# Patient Record
Sex: FEMALE | Race: WHITE | Employment: OTHER | URBAN - NONMETROPOLITAN AREA
[De-identification: names, ages, dates, MRNs, and addresses within clinical notes are randomized per-mention and may not be internally consistent; named-entity substitution may affect disease eponyms.]

---

## 2017-01-12 ENCOUNTER — TELEPHONE (OUTPATIENT)
Dept: FAMILY MEDICINE | Facility: CLINIC | Age: 72
End: 2017-01-12

## 2017-01-18 ENCOUNTER — OFFICE VISIT (OUTPATIENT)
Dept: FAMILY MEDICINE | Facility: CLINIC | Age: 72
End: 2017-01-18
Payer: COMMERCIAL

## 2017-01-18 VITALS
SYSTOLIC BLOOD PRESSURE: 134 MMHG | WEIGHT: 280 LBS | HEIGHT: 65 IN | DIASTOLIC BLOOD PRESSURE: 68 MMHG | BODY MASS INDEX: 46.65 KG/M2 | HEART RATE: 84 BPM | TEMPERATURE: 98.7 F | RESPIRATION RATE: 20 BRPM

## 2017-01-18 DIAGNOSIS — Z23 NEED FOR PROPHYLACTIC VACCINATION AND INOCULATION AGAINST INFLUENZA: ICD-10-CM

## 2017-01-18 DIAGNOSIS — J45.30 MILD PERSISTENT ASTHMA, UNCOMPLICATED: Chronic | ICD-10-CM

## 2017-01-18 DIAGNOSIS — I10 BENIGN ESSENTIAL HYPERTENSION: ICD-10-CM

## 2017-01-18 DIAGNOSIS — Z13.6 CARDIOVASCULAR SCREENING; LDL GOAL LESS THAN 100: ICD-10-CM

## 2017-01-18 DIAGNOSIS — E66.01 MORBID OBESITY WITH BMI OF 45.0-49.9, ADULT (H): Chronic | ICD-10-CM

## 2017-01-18 DIAGNOSIS — E11.9 TYPE 2 DIABETES MELLITUS WITHOUT COMPLICATION, WITHOUT LONG-TERM CURRENT USE OF INSULIN (H): Primary | ICD-10-CM

## 2017-01-18 DIAGNOSIS — E78.1 HYPERTRIGLYCERIDEMIA: ICD-10-CM

## 2017-01-18 LAB
ALBUMIN SERPL-MCNC: 3.6 G/DL (ref 3.4–5)
ALP SERPL-CCNC: 120 U/L (ref 40–150)
ALT SERPL W P-5'-P-CCNC: 72 U/L (ref 0–50)
ANION GAP SERPL CALCULATED.3IONS-SCNC: 13 MMOL/L (ref 3–14)
AST SERPL W P-5'-P-CCNC: 52 U/L (ref 0–45)
BILIRUB SERPL-MCNC: 0.5 MG/DL (ref 0.2–1.3)
BUN SERPL-MCNC: 16 MG/DL (ref 7–30)
CALCIUM SERPL-MCNC: 9 MG/DL (ref 8.5–10.1)
CHLORIDE SERPL-SCNC: 107 MMOL/L (ref 94–109)
CHOLEST SERPL-MCNC: 166 MG/DL
CO2 SERPL-SCNC: 18 MMOL/L (ref 20–32)
CREAT SERPL-MCNC: 0.81 MG/DL (ref 0.52–1.04)
GFR SERPL CREATININE-BSD FRML MDRD: 70 ML/MIN/1.7M2
GLUCOSE SERPL-MCNC: 176 MG/DL (ref 70–99)
HBA1C MFR BLD: 7.7 % (ref 4.3–6)
HDLC SERPL-MCNC: 55 MG/DL
LDLC SERPL CALC-MCNC: 55 MG/DL
NONHDLC SERPL-MCNC: 111 MG/DL
POTASSIUM SERPL-SCNC: 4 MMOL/L (ref 3.4–5.3)
PROT SERPL-MCNC: 7.2 G/DL (ref 6.8–8.8)
SODIUM SERPL-SCNC: 138 MMOL/L (ref 133–144)
T4 FREE SERPL-MCNC: 1.12 NG/DL (ref 0.76–1.46)
TRIGL SERPL-MCNC: 281 MG/DL
TSH SERPL DL<=0.005 MIU/L-ACNC: 5 MU/L (ref 0.4–4)

## 2017-01-18 PROCEDURE — 83036 HEMOGLOBIN GLYCOSYLATED A1C: CPT | Performed by: NURSE PRACTITIONER

## 2017-01-18 PROCEDURE — 36415 COLL VENOUS BLD VENIPUNCTURE: CPT | Performed by: NURSE PRACTITIONER

## 2017-01-18 PROCEDURE — 80061 LIPID PANEL: CPT | Performed by: NURSE PRACTITIONER

## 2017-01-18 PROCEDURE — G0008 ADMIN INFLUENZA VIRUS VAC: HCPCS | Performed by: NURSE PRACTITIONER

## 2017-01-18 PROCEDURE — 84439 ASSAY OF FREE THYROXINE: CPT | Performed by: NURSE PRACTITIONER

## 2017-01-18 PROCEDURE — 99207 C FOOT EXAM  NO CHARGE: CPT | Performed by: NURSE PRACTITIONER

## 2017-01-18 PROCEDURE — 99214 OFFICE O/P EST MOD 30 MIN: CPT | Mod: 25 | Performed by: NURSE PRACTITIONER

## 2017-01-18 PROCEDURE — 80053 COMPREHEN METABOLIC PANEL: CPT | Performed by: NURSE PRACTITIONER

## 2017-01-18 PROCEDURE — 90662 IIV NO PRSV INCREASED AG IM: CPT | Performed by: NURSE PRACTITIONER

## 2017-01-18 PROCEDURE — 84443 ASSAY THYROID STIM HORMONE: CPT | Performed by: NURSE PRACTITIONER

## 2017-01-18 RX ORDER — VALSARTAN 320 MG/1
320 TABLET ORAL DAILY
Qty: 30 TABLET | Refills: 11 | Status: SHIPPED | OUTPATIENT
Start: 2017-01-18 | End: 2018-02-05

## 2017-01-18 RX ORDER — HYDRALAZINE HYDROCHLORIDE 50 MG/1
100 TABLET, FILM COATED ORAL 2 TIMES DAILY
Qty: 30 TABLET | Refills: 11 | Status: SHIPPED | OUTPATIENT
Start: 2017-01-18 | End: 2017-01-18

## 2017-01-18 RX ORDER — VERAPAMIL HYDROCHLORIDE 240 MG/1
240 TABLET, FILM COATED, EXTENDED RELEASE ORAL DAILY
Qty: 30 TABLET | Refills: 11 | Status: SHIPPED | OUTPATIENT
Start: 2017-01-18 | End: 2017-11-09 | Stop reason: ALTCHOICE

## 2017-01-18 RX ORDER — FUROSEMIDE 20 MG
20 TABLET ORAL DAILY
Qty: 30 TABLET | Refills: 11 | Status: SHIPPED | OUTPATIENT
Start: 2017-01-18 | End: 2017-12-04

## 2017-01-18 RX ORDER — HYDRALAZINE HYDROCHLORIDE 100 MG/1
100 TABLET, FILM COATED ORAL 2 TIMES DAILY
Qty: 60 TABLET | Refills: 11 | Status: SHIPPED | OUTPATIENT
Start: 2017-01-18 | End: 2017-08-29 | Stop reason: DRUGHIGH

## 2017-01-18 RX ORDER — LIRAGLUTIDE 6 MG/ML
1.2 INJECTION SUBCUTANEOUS DAILY
Qty: 3 ML | Refills: 3 | Status: SHIPPED | OUTPATIENT
Start: 2017-01-18 | End: 2017-05-31

## 2017-01-18 NOTE — PATIENT INSTRUCTIONS
Labs are pending    Diet- continue eating fruits/veggies    Exercise- up/down stairs, ensure 10 minute walk daily. GREAT JOB!!    When you can, get your eye exam

## 2017-01-18 NOTE — PROGRESS NOTES
SUBJECTIVE:                                                    Lucia Bobo is a 71 year old female who presents to clinic today for the following health issues:        Diabetes Follow-up    Patient is checking blood sugars: twice daily.  Results are as follows:         Average 130-160    Diabetic concerns: None     Symptoms of hypoglycemia (low blood sugar): none     Paresthesias (numbness or burning in feet) or sores: No     Date of last diabetic eye exam: Due   A1C      6.7   9/10/2015  A1C      6.9   3/23/2015  A1C      7.8   8/5/2014  A1C      6.8   5/7/2014  A1C      8.2   1/24/2014    Hyperlipidemia Follow-Up      Rate your low fat/cholesterol diet?: fair    Taking statin?  No    Other lipid medications/supplements?:  none     LDL CHOLESTEROL CALCULATED   Date Value Ref Range Status   09/30/2015 66 0 - 129 mg/dL Final     Comment:     LDL Cholesterol is the primary guide to therapy: LDL-cholesterol goal in high   risk patients is <100 mg/dL and in very high risk patients is <70 mg/dL.     08/05/2014 83 0 - 129 mg/dL Final     Comment:     LDL Cholesterol is the primary guide to therapy: LDL-cholesterol goal in high   risk patients is <100 mg/dL and in very high risk patients is <70 mg/dL.         Hypertension Follow-up      Outpatient blood pressures are not being checked.    Low Salt Diet: minimal added salt     BP Readings from Last 6 Encounters:   01/18/17 134/68   07/20/16 152/72   11/20/15 128/65   11/18/15 138/72   09/30/15 118/68   09/14/15 132/68     Due for eye exam, unable d/t financial situation currently    Asthma  ACT Total Scores 1/18/2017   ACT TOTAL SCORE -   ASTHMA ER VISITS -   ASTHMA HOSPITALIZATIONS -   ACT TOTAL SCORE (Goal Greater than or Equal to 20) 25   In the past 12 months, how many times did you visit the emergency room for your asthma without being admitted to the hospital? 0   In the past 12 months, how many times were you hospitalized overnight because of your asthma? 0          Amount of exercise or physical activity: Walking     Problems taking medications regularly: No    Medication side effects: Occasional diarrhea from Victoza     Diet: low salt, low fat/cholesterol and diabetic  HPI:     Patient Active Problem List   Diagnosis     Type 2 diabetes mellitus without complications (H)     CARDIOVASCULAR SCREENING; LDL GOAL LESS THAN 100     Advanced directives, counseling/discussion     Osteoarthritis of both knees     Morbid obesity with BMI of 45.0-49.9, adult (H)     Hypertriglyceridemia     Benign essential hypertension, BP goal <140/90     Mild persistent asthma, uncomplicated       Current outpatient prescriptions:      albuterol (2.5 MG/3ML) 0.083% nebulizer solution, Take 3 mLs by nebulization every 4 hours as needed for shortness of breath / dyspnea., Disp: 1 Box, Rfl: 1     albuterol (VENTOLIN HFA) 108 (90 BASE) MCG/ACT inhaler, Inhale 1-2 puffs into the lungs every 4 hours as needed, Disp: 1 Inhaler, Rfl: 3     aspirin 81 MG tablet, Take 1 tablet (81 mg) by mouth daily, Disp: 90 tablet, Rfl: 3     fluticasone-salmeterol (ADVAIR DISKUS) 250-50 MCG/DOSE diskus inhaler, Inhale 1 puff into the lungs 2 times daily, Disp: 3 Inhaler, Rfl: 3     furosemide (LASIX) 20 MG tablet, Take 1 tablet (20 mg) by mouth daily, Disp: 90 tablet, Rfl: 1     blood glucose monitoring (NO BRAND SPECIFIED) test strip, 1 strip by In Vitro route 2 times daily, Disp: 3 Box, Rfl: 3     hydrALAZINE (APRESOLINE) 50 MG tablet, Take 2 tablets (100 mg) by mouth 2 times daily, Disp: 360 tablet, Rfl: 3     insulin pen needle (BD SMILEY U/F) 32G X 4 MM, Use 1 daily or as directed., Disp: 100 each, Rfl: prn     ipratropium - albuterol 0.5 mg/2.5 mg/3 mL (DUONEB) 0.5-2.5 (3) MG/3ML nebulization, Inhale 1 vial (3 mLs) into the lungs every 6 hours as needed, Disp: 1 Box, Rfl: 3     liraglutide (VICTOZA) 18 MG/3ML soln, Inject 1.2 mg Subcutaneous daily, Disp: 1 Month, Rfl: 5     metFORMIN (GLUCOPHAGE) 1000 MG  "tablet, Take 1 tablet with breakfast and 1.5 tablets at dinner., Disp: 90 tablet, Rfl: 5     PHARMACIST CHOICE LANCETS MISC, Check blood sugars twice daily, Disp: 100 each, Rfl: 3     valsartan (DIOVAN) 320 MG tablet, Take 1 tablet (320 mg) by mouth daily, Disp: 90 tablet, Rfl: 1     verapamil (CALAN-SR) 240 MG tablet, Take 1 tablet (240 mg) by mouth daily, Disp: 90 tablet, Rfl: 1    Labs reviewed in EPIC  Problem list, Medication list, Allergies, and Medical/Social/Surgical histories reviewed in [facility] and updated as appropriate.      ROS:  Constitutional, HEENT, cardiovascular, pulmonary, gi and gu systems are negative, except as otherwise noted.  CONSTITUTIONAL:NEGATIVE for fever, chills, change in weight and obesity  RESP:NEGATIVE for significant cough or SOB and Hx asthma  CV: NEGATIVE for chest pain, palpitations or peripheral edema and Hx HTN  ENDOCRINE: NEGATIVE for temperature intolerance, skin/hair changes and Hx diabetes    PHYSICAL EXAM:   /68 mmHg  Pulse 84  Temp(Src) 98.7  F (37.1  C) (Tympanic)  Resp 20  Ht 5' 5\" (1.651 m)  Wt 280 lb (127.007 kg)  BMI 46.59 kg/m2  Body mass index is 46.59 kg/(m^2).  GENERAL APPEARANCE: healthy, alert, no distress and over weight  NECK: no adenopathy, no asymmetry, masses, or scars and thyroid normal to palpation  RESP: lungs clear to auscultation - no rales, rhonchi or wheezes  CV: regular rates and rhythm, normal S1 S2, no S3 or S4 and no murmur, click or rub  MS: extremities normal- no gross deformities noted and genu valgum  SKIN: no suspicious lesions or rashes  DIABETIC FOOT EXAM: normal DP and PT pulses, no trophic changes or ulcerative lesions, normal sensory exam, normal monofilament exam and dry skin  PSYCH: mentation appears normal and affect normal/bright    Flu shot given today  ASSESSMENT & PLAN:   Lucia was seen today for hypertension, diabetes and lipids.    Diagnoses and all orders for this visit:    Type 2 diabetes mellitus without " complication, without long-term current use of insulin (H), HgbA1c goal <7.0  Stable, continue medications  -     TSH with free T4 reflex  -     Hemoglobin A1c  -     Albumin Random Urine Quantitative  -     FOOT EXAM  -     Comprehensive metabolic panel (BMP + Alb, Alk Phos, ALT, AST, Total. Bili, TP)  -     liraglutide (VICTOZA) 18 MG/3ML soln; Inject 1.2 mg Subcutaneous daily    Benign essential hypertension, BP goal <140/90  Stable, controlled, continue medications  -     furosemide (LASIX) 20 MG tablet; Take 1 tablet (20 mg) by mouth daily  -     hydrALAZINE (APRESOLINE) 50 MG tablet; Take 2 tablets (100 mg) by mouth 2 times daily  -     valsartan (DIOVAN) 320 MG tablet; Take 1 tablet (320 mg) by mouth daily  -     verapamil (CALAN-SR) 240 MG CR tablet; Take 1 tablet (240 mg) by mouth daily    Morbid obesity with BMI of 45.0-49.9, adult (H)  Stable, working on walking and watching food    Mild persistent asthma, uncomplicated  Controlled, continue medications    CARDIOVASCULAR SCREENING; LDL GOAL LESS THAN 100  Controlled  -     Lipid Profile with reflex to direct LDL    Hypertriglyceridemia  Controlled  -     Lipid Profile with reflex to direct LDL          Patient Instructions     Labs are pending    Diet- continue eating fruits/veggies    Exercise- up/down stairs, ensure 10 minute walk daily. GREAT JOB!!    When you can, get your eye exam          Risks, benefits, side effects and rationale for treatment plan fully discussed with the patient and understanding expressed.    Keturah Chaney, [facility]

## 2017-01-18 NOTE — PROGRESS NOTES
Injectable Influenza Immunization Documentation    1.  Is the person to be vaccinated sick today?  No    2. Does the person to be vaccinated have an allergy to eggs or to a component of the vaccine?  No    3. Has the person to be vaccinated today ever had a serious reaction to influenza vaccine in the past?  No    4. Has the person to be vaccinated ever had Guillain-Lolo syndrome?  No     Form completed by Pt verbally

## 2017-01-18 NOTE — Clinical Note
[facility]  [address] [facility] [place] [address]  Phone: [phone]  Fax: [phone]    January 19, 2017    Lucia Bobo  [id number] [place] [id number]          Dear Ms. Bobo,    The results of your recent lab tests were: TSH is elevated. I'd like to repeat this lab in 3 weeks with LAB ONLY appointment to ensure this level is elevated before starting treatment for Hypothyroidism.  Cholesterol-this looks fine. CMP- Glucose is elevated, and ALT /AST have come down from a year ago.  HgbA1c has gone up again. I'd like you to focus on diet and exercise for the next 3 months. I want a repeat HgBA1c in 3 months LAB ONLY appointment. If still at this number or higher, I will have you sit with Michell Angulo to review your medications.    Enclosed is a copy of these results.  If you have any further questions or problems, please contact our office.    Sincerely,      Keturah Chaney, OPAL/ ana

## 2017-01-18 NOTE — Clinical Note
My Asthma Action Plan  Name: Lucia Bobo   YOB: 1945  Date: 1/18/2017   My doctor: Keturah Chaney   My clinic: [facility]      My Control Medicine: Fluticasone+salmeterol (Advair) -  Diskus 250/50 mcg          My Rescue Medicine: Albuterol (Proair/Ventolin/Proventil) HFA           My Asthma Severity: mild persistent  Avoid your asthma triggers!        GREEN ZONE   Good Control    I feel good    No cough or wheeze    Can work, sleep and play without asthma symptoms       Take your asthma control medicine every day.     1. If exercise triggers your asthma, take your rescue medication    15 minutes before exercise or sports, and    During exercise if you have asthma symptoms  2. Spacer to use with inhaler: If you have a spacer, make sure to use it with your inhaler             YELLOW ZONE Getting Worse  I have ANY of these:    I do not feel good    Cough or wheeze    Chest feels tight    Wake up at night   1. Keep taking your Green Zone medications  2. Start taking your rescue medicine:    every 20 minutes for up to 1 hour. Then every 4 hours for 24-48 hours.  3. If you stay in the Yellow Zone for more than 12-24 hours, contact your doctor.  4. If you do not return to the Green Zone in 12-24 hours or you get worse, start taking your oral steroid medicine if prescribed by your provider.           RED ZONE Medical Alert - Get Help  I have ANY of these:    I feel awful    Medicine is not helping    Breathing getting harder    Trouble walking or talking    Nose opens wide to breathe       1. Take your rescue medicine NOW  2. If your provider has prescribed an oral steroid medicine, start taking it NOW  3. Call your doctor NOW  4. If you are still in the Red Zone after 20 minutes and you have not reached your doctor:    Take your rescue medicine again and    Call 911 or go to the emergency room right away    See your regular doctor within 2 weeks of an Emergency Room or Urgent  Care visit for follow-up treatment.            Electronically signed by: Mi Sanz, January 18, 2017    Annual Reminders:  Meet with Asthma Educator,  Flu Shot in the Fall, consider Pneumonia Vaccination for patients with asthma (aged 19 and older).    Pharmacy:    [id number] - [place], [place] - [address] [facility] [place] [address]                    Asthma Triggers  How To Control Things That Make Your Asthma Worse    Triggers are things that make your asthma worse.  Look at the list below to help you find your triggers and what you can do about them.  You can help prevent asthma flare-ups by staying away from your triggers.      Trigger                                                          What you can do   Cigarette Smoke  Tobacco smoke can make asthma worse. Do not allow smoking in your home, car or around you.  Be sure no one smokes at a child s day care or school.  If you smoke, ask your health care provider for ways to help you quit.  Ask family members to quit too.  Ask your health care provider for a referral to Quit Plan to help you quit smoking, or call 1-815-619PLAN.     Colds, Flu, Bronchitis  These are common triggers of asthma. Wash your hands often.  Don t touch your eyes, nose or mouth.  Get a flu shot every year.     Dust Mites  These are tiny bugs that live in cloth or carpet. They are too small to see. Wash sheets and blankets in hot water every week.   Encase pillows and mattress in dust mite proof covers.  Avoid having carpet if you can. If you have carpet, vacuum weekly.   Use a dust mask and HEPA vacuum.   Pollen and Outdoor Mold  Some people are allergic to trees, grass, or weed pollen, or molds. Try to keep your windows closed.  Limit time out doors when pollen count is high.   Ask you health care provider about taking medicine during allergy season.     Animal Dander  Some people are allergic to skin flakes, urine or saliva  from pets with fur or feathers. Keep pets with fur or feathers out of your home.    If you can t keep the pet outdoors, then keep the pet out of your bedroom.  Keep the bedroom door closed.  Keep pets off cloth furniture and away from stuffed toys.     Mice, Rats, and Cockroaches  Some people are allergic to the waste from these pests.   Cover food and garbage.  Clean up spills and food crumbs.  Store grease in the refrigerator.   Keep food out of the bedroom.   Indoor Mold  This can be a trigger if your home has high moisture. Fix leaking faucets, pipes, or other sources of water.   Clean moldy surfaces.  Dehumidify basement if it is damp and smelly.   Smoke, Strong Odors, and Sprays  These can reduce air quality. Stay away from strong odors and sprays, such as perfume, powder, hair spray, paints, smoke incense, paint, cleaning products, candles and new carpet.   Exercise or Sports  Some people with asthma have this trigger. Be active!  Ask your doctor about taking medicine before sports or exercise to prevent symptoms.    Warm up for 5-10 minutes before and after sports or exercise.     Other Triggers of Asthma  Cold air:  Cover your nose and mouth with a scarf.  Sometimes laughing or crying can be a trigger.  Some medicines and food can trigger asthma.

## 2017-01-18 NOTE — MR AVS SNAPSHOT
"              After Visit Summary   1/18/2017    Lucia Bobo    MRN: [id number]           Patient Information     Date Of Birth          1945        Visit Information        Provider Department      1/18/2017 8:20 AM Keturah Chaney CNP [facility]        Today's Diagnoses     Type 2 diabetes mellitus without complication, without long-term current use of insulin (H), HgbA1c goal <7.0    -  1     Benign essential hypertension, BP goal <140/90         Morbid obesity with BMI of 45.0-49.9, adult (H)         Mild persistent asthma, uncomplicated         CARDIOVASCULAR SCREENING; LDL GOAL LESS THAN 100         Hypertriglyceridemia           Care Instructions      Labs are pending    Diet- continue eating fruits/veggies    Exercise- up/down stairs, ensure 10 minute walk daily. GREAT JOB!!    When you can, get your eye exam            Follow-ups after your visit        Who to contact     If you have questions or need follow up information about today's clinic visit or your schedule please contact [facility] directly at [phone].  Normal or non-critical lab and imaging results will be communicated to you by [employer], letter or phone within 4 business days after the clinic has received the results. If you do not hear from us within 7 days, please contact the clinic through [employer] or phone. If you have a critical or abnormal lab result, we will notify you by phone as soon as possible.  Submit refill requests through [employer] or call your pharmacy and they will forward the refill request to us. Please allow 3 business days for your refill to be completed.          Additional Information About Your Visit        [employer] Information     [employer] lets you send messages to your doctor, view your test results, renew your prescriptions, schedule appointments and more. To sign up, go to [email or web address] . Click on \"Log in\" on the left side of the screen, which will take you to " "the Welcome page. Then click on \"Sign up Now\" on the right side of the page.     You will be asked to enter the access code listed below, as well as some personal information. Please follow the directions to create your username and password.     Your access code is: [email or web address]  Expires: 2017  8:55 AM     Your access code will  in 90 days. If you need help or a new code, please call your [place] clinic or [phone].        Care EveryWhere ID     This is your Care EveryWhere ID. This could be used by other organizations to access your [place] medical records  [id number]        Your Vitals Were     Pulse Temperature Respirations Height BMI (Body Mass Index)       84 98.7  F (37.1  C) (Tympanic) 20 5' 5\" (1.651 m) 46.59 kg/m2        Blood Pressure from Last 3 Encounters:   17 134/68   16 152/72   11/20/15 128/65    Weight from Last 3 Encounters:   17 280 lb (127.007 kg)   16 284 lb 12.8 oz (129.184 kg)   11/20/15 294 lb (133.358 kg)              We Performed the Following     Albumin Random Urine Quantitative     Asthma Action Plan (AAP)     Comprehensive metabolic panel (BMP + Alb, Alk Phos, ALT, AST, Total. Bili, TP)     FOOT EXAM     Hemoglobin A1c     Lipid Profile with reflex to direct LDL     TSH with free T4 reflex          Where to get your medicines      These medications were sent to [facility] - [address] [address]     Phone:  [phone]    - furosemide 20 MG tablet  - hydrALAZINE 50 MG tablet  - liraglutide 18 MG/3ML soln  - valsartan 320 MG tablet  - verapamil 240 MG CR tablet       Primary Care Provider Office Phone # Fax #    Keturah Chaney [phone]       [facility] [address] [address]        Thank you!     Thank you for choosing [facility]  for your care. Our goal is always to provide you with excellent care. " Hearing back from our patients is one way we can continue to improve our services. Please take a few minutes to complete the written survey that you may receive in the mail after your visit with us. Thank you!             Your Updated Medication List - Protect others around you: Learn how to safely use, store and throw away your medicines at www.disposemymeds.org.          This list is accurate as of: 1/18/17  8:55 AM.  Always use your most recent med list.                   Brand Name Dispense Instructions for use    * albuterol (2.5 MG/3ML) 0.083% neb solution     1 Box    Take 3 mLs by nebulization every 4 hours as needed for shortness of breath / dyspnea.       * albuterol 108 (90 BASE) MCG/ACT Inhaler    VENTOLIN HFA    1 Inhaler    Inhale 1-2 puffs into the lungs every 4 hours as needed       aspirin 81 MG tablet     90 tablet    Take 1 tablet (81 mg) by mouth daily       blood glucose monitoring test strip    no brand specified    3 Box    1 strip by In Vitro route 2 times daily       fluticasone-salmeterol 250-50 MCG/DOSE diskus inhaler    ADVAIR DISKUS    3 Inhaler    Inhale 1 puff into the lungs 2 times daily       furosemide 20 MG tablet    LASIX    30 tablet    Take 1 tablet (20 mg) by mouth daily       hydrALAZINE 50 MG tablet    APRESOLINE    30 tablet    Take 2 tablets (100 mg) by mouth 2 times daily       insulin pen needle 32G X 4 MM    BD SMILEY U/F    100 each    Use 1 daily or as directed.       ipratropium - albuterol 0.5 mg/2.5 mg/3 mL 0.5-2.5 (3) MG/3ML neb solution    DUONEB    1 Box    Inhale 1 vial (3 mLs) into the lungs every 6 hours as needed       liraglutide 18 MG/3ML soln    VICTOZA    3 mL    Inject 1.2 mg Subcutaneous daily       metFORMIN 1000 MG tablet    GLUCOPHAGE    90 tablet    Take 1 tablet with breakfast and 1.5 tablets at dinner.       PHARMACIST CHOICE LANCETS Misc     100 each    Check blood sugars twice daily       valsartan 320 MG tablet    DIOVAN    30 tablet    Take 1  tablet (320 mg) by mouth daily       verapamil 240 MG CR tablet    CALAN-SR    30 tablet    Take 1 tablet (240 mg) by mouth daily       * Notice:  This list has 2 medication(s) that are the same as other medications prescribed for you. Read the directions carefully, and ask your doctor or other care provider to review them with you.

## 2017-01-18 NOTE — NURSING NOTE
"Chief Complaint   Patient presents with     Hypertension     Diabetes     Lipids       Initial /68 mmHg  Pulse 84  Temp(Src) 98.7  F (37.1  C) (Tympanic)  Resp 20  Wt 280 lb (127.007 kg) Estimated body mass index is 46.59 kg/(m^2) as calculated from the following:    Height as of 11/20/15: 5' 5\" (1.651 m).    Weight as of this encounter: 280 lb (127.007 kg).  BP completed using cuff size: large  "

## 2017-01-19 DIAGNOSIS — E11.9 TYPE 2 DIABETES MELLITUS WITHOUT COMPLICATION, WITHOUT LONG-TERM CURRENT USE OF INSULIN (H): ICD-10-CM

## 2017-01-19 DIAGNOSIS — R79.89 ELEVATED TSH: Primary | ICD-10-CM

## 2017-01-19 LAB
CREAT UR-MCNC: 58 MG/DL
MICROALBUMIN UR-MCNC: 10 MG/L
MICROALBUMIN/CREAT UR: 16.93 MG/G CR (ref 0–25)

## 2017-01-19 PROCEDURE — 82043 UR ALBUMIN QUANTITATIVE: CPT | Performed by: NURSE PRACTITIONER

## 2017-01-19 ASSESSMENT — ASTHMA QUESTIONNAIRES: ACT_TOTALSCORE: 25

## 2017-01-19 NOTE — PROGRESS NOTES
Quick Note:    Normal albumin  Please send her a copy of lab/test results.   Thanks. Keturah Chaney, LUIS M      ______

## 2017-01-19 NOTE — PROGRESS NOTES
Quick Note:    TSH is elevated. I'd like to repeat this lab in 3 weeks with LAB ONLY appointment to ensure this level is elevated before starting treatment for Hypothyroidism.  Cholesterol-this looks fine  CMP- Glucose is elevated, and ALT /AST have come down from a year ago  HgbA1c has gone up again. I'd like you to focus on diet and exercise for the next 3 months. I want a repeat HgBA1c in 3 months LAB ONLY appointment. If still at this number or higher, I will have you sit with Michell Angulo to review your medications.  Please send her a copy of lab/test results.   Thanks. Keturah Chaney, LUIS M      ______

## 2017-03-01 DIAGNOSIS — E11.9 TYPE 2 DIABETES MELLITUS WITHOUT COMPLICATIONS (H): ICD-10-CM

## 2017-03-01 NOTE — TELEPHONE ENCOUNTER
Metformin         Last Written Prescription Date: 07/20/2016  Last Fill Quantity: 90, # refills: 5  Last Office Visit with [facility], [facility] or [facility] prescribing provider:  01/18/2017        BP Readings from Last 3 Encounters:   01/18/17 134/68   07/20/16 152/72   11/20/15 128/65     Lab Results   Component Value Date    MICROL 10 01/19/2017     No results found for: MICROALBUMIN  Creatinine   Date Value Ref Range Status   01/18/2017 0.81 0.52 - 1.04 mg/dL Final   ]  GFR Estimate   Date Value Ref Range Status   01/18/2017 70 >60 mL/min/1.7m2 Final     Comment:     Non  GFR Calc   11/18/2015 62 >60 mL/min/1.7m2 Final     Comment:     Non  GFR Calc   09/10/2015 69 >60 mL/min/1.7m2 Final     Comment:     Non  GFR Calc     GFR Estimate If Black   Date Value Ref Range Status   01/18/2017 84 >60 mL/min/1.7m2 Final     Comment:      GFR Calc   11/18/2015 75 >60 mL/min/1.7m2 Final     Comment:      GFR Calc   09/10/2015 83 >60 mL/min/1.7m2 Final     Comment:      GFR Calc     Lab Results   Component Value Date    CHOL 166 01/18/2017     Lab Results   Component Value Date    HDL 55 01/18/2017     Lab Results   Component Value Date    LDL 55 01/18/2017     Lab Results   Component Value Date    TRIG 281 01/18/2017     Lab Results   Component Value Date    CHOLHDLRATIO 3.3 09/30/2015     Lab Results   Component Value Date    AST 52 01/18/2017     Lab Results   Component Value Date    ALT 72 01/18/2017     Lab Results   Component Value Date    A1C 7.7 01/18/2017    A1C 6.7 09/10/2015    A1C 6.9 03/23/2015    A1C 7.8 08/05/2014    A1C 6.8 05/07/2014     Potassium   Date Value Ref Range Status   01/18/2017 4.0 3.4 - 5.3 mmol/L Final       Korin Gardner [facility]  Float Technician  [place]

## 2017-03-02 NOTE — TELEPHONE ENCOUNTER
Prescription approved per [facility] Refill Protocol.  Chayito Sanz PharmD   [facility]  [email or web address]   Float pharmacist on behalf of [facility].

## 2017-05-04 ENCOUNTER — TRANSFERRED RECORDS (OUTPATIENT)
Dept: HEALTH INFORMATION MANAGEMENT | Facility: CLINIC | Age: 72
End: 2017-05-04

## 2017-05-31 DIAGNOSIS — E11.9 TYPE 2 DIABETES MELLITUS WITHOUT COMPLICATION, WITHOUT LONG-TERM CURRENT USE OF INSULIN (H): ICD-10-CM

## 2017-05-31 RX ORDER — LIRAGLUTIDE 6 MG/ML
INJECTION SUBCUTANEOUS
Qty: 6 ML | Refills: 2 | Status: SHIPPED | OUTPATIENT
Start: 2017-05-31 | End: 2017-08-29

## 2017-05-31 NOTE — TELEPHONE ENCOUNTER
liraglutide (VICTOZA) 18 MG/3ML soln         Last Written Prescription Date: 1/18/17  Last Fill Quantity: 3 ml, # refills: 3  Last Office Visit with G, [facility] or [facility] prescribing provider:  2/15/17        BP Readings from Last 3 Encounters:   01/18/17 134/68   07/20/16 152/72   11/20/15 128/65     Lab Results   Component Value Date    MICROL 10 01/19/2017     Lab Results   Component Value Date    UMALCR 16.93 01/19/2017     Creatinine   Date Value Ref Range Status   01/18/2017 0.81 0.52 - 1.04 mg/dL Final   ]  GFR Estimate   Date Value Ref Range Status   01/18/2017 70 >60 mL/min/1.7m2 Final     Comment:     Non  GFR Calc   11/18/2015 62 >60 mL/min/1.7m2 Final     Comment:     Non  GFR Calc   09/10/2015 69 >60 mL/min/1.7m2 Final     Comment:     Non  GFR Calc     GFR Estimate If Black   Date Value Ref Range Status   01/18/2017 84 >60 mL/min/1.7m2 Final     Comment:      GFR Calc   11/18/2015 75 >60 mL/min/1.7m2 Final     Comment:      GFR Calc   09/10/2015 83 >60 mL/min/1.7m2 Final     Comment:      GFR Calc     Lab Results   Component Value Date    CHOL 166 01/18/2017     Lab Results   Component Value Date    HDL 55 01/18/2017     Lab Results   Component Value Date    LDL 55 01/18/2017     Lab Results   Component Value Date    TRIG 281 01/18/2017     Lab Results   Component Value Date    CHOLHDLRATIO 3.3 09/30/2015     Lab Results   Component Value Date    AST 52 01/18/2017     Lab Results   Component Value Date    ALT 72 01/18/2017     Lab Results   Component Value Date    A1C 7.7 01/18/2017    A1C 6.7 09/10/2015    A1C 6.9 03/23/2015    A1C 7.8 08/05/2014    A1C 6.8 05/07/2014     Potassium   Date Value Ref Range Status   01/18/2017 4.0 3.4 - 5.3 mmol/L Final

## 2017-07-31 DIAGNOSIS — J45.30 MILD PERSISTENT ASTHMA, UNCOMPLICATED: Chronic | ICD-10-CM

## 2017-07-31 DIAGNOSIS — E11.9 TYPE 2 DIABETES MELLITUS WITHOUT COMPLICATIONS (H): ICD-10-CM

## 2017-07-31 DIAGNOSIS — I10 BENIGN ESSENTIAL HYPERTENSION: ICD-10-CM

## 2017-07-31 RX ORDER — HYDRALAZINE HYDROCHLORIDE 50 MG/1
TABLET, FILM COATED ORAL
Qty: 120 TABLET | Refills: 0 | Status: SHIPPED | OUTPATIENT
Start: 2017-07-31 | End: 2017-08-29

## 2017-07-31 NOTE — TELEPHONE ENCOUNTER
fluticasone-salmeterol (ADVAIR DISKUS) 250-50 MCG/DOSE diskus inhaler       Last Written Prescription Date: 7/20/16  Last Fill Quantity: 3, # refills: 3    Last Office Visit with [facility] or  [facility] prescribing provider:  1/18/17   Future Office Visit:       Date of Last Asthma Action Plan Letter:   Asthma Action Plan Q1 Year    Topic Date Due     Asthma Action Plan - yearly  01/18/2018      Asthma Control Test:   ACT Total Scores 1/18/2017   ACT TOTAL SCORE -   ASTHMA ER VISITS -   ASTHMA HOSPITALIZATIONS -   ACT TOTAL SCORE (Goal Greater than or Equal to 20) 25   In the past 12 months, how many times did you visit the emergency room for your asthma without being admitted to the hospital? 0   In the past 12 months, how many times were you hospitalized overnight because of your asthma? 0       Date of Last Spirometry Test:   No results found for this or any previous visit.          hydrALAZINE (APRESOLINE) 100 MG TABS tablet      Last Written Prescription Date: 1/18/17  Last Fill Quantity: 60, # refills: 11  Last Office Visit with [facility] or [facility] prescribing provider: 1/18/17       Potassium   Date Value Ref Range Status   01/18/2017 4.0 3.4 - 5.3 mmol/L Final     Creatinine   Date Value Ref Range Status   01/18/2017 0.81 0.52 - 1.04 mg/dL Final     BP Readings from Last 3 Encounters:   01/18/17 134/68   07/20/16 152/72   11/20/15 128/65       metFORMIN (GLUCOPHAGE) 1000 MG tablet         Last Written Prescription Date: 3/2/17  Last Fill Quantity: 75, # refills: 32  Last Office Visit with [facility] or [facility] prescribing provider:  1/18/17        BP Readings from Last 3 Encounters:   01/18/17 134/68   07/20/16 152/72   11/20/15 128/65     Lab Results   Component Value Date    MICROL 10 01/19/2017     Lab Results   Component Value Date    UMALCR 16.93 01/19/2017     Creatinine   Date Value Ref Range Status   01/18/2017 0.81 0.52 - 1.04 mg/dL Final   ]  GFR Estimate   Date Value Ref Range Status   01/18/2017 70  >60 mL/min/1.7m2 Final     Comment:     Non  GFR Calc   11/18/2015 62 >60 mL/min/1.7m2 Final     Comment:     Non  GFR Calc   09/10/2015 69 >60 mL/min/1.7m2 Final     Comment:     Non  GFR Calc     GFR Estimate If Black   Date Value Ref Range Status   01/18/2017 84 >60 mL/min/1.7m2 Final     Comment:      GFR Calc   11/18/2015 75 >60 mL/min/1.7m2 Final     Comment:      GFR Calc   09/10/2015 83 >60 mL/min/1.7m2 Final     Comment:      GFR Calc     Lab Results   Component Value Date    CHOL 166 01/18/2017     Lab Results   Component Value Date    HDL 55 01/18/2017     Lab Results   Component Value Date    LDL 55 01/18/2017     Lab Results   Component Value Date    TRIG 281 01/18/2017     Lab Results   Component Value Date    CHOLHDLRATIO 3.3 09/30/2015     Lab Results   Component Value Date    AST 52 01/18/2017     Lab Results   Component Value Date    ALT 72 01/18/2017     Lab Results   Component Value Date    A1C 7.7 01/18/2017    A1C 6.7 09/10/2015    A1C 6.9 03/23/2015    A1C 7.8 08/05/2014    A1C 6.8 05/07/2014     Potassium   Date Value Ref Range Status   01/18/2017 4.0 3.4 - 5.3 mmol/L Final       insulin pen needle (BD SMILEY U/F) 32G X 4 MM         Last Written Prescription Date: 7/20/16  Last Fill Quantity: 100, # refills: prn  Last Office Visit with G, [facility] or [facility] prescribing provider:  1/18/17        BP Readings from Last 3 Encounters:   01/18/17 134/68   07/20/16 152/72   11/20/15 128/65     Lab Results   Component Value Date    MICROL 10 01/19/2017     Lab Results   Component Value Date    UMALCR 16.93 01/19/2017     Creatinine   Date Value Ref Range Status   01/18/2017 0.81 0.52 - 1.04 mg/dL Final   ]  GFR Estimate   Date Value Ref Range Status   01/18/2017 70 >60 mL/min/1.7m2 Final     Comment:     Non  GFR Calc   11/18/2015 62 >60 mL/min/1.7m2 Final     Comment:     Non   GFR Calc   09/10/2015 69 >60 mL/min/1.7m2 Final     Comment:     Non  GFR Calc     GFR Estimate If Black   Date Value Ref Range Status   01/18/2017 84 >60 mL/min/1.7m2 Final     Comment:      GFR Calc   11/18/2015 75 >60 mL/min/1.7m2 Final     Comment:      GFR Calc   09/10/2015 83 >60 mL/min/1.7m2 Final     Comment:      GFR Calc     Lab Results   Component Value Date    CHOL 166 01/18/2017     Lab Results   Component Value Date    HDL 55 01/18/2017     Lab Results   Component Value Date    LDL 55 01/18/2017     Lab Results   Component Value Date    TRIG 281 01/18/2017     Lab Results   Component Value Date    CHOLHDLRATIO 3.3 09/30/2015     Lab Results   Component Value Date    AST 52 01/18/2017     Lab Results   Component Value Date    ALT 72 01/18/2017     Lab Results   Component Value Date    A1C 7.7 01/18/2017    A1C 6.7 09/10/2015    A1C 6.9 03/23/2015    A1C 7.8 08/05/2014    A1C 6.8 05/07/2014     Potassium   Date Value Ref Range Status   01/18/2017 4.0 3.4 - 5.3 mmol/L Final

## 2017-07-31 NOTE — TELEPHONE ENCOUNTER
Refills given with attached appt reminder message.  Spoke with [facility] who will talk to pt re: hydralazine dose as has 100mg tabs take 1 BID on profile.  Pharm will dispense dose per pharm recommendation/ pt preference.  JEFFREY Arias RN

## 2017-08-02 ENCOUNTER — TELEPHONE (OUTPATIENT)
Dept: FAMILY MEDICINE | Facility: CLINIC | Age: 72
End: 2017-08-02

## 2017-08-02 DIAGNOSIS — J45.30 MILD PERSISTENT ASTHMA, UNCOMPLICATED: Chronic | ICD-10-CM

## 2017-08-02 RX ORDER — ALBUTEROL SULFATE 90 UG/1
1-2 AEROSOL, METERED RESPIRATORY (INHALATION) EVERY 4 HOURS PRN
Qty: 1 INHALER | Refills: 1 | Status: SHIPPED | OUTPATIENT
Start: 2017-08-02 | End: 2019-03-27

## 2017-08-02 NOTE — TELEPHONE ENCOUNTER
albuterol (VENTOLIN HFA) 108 (90 BASE) MCG/ACT inhaler       Last Written Prescription Date: 7/20/16  Last Fill Quantity: 1, # refills: 3    Last Office Visit with [facility], [facility] or [facility] prescribing provider:  1/18/17   Future Office Visit:       Date of Last Asthma Action Plan Letter:   Asthma Action Plan Q1 Year    Topic Date Due     Asthma Action Plan - yearly  01/18/2018      Asthma Control Test:   ACT Total Scores 1/18/2017   ACT TOTAL SCORE -   ASTHMA ER VISITS -   ASTHMA HOSPITALIZATIONS -   ACT TOTAL SCORE (Goal Greater than or Equal to 20) 25   In the past 12 months, how many times did you visit the emergency room for your asthma without being admitted to the hospital? 0   In the past 12 months, how many times were you hospitalized overnight because of your asthma? 0       Date of Last Spirometry Test:   No results found for this or any previous visit.

## 2017-08-03 ASSESSMENT — ASTHMA QUESTIONNAIRES: ACT_TOTALSCORE: 24

## 2017-08-29 ENCOUNTER — OFFICE VISIT (OUTPATIENT)
Dept: FAMILY MEDICINE | Facility: CLINIC | Age: 72
End: 2017-08-29
Payer: COMMERCIAL

## 2017-08-29 VITALS
TEMPERATURE: 98 F | DIASTOLIC BLOOD PRESSURE: 76 MMHG | SYSTOLIC BLOOD PRESSURE: 148 MMHG | WEIGHT: 278 LBS | BODY MASS INDEX: 46.26 KG/M2 | RESPIRATION RATE: 20 BRPM | HEART RATE: 84 BPM

## 2017-08-29 DIAGNOSIS — E11.65 TYPE 2 DIABETES MELLITUS WITH HYPERGLYCEMIA, WITH LONG-TERM CURRENT USE OF INSULIN (H): Primary | ICD-10-CM

## 2017-08-29 DIAGNOSIS — E66.01 MORBID OBESITY WITH BMI OF 45.0-49.9, ADULT (H): Chronic | ICD-10-CM

## 2017-08-29 DIAGNOSIS — Z79.4 TYPE 2 DIABETES MELLITUS WITH HYPERGLYCEMIA, WITH LONG-TERM CURRENT USE OF INSULIN (H): Primary | ICD-10-CM

## 2017-08-29 DIAGNOSIS — I10 BENIGN ESSENTIAL HYPERTENSION: ICD-10-CM

## 2017-08-29 DIAGNOSIS — J45.30 MILD PERSISTENT ASTHMA, UNCOMPLICATED: Chronic | ICD-10-CM

## 2017-08-29 DIAGNOSIS — Z11.59 NEED FOR HEPATITIS C SCREENING TEST: ICD-10-CM

## 2017-08-29 DIAGNOSIS — E78.1 HYPERTRIGLYCERIDEMIA: ICD-10-CM

## 2017-08-29 LAB
HBA1C MFR BLD: 7.8 % (ref 4.3–6)
HCV AB SERPL QL IA: NONREACTIVE

## 2017-08-29 PROCEDURE — 99214 OFFICE O/P EST MOD 30 MIN: CPT | Performed by: NURSE PRACTITIONER

## 2017-08-29 PROCEDURE — 86803 HEPATITIS C AB TEST: CPT | Performed by: NURSE PRACTITIONER

## 2017-08-29 PROCEDURE — 36415 COLL VENOUS BLD VENIPUNCTURE: CPT | Performed by: NURSE PRACTITIONER

## 2017-08-29 PROCEDURE — 83036 HEMOGLOBIN GLYCOSYLATED A1C: CPT | Performed by: NURSE PRACTITIONER

## 2017-08-29 RX ORDER — LIRAGLUTIDE 6 MG/ML
INJECTION SUBCUTANEOUS
Qty: 6 ML | Refills: 2 | Status: SHIPPED | OUTPATIENT
Start: 2017-08-29 | End: 2017-08-30

## 2017-08-29 RX ORDER — HYDRALAZINE HYDROCHLORIDE 100 MG/1
TABLET, FILM COATED ORAL
Qty: 60 TABLET | Refills: 5 | Status: SHIPPED | OUTPATIENT
Start: 2017-08-29 | End: 2017-11-09

## 2017-08-29 RX ORDER — HYDRALAZINE HYDROCHLORIDE 25 MG/1
25 TABLET, FILM COATED ORAL DAILY
Qty: 30 TABLET | Refills: 5 | Status: SHIPPED | OUTPATIENT
Start: 2017-08-29 | End: 2017-10-24

## 2017-08-29 NOTE — PATIENT INSTRUCTIONS
Diabetes  If HgbA1c is not <7, repeat HgbA1c lab only every 3 months.   If HgbA1c is under 7, then repeat HgbA1c lab only every 6 months  Please have [facility] fax us your last eye exam results  Medications refilled    Hypertension  Add 25 mg Hadralazine to your morning dose of 100 mg   Recheck BP in 1 week with nurse only    Continue to work on exercise and weight loss. Fantastic!!!  Wt Readings from Last 10 Encounters:   08/29/17 278 lb (126.1 kg)   01/18/17 280 lb (127 kg)   07/20/16 284 lb 12.8 oz (129.2 kg)   11/20/15 294 lb (133.4 kg)   11/18/15 294 lb (133.4 kg)   09/30/15 297 lb (134.7 kg)   09/14/15 295 lb (133.8 kg)   09/03/15 296 lb (134.3 kg)   03/23/15 (!) 303 lb (137.4 kg)   08/05/14 (!) 310 lb (140.6 kg)

## 2017-08-29 NOTE — NURSING NOTE
"Chief Complaint   Patient presents with     Diabetes       Initial /76 (BP Location: Right arm, Patient Position: Sitting, Cuff Size: Adult Large)  Pulse 84  Temp 98  F (36.7  C) (Tympanic)  Resp 20  Wt 278 lb (126.1 kg)  BMI 46.26 kg/m2 Estimated body mass index is 46.26 kg/(m^2) as calculated from the following:    Height as of 1/18/17: 5' 5\" (1.651 m).    Weight as of this encounter: 278 lb (126.1 kg).  Medication Reconciliation: complete    Health Maintenance that is potentially due pending provider review:  Mammogram    Pt declines.    Is there anyone who you would like to be able to receive your results? No  If yes have patient fill out JAZMYN    "

## 2017-08-29 NOTE — MR AVS SNAPSHOT
After Visit Summary   8/29/2017    Lucia Bobo    MRN: [id number]           Patient Information     Date Of Birth          1945        Visit Information        Provider Department      8/29/2017 8:40 AM Keturah Chaney CNP [facility]        Today's Diagnoses     Type 2 diabetes mellitus with hyperglycemia, with long-term current use of insulin (H) HgbA1c goal <7    -  1    Benign essential hypertension, BP goal <140/90        Morbid obesity with BMI of 45.0-49.9, adult (H)        Mild persistent asthma, uncomplicated        Hypertriglyceridemia, LDL goal <100        Need for hepatitis C screening test          Care Instructions    Diabetes  If HgbA1c is not <7, repeat HgbA1c lab only every 3 months.   If HgbA1c is under 7, then repeat HgbA1c lab only every 6 months  Please have [facility] fax us your last eye exam results  Medications refilled    Hypertension  Add 25 mg Hadralazine to your morning dose of 100 mg   Recheck BP in 1 week with nurse only    Continue to work on exercise and weight loss. Fantastic!!!  Wt Readings from Last 10 Encounters:   08/29/17 278 lb (126.1 kg)   01/18/17 280 lb (127 kg)   07/20/16 284 lb 12.8 oz (129.2 kg)   11/20/15 294 lb (133.4 kg)   11/18/15 294 lb (133.4 kg)   09/30/15 297 lb (134.7 kg)   09/14/15 295 lb (133.8 kg)   09/03/15 296 lb (134.3 kg)   03/23/15 (!) 303 lb (137.4 kg)   08/05/14 (!) 310 lb (140.6 kg)               Follow-ups after your visit        Who to contact     If you have questions or need follow up information about today's clinic visit or your schedule please contact [facility] directly at [phone].  Normal or non-critical lab and imaging results will be communicated to you by MyChart, letter or phone within 4 business days after the clinic has received the results. If you do not hear from us within 7 days, please contact the clinic through MyChart or phone. If you have a critical or abnormal lab  "result, we will notify you by phone as soon as possible.  Submit refill requests through Archive or call your pharmacy and they will forward the refill request to us. Please allow 3 business days for your refill to be completed.          Additional Information About Your Visit        [facility] Information     Archive lets you send messages to your doctor, view your test results, renew your prescriptions, schedule appointments and more. To sign up, go to [email or web address] . Click on \"Log in\" on the left side of the screen, which will take you to the Welcome page. Then click on \"Sign up Now\" on the right side of the page.     You will be asked to enter the access code listed below, as well as some personal information. Please follow the directions to create your username and password.     Your access code is: [email or web address]  Expires: 2017  9:12 AM     Your access code will  in 90 days. If you need help or a new code, please call your [place] clinic or [phone].        Care EveryWhere ID     This is your Care EveryWhere ID. This could be used by other organizations to access your [place] medical records  [id number]        Your Vitals Were     Pulse Temperature Respirations BMI (Body Mass Index)          84 98  F (36.7  C) (Tympanic) 20 46.26 kg/m2         Blood Pressure from Last 3 Encounters:   17 158/76   17 134/68   16 152/72    Weight from Last 3 Encounters:   17 278 lb (126.1 kg)   17 280 lb (127 kg)   16 284 lb 12.8 oz (129.2 kg)              We Performed the Following     Hemoglobin A1c     Hepatitis C antibody          Today's Medication Changes          These changes are accurate as of: 17  9:12 AM.  If you have any questions, ask your nurse or doctor.               Start taking these medicines.        Dose/Directions    blood glucose monitoring meter device kit   Commonly known as:  no brand specified   Used for:  Type 2 diabetes mellitus with " hyperglycemia, with long-term current use of insulin (H)   Started by:  Keturah Chaney CNP        Use to test blood sugar 2 times daily or as directed. Needs lancets and strips per insurance coverage.   Quantity:  1 kit   Refills:  0         These medicines have changed or have updated prescriptions.        Dose/Directions    fluticasone-salmeterol 250-50 MCG/DOSE diskus inhaler   Commonly known as:  ADVAIR DISKUS   This may have changed:  additional instructions   Used for:  Mild persistent asthma, uncomplicated   Changed by:  Keturah Chaney CNP        Dose:  1 puff   Inhale 1 puff into the lungs 2 times daily   Quantity:  1 Inhaler   Refills:  6       * hydrALAZINE 100 MG Tabs tablet   Commonly known as:  APRESOLINE   This may have changed:    - medication strength  - additional instructions   Used for:  Benign essential hypertension   Changed by:  Keturah Chaney CNP        Take 1 tablet  by mouth 2 times daily   Quantity:  60 tablet   Refills:  5       * hydrALAZINE 25 MG tablet   Commonly known as:  APRESOLINE   This may have changed:    - medication strength  - how much to take  - when to take this   Used for:  Benign essential hypertension   Changed by:  Keturah Chaney CNP        Dose:  25 mg   Take 1 tablet (25 mg) by mouth daily   Quantity:  30 tablet   Refills:  5       liraglutide 18 MG/3ML soln   Commonly known as:  VICTOZA PEN   This may have changed:  See the new instructions.   Used for:  Type 2 diabetes mellitus with hyperglycemia, with long-term current use of insulin (H)   Changed by:  Keturah Chaney CNP        INJECT 1.2MG UNDER THE SKIN DAILY   Quantity:  6 mL   Refills:  2       metFORMIN 1000 MG tablet   Commonly known as:  GLUCOPHAGE   This may have changed:  additional instructions   Used for:  Type 2 diabetes mellitus with hyperglycemia, with long-term current use of insulin (H)   Changed by:  Keturah Chaney CNP        Take 1 tablet with  breakfast and 1.5 tablets at dinner   Quantity:  75 tablet   Refills:  2       * Notice:  This list has 2 medication(s) that are the same as other medications prescribed for you. Read the directions carefully, and ask your doctor or other care provider to review them with you.         Where to get your medicines      These medications were sent to [facility] - [address], [facility] [address]     Phone:  [phone]     blood glucose monitoring meter device kit    fluticasone-salmeterol 250-50 MCG/DOSE diskus inhaler    hydrALAZINE 100 MG Tabs tablet    hydrALAZINE 25 MG tablet    liraglutide 18 MG/3ML soln    metFORMIN 1000 MG tablet                Primary Care Provider Office Phone # Fax #    Keturah Singh Shiv, [facility] [phone]       [address] [facility] [address]        Equal Access to Services     SANJUANA [facility] System[facility]IGNA : Hadii bianca garcia hadasho Soomaali, waaxda luqadaha, qaybta kaalmada adeegyada, celeste lockwoodin hayaagarrett chance . So [facility] [phone].    ATENCIÓN: Si habla español, tiene a mcarthur disposición servicios gratuitos de asistencia lingüística. German al [phone].    We comply with applicable federal civil rights laws and [place] laws. We do not discriminate on the basis of race, color, national origin, age, disability sex, sexual orientation or gender identity.            Thank you!     Thank you for choosing [facility]  for your care. Our goal is always to provide you with excellent care. Hearing back from our patients is one way we can continue to improve our services. Please take a few minutes to complete the written survey that you may receive in the mail after your visit with us. Thank you!             Your Updated Medication List - Protect others around you: Learn how to safely use, store and throw away your medicines at www.disposemymeds.org.          This list is accurate as of: 8/29/17  9:12 AM.   Always use your most recent med list.                   Brand Name Dispense Instructions for use Diagnosis    * albuterol (2.5 MG/3ML) 0.083% neb solution     1 Box    Take 3 mLs by nebulization every 4 hours as needed for shortness of breath / dyspnea.    Mild persistent asthma, uncomplicated       * albuterol 108 (90 BASE) MCG/ACT Inhaler    VENTOLIN HFA    1 Inhaler    Inhale 1-2 puffs into the lungs every 4 hours as needed    Mild persistent asthma, uncomplicated       aspirin 81 MG tablet     90 tablet    Take 1 tablet (81 mg) by mouth daily    Type 2 diabetes mellitus without complications (H)       blood glucose monitoring meter device kit    no brand specified    1 kit    Use to test blood sugar 2 times daily or as directed. Needs lancets and strips per insurance coverage.    Type 2 diabetes mellitus with hyperglycemia, with long-term current use of insulin (H)       blood glucose monitoring test strip    no brand specified    3 Box    1 strip by In Vitro route 2 times daily    Type 2 diabetes mellitus without complications (H)       fluticasone-salmeterol 250-50 MCG/DOSE diskus inhaler    ADVAIR DISKUS    1 Inhaler    Inhale 1 puff into the lungs 2 times daily    Mild persistent asthma, uncomplicated       furosemide 20 MG tablet    LASIX    30 tablet    Take 1 tablet (20 mg) by mouth daily    Benign essential hypertension       * hydrALAZINE 100 MG Tabs tablet    APRESOLINE    60 tablet    Take 1 tablet  by mouth 2 times daily    Benign essential hypertension       * hydrALAZINE 25 MG tablet    APRESOLINE    30 tablet    Take 1 tablet (25 mg) by mouth daily    Benign essential hypertension       insulin pen needle 32G X 4 MM    BD SMILEY U/F    100 each    Inject Subcutaneous daily DUE FOR APPT    Type 2 diabetes mellitus without complications (H)       ipratropium - albuterol 0.5 mg/2.5 mg/3 mL 0.5-2.5 (3) MG/3ML neb solution    DUONEB    1 Box    Inhale 1 vial (3 mLs) into the lungs every 6 hours as needed     Mild persistent asthma, uncomplicated       liraglutide 18 MG/3ML soln    VICTOZA PEN    6 mL    INJECT 1.2MG UNDER THE SKIN DAILY    Type 2 diabetes mellitus with hyperglycemia, with long-term current use of insulin (H)       metFORMIN 1000 MG tablet    GLUCOPHAGE    75 tablet    Take 1 tablet with breakfast and 1.5 tablets at dinner    Type 2 diabetes mellitus with hyperglycemia, with long-term current use of insulin (H)       PHARMACIST CHOICE LANCETS Misc     100 each    Check blood sugars twice daily    Type 2 diabetes mellitus without complications (H)       valsartan 320 MG tablet    DIOVAN    30 tablet    Take 1 tablet (320 mg) by mouth daily    Benign essential hypertension       verapamil 240 MG CR tablet    CALAN-SR    30 tablet    Take 1 tablet (240 mg) by mouth daily    Benign essential hypertension       * Notice:  This list has 4 medication(s) that are the same as other medications prescribed for you. Read the directions carefully, and ask your doctor or other care provider to review them with you.

## 2017-08-29 NOTE — PROGRESS NOTES
SUBJECTIVE:   Lucia Bobo is a 72 year old female who presents to clinic today for the following health issues:      Diabetes Follow-up    Patient is checking blood sugars: twice daily.    Blood sugar testing frequency justification: Uncontrolled diabetes  Results are as follows: 110-150  Her meter broke 1 month ago   Diabetic concerns: None     Symptoms of hypoglycemia (low blood sugar): none     Paresthesias (numbness or burning in feet) or sores: No    Date of last diabetic eye exam: 2 months ago at [facility]       Amount of exercise or physical activity: Walking    Problems taking medications regularly: No    Medication side effects: none    Diet: diabetic- quit eating chips/sugar    Lab Results   Component Value Date    A1C 7.7 01/18/2017    A1C 6.7 09/10/2015    A1C 6.9 03/23/2015    A1C 7.8 08/05/2014    A1C 6.8 05/07/2014     BP Readings from Last 6 Encounters:   08/29/17 158/76   01/18/17 134/68   07/20/16 152/72   11/20/15 128/65   11/18/15 138/72   09/30/15 118/68       Declines: Mammo, DEXA, pneumonia IZ here d/t cost,     HPI:   PCP:  Keturah Chaney, [phone]    Patient Active Problem List   Diagnosis     Type 2 diabetes mellitus with hyperglycemia, with long-term current use of insulin (H) HgbA1c goal <7     Advanced directives, counseling/discussion     Osteoarthritis of both knees     Morbid obesity with BMI of 45.0-49.9, adult (H)     Hypertriglyceridemia, LDL goal <100     Benign essential hypertension, BP goal <140/90     Mild persistent asthma, uncomplicated     Current Outpatient Prescriptions   Medication     fluticasone-salmeterol (ADVAIR DISKUS) 250-50 MCG/DOSE diskus inhaler     hydrALAZINE (APRESOLINE) 100 MG TABS tablet     metFORMIN (GLUCOPHAGE) 1000 MG tablet     liraglutide (VICTOZA PEN) 18 MG/3ML soln     blood glucose monitoring (NO BRAND SPECIFIED) meter device kit     hydrALAZINE (APRESOLINE) 25 MG tablet     albuterol (VENTOLIN HFA) 108 (90 BASE) MCG/ACT Inhaler      furosemide (LASIX) 20 MG tablet     valsartan (DIOVAN) 320 MG tablet     verapamil (CALAN-SR) 240 MG CR tablet     aspirin 81 MG tablet     blood glucose monitoring (NO BRAND SPECIFIED) test strip     insulin pen needle (BD SMILEY U/F) 32G X 4 MM     [DISCONTINUED] hydrALAZINE (APRESOLINE) 50 MG tablet     [DISCONTINUED] metFORMIN (GLUCOPHAGE) 1000 MG tablet     [DISCONTINUED] fluticasone-salmeterol (ADVAIR DISKUS) 250-50 MCG/DOSE diskus inhaler     [DISCONTINUED] VICTOZA PEN 18 MG/3ML soln     [DISCONTINUED] hydrALAZINE (APRESOLINE) 100 MG TABS tablet     albuterol (2.5 MG/3ML) 0.083% nebulizer solution     ipratropium - albuterol 0.5 mg/2.5 mg/3 mL (DUONEB) 0.5-2.5 (3) MG/3ML nebulization     PHARMACIST CHOICE LANCETS MISC     No current facility-administered medications for this visit.        Reviewed and updated:  Tobacco  Allergies  Meds  Med Hx  Surg Hx  Fam Hx  Soc Hx     ROS:  Constitutional, HEENT, cardiovascular, pulmonary, gi and gu systems are negative, except as otherwise noted.  CV: NEGATIVE for chest pain, palpitations or peripheral edema and Hx HTN  ENDOCRINE: NEGATIVE for temperature intolerance, skin/hair changes and Hx diabetes      PHYSICAL EXAM:   /76 (BP Location: Right arm, Patient Position: Sitting, Cuff Size: Adult Large)  Pulse 84  Temp 98  F (36.7  C) (Tympanic)  Resp 20  Wt 278 lb (126.1 kg)  BMI 46.26 kg/m2  Body mass index is 46.26 kg/(m^2).  GENERAL APPEARANCE: healthy, alert, no distress and over weight  NECK: no adenopathy, no asymmetry, masses, or scars and thyroid normal to palpation  RESP: lungs clear to auscultation - no rales, rhonchi or wheezes  CV: regular rates and rhythm, normal S1 S2, no S3 or S4 and no murmur, click or rub  MS: extremities normal- no gross deformities noted  PSYCH: mentation appears normal and affect normal/bright    ASSESSMENT & PLAN:     (E11.65,  Z79.4) Type 2 diabetes mellitus with hyperglycemia, with long-term current use of insulin  (H) HgbA1c goal <7  (primary encounter diagnosis)  Comment: chronic, uncontrolled  Plan: metFORMIN (GLUCOPHAGE) 1000 MG tablet,         liraglutide (VICTOZA PEN) 18 MG/3ML soln,         Hemoglobin A1c, blood glucose monitoring (NO         BRAND SPECIFIED) meter device kit        She has changed diet significantly- we'll see how her A1c looks today    (I10) Benign essential hypertension, BP goal <140/90  Comment: chronic, elevated  Plan: hydrALAZINE (APRESOLINE) 100 MG TABS tablet,         Add hydrALAZINE (APRESOLINE) 25 MG tablet in the morning   Recheck BP in 1 week            (E66.01,  Z68.42) Morbid obesity with BMI of 45.0-49.9, adult (H)  Comment: chronic, stable  Plan: improving- continue staying away from sweets, and eating healthy. She is going to start Weight Watchers    (J45.30) Mild persistent asthma, uncomplicated  Comment: chronic, stable  Plan: fluticasone-salmeterol (ADVAIR DISKUS) 250-50         MCG/DOSE diskus inhaler        refill    (E78.1) Hypertriglyceridemia, LDL goal <100  Comment: chronic, stable  Plan: test    (Z11.59) Need for hepatitis C screening test  Comment: screen  Plan: Hepatitis C antibody              Patient Instructions     Diabetes  If HgbA1c is not <7, repeat HgbA1c lab only every 3 months.   If HgbA1c is under 7, then repeat HgbA1c lab only every 6 months  Please have [facility] fax us your last eye exam results  Medications refilled    Hypertension  Add 25 mg Hadralazine to your morning dose of 100 mg   Recheck BP in 1 week with nurse only    Continue to work on exercise and weight loss. Fantastic!!!  Wt Readings from Last 10 Encounters:   08/29/17 278 lb (126.1 kg)   01/18/17 280 lb (127 kg)   07/20/16 284 lb 12.8 oz (129.2 kg)   11/20/15 294 lb (133.4 kg)   11/18/15 294 lb (133.4 kg)   09/30/15 297 lb (134.7 kg)   09/14/15 295 lb (133.8 kg)   09/03/15 296 lb (134.3 kg)   03/23/15 (!) 303 lb (137.4 kg)   08/05/14 (!) 310 lb (140.6 kg)         Risks, benefits, side effects and  rationale for treatment plan fully discussed with the patient and understanding expressed.    Keturah Chaney, FNP-BC  [facility]

## 2017-08-30 DIAGNOSIS — E11.65 TYPE 2 DIABETES MELLITUS WITH HYPERGLYCEMIA, WITH LONG-TERM CURRENT USE OF INSULIN (H): ICD-10-CM

## 2017-08-30 DIAGNOSIS — Z79.4 TYPE 2 DIABETES MELLITUS WITH HYPERGLYCEMIA, WITH LONG-TERM CURRENT USE OF INSULIN (H): ICD-10-CM

## 2017-08-30 RX ORDER — LIRAGLUTIDE 6 MG/ML
INJECTION SUBCUTANEOUS
Qty: 6 ML | Refills: 2 | Status: SHIPPED | OUTPATIENT
Start: 2017-08-30 | End: 2017-11-13

## 2017-08-30 RX ORDER — LIRAGLUTIDE 6 MG/ML
INJECTION SUBCUTANEOUS
Qty: 6 ML | Refills: 2 | Status: SHIPPED | OUTPATIENT
Start: 2017-08-30 | End: 2017-08-30

## 2017-08-30 NOTE — PROGRESS NOTES
Pharmacist from [place] notified me, the Victoza pen does not have 1.4 mg markings. So I changed her dose from 1.2 mg to 1.8 mg. Please notify her of change and that the new Rx is at the pharmacy.   LUIS M Bean

## 2017-08-30 NOTE — PROGRESS NOTES
Hep C negative  Hemoglboin A1c has gone up. Increase Victoza from 1.2 mg to 1.4 mg daily. Call [facility] for new Rx when you run out early. Repeat HgbA1c every 3 months until it is under 7.   Please notify her of these results and send a hard copy if not on myChart.   Thanks. LUIS M Bean

## 2017-09-13 ENCOUNTER — ALLIED HEALTH/NURSE VISIT (OUTPATIENT)
Dept: FAMILY MEDICINE | Facility: CLINIC | Age: 72
End: 2017-09-13
Payer: COMMERCIAL

## 2017-09-13 VITALS — HEART RATE: 74 BPM | SYSTOLIC BLOOD PRESSURE: 132 MMHG | DIASTOLIC BLOOD PRESSURE: 70 MMHG

## 2017-09-13 DIAGNOSIS — I10 BENIGN ESSENTIAL HYPERTENSION: Primary | Chronic | ICD-10-CM

## 2017-09-13 PROCEDURE — 99207 ZZC NO CHARGE NURSE ONLY: CPT

## 2017-09-13 NOTE — MR AVS SNAPSHOT
"              After Visit Summary   2017    Lucia Bobo    MRN: [id number]           Patient Information     Date Of Birth          1945        Visit Information        Provider Department      2017 10:00 AM NATHAN MAR RN [facility]        Today's Diagnoses     Benign essential hypertension, BP goal <140/90    -  1       Follow-ups after your visit        Who to contact     If you have questions or need follow up information about today's clinic visit or your schedule please contact [facility] directly at [phone].  Normal or non-critical lab and imaging results will be communicated to you by [employer], letter or phone within 4 business days after the clinic has received the results. If you do not hear from us within 7 days, please contact the clinic through [email or web address] or phone. If you have a critical or abnormal lab result, we will notify you by phone as soon as possible.  Submit refill requests through [employer] or call your pharmacy and they will forward the refill request to us. Please allow 3 business days for your refill to be completed.          Additional Information About Your Visit        MyChart Information     [employer] lets you send messages to your doctor, view your test results, renew your prescriptions, schedule appointments and more. To sign up, go to [email or web address] . Click on \"Log in\" on the left side of the screen, which will take you to the Welcome page. Then click on \"Sign up Now\" on the right side of the page.     You will be asked to enter the access code listed below, as well as some personal information. Please follow the directions to create your username and password.     Your access code is: [email or web address]  Expires: 2017  9:12 AM     Your access code will  in 90 days. If you need help or a new code, please call your [facility] or [phone].        Care EveryWhere ID     This is your Care EveryWhere ID. This could be used " by other organizations to access your [place] medical records  [id number]        Your Vitals Were     Pulse                   74            Blood Pressure from Last 3 Encounters:   09/13/17 132/70   08/29/17 148/76   01/18/17 134/68    Weight from Last 3 Encounters:   08/29/17 278 lb (126.1 kg)   01/18/17 280 lb (127 kg)   07/20/16 284 lb 12.8 oz (129.2 kg)              Today, you had the following     No orders found for display       Primary Care Provider Office Phone # Fax #    Keturah Chaney, [facility] [phone]       [address] [facility] [address]        Equal Access to Services     SANJUANA MORGAN : Danielle Herrera, wajerome elkins, hildata kaalmada airam, celeste goodman. So [facility] [phone].    ATENCIÓN: Si habla español, tiene a mcarthur disposición servicios gratuitos de asistencia lingüística. Llame al [phone].    We comply with applicable federal civil rights laws and [place] laws. We do not discriminate on the basis of race, color, national origin, age, disability sex, sexual orientation or gender identity.            Thank you!     Thank you for choosing [facility]  for your care. Our goal is always to provide you with excellent care. Hearing back from our patients is one way we can continue to improve our services. Please take a few minutes to complete the written survey that you may receive in the mail after your visit with us. Thank you!             Your Updated Medication List - Protect others around you: Learn how to safely use, store and throw away your medicines at www.disposemymeds.org.          This list is accurate as of: 9/13/17  4:00 PM.  Always use your most recent med list.                   Brand Name Dispense Instructions for use Diagnosis    * albuterol (2.5 MG/3ML) 0.083% neb solution     1 Box    Take 3 mLs by nebulization every 4 hours as needed for shortness of breath / dyspnea.    Mild  persistent asthma, uncomplicated       * albuterol 108 (90 BASE) MCG/ACT Inhaler    VENTOLIN HFA    1 Inhaler    Inhale 1-2 puffs into the lungs every 4 hours as needed    Mild persistent asthma, uncomplicated       aspirin 81 MG tablet     90 tablet    Take 1 tablet (81 mg) by mouth daily    Type 2 diabetes mellitus without complications (H)       blood glucose monitoring meter device kit    no brand specified    1 kit    Use to test blood sugar 2 times daily or as directed. Needs lancets and strips per insurance coverage.    Type 2 diabetes mellitus with hyperglycemia, with long-term current use of insulin (H)       blood glucose monitoring test strip    no brand specified    3 Box    1 strip by In Vitro route 2 times daily    Type 2 diabetes mellitus without complications (H)       fluticasone-salmeterol 250-50 MCG/DOSE diskus inhaler    ADVAIR DISKUS    1 Inhaler    Inhale 1 puff into the lungs 2 times daily    Mild persistent asthma, uncomplicated       furosemide 20 MG tablet    LASIX    30 tablet    Take 1 tablet (20 mg) by mouth daily    Benign essential hypertension       * hydrALAZINE 100 MG Tabs tablet    APRESOLINE    60 tablet    Take 1 tablet  by mouth 2 times daily    Benign essential hypertension       * hydrALAZINE 25 MG tablet    APRESOLINE    30 tablet    Take 1 tablet (25 mg) by mouth daily    Benign essential hypertension       insulin pen needle 32G X 4 MM    BD SMILEY U/F    100 each    Inject Subcutaneous daily DUE FOR APPT    Type 2 diabetes mellitus without complications (H)       ipratropium - albuterol 0.5 mg/2.5 mg/3 mL 0.5-2.5 (3) MG/3ML neb solution    DUONEB    1 Box    Inhale 1 vial (3 mLs) into the lungs every 6 hours as needed    Mild persistent asthma, uncomplicated       liraglutide 18 MG/3ML soln    VICTOZA PEN    6 mL    INJECT 1.8 MG UNDER THE SKIN DAILY    Type 2 diabetes mellitus with hyperglycemia, with long-term current use of insulin (H)       metFORMIN 1000 MG tablet     GLUCOPHAGE    75 tablet    Take 1 tablet with breakfast and 1.5 tablets at dinner    Type 2 diabetes mellitus with hyperglycemia, with long-term current use of insulin (H)       PHARMACIST CHOICE LANCETS Misc     100 each    Check blood sugars twice daily    Type 2 diabetes mellitus without complications (H)       valsartan 320 MG tablet    DIOVAN    30 tablet    Take 1 tablet (320 mg) by mouth daily    Benign essential hypertension       verapamil 240 MG CR tablet    CALAN-SR    30 tablet    Take 1 tablet (240 mg) by mouth daily    Benign essential hypertension       * Notice:  This list has 4 medication(s) that are the same as other medications prescribed for you. Read the directions carefully, and ask your doctor or other care provider to review them with you.

## 2017-09-13 NOTE — PROGRESS NOTES
Pt here for F/U BP check.  Albertina Castellanos RN    BP Readings from Last 5 Encounters:   09/13/17 132/70   08/29/17 148/76   01/18/17 134/68   07/20/16 152/72   11/20/15 128/65     I10) Benign essential hypertension, BP goal <140/90  Comment: chronic, elevated  Plan: hydrALAZINE (APRESOLINE) 100 MG TABS tablet,         Add hydrALAZINE (APRESOLINE) 25 MG tablet in the morning                        Recheck BP in 1 week

## 2017-09-14 NOTE — PROGRESS NOTES
Hypertension controlled on Lasix, Hydralazine, Diovan, Verapamil. Keep an eye on BP at home, goal <140/90.   LUIS M Bean

## 2017-10-16 ENCOUNTER — OFFICE VISIT (OUTPATIENT)
Dept: FAMILY MEDICINE | Facility: CLINIC | Age: 72
End: 2017-10-16
Payer: COMMERCIAL

## 2017-10-16 ENCOUNTER — APPOINTMENT (OUTPATIENT)
Dept: CT IMAGING | Facility: CLINIC | Age: 72
End: 2017-10-16
Attending: EMERGENCY MEDICINE
Payer: MEDICARE

## 2017-10-16 ENCOUNTER — HOSPITAL ENCOUNTER (EMERGENCY)
Facility: CLINIC | Age: 72
Discharge: HOME | End: 2017-10-16
Attending: EMERGENCY MEDICINE | Admitting: EMERGENCY MEDICINE
Payer: MEDICARE

## 2017-10-16 VITALS
TEMPERATURE: 98.6 F | DIASTOLIC BLOOD PRESSURE: 76 MMHG | BODY MASS INDEX: 45.93 KG/M2 | OXYGEN SATURATION: 97 % | HEART RATE: 95 BPM | RESPIRATION RATE: 18 BRPM | WEIGHT: 276 LBS | SYSTOLIC BLOOD PRESSURE: 176 MMHG

## 2017-10-16 VITALS
TEMPERATURE: 97.9 F | DIASTOLIC BLOOD PRESSURE: 83 MMHG | SYSTOLIC BLOOD PRESSURE: 164 MMHG | HEART RATE: 98 BPM | OXYGEN SATURATION: 95 %

## 2017-10-16 DIAGNOSIS — R10.11 RUQ ABDOMINAL PAIN: ICD-10-CM

## 2017-10-16 DIAGNOSIS — R10.11 ABDOMINAL PAIN, RIGHT UPPER QUADRANT: ICD-10-CM

## 2017-10-16 DIAGNOSIS — R10.84 ABDOMINAL PAIN, GENERALIZED: Primary | ICD-10-CM

## 2017-10-16 LAB
ALBUMIN SERPL-MCNC: 3.6 G/DL (ref 3.4–5)
ALBUMIN UR-MCNC: NEGATIVE MG/DL
ALP SERPL-CCNC: 98 U/L (ref 40–150)
ALT SERPL W P-5'-P-CCNC: 82 U/L (ref 0–50)
ANION GAP SERPL CALCULATED.3IONS-SCNC: 11 MMOL/L (ref 3–14)
APPEARANCE UR: CLEAR
AST SERPL W P-5'-P-CCNC: 72 U/L (ref 0–45)
BASOPHILS # BLD AUTO: 0.1 10E9/L (ref 0–0.2)
BASOPHILS NFR BLD AUTO: 0.5 %
BILIRUB SERPL-MCNC: 0.4 MG/DL (ref 0.2–1.3)
BILIRUB UR QL STRIP: NEGATIVE
BUN SERPL-MCNC: 21 MG/DL (ref 7–30)
CALCIUM SERPL-MCNC: 9.6 MG/DL (ref 8.5–10.1)
CHLORIDE SERPL-SCNC: 108 MMOL/L (ref 94–109)
CO2 SERPL-SCNC: 20 MMOL/L (ref 20–32)
COLOR UR AUTO: YELLOW
CREAT SERPL-MCNC: 0.89 MG/DL (ref 0.52–1.04)
D DIMER PPP FEU-MCNC: 0.4 UG/ML FEU (ref 0–0.5)
DIFFERENTIAL METHOD BLD: ABNORMAL
EOSINOPHIL # BLD AUTO: 0.3 10E9/L (ref 0–0.7)
EOSINOPHIL NFR BLD AUTO: 2.6 %
ERYTHROCYTE [DISTWIDTH] IN BLOOD BY AUTOMATED COUNT: 13.4 % (ref 10–15)
GFR SERPL CREATININE-BSD FRML MDRD: 62 ML/MIN/1.7M2
GLUCOSE SERPL-MCNC: 155 MG/DL (ref 70–99)
GLUCOSE UR STRIP-MCNC: 100 MG/DL
HCT VFR BLD AUTO: 39.5 % (ref 35–47)
HGB BLD-MCNC: 13.3 G/DL (ref 11.7–15.7)
HGB UR QL STRIP: NEGATIVE
KETONES UR STRIP-MCNC: NEGATIVE MG/DL
LACTATE BLD-SCNC: 2.9 MMOL/L (ref 0.7–2)
LEUKOCYTE ESTERASE UR QL STRIP: NEGATIVE
LIPASE SERPL-CCNC: 308 U/L (ref 73–393)
LYMPHOCYTES # BLD AUTO: 2.8 10E9/L (ref 0.8–5.3)
LYMPHOCYTES NFR BLD AUTO: 23.2 %
MCH RBC QN AUTO: 32.8 PG (ref 26.5–33)
MCHC RBC AUTO-ENTMCNC: 33.7 G/DL (ref 31.5–36.5)
MCV RBC AUTO: 98 FL (ref 78–100)
MONOCYTES # BLD AUTO: 0.6 10E9/L (ref 0–1.3)
MONOCYTES NFR BLD AUTO: 5.2 %
NEUTROPHILS # BLD AUTO: 8.2 10E9/L (ref 1.6–8.3)
NEUTROPHILS NFR BLD AUTO: 68.5 %
NITRATE UR QL: NEGATIVE
PH UR STRIP: 5 PH (ref 5–7)
PLATELET # BLD AUTO: 439 10E9/L (ref 150–450)
POTASSIUM SERPL-SCNC: 4 MMOL/L (ref 3.4–5.3)
PROT SERPL-MCNC: 7.7 G/DL (ref 6.8–8.8)
RBC # BLD AUTO: 4.05 10E12/L (ref 3.8–5.2)
SODIUM SERPL-SCNC: 139 MMOL/L (ref 133–144)
SOURCE: ABNORMAL
SP GR UR STRIP: 1.01 (ref 1–1.03)
UROBILINOGEN UR STRIP-ACNC: 0.2 EU/DL (ref 0.2–1)
WBC # BLD AUTO: 12 10E9/L (ref 4–11)

## 2017-10-16 PROCEDURE — 81003 URINALYSIS AUTO W/O SCOPE: CPT | Performed by: NURSE PRACTITIONER

## 2017-10-16 PROCEDURE — 85379 FIBRIN DEGRADATION QUANT: CPT | Performed by: EMERGENCY MEDICINE

## 2017-10-16 PROCEDURE — 83690 ASSAY OF LIPASE: CPT | Performed by: NURSE PRACTITIONER

## 2017-10-16 PROCEDURE — 85025 COMPLETE CBC W/AUTO DIFF WBC: CPT | Performed by: NURSE PRACTITIONER

## 2017-10-16 PROCEDURE — 83690 ASSAY OF LIPASE: CPT | Performed by: EMERGENCY MEDICINE

## 2017-10-16 PROCEDURE — 99213 OFFICE O/P EST LOW 20 MIN: CPT | Performed by: NURSE PRACTITIONER

## 2017-10-16 PROCEDURE — 36415 COLL VENOUS BLD VENIPUNCTURE: CPT | Performed by: NURSE PRACTITIONER

## 2017-10-16 PROCEDURE — 80053 COMPREHEN METABOLIC PANEL: CPT | Performed by: EMERGENCY MEDICINE

## 2017-10-16 PROCEDURE — 82150 ASSAY OF AMYLASE: CPT | Performed by: NURSE PRACTITIONER

## 2017-10-16 PROCEDURE — 25000128 H RX IP 250 OP 636: Performed by: EMERGENCY MEDICINE

## 2017-10-16 PROCEDURE — 99284 EMERGENCY DEPT VISIT MOD MDM: CPT | Mod: 25 | Performed by: EMERGENCY MEDICINE

## 2017-10-16 PROCEDURE — 80053 COMPREHEN METABOLIC PANEL: CPT | Performed by: NURSE PRACTITIONER

## 2017-10-16 PROCEDURE — 99284 EMERGENCY DEPT VISIT MOD MDM: CPT | Mod: Z6 | Performed by: EMERGENCY MEDICINE

## 2017-10-16 PROCEDURE — 83605 ASSAY OF LACTIC ACID: CPT | Performed by: EMERGENCY MEDICINE

## 2017-10-16 PROCEDURE — 74176 CT ABD & PELVIS W/O CONTRAST: CPT

## 2017-10-16 PROCEDURE — 96374 THER/PROPH/DIAG INJ IV PUSH: CPT | Performed by: EMERGENCY MEDICINE

## 2017-10-16 RX ORDER — KETOROLAC TROMETHAMINE 15 MG/ML
15 INJECTION, SOLUTION INTRAMUSCULAR; INTRAVENOUS ONCE
Status: COMPLETED | OUTPATIENT
Start: 2017-10-16 | End: 2017-10-16

## 2017-10-16 RX ORDER — HYDROCODONE BITARTRATE AND ACETAMINOPHEN 5; 325 MG/1; MG/1
1-2 TABLET ORAL EVERY 4 HOURS PRN
Qty: 15 TABLET | Refills: 0 | Status: SHIPPED | OUTPATIENT
Start: 2017-10-16 | End: 2017-10-24

## 2017-10-16 RX ADMIN — KETOROLAC TROMETHAMINE 15 MG: 15 INJECTION, SOLUTION INTRAMUSCULAR; INTRAVENOUS at 14:41

## 2017-10-16 NOTE — ED AVS SNAPSHOT
[facility]    [address] [address]    Phone:  [phone]    Fax:  [phone]                                       Lucai Bobo   MRN: [id number]    Department:  [facility]   Date of Visit:  10/16/2017           Patient Information     Date Of Birth          1945        Your diagnoses for this visit were:     Abdominal pain, right upper quadrant        You were seen by Lacho Pedro MD.      Follow-up Information     Follow up with Keturah Chaney CNP.    Specialty:  Nurse Practitioner - Family    Why:  On Thursday or Friday to recheck your pain to see if you have improvement.    Contact information:    [address] [id number] [phone]        24 Hour Appointment Hotline       To make an appointment at any [facility], call [phone] ([phone]). If you don't have a family doctor or clinic, we will help you find one. [facility] are conveniently located to serve the needs of you and your family.             Review of your medicines      START taking        Dose / Directions Last dose taken    HYDROcodone-acetaminophen 5-325 MG per tablet   Commonly known as:  NORCO   Dose:  1-2 tablet   Quantity:  15 tablet        Take 1-2 tablets by mouth every 4 hours as needed for moderate to severe pain   Refills:  0        omeprazole 20 MG CR capsule   Commonly known as:  priLOSEC   Dose:  20 mg   Quantity:  30 capsule        Take 1 capsule (20 mg) by mouth daily   Refills:  0          Our records show that you are taking the medicines listed below. If these are incorrect, please call your family doctor or clinic.        Dose / Directions Last dose taken    * albuterol (2.5 MG/3ML) 0.083% neb solution   Quantity:  1 Box        Take 3 mLs by nebulization every 4 hours as needed for shortness of breath / dyspnea.   Refills:  1        * albuterol 108 (90 BASE) MCG/ACT Inhaler   Commonly known as:   VENTOLIN HFA   Dose:  1-2 puff   Quantity:  1 Inhaler        Inhale 1-2 puffs into the lungs every 4 hours as needed   Refills:  1        aspirin 81 MG tablet   Dose:  81 mg   Quantity:  90 tablet        Take 1 tablet (81 mg) by mouth daily   Refills:  3        blood glucose monitoring meter device kit   Commonly known as:  no brand specified   Quantity:  1 kit        Use to test blood sugar 2 times daily or as directed. Needs lancets and strips per insurance coverage.   Refills:  0        blood glucose monitoring test strip   Commonly known as:  no brand specified   Dose:  1 strip   Quantity:  3 Box        1 strip by In Vitro route 2 times daily   Refills:  3        fluticasone-salmeterol 250-50 MCG/DOSE diskus inhaler   Commonly known as:  ADVAIR DISKUS   Dose:  1 puff   Quantity:  1 Inhaler        Inhale 1 puff into the lungs 2 times daily   Refills:  6        furosemide 20 MG tablet   Commonly known as:  LASIX   Dose:  20 mg   Quantity:  30 tablet        Take 1 tablet (20 mg) by mouth daily   Refills:  11        * hydrALAZINE 100 MG Tabs tablet   Commonly known as:  APRESOLINE   Quantity:  60 tablet        Take 1 tablet  by mouth 2 times daily   Refills:  5        * hydrALAZINE 25 MG tablet   Commonly known as:  APRESOLINE   Dose:  25 mg   Quantity:  30 tablet        Take 1 tablet (25 mg) by mouth daily   Refills:  5        insulin pen needle 32G X 4 MM   Commonly known as:  BD SMILEY U/F   Quantity:  100 each        Inject Subcutaneous daily DUE FOR APPT   Refills:  0        ipratropium - albuterol 0.5 mg/2.5 mg/3 mL 0.5-2.5 (3) MG/3ML neb solution   Commonly known as:  DUONEB   Dose:  1 vial   Quantity:  1 Box        Inhale 1 vial (3 mLs) into the lungs every 6 hours as needed   Refills:  3        liraglutide 18 MG/3ML soln   Commonly known as:  VICTOZA PEN   Quantity:  6 mL        INJECT 1.8 MG UNDER THE SKIN DAILY   Refills:  2        metFORMIN 1000 MG tablet   Commonly known as:  GLUCOPHAGE   Quantity:  75  tablet        Take 1 tablet with breakfast and 1.5 tablets at dinner   Refills:  2        PHARMACIST CHOICE LANCETS Misc   Quantity:  100 each        Check blood sugars twice daily   Refills:  3        valsartan 320 MG tablet   Commonly known as:  DIOVAN   Dose:  320 mg   Quantity:  30 tablet        Take 1 tablet (320 mg) by mouth daily   Refills:  11        verapamil 240 MG CR tablet   Commonly known as:  CALAN-SR   Dose:  240 mg   Quantity:  30 tablet        Take 1 tablet (240 mg) by mouth daily   Refills:  11        * Notice:  This list has 4 medication(s) that are the same as other medications prescribed for you. Read the directions carefully, and ask your doctor or other care provider to review them with you.            Prescriptions were sent or printed at these locations (2 Prescriptions)                   [facility] [place], [place] - [address]   [address] [address]    Telephone:  [phone]   Fax:  [phone]   Hours:                  E-Prescribed (1 of 2)         omeprazole (PRILOSEC) 20 MG CR capsule                 Printed at Department/Unit printer (1 of 2)         HYDROcodone-acetaminophen (NORCO) 5-325 MG per tablet                Procedures and tests performed during your visit     CT Abdomen Pelvis WITHOUT Contrast (stone protocol)    Comprehensive metabolic panel    D dimer quantitative    Lactic acid whole blood    Lipase    Peripheral IV: Standard      Orders Needing Specimen Collection     None      Pending Results     Date and Time Order Name Status Description    10/16/2017 1152 AMYLASE In process     10/16/2017 1152 LIPASE In process     10/16/2017 1152 COMPREHENSIVE METABOLIC PANEL In process             Pending Culture Results     No orders found from 10/14/2017 to 10/17/2017.            Pending Results Instructions     If you had any lab results that were not finalized at the time of your Discharge, you can call the ED Lab Result RN at  [phone]. You will be contacted by this team for any positive Lab results or changes in treatment. The nurses are available 7 days a week from 10A to 6:30P.  You can leave a message 24 hours per day and they will return your call.        Test Results From Your Hospital Stay        10/16/2017  3:15 PM      Component Results     Component Value Ref Range & Units Status    D Dimer 0.4 0.0 - 0.50 ug/ml FEU Final    This D-dimer assay is intended for use in conjunction with a clinical pretest   probability assessment model to exclude pulmonary embolism (PE) and deep   venous thrombosis (DVT) in outpatients suspected of PE or DVT. The cut-off   value is 0.5 ug/mL FEU.           10/16/2017  3:25 PM      Component Results     Component Value Ref Range & Units Status    Sodium 139 133 - 144 mmol/L Final    Potassium 4.0 3.4 - 5.3 mmol/L Final    Chloride 108 94 - 109 mmol/L Final    Carbon Dioxide 20 20 - 32 mmol/L Final    Anion Gap 11 3 - 14 mmol/L Final    Glucose 155 (H) 70 - 99 mg/dL Final    Urea Nitrogen 21 7 - 30 mg/dL Final    Creatinine 0.89 0.52 - 1.04 mg/dL Final    GFR Estimate 62 >60 mL/min/1.7m2 Final    Non  GFR Calc    GFR Estimate If Black 75 >60 mL/min/1.7m2 Final    African American GFR Calc    Calcium 9.6 8.5 - 10.1 mg/dL Final    Bilirubin Total 0.4 0.2 - 1.3 mg/dL Final    Albumin 3.6 3.4 - 5.0 g/dL Final    Protein Total 7.7 6.8 - 8.8 g/dL Final    Alkaline Phosphatase 98 40 - 150 U/L Final    ALT 82 (H) 0 - 50 U/L Final    AST 72 (H) 0 - 45 U/L Final         10/16/2017  3:22 PM      Component Results     Component Value Ref Range & Units Status    Lipase 308 73 - 393 U/L Final         10/16/2017  3:12 PM      Component Results     Component Value Ref Range & Units Status    Lactic Acid 2.9 (H) 0.7 - 2.0 mmol/L Final    Significant value called to and read back by  CARMELITA MAURICIO RN [id number]  [id number]           10/16/2017  4:48 PM      Narrative     CT ABDOMEN AND PELVIS WITHOUT CONTRAST    10/16/2017 4:23 PM     HISTORY: Right flank and right upper quadrant pain with dysuria.    COMPARISON: 9/8/2015.    TECHNIQUE: Without intravenous or oral contrast, helical sections were  acquired from the top of the diaphragm through the pubic symphysis.  Coronal reconstructions were generated. Radiation dose for this scan  was reduced using automated exposure control, adjustment of the mA  and/or kV according to the patient's size, or iterative reconstruction  technique. (Renal stone protocol)    FINDINGS:     Right urinary tract: Four tiny nonobstructing renal calculi, all  measuring less than 0.3 cm in diameter. No ureteral calculi. No  dilatation of the intrarenal collecting system or ureter.    Left urinary tract: Two nonobstructing calculi in the inferior pole of  kidney, both measuring less than 0.3 cm in diameter. No ureteral  calculi. No dilatation of the intrarenal collecting system or ureter.    Urinary bladder: No visualized calculi.    Remainder of the abdomen and pelvis: Several calculi within the spleen  and liver likely relate to prior granulomatous infection. The liver,  spleen, and pancreas are otherwise unremarkable to the limits of a  noncontrast CT scan. Small low-attenuation nodules within both adrenal  glands, likely representing adenomas. The gallbladder is not  visualized. The small and large bowel are normal in caliber. The  appendix is not visualized. No bowel wall thickening, pneumatosis or  free intraperitoneal gas. The uterus is present. No enlarged lymph  nodes or free fluid in the abdomen or pelvis. 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  Atherosclerotic calcification in the abdominal aorta.    Scan through the lower chest is significant for a small calcified  granuloma in the left lower lobe.        Impression     IMPRESSION:   1. No cause of acute pain identified in  "the abdomen or pelvis.  2. No ureteral calculi or evidence of urinary obstruction.  3. A few tiny bilateral nonobstructing renal calculi.    DEBORA PERALTA MD                Thank you for choosing [facility]       Thank you for choosing [facility] for your care. Our goal is always to provide you with excellent care. Hearing back from our patients is one way we can continue to improve our services. Please take a few minutes to complete the written survey that you may receive in the mail after you visit with us. Thank you!        [employer] Information     Easy[employer] lets you send messages to your doctor, view your test results, renew your prescriptions, schedule appointments and more. To sign up, go to [email or web address] . Click on \"Log in\" on the left side of the screen, which will take you to the Welcome page. Then click on \"Sign up Now\" on the right side of the page.     You will be asked to enter the access code listed below, as well as some personal information. Please follow the directions to create your username and password.     Your access code is: [email or web address]  Expires: 2017  9:12 AM     Your access code will  in 90 days. If you need help or a new code, please call your [facility] clinic or [phone].        Care EveryWhere ID     This is your Care EveryWhere ID. This could be used by other organizations to access your [facility] medical records  [id number]        Equal Access to Services     SANJUANA MORGAN : Hadii bianca trianao [place], waaxda luqadaha, qaybta kaalmada airam, celeste chance . So [facility] [phone].    ATENCIÓN: Si habla español, tiene a mcarthur disposición servicios gratuitos de asistencia lingüística. Llame al [phone].    We comply with applicable federal civil rights laws and [place] laws. We do not discriminate on the basis of race, color, national origin, age, disability, sex, sexual orientation, or gender identity.            After Visit Summary "       This is your record. Keep this with you and show to your community pharmacist(s) and doctor(s) at your next visit.

## 2017-10-16 NOTE — PROGRESS NOTES
SUBJECTIVE:   Lucia Bobo is a 72 year old female who presents to clinic today for the following health issues:      Abdominal Pain      Duration: few weeks    Description (location/character/radiation): Right side of back, upper abdomen       Associated flank pain: yes, right side    Intensity:  moderate    Accompanying signs and symptoms:        Fever/Chills: YES (hot/cold feeling)       Gas/Bloating: YES       Nausea/vomitting: YES       Diarrhea: no        Dysuria or Hematuria: no     History (previous similar pain/trauma/previous testing): has had a kidney stone before, passed on its own, had gallbladder removed    Precipitating or alleviating factors:       Pain worse with eating/BM/urination: None       Pain relieved by BM: no     Therapies tried and outcome: Nothing    LMP:  not applicable    2-3 weeks of pain   Nausea no vomiting   Pain 7-8/10  Has tried tylenol- does not help   Had gall bladder and appendix removed  Past history of kidney stone      Problem list and histories reviewed & adjusted, as indicated.  Additional history: as documented    Labs reviewed in EPIC    Reviewed and updated as needed this visit by clinical staffTobacco  Allergies  Med Hx  Surg Hx  Fam Hx  Soc Hx      Reviewed and updated as needed this visit by Provider         ROS:  Constitutional, HEENT, cardiovascular, pulmonary, gi and gu systems are negative, except as otherwise noted.      OBJECTIVE:                                                    /76  Pulse 95  Temp 98.6  F (37  C) (Tympanic)  Resp 18  Wt 276 lb (125.2 kg)  SpO2 97%  BMI 45.93 kg/m2  Body mass index is 45.93 kg/(m^2).  GENERAL APPEARANCE: healthy, alert and mild distress  RESP: lungs clear to auscultation - no rales, rhonchi or wheezes  CV: regular rates and rhythm, normal S1 S2, no S3 or S4 and no murmur, click or rub  ABDOMEN: soft, obese, RUQ/epigastric abdominal pain  without hepatosplenomegaly or masses and bowel sounds normal   MS:  extremities normal- no gross deformities noted  SKIN: no suspicious lesions or rashes      Diagnostic test results:  Diagnostic Test Results:  Results for orders placed or performed in visit on 10/16/17 (from the past 24 hour(s))   *UA reflex to Microscopic and Culture ([facility] and [facility] (except [place] and [place])   Result Value Ref Range    Color Urine Yellow     Appearance Urine Clear     Glucose Urine 100 (A) NEG^Negative mg/dL    Bilirubin Urine Negative NEG^Negative    Ketones Urine Negative NEG^Negative mg/dL    Specific Gravity Urine 1.010 1.003 - 1.035    Blood Urine Negative NEG^Negative    pH Urine 5.0 5.0 - 7.0 pH    Protein Albumin Urine Negative NEG^Negative mg/dL    Urobilinogen Urine 0.2 0.2 - 1.0 EU/dL    Nitrite Urine Negative NEG^Negative    Leukocyte Esterase Urine Negative NEG^Negative    Source Midstream Urine    CBC with platelets differential   Result Value Ref Range    WBC 12.0 (H) 4.0 - 11.0 10e9/L    RBC Count 4.05 3.8 - 5.2 10e12/L    Hemoglobin 13.3 11.7 - 15.7 g/dL    Hematocrit 39.5 35.0 - 47.0 %    MCV 98 78 - 100 fl    MCH 32.8 26.5 - 33.0 pg    MCHC 33.7 31.5 - 36.5 g/dL    RDW 13.4 10.0 - 15.0 %    Platelet Count 439 150 - 450 10e9/L    Diff Method Automated Method     % Neutrophils 68.5 %    % Lymphocytes 23.2 %    % Monocytes 5.2 %    % Eosinophils 2.6 %    % Basophils 0.5 %    Absolute Neutrophil 8.2 1.6 - 8.3 10e9/L    Absolute Lymphocytes 2.8 0.8 - 5.3 10e9/L    Absolute Monocytes 0.6 0.0 - 1.3 10e9/L    Absolute Eosinophils 0.3 0.0 - 0.7 10e9/L    Absolute Basophils 0.1 0.0 - 0.2 10e9/L          ASSESSMENT/PLAN:                                                    1. Abdominal pain, generalized  2. RUQ abdominal pain  Unclear etiology of pain   Discussed with patient unable to get CT scan until tomorrow late afternoon   Given patient degree of pain and unremarkable work up recommend going to the EMERGENCY ROOM for further work up  - *UA reflex to Microscopic and  Culture ([facility] and [facility] (except [place] and [place])  - CBC with platelets differential  - Comprehensive metabolic panel  - Lipase  - Amylase        Mary Hutchins NP  [facility]

## 2017-10-16 NOTE — MR AVS SNAPSHOT
"              After Visit Summary   10/16/2017    Lucia Bobo    MRN: [id number]           Patient Information     Date Of Birth          1945        Visit Information        Provider Department      10/16/2017 11:20 AM Mary Hutchins NP [facility]        Today's Diagnoses     Abdominal pain, generalized    -  1    RUQ abdominal pain          Care Instructions    TO EMERGENCY ROOM           Follow-ups after your visit        Future tests that were ordered for you today     Open Future Orders        Priority Expected Expires Ordered    CT Abdomen Pelvis w Contrast STAT  10/16/2018 10/16/2017            Who to contact     If you have questions or need follow up information about today's clinic visit or your schedule please contact [facility] directly at [phone].  Normal or non-critical lab and imaging results will be communicated to you by TMAThart, letter or phone within 4 business days after the clinic has received the results. If you do not hear from us within 7 days, please contact the clinic through TMAThart or phone. If you have a critical or abnormal lab result, we will notify you by phone as soon as possible.  Submit refill requests through [email or web address] or call your pharmacy and they will forward the refill request to us. Please allow 3 business days for your refill to be completed.          Additional Information About Your Visit        MyChart Information     [email or web address] lets you send messages to your doctor, view your test results, renew your prescriptions, schedule appointments and more. To sign up, go to [email or web address] . Click on \"Log in\" on the left side of the screen, which will take you to the Welcome page. Then click on \"Sign up Now\" on the right side of the page.     You will be asked to enter the access code listed below, as well as some personal information. Please follow the directions to create your username and password.     Your access code is: " [email or web address]  Expires: 2017  9:12 AM     Your access code will  in 90 days. If you need help or a new code, please call your [facility] or [phone].        Care EveryWhere ID     This is your Care EveryWhere ID. This could be used by other organizations to access your [place] medical records  [id number]        Your Vitals Were     Pulse Temperature Respirations Pulse Oximetry BMI (Body Mass Index)       95 98.6  F (37  C) (Tympanic) 18 97% 45.93 kg/m2        Blood Pressure from Last 3 Encounters:   10/16/17 166/74   10/16/17 176/76   17 132/70    Weight from Last 3 Encounters:   10/16/17 276 lb (125.2 kg)   17 278 lb (126.1 kg)   17 280 lb (127 kg)              We Performed the Following     *UA reflex to Microscopic and Culture ([place] and [facility] (except [place] and [place])     Amylase     CBC with platelets differential     Comprehensive metabolic panel     Lipase        Primary Care Provider Office Phone # Fax #    Keturah Samantha Shiv, [facility] [phone]       [address] [address]        Equal Access to Services     SANJUANA MORGAN : Hadii bianca garcia hadasho Soomaali, waaxda luqadaha, qaybta kaalmada adeegyada, celeste goodman. So [facility] [phone].    ATENCIÓN: Si habla español, tiene a mcarthur disposición servicios gratuitos de asistencia lingüística. Llame al [phone].    We comply with applicable federal civil rights laws and [place] laws. We do not discriminate on the basis of race, color, national origin, age, disability, sex, sexual orientation, or gender identity.            Thank you!     Thank you for choosing [facility]  for your care. Our goal is always to provide you with excellent care. Hearing back from our patients is one way we can continue to improve our services. Please take a few minutes to complete the written survey that you may receive in the mail after your visit  with us. Thank you!             Your Updated Medication List - Protect others around you: Learn how to safely use, store and throw away your medicines at www.disposemymeds.org.          This list is accurate as of: 10/16/17  4:08 PM.  Always use your most recent med list.                   Brand Name Dispense Instructions for use Diagnosis    * albuterol (2.5 MG/3ML) 0.083% neb solution     1 Box    Take 3 mLs by nebulization every 4 hours as needed for shortness of breath / dyspnea.    Mild persistent asthma, uncomplicated       * albuterol 108 (90 BASE) MCG/ACT Inhaler    VENTOLIN HFA    1 Inhaler    Inhale 1-2 puffs into the lungs every 4 hours as needed    Mild persistent asthma, uncomplicated       aspirin 81 MG tablet     90 tablet    Take 1 tablet (81 mg) by mouth daily    Type 2 diabetes mellitus without complications (H)       blood glucose monitoring meter device kit    no brand specified    1 kit    Use to test blood sugar 2 times daily or as directed. Needs lancets and strips per insurance coverage.    Type 2 diabetes mellitus with hyperglycemia, with long-term current use of insulin (H)       blood glucose monitoring test strip    no brand specified    3 Box    1 strip by In Vitro route 2 times daily    Type 2 diabetes mellitus without complications (H)       fluticasone-salmeterol 250-50 MCG/DOSE diskus inhaler    ADVAIR DISKUS    1 Inhaler    Inhale 1 puff into the lungs 2 times daily    Mild persistent asthma, uncomplicated       furosemide 20 MG tablet    LASIX    30 tablet    Take 1 tablet (20 mg) by mouth daily    Benign essential hypertension       * hydrALAZINE 100 MG Tabs tablet    APRESOLINE    60 tablet    Take 1 tablet  by mouth 2 times daily    Benign essential hypertension       * hydrALAZINE 25 MG tablet    APRESOLINE    30 tablet    Take 1 tablet (25 mg) by mouth daily    Benign essential hypertension       insulin pen needle 32G X 4 MM    BD SMILEY U/F    100 each    Inject Subcutaneous  daily DUE FOR APPT    Type 2 diabetes mellitus without complications (H)       ipratropium - albuterol 0.5 mg/2.5 mg/3 mL 0.5-2.5 (3) MG/3ML neb solution    DUONEB    1 Box    Inhale 1 vial (3 mLs) into the lungs every 6 hours as needed    Mild persistent asthma, uncomplicated       liraglutide 18 MG/3ML soln    VICTOZA PEN    6 mL    INJECT 1.8 MG UNDER THE SKIN DAILY    Type 2 diabetes mellitus with hyperglycemia, with long-term current use of insulin (H)       metFORMIN 1000 MG tablet    GLUCOPHAGE    75 tablet    Take 1 tablet with breakfast and 1.5 tablets at dinner    Type 2 diabetes mellitus with hyperglycemia, with long-term current use of insulin (H)       PHARMACIST CHOICE LANCETS Misc     100 each    Check blood sugars twice daily    Type 2 diabetes mellitus without complications (H)       valsartan 320 MG tablet    DIOVAN    30 tablet    Take 1 tablet (320 mg) by mouth daily    Benign essential hypertension       verapamil 240 MG CR tablet    CALAN-SR    30 tablet    Take 1 tablet (240 mg) by mouth daily    Benign essential hypertension       * Notice:  This list has 4 medication(s) that are the same as other medications prescribed for you. Read the directions carefully, and ask your doctor or other care provider to review them with you.

## 2017-10-16 NOTE — ED PROVIDER NOTES
History     Chief Complaint   Patient presents with     Abdominal Pain     ruq pain radiating to the back, nauseated, in clinic today with same     HPI  Lucia Bobo is a 72 year old female who presents from primary care clinic with 1-2 weeks of right upper quadrant abdominal pain.  Patient has had previous cholecystectomy and appendectomy.  Denies any respiratory complaints such as shortness of breath cough, fever or chills.  Seen in primary care clinic and had a CBC and urinalysis were obtained and showed a mildly low white count at 12.0.  Urinalysis was negative except for glucose noted.  Additional blood work was obtained but is not one the clinic and is sent to the hospital for further evaluation.  Pain is in the right upper quadrant does radiate to her right flank.  Patient feels bloated and has nausea.  Patient's had a previous kidney stone.  She denies history of reflux.  Patient had an upper GI in 2015 which showed no gross lesions of the esophagus or stomach.  The duodenum which was visualized was normal.  Patient had a normal colonoscopy in 2009.  Patient had mild persistent asthma but is currently quiescent.  There is a mild elevation of the transaminases.  This was previously noted in January of this year.  No treatment for her pain prior to arrival.    I have reviewed the Medications, Allergies, Past Medical and Surgical History, and Social History in the Epic system.         Review of Systems all other systems reviewed and are negative.  Past Medical History:   Diagnosis Date     Asthma      HTN      Type II or unspecified type diabetes mellitus without mention of complication, not stated as uncontrolled 4/2/04     Patient Active Problem List   Diagnosis     Type 2 diabetes mellitus with hyperglycemia, with long-term current use of insulin (H) HgbA1c goal <7     Advanced directives, counseling/discussion     Osteoarthritis of both knees     Morbid obesity with BMI of 45.0-49.9, adult (H)      Hypertriglyceridemia, LDL goal <100     Benign essential hypertension, BP goal <140/90     Mild persistent asthma, uncomplicated     No current facility-administered medications for this encounter.      Current Outpatient Prescriptions   Medication     omeprazole (PRILOSEC) 20 MG CR capsule     HYDROcodone-acetaminophen (NORCO) 5-325 MG per tablet     liraglutide (VICTOZA PEN) 18 MG/3ML soln     fluticasone-salmeterol (ADVAIR DISKUS) 250-50 MCG/DOSE diskus inhaler     hydrALAZINE (APRESOLINE) 100 MG TABS tablet     metFORMIN (GLUCOPHAGE) 1000 MG tablet     blood glucose monitoring (NO BRAND SPECIFIED) meter device kit     hydrALAZINE (APRESOLINE) 25 MG tablet     furosemide (LASIX) 20 MG tablet     valsartan (DIOVAN) 320 MG tablet     verapamil (CALAN-SR) 240 MG CR tablet     aspirin 81 MG tablet     blood glucose monitoring (NO BRAND SPECIFIED) test strip     albuterol (VENTOLIN HFA) 108 (90 BASE) MCG/ACT Inhaler     insulin pen needle (BD SMILEY U/F) 32G X 4 MM     albuterol (2.5 MG/3ML) 0.083% nebulizer solution     ipratropium - albuterol 0.5 mg/2.5 mg/3 mL (DUONEB) 0.5-2.5 (3) MG/3ML nebulization     PHARMACIST CHOICE LANCETS MISC        Allergies   Allergen Reactions     Amoxicillin Hives and Itching     Lisinopril Cough     Penicillins Other (See Comments)     Has only had reaction to Amoxicillin so does not take any PCN     Social History     Social History     Marital status: Single     Spouse name: N/A     Number of children: N/A     Years of education: N/A     Occupational History     Not on file.     Social History Main Topics     Smoking status: Never Smoker     Smokeless tobacco: Never Used     Alcohol use No     Drug use: No     Sexual activity: Not Currently     Other Topics Concern     Parent/Sibling W/ Cabg, Mi Or Angioplasty Before 65f 55m? No     Social History Narrative     Family History   Problem Relation Age of Onset     CANCER Mother      Hypertension Father            Physical Exam   BP: (!)  180/92  Pulse: 98  Temp: 97.9  F (36.6  C)  SpO2: 97 %       Physical Exam Gen. alert cooperative female in mild to moderate distress.  HEENT shows no scleral icterus.  Oral mucosa is moist.  Speech is clear and concise.  Neck is supple.  Lungs were clear without adventitious sounds.  Cardiac regular without murmur.  Back reveals no CVA tenderness to percussion.  Abdomen is obese with active bowel sounds.  She has mild to moderate right upper quadrant tenderness without guarding or rebound.  There is no organomegaly or masses.  No skin rash of the flank or abdomen.  Extremities reveal no edema    ED Course     ED Course     Procedures               EKG Interpretation:      Results for orders placed or performed during the hospital encounter of 10/16/17   CT Abdomen Pelvis WITHOUT Contrast (stone protocol)    Narrative    CT ABDOMEN AND PELVIS WITHOUT CONTRAST   10/16/2017 4:23 PM     HISTORY: Right flank and right upper quadrant pain with dysuria.    COMPARISON: 9/8/2015.    TECHNIQUE: Without intravenous or oral contrast, helical sections were  acquired from the top of the diaphragm through the pubic symphysis.  Coronal reconstructions were generated. Radiation dose for this scan  was reduced using automated exposure control, adjustment of the mA  and/or kV according to the patient's size, or iterative reconstruction  technique. (Renal stone protocol)    FINDINGS:     Right urinary tract: Four tiny nonobstructing renal calculi, all  measuring less than 0.3 cm in diameter. No ureteral calculi. No  dilatation of the intrarenal collecting system or ureter.    Left urinary tract: Two nonobstructing calculi in the inferior pole of  kidney, both measuring less than 0.3 cm in diameter. No ureteral  calculi. No dilatation of the intrarenal collecting system or ureter.    Urinary bladder: No visualized calculi.    Remainder of the abdomen and pelvis: Several calculi within the spleen  and liver likely relate to prior  granulomatous infection. The liver,  spleen, and pancreas are otherwise unremarkable to the limits of a  noncontrast CT scan. Small low-attenuation nodules within both adrenal  glands, likely representing adenomas. The gallbladder is not  visualized. The small and large bowel are normal in caliber. The  appendix is not visualized. No bowel wall thickening, pneumatosis or  free intraperitoneal gas. The uterus is present. No enlarged lymph  nodes or free fluid in the abdomen or pelvis. 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  Atherosclerotic calcification in the abdominal aorta.    Scan through the lower chest is significant for a small calcified  granuloma in the left lower lobe.      Impression    IMPRESSION:   1. No cause of acute pain identified in the abdomen or pelvis.  2. No ureteral calculi or evidence of urinary obstruction.  3. A few tiny bilateral nonobstructing renal calculi.    DEBORA PERALTA MD                       Critical Care time:  none   Patient's lactic acid was elevated at 2.9.  Patient is not septic.         Labs Ordered and Resulted from Time of ED Arrival Up to the Time of Departure from the ED   COMPREHENSIVE METABOLIC PANEL - Abnormal; Notable for the following:        Result Value    Glucose 155 (*)     ALT 82 (*)     AST 72 (*)     All other components within normal limits   LACTIC ACID WHOLE BLOOD - Abnormal; Notable for the following:     Lactic Acid 2.9 (*)     All other components within normal limits   D DIMER QUANTITATIVE   LIPASE   PERIPHERAL IV CATHETER     IV established and fluid bolus was provided.  Patient was given IV Toradol with improvement of her pain.  On recheck on palpation of her abdomen she was no longer tender in the upper quadrant.  A CT Stone run is ordered to further assess the patient's dysuria and flank pain.  CT showed no distinct cause for her  symptoms.  Patient was given IV fluids  Assessments & Plan (with Medical Decision Making)   Patient is a 72-year-old female presents with 1-2 weeks of right upper quadrant abdominal pain.  She's had previous cholecystectomy and appendectomy.  She has no respiratory complaints, cough, fever or chills.  She was seen in primary care clinic earlier today and had an CBC which will likely 12 and normal urinalysis.  Patient states she feels bloated and nauseated.  She's had previous kidney stones.  No history of reflux but did have an upper GI in 2015 which was normal.  Normal colonoscopy in 2009.  Mild persistent asthma but currently quiescent.  She's had a previous mildly elevated transaminases.  She states she has mild diarrhea but attributes that to Victoza but this is been unchanged.  No injury.  No history of rash or shingles.  Patient states she had the shingles shot.  On presentation patient was afebrile and vital signs are stable.  She is not hypoxic.  She had normal lung auscultation.  No CVA tenderness to percussion.  Right upper quadrant midepigastric tenderness on palpation of the abdomen.  She is morbidly obese.  Blood in showed a mild elevation of her transaminases.  Normal bilirubin.  Lactic acid is 2.9 but the patient is not septic.  She had normal lipase.  D-dimer was normal.  Abdominal CT Stone run showed tiny intrarenal stones otherwise no acute cause for her symptoms.  Patient is given information abdominal pain of unclear etiology.  We'll try a short course of PPI.  Also give her some Vicodin for additional pain relief.  I'll have her see her doctor by the end of the week for reassessment.  Reasons to return to the emergency room were discussed.  I have reviewed the nursing notes.    I have reviewed the findings, diagnosis, plan and need for follow up with the patient.       New Prescriptions    HYDROCODONE-ACETAMINOPHEN (NORCO) 5-325 MG PER TABLET    Take 1-2 tablets by mouth every 4 hours as needed  for moderate to severe pain    OMEPRAZOLE (PRILOSEC) 20 MG CR CAPSULE    Take 1 capsule (20 mg) by mouth daily       Final diagnoses:   Abdominal pain, right upper quadrant       10/16/2017   [facility] EMERGENCY DEPARTMENT     Lacho Pedro MD  10/16/17 1711       Lacho Pedro MD  10/16/17 [id number]

## 2017-10-16 NOTE — ED NOTES
Patient was seen at clinic and sent to us for c/o right mid abd pain radiating to right flank that started 3 weeks ago.  Mild nausea with pain.  No painful, frequent urination.  Last BM today.  Looser stools but always has loose stools.  No blood in stools.

## 2017-10-16 NOTE — NURSING NOTE
"Chief Complaint   Patient presents with     Abdominal Pain       Initial /76  Pulse 95  Temp 98.6  F (37  C) (Tympanic)  Resp 18  Wt 276 lb (125.2 kg)  SpO2 97%  BMI 45.93 kg/m2 Estimated body mass index is 45.93 kg/(m^2) as calculated from the following:    Height as of 1/18/17: 5' 5\" (1.651 m).    Weight as of this encounter: 276 lb (125.2 kg).  Medication Reconciliation: complete    Health Maintenance that is potentially due pending provider review:  Patient aware, will skip mammo, will discuss colonoscopy    n/a    Is there anyone who you would like to be able to receive your results? No  If yes have patient fill out JAZMYN      "

## 2017-10-16 NOTE — ED NOTES
Rechecked pt's BP after a high reading. Pt states she does take BP medications and usually takes them at dinnertime.

## 2017-10-16 NOTE — ED AVS SNAPSHOT
[facility] Emergency Department    [address] [address]    Phone:  [phone]    Fax:  [phone]                                       Lucia Bobo   MRN: [id number]    Department:  [facility] Emergency Department   Date of Visit:  10/16/2017           After Visit Summary Signature Page     I have received my discharge instructions, and my questions have been answered. I have discussed any challenges I see with this plan with the nurse or doctor.    ..........................................................................................................................................  Patient/Patient Representative Signature      ..........................................................................................................................................  Patient Representative Print Name and Relationship to Patient    ..................................................               ................................................  Date                                            Time    ..........................................................................................................................................  Reviewed by Signature/Title    ...................................................              ..............................................  Date                                                            Time

## 2017-10-17 LAB
ALBUMIN SERPL-MCNC: 3.6 G/DL (ref 3.4–5)
ALP SERPL-CCNC: 97 U/L (ref 40–150)
ALT SERPL W P-5'-P-CCNC: 77 U/L (ref 0–50)
AMYLASE SERPL-CCNC: 75 U/L (ref 30–110)
ANION GAP SERPL CALCULATED.3IONS-SCNC: 10 MMOL/L (ref 3–14)
AST SERPL W P-5'-P-CCNC: 66 U/L (ref 0–45)
BILIRUB SERPL-MCNC: 0.4 MG/DL (ref 0.2–1.3)
BUN SERPL-MCNC: 22 MG/DL (ref 7–30)
CALCIUM SERPL-MCNC: 9.3 MG/DL (ref 8.5–10.1)
CHLORIDE SERPL-SCNC: 107 MMOL/L (ref 94–109)
CO2 SERPL-SCNC: 22 MMOL/L (ref 20–32)
CREAT SERPL-MCNC: 0.89 MG/DL (ref 0.52–1.04)
GFR SERPL CREATININE-BSD FRML MDRD: 62 ML/MIN/1.7M2
GLUCOSE SERPL-MCNC: 194 MG/DL (ref 70–99)
LIPASE SERPL-CCNC: 324 U/L (ref 73–393)
POTASSIUM SERPL-SCNC: 4 MMOL/L (ref 3.4–5.3)
PROT SERPL-MCNC: 7.4 G/DL (ref 6.8–8.8)
SODIUM SERPL-SCNC: 139 MMOL/L (ref 133–144)

## 2017-10-24 ENCOUNTER — OFFICE VISIT (OUTPATIENT)
Dept: FAMILY MEDICINE | Facility: CLINIC | Age: 72
End: 2017-10-24
Payer: COMMERCIAL

## 2017-10-24 VITALS
WEIGHT: 273 LBS | TEMPERATURE: 98.4 F | DIASTOLIC BLOOD PRESSURE: 78 MMHG | HEIGHT: 65 IN | HEART RATE: 96 BPM | BODY MASS INDEX: 45.48 KG/M2 | SYSTOLIC BLOOD PRESSURE: 154 MMHG | RESPIRATION RATE: 20 BRPM

## 2017-10-24 DIAGNOSIS — R10.11 RIGHT UPPER QUADRANT PAIN: Primary | ICD-10-CM

## 2017-10-24 DIAGNOSIS — Z79.4 TYPE 2 DIABETES MELLITUS WITH HYPERGLYCEMIA, WITH LONG-TERM CURRENT USE OF INSULIN (H): ICD-10-CM

## 2017-10-24 DIAGNOSIS — I10 BENIGN ESSENTIAL HYPERTENSION: Chronic | ICD-10-CM

## 2017-10-24 DIAGNOSIS — R79.89 ELEVATED TSH: ICD-10-CM

## 2017-10-24 DIAGNOSIS — E11.65 TYPE 2 DIABETES MELLITUS WITH HYPERGLYCEMIA, WITH LONG-TERM CURRENT USE OF INSULIN (H): ICD-10-CM

## 2017-10-24 DIAGNOSIS — Z90.49 HISTORY OF CHOLECYSTECTOMY: ICD-10-CM

## 2017-10-24 DIAGNOSIS — E66.01 MORBID OBESITY WITH BMI OF 45.0-49.9, ADULT (H): Chronic | ICD-10-CM

## 2017-10-24 LAB
HBA1C MFR BLD: 7.3 % (ref 4.3–6)
TSH SERPL DL<=0.005 MIU/L-ACNC: 3.48 MU/L (ref 0.4–4)

## 2017-10-24 PROCEDURE — 36415 COLL VENOUS BLD VENIPUNCTURE: CPT | Performed by: NURSE PRACTITIONER

## 2017-10-24 PROCEDURE — 83036 HEMOGLOBIN GLYCOSYLATED A1C: CPT | Performed by: NURSE PRACTITIONER

## 2017-10-24 PROCEDURE — 84443 ASSAY THYROID STIM HORMONE: CPT | Performed by: NURSE PRACTITIONER

## 2017-10-24 PROCEDURE — 99214 OFFICE O/P EST MOD 30 MIN: CPT | Performed by: NURSE PRACTITIONER

## 2017-10-24 RX ORDER — TRAMADOL HYDROCHLORIDE 50 MG/1
50-100 TABLET ORAL
Qty: 40 TABLET | Refills: 0 | Status: SHIPPED | OUTPATIENT
Start: 2017-10-24 | End: 2017-11-13

## 2017-10-24 RX ORDER — HYDRALAZINE HYDROCHLORIDE 50 MG/1
50 TABLET, FILM COATED ORAL DAILY
Qty: 90 TABLET | Refills: 1 | Status: SHIPPED | OUTPATIENT
Start: 2017-10-24 | End: 2017-11-09 | Stop reason: DRUGHIGH

## 2017-10-24 NOTE — NURSING NOTE
"Chief Complaint   Patient presents with     ER F/U       Initial /70 (BP Location: Right arm, Patient Position: Sitting, Cuff Size: Adult Large)  Pulse 96  Temp 98.4  F (36.9  C) (Tympanic)  Resp 20  Ht 5' 5\" (1.651 m)  Wt 273 lb (123.8 kg)  BMI 45.43 kg/m2 Estimated body mass index is 45.43 kg/(m^2) as calculated from the following:    Height as of this encounter: 5' 5\" (1.651 m).    Weight as of this encounter: 273 lb (123.8 kg).  Medication Reconciliation: complete    Health Maintenance that is potentially due pending provider review:  Mammogram and Colonoscopy/FIT    Pt declines to have both.    Is there anyone who you would like to be able to receive your results? No  If yes have patient fill out JAZMYN    "

## 2017-10-24 NOTE — PROGRESS NOTES
SUBJECTIVE:   Lucia Bobo is a 72 year old female who presents to clinic today for the following health issues:      ED/UC Followup:    [place]  [facility] [place]  Date of visit: 10/16/2017  Reason for visit: Rt upper abdominal pain radiating to the back   Current Status: Pain at 6/10   She feels like the last time when she had an intercostal muscle pull. She did do Physical Therapy and that helped, but she doesn't have the exercises at home. Her gallbladder was not visualized on the CT scan done in the ER, so we'll get ultrasound. She is requesting refill of pain medication- will switch to Tramadol. She only uses at bedtime.       Diabetes- uncontrolled  Lab Results   Component Value Date    A1C 7.8 08/29/2017    A1C 7.7 01/18/2017    A1C 6.7 09/10/2015    A1C 6.9 03/23/2015    A1C 7.8 08/05/2014     Hypertension- uncontrolled  Increase morning Hydralazine by 25 mg.. Take 2 of her 25 mg tablets  BP Readings from Last 6 Encounters:   10/24/17 154/78   10/16/17 164/83   10/16/17 176/76   09/13/17 132/70   08/29/17 148/76   01/18/17 134/68       HPI:   PCP:  Keturah Chaney, [facility] [phone]    Patient Active Problem List   Diagnosis     Type 2 diabetes mellitus with hyperglycemia, with long-term current use of insulin (H) HgbA1c goal <7     Advanced directives, counseling/discussion     Osteoarthritis of both knees     Morbid obesity with BMI of 45.0-49.9, adult (H)     Hypertriglyceridemia, LDL goal <100     Benign essential hypertension, BP goal <140/90     Mild persistent asthma, uncomplicated     Current Outpatient Prescriptions   Medication     hydrALAZINE (APRESOLINE) 50 MG tablet     traMADol (ULTRAM) 50 MG tablet     omeprazole (PRILOSEC) 20 MG CR capsule     HYDROcodone-acetaminophen (NORCO) 5-325 MG per tablet     liraglutide (VICTOZA PEN) 18 MG/3ML soln     fluticasone-salmeterol (ADVAIR DISKUS) 250-50 MCG/DOSE diskus inhaler     hydrALAZINE (APRESOLINE) 100 MG TABS tablet     metFORMIN (GLUCOPHAGE) 1000  "MG tablet     blood glucose monitoring (NO BRAND SPECIFIED) meter device kit     albuterol (VENTOLIN HFA) 108 (90 BASE) MCG/ACT Inhaler     insulin pen needle (BD SMILEY U/F) 32G X 4 MM     furosemide (LASIX) 20 MG tablet     valsartan (DIOVAN) 320 MG tablet     verapamil (CALAN-SR) 240 MG CR tablet     albuterol (2.5 MG/3ML) 0.083% nebulizer solution     aspirin 81 MG tablet     blood glucose monitoring (NO BRAND SPECIFIED) test strip     ipratropium - albuterol 0.5 mg/2.5 mg/3 mL (DUONEB) 0.5-2.5 (3) MG/3ML nebulization     PHARMACIST CHOICE LANCETS MISC     [DISCONTINUED] hydrALAZINE (APRESOLINE) 25 MG tablet     No current facility-administered medications for this visit.        Health Maintenance Due   Topic Date Due     DEXA SCAN SCREENING (SYSTEM ASSIGNED)  07/29/2011     PNEUMOCOCCAL (2 of 2 - PCV13) 03/22/2014     MAMMO SCREEN Q2 YR (SYSTEM ASSIGNED)  08/23/2014     FALL RISK ASSESSMENT  07/20/2017     INFLUENZA VACCINE (SYSTEM ASSIGNED)  09/01/2017     COLON CANCER SCREEN (SYSTEM ASSIGNED)  10/23/2017       Reviewed and updated:  Tobacco  Allergies  Meds  Med Hx  Surg Hx  Fam Hx  Soc Hx     ROS:  Constitutional, HEENT, cardiovascular, pulmonary, gi and gu systems are negative, except as otherwise noted.  CV: NEGATIVE for chest pain, palpitations or peripheral edema and Hx HTN  GI: NEGATIVE for nausea, abdominal pain, heartburn, or change in bowel habits and POSITIVE for abdominal pain RUQ  ENDOCRINE: NEGATIVE for temperature intolerance, skin/hair changes and Hx diabetes      PHYSICAL EXAM:   /78  Pulse 96  Temp 98.4  F (36.9  C) (Tympanic)  Resp 20  Ht 5' 5\" (1.651 m)  Wt 273 lb (123.8 kg)  BMI 45.43 kg/m2  Body mass index is 45.43 kg/(m^2).  GENERAL APPEARANCE: healthy, alert, no distress and over weight  RESP: lungs clear to auscultation - no rales, rhonchi or wheezes  CV: regular rates and rhythm, normal S1 S2, no S3 or S4 and no murmur, click or rub  ABDOMEN: obese,soft, nontender, " without hepatosplenomegaly or masses and bowel sounds normal  PSYCH: mentation appears normal and affect normal/bright      ASSESSMENT & PLAN:   1. Right upper quadrant pain  Acute on chronic  - US Abdomen Limited; Future (Schedule ultrasound in [place]: [place] [facility] at: [phone] )  - PHYSICAL THERAPY REFERRAL  - traMADol (ULTRAM) 50 MG tablet; Take 1-2 tablets ( mg) by mouth nightly as needed for pain maximum 2 tablet(s) per day  Dispense: 40 tablet; Refill: 0    2. History of cholecystectomy  - US Abdomen Limited; Future (Schedule ultrasound in [place]: [place] [facility] at: [phone] )  - PHYSICAL THERAPY REFERRAL    3. Type 2 diabetes mellitus with hyperglycemia, with long-term current use of insulin (H) HgbA1c goal <7  Chronic, uncontrolled    4. Elevated TSH  - TSH with free T4 reflex    5. Morbid obesity with BMI of 45.0-49.9, adult (H)  Chronic, uncontrolled  Walking daily    6. Benign essential hypertension, BP goal <140/90  Chronic, uncontrolled  Increased Hydralazine from 125 mg in the morning to 150 mg in the morning  Recheck BP in 1 week with Nurse only appointment  - hydrALAZINE (APRESOLINE) 50 MG tablet; Take 1 tablet (50 mg) by mouth daily In the morning  Dispense: 90 tablet; Refill: 1      Risks, benefits, side effects and rationale for treatment plan fully discussed with the patient and understanding expressed.    LUIS M [facility]

## 2017-10-24 NOTE — LETTER
October 25, 2017      Lucia Bobo  [address] [address]        Dear ,    We are writing to inform you of your test results.    TSH normal. Recheck yearly.   HgbA1c uncontrolled (goal <7). Recheck every 3 months until controlled. You are at maximum doses for Metformin and Victoza, so we'll need to add a 3rd agent to help control your diabetes better. I'd like you to schedule an appointment with Michell Angulo (our diabetic educator) to review your options/insurance coverage. Please call her to schedule:  [phone].     Resulted Orders   Hemoglobin A1c   Result Value Ref Range    Hemoglobin A1C 7.3 (H) 4.3 - 6.0 %   TSH with free T4 reflex   Result Value Ref Range    TSH 3.48 0.40 - 4.00 mU/L       If you have any questions or concerns, please call the clinic at the number listed above.       Sincerely,        Keturah Chaney, CNP

## 2017-10-24 NOTE — MR AVS SNAPSHOT
After Visit Summary   10/24/2017    Lucia Bobo    MRN: [id number]           Patient Information     Date Of Birth          1945        Visit Information        Provider Department      10/24/2017 9:00 AM Keturah Chaney CNP [facility]        Today's Diagnoses     Right upper quadrant pain    -  1    History of cholecystectomy        Type 2 diabetes mellitus with hyperglycemia, with long-term current use of insulin (H) HgbA1c goal <7        Elevated TSH        Morbid obesity with BMI of 45.0-49.9, adult (H)        Benign essential hypertension, BP goal <140/90          Care Instructions    1. Right upper quadrant pain    - US Abdomen Limited; Future (Schedule ultrasound in [place]: [facility] at: [phone] )  - PHYSICAL THERAPY REFERRAL  - traMADol (ULTRAM) 50 MG tablet; Take 1-2 tablets ( mg) by mouth nightly as needed for pain maximum 2 tablet(s) per day  Dispense: 40 tablet; Refill: 0    2. History of cholecystectomy  - US Abdomen Limited; Future (Schedule ultrasound in [place]: [facility] at: [phone] )  - PHYSICAL THERAPY REFERRAL    3. Type 2 diabetes mellitus with hyperglycemia, with long-term current use of insulin (H) HgbA1c goal <7      4. Elevated TSH  - TSH with free T4 reflex    5. Morbid obesity with BMI of 45.0-49.9, adult (H)  Walking daily    6. Benign essential hypertension, BP goal <140/90  Increased Hydralazine from 125 mg in the morning to 150 mg in the morning  Recheck BP in 1 week with Nurse only appointment  - hydrALAZINE (APRESOLINE) 50 MG tablet; Take 1 tablet (50 mg) by mouth daily In the morning  Dispense: 90 tablet; Refill: 1            Follow-ups after your visit        Additional Services     PHYSICAL THERAPY REFERRAL       *This therapy referral will be filtered to a centralized scheduling office at [facility] and the patient will receive a call to schedule an  "appointment at a [facility] location most convenient for them. *     [facility] provides Physical Therapy evaluation and treatment and many specialty services across the [place] system.  If requesting a specialty program, please choose from the list below.    If you have not heard from the scheduling office within 2 business days, please call [phone] for all locations, with the exception of [place], please call [phone].  Treatment: Evaluation & Treatment  Special Instructions/Modalities: RIGHT UPPER QUADRANT pain- she did Physical Therapy before for intercostal pain  Special Programs:     Please be aware that coverage of these services is subject to the terms and limitations of your health insurance plan.  Call member services at your health plan with any benefit or coverage questions.      **Note to Provider:  If you are referring outside of [facility] for the therapy appointment, please list the name of the location in the \"special instructions\" above, print the referral and give to the patient to schedule the appointment.                  Future tests that were ordered for you today     Open Future Orders        Priority Expected Expires Ordered    US Abdomen Limited Routine  10/24/2018 10/24/2017            Who to contact     If you have questions or need follow up information about today's clinic visit or your schedule please contact [facility] directly at [phone].  Normal or non-critical lab and imaging results will be communicated to you by MyChart, letter or phone within 4 business days after the clinic has received the results. If you do not hear from us within 7 days, please contact the clinic through MyChart or phone. If you have a critical or abnormal lab result, we will notify you by phone as soon as possible.  Submit refill requests through [employer] or call your pharmacy and they will forward the refill request to us. Please allow 3 business days for your " "refill to be completed.          Additional Information About Your Visit        [employer] Information     [employer] lets you send messages to your doctor, view your test results, renew your prescriptions, schedule appointments and more. To sign up, go to [email or web address] . Click on \"Log in\" on the left side of the screen, which will take you to the Welcome page. Then click on \"Sign up Now\" on the right side of the page.     You will be asked to enter the access code listed below, as well as some personal information. Please follow the directions to create your username and password.     Your access code is: [email or web address]  Expires: 2017  9:12 AM     Your access code will  in 90 days. If you need help or a new code, please call your [facility] clinic or [phone].        Care EveryWhere ID     This is your Care EveryWhere ID. This could be used by other organizations to access your [facility] medical records  [id number]        Your Vitals Were     Pulse Temperature Respirations Height BMI (Body Mass Index)       96 98.4  F (36.9  C) (Tympanic) 20 5' 5\" (1.651 m) 45.43 kg/m2        Blood Pressure from Last 3 Encounters:   10/24/17 168/70   10/16/17 164/83   10/16/17 176/76    Weight from Last 3 Encounters:   10/24/17 273 lb (123.8 kg)   10/16/17 276 lb (125.2 kg)   17 278 lb (126.1 kg)              We Performed the Following     Hemoglobin A1c     PHYSICAL THERAPY REFERRAL     TSH with free T4 reflex          Today's Medication Changes          These changes are accurate as of: 10/24/17  9:26 AM.  If you have any questions, ask your nurse or doctor.               Start taking these medicines.        Dose/Directions    traMADol 50 MG tablet   Commonly known as:  ULTRAM   Used for:  Right upper quadrant pain   Started by:  Keturah Chaney CNP        Dose:   mg   Take 1-2 tablets ( mg) by mouth nightly as needed for pain maximum 2 tablet(s) per day   Quantity:  40 tablet   Refills: "  0         These medicines have changed or have updated prescriptions.        Dose/Directions    * hydrALAZINE 100 MG Tabs tablet   Commonly known as:  APRESOLINE   This may have changed:  Another medication with the same name was changed. Make sure you understand how and when to take each.   Used for:  Benign essential hypertension   Changed by:  Keturah Chaney CNP        Take 1 tablet  by mouth 2 times daily   Quantity:  60 tablet   Refills:  5       * hydrALAZINE 50 MG tablet   Commonly known as:  APRESOLINE   This may have changed:    - medication strength  - how much to take  - additional instructions   Used for:  Benign essential hypertension   Changed by:  Keturah Chaney CNP        Dose:  50 mg   Take 1 tablet (50 mg) by mouth daily In the morning   Quantity:  90 tablet   Refills:  1       * Notice:  This list has 2 medication(s) that are the same as other medications prescribed for you. Read the directions carefully, and ask your doctor or other care provider to review them with you.         Where to get your medicines      These medications were sent to [facility]-[address] [address]     Phone:  [phone]     hydrALAZINE 50 MG tablet         Some of these will need a paper prescription and others can be bought over the counter.  Ask your nurse if you have questions.     Bring a paper prescription for each of these medications     traMADol 50 MG tablet                Primary Care Provider Office Phone # Fax #    Keturah Chaney [phone]       [facility] [address]        Equal Access to Services     REYNA MORGAN : Danielle prasad Soruba, waaxda luniurka, qaybta kaalmada celeste alegria. So [facility] [phone].    ATENCIÓN: Si habla español, tiene a mcarthur disposición servicios gratuitos de asistencia lingüística. Llame al  [phone].    We comply with applicable federal civil rights laws and [place] laws. We do not discriminate on the basis of race, color, national origin, age, disability, sex, sexual orientation, or gender identity.            Thank you!     Thank you for choosing [facility]  for your care. Our goal is always to provide you with excellent care. Hearing back from our patients is one way we can continue to improve our services. Please take a few minutes to complete the written survey that you may receive in the mail after your visit with us. Thank you!             Your Updated Medication List - Protect others around you: Learn how to safely use, store and throw away your medicines at www.disposemymeds.org.          This list is accurate as of: 10/24/17  9:26 AM.  Always use your most recent med list.                   Brand Name Dispense Instructions for use Diagnosis    * albuterol (2.5 MG/3ML) 0.083% neb solution     1 Box    Take 3 mLs by nebulization every 4 hours as needed for shortness of breath / dyspnea.    Mild persistent asthma, uncomplicated       * albuterol 108 (90 BASE) MCG/ACT Inhaler    VENTOLIN HFA    1 Inhaler    Inhale 1-2 puffs into the lungs every 4 hours as needed    Mild persistent asthma, uncomplicated       aspirin 81 MG tablet     90 tablet    Take 1 tablet (81 mg) by mouth daily    Type 2 diabetes mellitus without complications (H)       blood glucose monitoring meter device kit    no brand specified    1 kit    Use to test blood sugar 2 times daily or as directed. Needs lancets and strips per insurance coverage.    Type 2 diabetes mellitus with hyperglycemia, with long-term current use of insulin (H)       blood glucose monitoring test strip    no brand specified    3 Box    1 strip by In Vitro route 2 times daily    Type 2 diabetes mellitus without complications (H)       fluticasone-salmeterol 250-50 MCG/DOSE diskus inhaler    ADVAIR DISKUS    1 Inhaler    Inhale 1  puff into the lungs 2 times daily    Mild persistent asthma, uncomplicated       furosemide 20 MG tablet    LASIX    30 tablet    Take 1 tablet (20 mg) by mouth daily    Benign essential hypertension       * hydrALAZINE 100 MG Tabs tablet    APRESOLINE    60 tablet    Take 1 tablet  by mouth 2 times daily    Benign essential hypertension       * hydrALAZINE 50 MG tablet    APRESOLINE    90 tablet    Take 1 tablet (50 mg) by mouth daily In the morning    Benign essential hypertension       HYDROcodone-acetaminophen 5-325 MG per tablet    NORCO    15 tablet    Take 1-2 tablets by mouth every 4 hours as needed for moderate to severe pain        insulin pen needle 32G X 4 MM    BD SMILEY U/F    100 each    Inject Subcutaneous daily DUE FOR APPT    Type 2 diabetes mellitus without complications (H)       ipratropium - albuterol 0.5 mg/2.5 mg/3 mL 0.5-2.5 (3) MG/3ML neb solution    DUONEB    1 Box    Inhale 1 vial (3 mLs) into the lungs every 6 hours as needed    Mild persistent asthma, uncomplicated       liraglutide 18 MG/3ML soln    VICTOZA PEN    6 mL    INJECT 1.8 MG UNDER THE SKIN DAILY    Type 2 diabetes mellitus with hyperglycemia, with long-term current use of insulin (H)       metFORMIN 1000 MG tablet    GLUCOPHAGE    75 tablet    Take 1 tablet with breakfast and 1.5 tablets at dinner    Type 2 diabetes mellitus with hyperglycemia, with long-term current use of insulin (H)       omeprazole 20 MG CR capsule    priLOSEC    30 capsule    Take 1 capsule (20 mg) by mouth daily        PHARMACIST CHOICE LANCETS Misc     100 each    Check blood sugars twice daily    Type 2 diabetes mellitus without complications (H)       traMADol 50 MG tablet    ULTRAM    40 tablet    Take 1-2 tablets ( mg) by mouth nightly as needed for pain maximum 2 tablet(s) per day    Right upper quadrant pain       valsartan 320 MG tablet    DIOVAN    30 tablet    Take 1 tablet (320 mg) by mouth daily    Benign essential hypertension        verapamil 240 MG CR tablet    CALAN-SR    30 tablet    Take 1 tablet (240 mg) by mouth daily    Benign essential hypertension       * Notice:  This list has 4 medication(s) that are the same as other medications prescribed for you. Read the directions carefully, and ask your doctor or other care provider to review them with you.

## 2017-10-24 NOTE — PATIENT INSTRUCTIONS
1. Right upper quadrant pain  Acute on chronic  - US Abdomen Limited; Future (Schedule ultrasound in [place]: [place] [facility] at: [phone] )  - PHYSICAL THERAPY REFERRAL  - traMADol (ULTRAM) 50 MG tablet; Take 1-2 tablets ( mg) by mouth nightly as needed for pain maximum 2 tablet(s) per day  Dispense: 40 tablet; Refill: 0    2. History of cholecystectomy  - US Abdomen Limited; Future (Schedule ultrasound in [place]: [place] [facility] at: [phone] )  - PHYSICAL THERAPY REFERRAL    3. Type 2 diabetes mellitus with hyperglycemia, with long-term current use of insulin (H) HgbA1c goal <7  Chronic, uncontrolled    4. Elevated TSH  - TSH with free T4 reflex    5. Morbid obesity with BMI of 45.0-49.9, adult (H)  Chronic, uncontrolled  Walking daily    6. Benign essential hypertension, BP goal <140/90  Chronic, uncontrolled  Increased Hydralazine from 125 mg in the morning to 150 mg in the morning  Recheck BP in 1 week with Nurse only appointment  - hydrALAZINE (APRESOLINE) 50 MG tablet; Take 1 tablet (50 mg) by mouth daily In the morning  Dispense: 90 tablet; Refill: 1

## 2017-10-25 DIAGNOSIS — E66.01 MORBID OBESITY WITH BMI OF 45.0-49.9, ADULT (H): Chronic | ICD-10-CM

## 2017-10-25 DIAGNOSIS — Z79.4 TYPE 2 DIABETES MELLITUS WITH HYPERGLYCEMIA, WITH LONG-TERM CURRENT USE OF INSULIN (H): Primary | ICD-10-CM

## 2017-10-25 DIAGNOSIS — E11.65 TYPE 2 DIABETES MELLITUS WITH HYPERGLYCEMIA, WITH LONG-TERM CURRENT USE OF INSULIN (H): Primary | ICD-10-CM

## 2017-10-25 NOTE — PROGRESS NOTES
TSH normal. Recheck yearly.  HgbA1c uncontrolled (goal <7). Recheck every 3 months until controlled. You are at maximum doses for Metformin and Victoza, so we'll need to add a 3rd agent to help control your diabetes better. I'd like you to schedule an appointment with Michell Angulo (our diabetic educator) to review your options/insurance coverage. Please call her to schedule:  [phone].   Please notify her of these results and send a hard copy if not on [employer].   Thanks. LUIS M Bean

## 2017-10-26 ENCOUNTER — RADIANT APPOINTMENT (OUTPATIENT)
Dept: ULTRASOUND IMAGING | Facility: CLINIC | Age: 72
End: 2017-10-26
Attending: NURSE PRACTITIONER
Payer: COMMERCIAL

## 2017-10-26 DIAGNOSIS — R10.11 RIGHT UPPER QUADRANT PAIN: ICD-10-CM

## 2017-10-26 DIAGNOSIS — Z90.49 HISTORY OF CHOLECYSTECTOMY: ICD-10-CM

## 2017-10-26 PROCEDURE — 76705 ECHO EXAM OF ABDOMEN: CPT

## 2017-11-02 ENCOUNTER — HOSPITAL ENCOUNTER (OUTPATIENT)
Dept: PHYSICAL THERAPY | Facility: CLINIC | Age: 72
Setting detail: THERAPIES SERIES
End: 2017-11-02
Attending: NURSE PRACTITIONER
Payer: MEDICARE

## 2017-11-02 DIAGNOSIS — E11.9 TYPE 2 DIABETES MELLITUS WITHOUT COMPLICATIONS (H): ICD-10-CM

## 2017-11-02 PROCEDURE — 40000718 ZZHC STATISTIC PT DEPARTMENT ORTHO VISIT: Performed by: PHYSICAL THERAPIST

## 2017-11-02 PROCEDURE — G8982 BODY POS GOAL STATUS: HCPCS | Mod: GP,CI | Performed by: PHYSICAL THERAPIST

## 2017-11-02 PROCEDURE — 97161 PT EVAL LOW COMPLEX 20 MIN: CPT | Mod: GP | Performed by: PHYSICAL THERAPIST

## 2017-11-02 PROCEDURE — G8981 BODY POS CURRENT STATUS: HCPCS | Mod: GP,CJ | Performed by: PHYSICAL THERAPIST

## 2017-11-02 PROCEDURE — 97110 THERAPEUTIC EXERCISES: CPT | Mod: GP | Performed by: PHYSICAL THERAPIST

## 2017-11-02 NOTE — PROGRESS NOTES
11/02/17 1000   General Information   Type of Visit Initial OP Ortho PT Evaluation   Start of Care Date 11/02/17   Referring Physician [facility]   Patient/Family Goals Statement decrease pain   Orders Evaluate and Treat   Date of Order 10/24/17   Insurance Type Medicare   Medical Diagnosis Right upper quadrant pain   Surgical/Medical history reviewed Yes   Precautions/Limitations no known precautions/limitations   Body Part(s)   Body Part(s) Lumbar Spine/SI   Presentation and Etiology   Pertinent history of current problem (include personal factors and/or comorbidities that impact the POC) Patient reports she has had right abdominal pain that wraps around to her back. Has had CT scan, US, urine and blood samples all coming back negative. Pt states she had this same problem a few years ago and also found therapy to be helpful. Patient reports pain started 3-4 weeks ago after no specific incident, she thought maybe it was just from sitting too much. It is worse with sitting for long periods of time, less pain when up and moving around. No pain with pressure, coughing, deep breathing, eating. Gall bladder and appendix were removed in 1984. Cant lay supine ever since she broke a vertebrae in 1984.    Impairments A. Pain   Functional Limitations perform activities of daily living;perform desired leisure / sports activities   Symptom Location Right upper quadrant, wrapping to back   How/Where did it occur From insidious onset   Onset date of current episode/exacerbation 10/12/17   Chronicity New   Pain rating (0-10 point scale) Best (/10);Worst (/10)   Best (/10) 3   Worst (/10) 8   Pain quality A. Sharp;F. Stabbing;C. Aching   Frequency of pain/symptoms A. Constant   Pain symptoms comment Worse with sitting usually   Pain/symptoms exacerbated by A. Sitting   Pain/symptoms eased by C. Rest;B. Walking   Progression of symptoms since onset: Unchanged   Current / Previous Interventions   Diagnostic Tests: CT scan   CT Results  unremarkable   Prior Level of Function   Prior Level of Function-Mobility Independant   Prior Level of Function-ADLs Independant   Current Level of Function   Patient role/employment history F. Retired   Fall Risk Screen   Per patient - Fall 2 or more times in past year? No   Per patient - Fall with injury in past year? No   Is patient a fall risk? No   Lumbar Spine/SI Objective Findings   Integumentary Edema B LE   Balance/Proprioception (Single Leg Stance) Decreased B < 5 seconds   Flexion ROM Fingers to mid tibia, decreased overall mobility   Extension ROM Just past neutral, no pain   Right Side Bending ROM Fingertips to just above knee   Left Side Bending ROM Fingertips to just above knee   Hip Abduction Strength 3/5   Knee Flexion Strength 5-/6   Knee Extension (L3) Strength 5-/6   Ankle Dorsiflexion (L4) Strength 5/5   Hamstring Flexibility in 90/90 position on L lacking 17 *, on R lacking 30*   Hip Flexor Flexibility Limited B   Quadricep Flexibility Limited R>L   Slump Test Mild positive B   Palpation No tenderness abdominal quadrants, tender to palpation R QL, paraspinals, over inferior ribs   Posture Increased anterior pelvic tilt   Repeated Extension-Standing ROM No effect   Repeated Flexion-Standing ROM No effect   Great Toe Extension (L5) Strength 5/5   Ankle Plantar Flexion (S1) Strength 5/5   SLR mild + R   Segmental Mobility Pain PA mobilizations T 10-L2, pain mobilizations lower ribs   Planned Therapy Interventions   Planned Therapy Interventions manual therapy;joint mobilization;ROM;strengthening;stretching;neuromuscular re-education   Planned Modality Interventions   Planned Modality Interventions Ultrasound;Electrical stimulation   Clinical Impression   Criteria for Skilled Therapeutic Interventions Met yes, treatment indicated   PT Diagnosis rib pain   Influenced by the following impairments Pain, decreased flexibility, decreased strength   Functional limitations due to impairments difficulty  sitting or standing long periods of time   Clinical Presentation Evolving/Changing   Clinical Presentation Rationale multiple comorbidities, subacute pain, limiting activity, restrictions of body structire   Clinical Decision Making (Complexity) Low complexity   Therapy Frequency 1 time/week   Predicted Duration of Therapy Intervention (days/wks) 8 weeks   Risk & Benefits of therapy have been explained Yes   Patient, Family & other staff in agreement with plan of care Yes   Clinical Impression Comments Patient presenting with decreased ROM and strength, right upper quadrant pain, referred from low back and ribs. Decreased mobility R compared to L   Education Assessment   Preferred Learning Style Listening;Demonstration;Pictures/video   Barriers to Learning No barriers   ORTHO GOALS   PT Ortho Eval Goals 1;2;3   Ortho Goal 1   Goal Identifier 1   Goal Description Patient will be independant with HEP for long term self managment   Target Date 12/28/17   Ortho Goal 2   Goal Identifier 2   Goal Description Patient will be able to sit for > 1 hour without increases in pain > 3/10   Target Date 12/28/17   Ortho Goal 3   Goal Identifier 3   Goal Description Patient will be able to perform bed mobility with little difficulty (Optimal) and no increases in pain.    Target Date 12/28/17   Total Evaluation Time   Total Evaluation Time 30   Therapy Certification   Certification date from 11/02/17   Certification date to 01/11/18   Medical Diagnosis right upper quadrant pain

## 2017-11-02 NOTE — TELEPHONE ENCOUNTER
Lab Results   Component Value Date    A1C 7.3 10/24/2017    A1C 7.8 08/29/2017    A1C 7.7 01/18/2017    A1C 6.7 09/10/2015    A1C 6.9 03/23/2015     Refilled.  JEFFREY Arias RN

## 2017-11-02 NOTE — TELEPHONE ENCOUNTER
insulin pen needle (BD SMILEY U/F) 32G X 4 MM      Last Written Prescription Date: 7/31/17  Last Fill Quantity: 100,  # refills: 0   Last Office Visit with [facility], [facility] or [facility] prescribing provider: 10/24/17                                         Next 5 appointments (look out 90 days)     Nov 09, 2017  9:20 AM CST   [facility]   [facility]    [address] [place] [id number]   [phone]

## 2017-11-02 NOTE — PROGRESS NOTES
[facility]          OUTPATIENT PHYSICAL THERAPY ORTHOPEDIC EVALUATION  PLAN OF TREATMENT FOR OUTPATIENT REHABILITATION  (COMPLETE FOR INITIAL CLAIMS ONLY)  Patient's Last Name, First Name, M.I.  YOB: 1945  Lucia Bobo    Provider s Name:  [facility]   Medical Record No.  [id number]   Start of Care Date:  11/02/17   Onset Date:  10/12/17   Type:     _X__PT   ___OT   ___SLP Medical Diagnosis:  right upper quadrant pain     PT Diagnosis:  rib pain   Visits from SOC:  1      _________________________________________________________________________________  Plan of Treatment/Functional Goals:  manual therapy, joint mobilization, ROM, strengthening, stretching, neuromuscular re-education     Ultrasound, Electrical stimulation     Goals  Goal Identifier: 1  Goal Description: Patient will be independant with HEP for long term self managment  Target Date: 12/28/17    Goal Identifier: 2  Goal Description: Patient will be able to sit for > 1 hour without increases in pain > 3/10  Target Date: 12/28/17    Goal Identifier: 3  Goal Description: Patient will be able to perform bed mobility with little difficulty and no increases in pain.   Target Date: 12/28/17                   Therapy Frequency:  1 time/week  Predicted Duration of Therapy Intervention:  8 weeks    Lucia Campoverde, PT                 I CERTIFY THE NEED FOR THESE SERVICES FURNISHED UNDER        THIS PLAN OF TREATMENT AND WHILE UNDER MY CARE     (Physician co-signature of this document indicates review and certification of the therapy plan).                         Certification Date From:  11/02/17   Certification Date To:  01/11/18    Referring Provider:  Shiv    Initial Assessment        See Epic Evaluation Start of Care Date: 11/02/17

## 2017-11-07 ENCOUNTER — TELEPHONE (OUTPATIENT)
Dept: FAMILY MEDICINE | Facility: CLINIC | Age: 72
End: 2017-11-07

## 2017-11-07 ENCOUNTER — HOSPITAL ENCOUNTER (OUTPATIENT)
Dept: PHYSICAL THERAPY | Facility: CLINIC | Age: 72
Setting detail: THERAPIES SERIES
End: 2017-11-07
Attending: NURSE PRACTITIONER
Payer: MEDICARE

## 2017-11-07 PROCEDURE — 40000718 ZZHC STATISTIC PT DEPARTMENT ORTHO VISIT: Performed by: PHYSICAL THERAPIST

## 2017-11-07 PROCEDURE — 97110 THERAPEUTIC EXERCISES: CPT | Mod: GP | Performed by: PHYSICAL THERAPIST

## 2017-11-07 NOTE — TELEPHONE ENCOUNTER
Please check with Lucia if she has a home BP cuff. If not, she needs a recheck of her BP with RN visit. If still elevated, she'll need an OV with me for medication adjustment  BP Readings from Last 6 Encounters:   10/24/17 154/78   10/16/17 164/83   10/16/17 176/76   09/13/17 132/70   08/29/17 148/76   01/18/17 134/68     Thanks,  LUIS M Bean

## 2017-11-07 NOTE — TELEPHONE ENCOUNTER
Attempt to call pt, VM box not set up.  Has PT appt scheduled 11-14-17, CDE 11-17-17.  Will see if pt agreeable to RN visit for BP check either of these days. JEFFREY Arias RN

## 2017-11-08 ENCOUNTER — ALLIED HEALTH/NURSE VISIT (OUTPATIENT)
Dept: FAMILY MEDICINE | Facility: CLINIC | Age: 72
End: 2017-11-08
Payer: COMMERCIAL

## 2017-11-08 ENCOUNTER — TELEPHONE (OUTPATIENT)
Dept: FAMILY MEDICINE | Facility: CLINIC | Age: 72
End: 2017-11-08

## 2017-11-08 VITALS — HEART RATE: 80 BPM | DIASTOLIC BLOOD PRESSURE: 72 MMHG | SYSTOLIC BLOOD PRESSURE: 144 MMHG

## 2017-11-08 DIAGNOSIS — E78.1 HYPERTRIGLYCERIDEMIA: ICD-10-CM

## 2017-11-08 DIAGNOSIS — E11.65 TYPE 2 DIABETES MELLITUS WITH HYPERGLYCEMIA, WITH LONG-TERM CURRENT USE OF INSULIN (H): ICD-10-CM

## 2017-11-08 DIAGNOSIS — Z79.4 TYPE 2 DIABETES MELLITUS WITH HYPERGLYCEMIA, WITH LONG-TERM CURRENT USE OF INSULIN (H): ICD-10-CM

## 2017-11-08 DIAGNOSIS — E66.01 MORBID OBESITY WITH BMI OF 45.0-49.9, ADULT (H): Chronic | ICD-10-CM

## 2017-11-08 DIAGNOSIS — I10 BENIGN ESSENTIAL HYPERTENSION: Primary | Chronic | ICD-10-CM

## 2017-11-08 PROCEDURE — 99207 ZZC NO CHARGE NURSE ONLY: CPT

## 2017-11-08 NOTE — MR AVS SNAPSHOT
"              After Visit Summary   11/8/2017    Lucia Bobo    MRN: [id number]           Patient Information     Date Of Birth          1945        Visit Information        Provider Department      11/8/2017 1:00 PM FL PI RN [facility]        Today's Diagnoses     Benign essential hypertension, BP goal <140/90    -  1       Follow-ups after your visit        Your next 10 appointments already scheduled     Nov 14, 2017 10:15 AM CST   Ortho Treatment with Lucia Campoverde PT   [facility]    [address] [address] [phone]            Nov 17, 2017  9:00 AM CST   Diabetic Education with Michell Angulo RD   [facility]    [address] [address] [phone]              Who to contact     If you have questions or need follow up information about today's clinic visit or your schedule please contact [facility] directly at [phone].  Normal or non-critical lab and imaging results will be communicated to you by Jagexhart, letter or phone within 4 business days after the clinic has received the results. If you do not hear from us within 7 days, please contact the clinic through Jagexhart or phone. If you have a critical or abnormal lab result, we will notify you by phone as soon as possible.  Submit refill requests through HealthyRoad or call your pharmacy and they will forward the refill request to us. Please allow 3 business days for your refill to be completed.          Additional Information About Your Visit        Jagexhart Information     HealthyRoad lets you send messages to your doctor, view your test results, renew your prescriptions, schedule appointments and more. To sign up, go to [email or web address] . Click on \"Log in\" on the left side of the screen, which will take you to the Welcome page. Then click on \"Sign up Now\" on the right side of " the page.     You will be asked to enter the access code listed below, as well as some personal information. Please follow the directions to create your username and password.     Your access code is: [email or web address]  Expires: 2017  8:12 AM     Your access code will  in 90 days. If you need help or a new code, please call your [place] clinic or [phone].        Care EveryWhere ID     This is your Care EveryWhere ID. This could be used by other organizations to access your [place] medical records  [id number]        Your Vitals Were     Pulse                   80            Blood Pressure from Last 3 Encounters:   17 144/72   10/24/17 154/78   10/16/17 164/83    Weight from Last 3 Encounters:   10/24/17 273 lb (123.8 kg)   10/16/17 276 lb (125.2 kg)   17 278 lb (126.1 kg)              Today, you had the following     No orders found for display       Primary Care Provider Office Phone # Fax #    Keturah Chaney, [facility] [phone]       [address] [id number]        Equal Access to Services     [facility]: Hadii aad ku hadasho Soomaali, waaxda luqadaha, qaybta kaalmada adeegyada, celeste sutherland hayronnien marybeth chance ah. So [facility] [phone].    ATENCIÓN: Si habla español, tiene a mcarthur disposición servicios gratuitos de asistencia lingüística. Llame al [phone].    We comply with applicable federal civil rights laws and [place] laws. We do not discriminate on the basis of race, color, national origin, age, disability, sex, sexual orientation, or gender identity.            Thank you!     Thank you for choosing [facility]  for your care. Our goal is always to provide you with excellent care. Hearing back from our patients is one way we can continue to improve our services. Please take a few minutes to complete the written survey that you may receive in the mail after your visit with us. Thank you!             Your Updated  Medication List - Protect others around you: Learn how to safely use, store and throw away your medicines at www.disposemymeds.org.          This list is accurate as of: 11/8/17  2:08 PM.  Always use your most recent med list.                   Brand Name Dispense Instructions for use Diagnosis    * albuterol (2.5 MG/3ML) 0.083% neb solution     1 Box    Take 3 mLs by nebulization every 4 hours as needed for shortness of breath / dyspnea.    Mild persistent asthma, uncomplicated       * albuterol 108 (90 BASE) MCG/ACT Inhaler    VENTOLIN HFA    1 Inhaler    Inhale 1-2 puffs into the lungs every 4 hours as needed    Mild persistent asthma, uncomplicated       aspirin 81 MG tablet     90 tablet    Take 1 tablet (81 mg) by mouth daily    Type 2 diabetes mellitus without complications (H)       blood glucose monitoring meter device kit    no brand specified    1 kit    Use to test blood sugar 2 times daily or as directed. Needs lancets and strips per insurance coverage.    Type 2 diabetes mellitus with hyperglycemia, with long-term current use of insulin (H)       blood glucose monitoring test strip    no brand specified    3 Box    1 strip by In Vitro route 2 times daily    Type 2 diabetes mellitus without complications (H)       fluticasone-salmeterol 250-50 MCG/DOSE diskus inhaler    ADVAIR DISKUS    1 Inhaler    Inhale 1 puff into the lungs 2 times daily    Mild persistent asthma, uncomplicated       furosemide 20 MG tablet    LASIX    30 tablet    Take 1 tablet (20 mg) by mouth daily    Benign essential hypertension       * hydrALAZINE 100 MG Tabs tablet    APRESOLINE    60 tablet    Take 1 tablet  by mouth 2 times daily    Benign essential hypertension       * hydrALAZINE 50 MG tablet    APRESOLINE    90 tablet    Take 1 tablet (50 mg) by mouth daily In the morning    Benign essential hypertension       insulin pen needle 32G X 4 MM    BD SMILEY U/F    100 each    Inject 1 each Subcutaneous daily    Type 2 diabetes  mellitus without complications (H)       ipratropium - albuterol 0.5 mg/2.5 mg/3 mL 0.5-2.5 (3) MG/3ML neb solution    DUONEB    1 Box    Inhale 1 vial (3 mLs) into the lungs every 6 hours as needed    Mild persistent asthma, uncomplicated       liraglutide 18 MG/3ML soln    VICTOZA PEN    6 mL    INJECT 1.8 MG UNDER THE SKIN DAILY    Type 2 diabetes mellitus with hyperglycemia, with long-term current use of insulin (H)       metFORMIN 1000 MG tablet    GLUCOPHAGE    75 tablet    Take 1 tablet with breakfast and 1.5 tablets at dinner    Type 2 diabetes mellitus with hyperglycemia, with long-term current use of insulin (H)       omeprazole 20 MG CR capsule    priLOSEC    30 capsule    Take 1 capsule (20 mg) by mouth daily        PHARMACIST CHOICE LANCETS Misc     100 each    Check blood sugars twice daily    Type 2 diabetes mellitus without complications (H)       traMADol 50 MG tablet    ULTRAM    40 tablet    Take 1-2 tablets ( mg) by mouth nightly as needed for pain maximum 2 tablet(s) per day    Right upper quadrant pain       valsartan 320 MG tablet    DIOVAN    30 tablet    Take 1 tablet (320 mg) by mouth daily    Benign essential hypertension       verapamil 240 MG CR tablet    CALAN-SR    30 tablet    Take 1 tablet (240 mg) by mouth daily    Benign essential hypertension       * Notice:  This list has 4 medication(s) that are the same as other medications prescribed for you. Read the directions carefully, and ask your doctor or other care provider to review them with you.

## 2017-11-08 NOTE — PROGRESS NOTES
S-(situation): RN visit for BP check in F/U to 10-24-17 OV-    6. Benign essential hypertension, BP goal <140/90  Chronic, uncontrolled  Increased Hydralazine from 125 mg in the morning to 150 mg in the morning  Recheck BP in 1 week with Nurse only appointment  - hydrALAZINE (APRESOLINE) 50 MG tablet; Take 1 tablet (50 mg) by mouth daily In the morning  Dispense: 90 tablet; Refill: 1       B-(background): Med/dosages reviewed. Voices adherence to med regime. Denies s/e. No home BP monitoring.   Component      Latest Ref Rng & Units 10/16/2017   Sodium      133 - 144 mmol/L 139   Potassium      3.4 - 5.3 mmol/L 4.0   Chloride      94 - 109 mmol/L 108   Carbon Dioxide      20 - 32 mmol/L 20   Anion Gap      3 - 14 mmol/L 11   Glucose      70 - 99 mg/dL 155 (H)   Urea Nitrogen      7 - 30 mg/dL 21   Creatinine      0.52 - 1.04 mg/dL 0.89   GFR Estimate      >60 mL/min/1.7m2 62   GFR Estimate If Black      >60 mL/min/1.7m2 75   Calcium      8.5 - 10.1 mg/dL 9.6   Bilirubin Total      0.2 - 1.3 mg/dL 0.4   Albumin      3.4 - 5.0 g/dL 3.6   Protein Total      6.8 - 8.8 g/dL 7.7   Alkaline Phosphatase      40 - 150 U/L 98   ALT      0 - 50 U/L 82 (H)   AST      0 - 45 U/L 72 (H)       A-(assessment):   BP Readings from Last 6 Encounters:   11/08/17 144/72   10/24/17 154/78   10/16/17 164/83   10/16/17 176/76   09/13/17 132/70   08/29/17 148/76     Initial /74, HR 80.  Recheck 144/72.    R-(recommendations): Forwarded to Keturah for review, further recommendations. JEFFREY Arias, RN

## 2017-11-09 RX ORDER — HYDRALAZINE HYDROCHLORIDE 50 MG/1
50 TABLET, FILM COATED ORAL DAILY
Qty: 90 TABLET | Refills: 1 | COMMUNITY
Start: 2017-11-09 | End: 2018-06-28 | Stop reason: DRUGHIGH

## 2017-11-09 RX ORDER — HYDRALAZINE HYDROCHLORIDE 100 MG/1
100 TABLET, FILM COATED ORAL DAILY
Qty: 60 TABLET | Refills: 5 | COMMUNITY
Start: 2017-11-09 | End: 2019-03-27

## 2017-11-09 RX ORDER — VERAPAMIL HYDROCHLORIDE 300 MG/1
CAPSULE, EXTENDED RELEASE ORAL
Qty: 30 CAPSULE | Refills: 0 | Status: SHIPPED | OUTPATIENT
Start: 2017-11-09 | End: 2018-01-12

## 2017-11-09 NOTE — TELEPHONE ENCOUNTER
Attempted to leave message on phone, but no answering machine is available to leave one.     Hypertension remains uncontrolled.   Continue Hydralazine 150 mg daily  Continue Valsartan 320 mg daily  Change Verapamil SR to Verapamil  mg daily  Recheck BP in clinic in 1 week after starting new Verapamil (extended release)  Schedule appointment with Pharmacist to review medications and improve Diabetes control    BP Readings from Last 6 Encounters:   11/08/17 144/72   10/24/17 154/78   10/16/17 164/83   10/16/17 176/76   09/13/17 132/70   08/29/17 148/76     Lab Results   Component Value Date    A1C 7.3 10/24/2017    A1C 7.8 08/29/2017    A1C 7.7 01/18/2017    A1C 6.7 09/10/2015    A1C 6.9 03/23/2015     LUIS M Bean

## 2017-11-10 ENCOUNTER — TELEPHONE (OUTPATIENT)
Dept: PHARMACY | Facility: OTHER | Age: 72
End: 2017-11-10

## 2017-11-13 ENCOUNTER — ALLIED HEALTH/NURSE VISIT (OUTPATIENT)
Dept: PHARMACY | Facility: CLINIC | Age: 72
End: 2017-11-13
Payer: COMMERCIAL

## 2017-11-13 DIAGNOSIS — J45.30 MILD PERSISTENT ASTHMA, UNCOMPLICATED: ICD-10-CM

## 2017-11-13 DIAGNOSIS — E11.9 TYPE 2 DIABETES MELLITUS WITHOUT COMPLICATION, WITHOUT LONG-TERM CURRENT USE OF INSULIN (H): ICD-10-CM

## 2017-11-13 DIAGNOSIS — R10.11 ABDOMINAL PAIN, RIGHT UPPER QUADRANT: ICD-10-CM

## 2017-11-13 DIAGNOSIS — E11.65 TYPE 2 DIABETES MELLITUS WITH HYPERGLYCEMIA, WITH LONG-TERM CURRENT USE OF INSULIN (H): ICD-10-CM

## 2017-11-13 DIAGNOSIS — I10 ESSENTIAL HYPERTENSION WITH GOAL BLOOD PRESSURE LESS THAN 140/90: Primary | ICD-10-CM

## 2017-11-13 DIAGNOSIS — Z79.4 TYPE 2 DIABETES MELLITUS WITH HYPERGLYCEMIA, WITH LONG-TERM CURRENT USE OF INSULIN (H): ICD-10-CM

## 2017-11-13 PROCEDURE — 99607 MTMS BY PHARM ADDL 15 MIN: CPT | Performed by: PHARMACIST

## 2017-11-13 PROCEDURE — 99605 MTMS BY PHARM NP 15 MIN: CPT | Performed by: PHARMACIST

## 2017-11-13 RX ORDER — LIRAGLUTIDE 6 MG/ML
1.8 INJECTION SUBCUTANEOUS DAILY
Qty: 27 ML | Refills: 3 | Status: SHIPPED | OUTPATIENT
Start: 2017-11-13 | End: 2018-06-28

## 2017-11-13 NOTE — PROGRESS NOTES
"SUBJECTIVE/OBJECTIVE:                           Lucia Bobo is a 72 year old female called for an initial visit for Medication Therapy Management.  She was referred to me from Dr. Chaney.     Chief Complaint: Initial CMR.    Allergies/ADRs: Reviewed in Epic  Tobacco: No tobacco use  Alcohol: Less than 1 beverage / month  Caffeine: 4-5 (12 oz ounce can) sodas/day  Activity: enjoys walking and activities of daily living ni the house  PMH: Reviewed in Epic    Medication Adherence/Access  The patient misses their medication 0 times per week.    Patient is responsible for his/her own medications.   Adherence/Compliance is described as excellent.  Medication barriers: no issues reported by patient.  The patient fills general medications at  [facility].    Has the patient been offered to fill at [facility]? Patient fills with   Other programs or coupons used: None     Hypertension: Current medications include furosemide 20 mg daily, hydralazine 150 mg, valsartan 320 mg daily and verapamil  mg daily.  Patient does not self-monitor BP.  Patient reports the following medication side effects: None. Did not tolerate lisinopril in the past.  Has never had trouble with edema and is uncertain how she got to the medications she is using today for blood pressure.  She has been on the furosemide \"forever\" and has also been on the verapamil for a long time.     Diabetes:  Pt currently taking metformin 1000 mg in the morning 1500 mg at night, Victoza 1.8 mg/dL. Pt is not experiencing side effects.   SMBG: one time daily.   Ranges (patient reported):  mg/dL. She is doing her best to measure her blood sugar at the same time everyday.  The highest she has seen recently is 152 mg/dL.   Patient is not experiencing hypoglycemia  Recent symptoms of high blood sugar? none  Eye exam: up to date  Foot exam: up to date  Microalbumin is < 30 mg/g. Pt is taking an ACEi/ARB.  Aspirin: Taking 81mg daily and denies side " "effects  Diet/Exercise: She has recently 25-30 lbs.  She states that this has occurred over the last several months. She also enjoys a lot of fruit and knows which ones have a lot of sugar and which ones are lower in sugar.  She avoids potato chips, desserts and candy.  If she gets cravings for chips or candy, she will buy a single serving and be satisfied.  \"I know what to do, I just have to do it!\" she tell me.  She would really like the chance to get her A1c down below 7% without the addition of another medication.     Lab Results   Component Value Date    A1C 7.3 10/24/2017    A1C 7.8 08/29/2017    A1C 7.7 01/18/2017    A1C 6.7 09/10/2015    A1C 6.9 03/23/2015     Asthma:  Current asthma medications: albuterol nebs, Duonebs, albuterol HFA, Fluticasone+Salmeterol (Advair) twice daily. Pt rinses their mouth after using steroid inhaler. Asthma triggers include: upper respiratory infections.  The only time she has trouble is if she has a cold. She will use her rescue inhaler or her nebulizer machine when she gets sick.  The Advair inhaler \"pretty much takes care of it\".  She has no concerns about her asthma today.   Pt reports the following symptoms: none.  AAP on file: YES  ACT Total Scores 1/18/2017 8/2/2017 8/29/2017   ACT TOTAL SCORE - - -   ASTHMA ER VISITS - - -   ASTHMA HOSPITALIZATIONS - - -   ACT TOTAL SCORE (Goal Greater than or Equal to 20) 25 24 -   In the past 12 months, how many times did you visit the emergency room for your asthma without being admitted to the hospital? 0 0 0   In the past 12 months, how many times were you hospitalized overnight because of your asthma? 0 0 0     GERD/Abdominal Pain: Current medications include: Prilosec (omeprazole) 20 mg once daily. Pt c/o no current symptoms.  Patient feels that current regimen is not effective because she doesn't feel she has heartburn. This was given to her when she was having some chest pain recently.  She does not intend to refill it when she " "runs out. She has heard descriptions of heartburn and does not endorse ever feeling that way. She was also given tramadol during this atypical chest pain.  She has finished this prescription and does not intend to use any more.  She states that she \"does not like taking pain pills\".     Current labs include:BP Readings from Last 3 Encounters:   11/08/17 144/72   10/24/17 154/78   10/16/17 164/83     Today's Vitals: There were no vitals taken for this visit. - telemed    Recent Labs   Lab Test  01/18/17   0920  09/30/15   0950  08/05/14   0942   CHOL  166  168  182   HDL  55  51  59   LDL  55  66  83   TRIG  281*  255*  202*   CHOLHDLRATIO   --   3.3  3.1     Liver Function Studies -   Recent Labs   Lab Test  10/16/17   1343   PROTTOTAL  7.7   ALBUMIN  3.6   BILITOTAL  0.4   ALKPHOS  98   AST  72*   ALT  82*       Lab Results   Component Value Date    UCRR 58 01/19/2017    MICROL 10 01/19/2017    UMALCR 16.93 01/19/2017       Last Basic Metabolic Panel:  Lab Results   Component Value Date     10/16/2017      Lab Results   Component Value Date    POTASSIUM 4.0 10/16/2017     Lab Results   Component Value Date    CHLORIDE 108 10/16/2017     Lab Results   Component Value Date    BUN 21 10/16/2017     Lab Results   Component Value Date    CR 0.89 10/16/2017     GFR Estimate   Date Value Ref Range Status   10/16/2017 62 >60 mL/min/1.7m2 Final     Comment:     Non  GFR Calc   10/16/2017 62 >60 mL/min/1.7m2 Final     Comment:     Non  GFR Calc   01/18/2017 70 >60 mL/min/1.7m2 Final     Comment:     Non  GFR Calc     TSH   Date Value Ref Range Status   10/24/2017 3.48 0.40 - 4.00 mU/L Final   ]    Most Recent Immunizations   Administered Date(s) Administered     Influenza (High Dose) 3 valent vaccine 11/03/2017     Influenza (IIV3) 01/24/2014     Pneumococcal 23 valent 03/22/2013     TDAP Vaccine (Adacel) 07/11/2011     Zoster vaccine, live 12/31/2014       ASSESSMENT:    "                          Current medications were reviewed today.     Medication Adherence: no issues identified    Hypertension: Needs Improvement. Patient is not meeting BP goal of < 140/90mmHg.  Verapamil is very useful for rate control but confers little benefit with blood pressure control. A switch to a non-dihydropyridine calcium channel blocker would confer much more systolic blood pressure lowering.  Also, furosemide provides very little blood pressure lowering.  Patient may benefit from switching to a thiazide diuretic such as chlorthalidone or hydrchlorothiazide.     Diabetes: Needs Improvement. Patient is not meeting A1c goal of < 7%. However, A1c is most recently measured at 7.3% and trending downward.  Patient prefers not to discuss adding another medication today.  Agree with continued lifestyle changes.     Asthma: Stable. Up to date and at goal of ACT > or equal to 20.    GERD/Abdominal Pain: Stable.  Agree with stopping omeprazole d/t lack of heartburn symptoms in the past and present.     PLAN:                            1) Consider stopping verapamil and starting amlodipine.  Patient requests that I review this with PCP prior to making change.    2) Continue lifestyle and diet changes.     3) Stop omperazole.     I spent 30 minutes with this patient today. All changes were made via collaborative practice agreement with Keturah Chaney. A copy of the visit note was provided to the patient's primary care provider.    Will follow up in 4 weeks after talking with Dr. Chaney.    The patient declined a summary of these recommendations as an after visit summary.     Lalita Ramirez, Pharm.D., [facility]  Medication Therapy Management Pharmacist  Page/VM:  [phone]

## 2017-11-13 NOTE — PATIENT INSTRUCTIONS
Lucia Bobo A  Female, 72 year old, 1945      Hypertension remains uncontrolled.   Continue Hydralazine 150 mg daily  Continue Valsartan 320 mg daily  Change Verapamil SR to Verapamil  mg daily  Recheck BP in clinic in 1 week after starting new Verapamil (extended release)  Schedule appointment with Pharmacist to review medications and improve Diabetes control

## 2017-11-13 NOTE — Clinical Note
Earle Rebollar - It was a pleasure to speak to Lucia today!  I'd like to see if switching her from verapamil to a more potent calcium channel blocker like amlodipine would be helpful.  Veramapil is great at reducing heart rate but is not a potent blood pressure lowering agent.  Another thought is to stop her furosemide in favor of a diuretic that is more potent in blood pressure lowering such as HCTZ or chlorthalidone.  Lucia would like to continue working to improve her blood sugars with diet.  She was not interested in discussing alternative diabetes medications today.    Please let me know your thoughts!  Curtis -------------------------- Lalita Ramirez, Pharm.D., [facility] Medication Therapy Management Pharmacist Page/VM:  [phone]

## 2017-11-13 NOTE — TELEPHONE ENCOUNTER
Pt stopped by clinic, reviewed medications/ instructions as noted below per Keturah.   Gave updated med list, pt instructions, copy of MTM referral.  States expecting call back from MTM today to schedule appt.  Pt voices understanding/ agreement if info given.  JEFFREY Arias RN

## 2017-11-13 NOTE — MR AVS SNAPSHOT
After Visit Summary   11/13/2017    Lucia Bobo    MRN: [id number]           Patient Information     Date Of Birth          1945        Visit Information        Provider Department      11/13/2017 12:30 PM Lalita Ramirez RPH [facility]        Today's Diagnoses     Essential hypertension with goal blood pressure less than 140/90    -  1    Mild persistent asthma, uncomplicated        Type 2 diabetes mellitus without complication, without long-term current use of insulin (H)        Abdominal pain, right upper quadrant           Follow-ups after your visit        Your next 10 appointments already scheduled     Nov 14, 2017 10:15 AM CST   Ortho Treatment with Lucia Campoverde, SHERRY   [facility]    [address] [address] [phone]            Nov 17, 2017  9:00 AM CST   Diabetic Education with Michell Angulo RD   [facility]    [address] [address] [phone]              Who to contact     If you have questions or need follow up information about today's clinic visit or your schedule please contact [facility] directly at [phone].  Normal or non-critical lab and imaging results will be communicated to you by MyChart, letter or phone within 4 business days after the clinic has received the results. If you do not hear from us within 7 days, please contact the clinic through MyChart or phone. If you have a critical or abnormal lab result, we will notify you by phone as soon as possible.  Submit refill requests through [employer] or call your pharmacy and they will forward the refill request to us. Please allow 3 business days for your refill to be completed.          Additional Information About Your Visit        [employer] Information     [employer] lets you send messages to your doctor, view your test results,  "renew your prescriptions, schedule appointments and more. To sign up, go to [email or web address] . Click on \"Log in\" on the left side of the screen, which will take you to the Welcome page. Then click on \"Sign up Now\" on the right side of the page.     You will be asked to enter the access code listed below, as well as some personal information. Please follow the directions to create your username and password.     Your access code is: [email or web address]  Expires: 2017  8:12 AM     Your access code will  in 90 days. If you need help or a new code, please call your [place] clinic or [phone].        Care EveryWhere ID     This is your Care EveryWhere ID. This could be used by other organizations to access your [place] medical records  [id number]         Blood Pressure from Last 3 Encounters:   17 144/72   10/24/17 154/78   10/16/17 164/83    Weight from Last 3 Encounters:   10/24/17 273 lb (123.8 kg)   10/16/17 276 lb (125.2 kg)   17 278 lb (126.1 kg)              Today, you had the following     No orders found for display       Primary Care Provider Office Phone # Fax #    Keturah Samantha Chaney, [facility] [phone]       [address] [address]        Equal Access to Services     REYNA MORGAN : Hadii bianca ku hadasho Soomaali, waaxda luqadaha, qaybta kaalmada adeegyada, celeste chance . So [facility] [phone].    ATENCIÓN: Si habla español, tiene a mcarthur disposición servicios gratuitos de asistencia lingüística. Llame al [phone].    We comply with applicable federal civil rights laws and [place] laws. We do not discriminate on the basis of race, color, national origin, age, disability, sex, sexual orientation, or gender identity.            Thank you!     Thank you for choosing [facility]  for your care. Our goal is always to provide you with excellent care. Hearing back from our patients is one way we can " continue to improve our services. Please take a few minutes to complete the written survey that you may receive in the mail after your visit with us. Thank you!             Your Updated Medication List - Protect others around you: Learn how to safely use, store and throw away your medicines at www.disposemymeds.org.          This list is accurate as of: 11/13/17  1:51 PM.  Always use your most recent med list.                   Brand Name Dispense Instructions for use Diagnosis    * albuterol (2.5 MG/3ML) 0.083% neb solution     1 Box    Take 3 mLs by nebulization every 4 hours as needed for shortness of breath / dyspnea.    Mild persistent asthma, uncomplicated       * albuterol 108 (90 BASE) MCG/ACT Inhaler    VENTOLIN HFA    1 Inhaler    Inhale 1-2 puffs into the lungs every 4 hours as needed    Mild persistent asthma, uncomplicated       aspirin 81 MG tablet     90 tablet    Take 1 tablet (81 mg) by mouth daily    Type 2 diabetes mellitus without complications (H)       blood glucose monitoring meter device kit    no brand specified    1 kit    Use to test blood sugar 2 times daily or as directed. Needs lancets and strips per insurance coverage.    Type 2 diabetes mellitus with hyperglycemia, with long-term current use of insulin (H)       blood glucose monitoring test strip    no brand specified    3 Box    1 strip by In Vitro route 2 times daily    Type 2 diabetes mellitus without complications (H)       fluticasone-salmeterol 250-50 MCG/DOSE diskus inhaler    ADVAIR DISKUS    1 Inhaler    Inhale 1 puff into the lungs 2 times daily    Mild persistent asthma, uncomplicated       furosemide 20 MG tablet    LASIX    30 tablet    Take 1 tablet (20 mg) by mouth daily    Benign essential hypertension       * hydrALAZINE 50 MG tablet    APRESOLINE    90 tablet    Take 1 tablet (50 mg) by mouth daily (take with 100 mg) Total= 150 mg daily    Benign essential hypertension       * hydrALAZINE 100 MG Tabs tablet     APRESOLINE    60 tablet    Take 1 tablet  by mouth  Daily (take with 50 mg) total = 150 mg    Benign essential hypertension       insulin pen needle 32G X 4 MM    BD SMILEY U/F    100 each    Inject 1 each Subcutaneous daily    Type 2 diabetes mellitus without complications (H)       ipratropium - albuterol 0.5 mg/2.5 mg/3 mL 0.5-2.5 (3) MG/3ML neb solution    DUONEB    1 Box    Inhale 1 vial (3 mLs) into the lungs every 6 hours as needed    Mild persistent asthma, uncomplicated       liraglutide 18 MG/3ML soln    VICTOZA PEN    6 mL    INJECT 1.8 MG UNDER THE SKIN DAILY    Type 2 diabetes mellitus with hyperglycemia, with long-term current use of insulin (H)       metFORMIN 1000 MG tablet    GLUCOPHAGE    75 tablet    Take 1 tablet with breakfast and 1.5 tablets at dinner    Type 2 diabetes mellitus with hyperglycemia, with long-term current use of insulin (H)       PHARMACIST CHOICE LANCETS Misc     100 each    Check blood sugars twice daily    Type 2 diabetes mellitus without complications (H)       valsartan 320 MG tablet    DIOVAN    30 tablet    Take 1 tablet (320 mg) by mouth daily    Benign essential hypertension       Verapamil HCl  MG Cp24     30 capsule    Take one tablet daily    Benign essential hypertension       * Notice:  This list has 4 medication(s) that are the same as other medications prescribed for you. Read the directions carefully, and ask your doctor or other care provider to review them with you.

## 2017-11-15 NOTE — TELEPHONE ENCOUNTER
Noted.  BP Readings from Last 7 Encounters:   11/08/17 144/72   10/24/17 154/78   10/16/17 164/83   10/16/17 176/76   09/13/17 132/70   08/29/17 148/76   01/18/17 134/68     Albertina Castellanos RN

## 2017-11-17 ENCOUNTER — TELEPHONE (OUTPATIENT)
Dept: FAMILY MEDICINE | Facility: CLINIC | Age: 72
End: 2017-11-17

## 2017-11-17 ENCOUNTER — ALLIED HEALTH/NURSE VISIT (OUTPATIENT)
Dept: EDUCATION SERVICES | Facility: CLINIC | Age: 72
End: 2017-11-17
Payer: COMMERCIAL

## 2017-11-17 VITALS — BODY MASS INDEX: 44.93 KG/M2 | WEIGHT: 270 LBS

## 2017-11-17 DIAGNOSIS — Z53.9 ERRONEOUS ENCOUNTER--DISREGARD: Primary | ICD-10-CM

## 2017-11-17 DIAGNOSIS — R10.11 RIGHT UPPER QUADRANT PAIN: ICD-10-CM

## 2017-11-17 DIAGNOSIS — R10.11 ABDOMINAL PAIN, CHRONIC, RIGHT UPPER QUADRANT: Primary | ICD-10-CM

## 2017-11-17 DIAGNOSIS — Z79.4 TYPE 2 DIABETES MELLITUS WITH HYPERGLYCEMIA, WITH LONG-TERM CURRENT USE OF INSULIN (H): Primary | ICD-10-CM

## 2017-11-17 DIAGNOSIS — G89.29 ABDOMINAL PAIN, CHRONIC, RIGHT UPPER QUADRANT: Primary | ICD-10-CM

## 2017-11-17 DIAGNOSIS — E11.65 TYPE 2 DIABETES MELLITUS WITH HYPERGLYCEMIA, WITH LONG-TERM CURRENT USE OF INSULIN (H): Primary | ICD-10-CM

## 2017-11-17 PROCEDURE — G0108 DIAB MANAGE TRN  PER INDIV: HCPCS | Performed by: DIETITIAN, REGISTERED

## 2017-11-17 RX ORDER — TRAMADOL HYDROCHLORIDE 50 MG/1
50-100 TABLET ORAL
Qty: 40 TABLET | Refills: 0 | Status: SHIPPED | OUTPATIENT
Start: 2017-11-17 | End: 2017-11-29

## 2017-11-17 RX ORDER — TRAMADOL HYDROCHLORIDE 50 MG/1
50-100 TABLET ORAL
Qty: 40 TABLET | Refills: 0 | Status: CANCELLED | OUTPATIENT
Start: 2017-11-17

## 2017-11-17 NOTE — PATIENT INSTRUCTIONS
1.  Keep doing what you are doing, numbers better this month than the last couple.  Try taking a few number before supper so we can see what those numbers are running.

## 2017-11-17 NOTE — MR AVS SNAPSHOT
After Visit Summary   11/17/2017    Lucia Bobo    MRN: [id number]           Patient Information     Date Of Birth          1945        Visit Information        Provider Department      11/17/2017 9:00 AM Michell Angulo RD [facility]        Care Instructions    1.  Keep doing what you are doing, numbers better this month than the last couple.  Try taking a few number before supper so we can see what those numbers are running.          Follow-ups after your visit        Your next 10 appointments already scheduled     Nov 17, 2017  1:00 PM CST   SHORT with Keturah Chaney [facility] ([facility])    [address] [address]   [phone]            Dec 08, 2017  9:00 AM CST   TELEMEDICINE with Lalita Ramirez [facility] ([facility])    [address] [facility] [id number] [phone]           Note: this is not an onsite visit; there is no need to come to the facility.            Feb 15, 2018 10:00 AM CST   Diabetic Education with Michell Angulo RD   [facility] ([facility])    [address] [address]   [phone]              Who to contact     If you have questions or need follow up information about today's clinic visit or your schedule please contact [facility] directly at [phone].  Normal or non-critical lab and imaging results will be communicated to you by MyChart, letter or phone within 4 business days after the clinic has received the results. If you do not hear from us within 7 days, please contact the clinic through MyChart or phone. If you have a critical or abnormal lab result, we will notify you by phone as soon as possible.  Submit refill requests through [employer] or call your pharmacy and they will forward the refill request to us. Please allow 3 business days for your  "refill to be completed.          Additional Information About Your Visit        [employer] Information     [employer] lets you send messages to your doctor, view your test results, renew your prescriptions, schedule appointments and more. To sign up, go to [email or web address] . Click on \"Log in\" on the left side of the screen, which will take you to the Welcome page. Then click on \"Sign up Now\" on the right side of the page.     You will be asked to enter the access code listed below, as well as some personal information. Please follow the directions to create your username and password.     Your access code is: [email or web address]  Expires: 2017  8:12 AM     Your access code will  in 90 days. If you need help or a new code, please call your [place] clinic or [phone].        Care EveryWhere ID     This is your Care EveryWhere ID. This could be used by other organizations to access your [place] medical records  [id number]        Your Vitals Were     BMI (Body Mass Index)                   44.93 kg/m2            Blood Pressure from Last 3 Encounters:   17 144/72   10/24/17 154/78   10/16/17 164/83    Weight from Last 3 Encounters:   17 122.5 kg (270 lb)   10/24/17 123.8 kg (273 lb)   10/16/17 125.2 kg (276 lb)              Today, you had the following     No orders found for display       Primary Care Provider Office Phone # Fax #    [facility] [phone]       [address] [place] [address]        Equal Access to Services     [facility]: Hadii bianca prasad [place], waaxda luqadaha, qaybta kaalmada celeste alegria. So [facility] [phone].    ATENCIÓN: Si habla español, tiene a mcarthur disposición servicios gratuitos de asistencia lingüística. Llame al [phone].    We comply with applicable federal civil rights laws and [place] laws. We do not discriminate on the basis of race, color, national origin, age, " disability, sex, sexual orientation, or gender identity.            Thank you!     Thank you for choosing [facility]  for your care. Our goal is always to provide you with excellent care. Hearing back from our patients is one way we can continue to improve our services. Please take a few minutes to complete the written survey that you may receive in the mail after your visit with us. Thank you!             Your Updated Medication List - Protect others around you: Learn how to safely use, store and throw away your medicines at www.disposemymeds.org.          This list is accurate as of: 11/17/17  9:51 AM.  Always use your most recent med list.                   Brand Name Dispense Instructions for use Diagnosis    * albuterol (2.5 MG/3ML) 0.083% neb solution     1 Box    Take 3 mLs by nebulization every 4 hours as needed for shortness of breath / dyspnea.    Mild persistent asthma, uncomplicated       * albuterol 108 (90 BASE) MCG/ACT Inhaler    VENTOLIN HFA    1 Inhaler    Inhale 1-2 puffs into the lungs every 4 hours as needed    Mild persistent asthma, uncomplicated       aspirin 81 MG tablet     90 tablet    Take 1 tablet (81 mg) by mouth daily    Type 2 diabetes mellitus without complications (H)       blood glucose monitoring meter device kit    no brand specified    1 kit    Use to test blood sugar 2 times daily or as directed. Needs lancets and strips per insurance coverage.    Type 2 diabetes mellitus with hyperglycemia, with long-term current use of insulin (H)       blood glucose monitoring test strip    no brand specified    3 Box    1 strip by In Vitro route 2 times daily    Type 2 diabetes mellitus without complications (H)       fluticasone-salmeterol 250-50 MCG/DOSE diskus inhaler    ADVAIR DISKUS    3 Inhaler    Inhale 1 puff into the lungs 2 times daily    Mild persistent asthma, uncomplicated       furosemide 20 MG tablet    LASIX    30 tablet    Take 1 tablet (20 mg) by mouth daily     Benign essential hypertension       * hydrALAZINE 50 MG tablet    APRESOLINE    90 tablet    Take 1 tablet (50 mg) by mouth daily (take with 100 mg) Total= 150 mg daily    Benign essential hypertension       * hydrALAZINE 100 MG Tabs tablet    APRESOLINE    60 tablet    Take 1 tablet  by mouth  Daily (take with 50 mg) total = 150 mg    Benign essential hypertension       insulin pen needle 32G X 4 MM    BD SMILEY U/F    100 each    Inject 1 each Subcutaneous daily    Type 2 diabetes mellitus without complications (H)       ipratropium - albuterol 0.5 mg/2.5 mg/3 mL 0.5-2.5 (3) MG/3ML neb solution    DUONEB    1 Box    Inhale 1 vial (3 mLs) into the lungs every 6 hours as needed    Mild persistent asthma, uncomplicated       liraglutide 18 MG/3ML soln    VICTOZA PEN    27 mL    Inject 1.8 mg Subcutaneous daily    Type 2 diabetes mellitus with hyperglycemia, with long-term current use of insulin (H)       metFORMIN 1000 MG tablet    GLUCOPHAGE    75 tablet    Take 1 tablet with breakfast and 1.5 tablets at dinner    Type 2 diabetes mellitus with hyperglycemia, with long-term current use of insulin (H)       PHARMACIST CHOICE LANCETS Misc     100 each    Check blood sugars twice daily    Type 2 diabetes mellitus without complications (H)       valsartan 320 MG tablet    DIOVAN    30 tablet    Take 1 tablet (320 mg) by mouth daily    Benign essential hypertension       Verapamil HCl  MG Cp24     30 capsule    Take one tablet daily    Benign essential hypertension       * Notice:  This list has 4 medication(s) that are the same as other medications prescribed for you. Read the directions carefully, and ask your doctor or other care provider to review them with you.

## 2017-11-17 NOTE — TELEPHONE ENCOUNTER
Patient is still have RIGHT UPPER QUADRANT pain. She has had CT and US without identifier of this pain. Will refer to General surgeon for further assessment of chronic abdominal pain. She would like to wait until after Thanksgiving. Noting 3/10 pain and sometimes getting to 8/10 pain. Using Tramadol at night only. Refill today.  LUIS M Bean

## 2017-11-17 NOTE — PROGRESS NOTES
Diabetes Self Management Training: Individual Review Visit    Lucia Bobo presents today for education related to Type 2 diabetes.    She is accompanied by self    Patient's diabetes management related comments/concerns: numbers running a bit higher in the am, has had a lot of pain    Patient's emotional response to diabetes: expresses readiness to learn    Patient would like this visit to be focused around the following diabetes-related behaviors and goals: Healthy Eating, Being Active, Monitoring and Taking Medication    ASSESSMENT:  Patient Problem List and Family Medical History reviewed for relevant medical history, current medical status, and diabetes risk factors.    Current Diabetes Management per Patient:  Taking diabetes medications?   yes:     Diabetes Medication(s)     Biguanides Sig    metFORMIN (GLUCOPHAGE) 1000 MG tablet Take 1 tablet with breakfast and 1.5 tablets at dinner    Incretin Mimetic Agents (GLP-1 Receptor Agonists) Sig    liraglutide (VICTOZA PEN) 18 MG/3ML soln Inject 1.8 mg Subcutaneous daily          *Abbreviated insulin dose documentation key: Insulin Trade Name (epfxuayzp-zyfnd-qskfha-bedtime) - i.e. Humalog 5-5-5-0 (Humalog 5 units at breakfast, 5 units at lunch, and 5 units at dinner).    Past Diabetes Education: Yes    Patient glucose self monitoring as follows: one time daily.   BG meter: One Touch Ultra 2 meter  BG results:                        BG values are: In goal  Patient's most recent   Lab Results   Component Value Date    A1C 7.3 10/24/2017    is meeting goal of <8.0    Nutrition:  Breakfast- cereal  Lunch- taco   Supper- leftover chicken lomein 1 cup or swiss steak with mashed potatoes  Snacks- none, sometimes grapes or a banana  Physical Activity:    Still goes out and about but is dealing with abdominal pain for the last 1.5 months    Diabetes Risk Factors:  family history    Diabetes Complications:  Not discussed today.    Vitals:  Wt 122.5 kg (270 lb)  BMI  "44.93 kg/m2  Estimated body mass index is 44.93 kg/(m^2) as calculated from the following:    Height as of 10/24/17: 1.651 m (5' 5\").    Weight as of this encounter: 122.5 kg (270 lb).   Last 3 BP:   BP Readings from Last 3 Encounters:   11/08/17 144/72   10/24/17 154/78   10/16/17 164/83       History   Smoking Status     Never Smoker   Smokeless Tobacco     Never Used       Labs:  Lab Results   Component Value Date    A1C 7.3 10/24/2017     Lab Results   Component Value Date     10/16/2017     Lab Results   Component Value Date    LDL 55 01/18/2017     HDL Cholesterol   Date Value Ref Range Status   01/18/2017 55 >49 mg/dL Final   ]  GFR Estimate   Date Value Ref Range Status   10/16/2017 62 >60 mL/min/1.7m2 Final     Comment:     Non  GFR Calc     GFR Estimate If Black   Date Value Ref Range Status   10/16/2017 75 >60 mL/min/1.7m2 Final     Comment:      GFR Calc     Lab Results   Component Value Date    CR 0.89 10/16/2017     No results found for: MICROALBUMIN    Socio/Economic Considerations:    Support system: family    Health Beliefs and Attitudes:   Patient Activation Measure Survey Score:  MONSERRAT Score (Last Two) 7/12/2010   MONSERRAT Raw Score 50   Activation Score 86.3   MONESRRAT Level 4       Stage of Change: ACTION (Actively working towards change)      Diabetes knowledge and skills assessment:     Patient is knowledgeable in diabetes management concepts related to: Healthy Eating, Being Active, Monitoring and Taking Medication    Patient needs further education on the following diabetes management concepts: Healthy Eating, Being Active, Monitoring, Taking Medication, Problem Solving, Reducing Risks and Healthy Coping    Barriers to Learning Assessment: No Barriers identified    Based on learning assessment above, most appropriate setting for further diabetes education would be: Individual setting.    INTERVENTION:   Does not buy snack foods or bake cookies  Wt is down another 3 " lbs  Doing well with food stuff  Blood sugars are looking better for the last month vs the two before, she would like to work on getting it down  Has been on the blood pressure medication for three days, will come back in after one week to have RN test her BP    Education provided today on:  AADE Self-Care Behaviors:  Healthy Eating: carbohydrate counting and consistency in amount, composition, and timing of food intake  Being Active: relationship to blood glucose and describe appropriate activity program      Opportunities for ongoing education and support in diabetes-self management were discussed.    Pt verbalized understanding of concepts discussed and recommendations provided today.       Education Materials Provided:  No new materials provided today    PLAN:  See Patient Instructions for co-developed, patient-stated behavior change goals.  AVS printed and provided to patient today.    FOLLOW-UP:  Follow-up appointment scheduled on .    Ongoing plan for education and support: Follow-up visit with diabetes educator in three months      Time Spent: 30 minutes  Encounter Type: Individual    Any diabetes medication dose changes were made via the CDE Protocol and Collaborative Practice Agreement with the patient's primary care provider. A copy of this encounter was shared with the provider.

## 2017-11-29 ENCOUNTER — TELEPHONE (OUTPATIENT)
Dept: FAMILY MEDICINE | Facility: CLINIC | Age: 72
End: 2017-11-29

## 2017-11-29 DIAGNOSIS — R10.11 RIGHT UPPER QUADRANT PAIN: ICD-10-CM

## 2017-11-29 DIAGNOSIS — R10.11 ABDOMINAL PAIN, RIGHT UPPER QUADRANT: Primary | ICD-10-CM

## 2017-11-29 NOTE — TELEPHONE ENCOUNTER
Keturah,  Please see note below, patient seen in office visit for upper quadrant pain 10-24-17. Please see telephone encounter on 11-17-17, has general surgery referral in place on 11-17-17.  Please advise.  BRITNEY Mcfarlane

## 2017-11-29 NOTE — TELEPHONE ENCOUNTER
Referral made. Please fax. Could you remind Lucia that she'll need to sign a JAZMYN with the general surgeon she is seeing so that I can get that information in her chart. Thanks. LUIS M Bean

## 2017-11-29 NOTE — TELEPHONE ENCOUNTER
Patient stopped by the  requesting an order for an upper GI. She would like this faxed to [phone]. She is able to get in next week once this gets faxed.    Nathalie Syed-Station

## 2017-11-29 NOTE — TELEPHONE ENCOUNTER
Patient called and is having the procedure at [facility] in [place]. Patient is requesting the Tramdol to be refilled for 20 tablets at [facility] to get her through till appointment which will be made as soon as referral is faxed and they call her back to schedule. Patient verbalizes understanding to make sure she signs a JAZMYN with the surgeon. Have pended the Tramadol for PCP to review. CSS to fax the referral to number below.  BRITNEY Mcfarlane

## 2017-11-30 ENCOUNTER — TELEPHONE (OUTPATIENT)
Dept: FAMILY MEDICINE | Facility: CLINIC | Age: 72
End: 2017-11-30

## 2017-11-30 NOTE — TELEPHONE ENCOUNTER
[facility] called and said they need verbal orders. They received a referral for a clinic visit but patient told them Mohini wanted her to have an upper endoscopy.  [facility] [phone]  Push option 1 twice.  Any  can take verbal order for this Upper endoscopy.

## 2017-11-30 NOTE — TELEPHONE ENCOUNTER
Reason for Call:  Other appointment    Detailed comments: Patient would like Clinic RN to call down to [facility] at [phone]. A referral was to be faxed for her yesterday 11/29. [facility] states that the fax can take up to a day to update in their system. If RN calls down, [facility] states they will call patient back right away to schedule.    Phone Number Patient can be reached at: Home number on file [phone] (home)      [facility]: [phone]    Best Time: any    Can we leave a detailed message on this number? YES    Call taken on 11/30/2017 at 8:50 AM by Pam Chaudhry    '

## 2017-11-30 NOTE — TELEPHONE ENCOUNTER
Per 10-24-17 OV-    1. Right upper quadrant pain  Acute on chronic  - US Abdomen Limited; Future (Schedule ultrasound in [place]: [facility] [facility] at: [phone] )  - PHYSICAL THERAPY REFERRAL  - traMADol (ULTRAM) 50 MG tablet; Take 1-2 tablets ( mg) by mouth nightly as needed for pain maximum 2 tablet(s) per day  Dispense: 40 tablet; Refill: 0      Per 10-26-17 abd US-    Notes Recorded by Keturah Chaney CNP on 10/27/2017 at 12:12 PM  Ultrasound looks fine. If not improving, then I would recommend referral to general surgeon to discuss chronic abdominal pain with unknown etiology.  Please notify her of these results and send a hard copy if not on [facility].   Thanks. LUIS M Bean      Informed [facility]  of GI referral for consult. No current EGD orders.   will contact to inform.  JEFFREY Arias RN

## 2017-12-01 RX ORDER — TRAMADOL HYDROCHLORIDE 50 MG/1
50-100 TABLET ORAL
Qty: 40 TABLET | Refills: 0 | Status: SHIPPED | OUTPATIENT
Start: 2017-12-01 | End: 2018-01-10

## 2017-12-04 DIAGNOSIS — J45.30 MILD PERSISTENT ASTHMA, UNCOMPLICATED: ICD-10-CM

## 2017-12-04 DIAGNOSIS — Z79.4 TYPE 2 DIABETES MELLITUS WITH HYPERGLYCEMIA, WITH LONG-TERM CURRENT USE OF INSULIN (H): ICD-10-CM

## 2017-12-04 DIAGNOSIS — E11.65 TYPE 2 DIABETES MELLITUS WITH HYPERGLYCEMIA, WITH LONG-TERM CURRENT USE OF INSULIN (H): ICD-10-CM

## 2017-12-04 DIAGNOSIS — I10 BENIGN ESSENTIAL HYPERTENSION: ICD-10-CM

## 2017-12-04 NOTE — TELEPHONE ENCOUNTER
Luica is requesting 3 months on all 3 of these RX's please.    Furosemide 20 mg Last RX 1/18/17  #30  Metformin 1000mg Last RX 08/29/2017  #75  Advair 250  Last RX 08/29/2017  #1 inhaler    JOANNE MCKINNEY [facility]

## 2017-12-05 RX ORDER — FUROSEMIDE 20 MG
20 TABLET ORAL DAILY
Qty: 90 TABLET | Refills: 1 | Status: SHIPPED | OUTPATIENT
Start: 2017-12-05 | End: 2018-06-28

## 2017-12-05 NOTE — TELEPHONE ENCOUNTER
Lab Results   Component Value Date    A1C 7.3 10/24/2017    A1C 7.8 08/29/2017    A1C 7.7 01/18/2017    A1C 6.7 09/10/2015    A1C 6.9 03/23/2015     Potassium   Date Value Ref Range Status   10/16/2017 4.0 3.4 - 5.3 mmol/L Final   ]  BP Readings from Last 5 Encounters:   11/08/17 144/72   10/24/17 154/78   10/16/17 164/83   10/16/17 176/76   09/13/17 132/70     Albertina Castellanos RN

## 2017-12-08 ENCOUNTER — TELEPHONE (OUTPATIENT)
Dept: PHARMACY | Facility: OTHER | Age: 72
End: 2017-12-08

## 2017-12-08 NOTE — TELEPHONE ENCOUNTER
I called Lucia for a blood pressure follow-up today.  She has been too busy with family and health commitments to get in to see Mohini Chaney DNP.  She informs me that she was given the wrong dose of verapamil at the pharmacy as well.  Her current fill is for the 240 mg SR capsules instead of the 300 mg capsules.  We discuss that the 300 mg is unlikely to provide much additional benefit and that now might be a good time to try a different blood pressure medication.  I also inform her that Mohini has approved our plan.  She requests that I call her back next week to discuss this further.     Lalita Ramirez, Pharm.D., [facility]  Medication Therapy Management Pharmacist  Page/VM:  [phone]

## 2017-12-11 ENCOUNTER — TRANSFERRED RECORDS (OUTPATIENT)
Dept: HEALTH INFORMATION MANAGEMENT | Facility: CLINIC | Age: 72
End: 2017-12-11

## 2017-12-14 ENCOUNTER — TELEPHONE (OUTPATIENT)
Dept: PHARMACY | Facility: CLINIC | Age: 72
End: 2017-12-14

## 2017-12-14 NOTE — TELEPHONE ENCOUNTER
I called Lucia this morning to follow-up on blood pressure as scheduled today.  She has not been in to see Keturah as she indicated she would. She has been working hard to get a endoscopy scheduled for a reason that she doesn't share.  She was to see a gastroenterology group in [place] this week for a consultation and is still waiting to hear back regarding scheduling for her procedure.  As a result, she has not had the opportunity to schedule with her provider. She wishes to see her before making changes and later indicates that she would have Keturah make the changes. I inform her that it is likely that Keturah will rely on me to make changes to her blood pressure regimen. She agrees.  I tell her that I will call again after she has a chance to see Keturah.    Lalita Ramirez, Pharm.D., BCACP  Medication Therapy Management Pharmacist  Page/VM:  [phone]

## 2017-12-19 ENCOUNTER — TELEPHONE (OUTPATIENT)
Dept: FAMILY MEDICINE | Facility: CLINIC | Age: 72
End: 2017-12-19

## 2017-12-19 ENCOUNTER — OFFICE VISIT (OUTPATIENT)
Dept: FAMILY MEDICINE | Facility: CLINIC | Age: 72
End: 2017-12-19
Payer: COMMERCIAL

## 2017-12-19 ENCOUNTER — ANESTHESIA EVENT (OUTPATIENT)
Dept: GASTROENTEROLOGY | Facility: CLINIC | Age: 72
End: 2017-12-19
Payer: MEDICARE

## 2017-12-19 VITALS
BODY MASS INDEX: 43.99 KG/M2 | WEIGHT: 264 LBS | HEART RATE: 84 BPM | HEIGHT: 65 IN | SYSTOLIC BLOOD PRESSURE: 136 MMHG | TEMPERATURE: 98.5 F | RESPIRATION RATE: 20 BRPM | DIASTOLIC BLOOD PRESSURE: 64 MMHG

## 2017-12-19 DIAGNOSIS — I10 BENIGN ESSENTIAL HYPERTENSION: Chronic | ICD-10-CM

## 2017-12-19 DIAGNOSIS — R10.11 RUQ ABDOMINAL PAIN: Primary | ICD-10-CM

## 2017-12-19 DIAGNOSIS — E66.01 MORBID OBESITY WITH BMI OF 45.0-49.9, ADULT (H): Chronic | ICD-10-CM

## 2017-12-19 DIAGNOSIS — Z12.11 SPECIAL SCREENING FOR MALIGNANT NEOPLASMS, COLON: ICD-10-CM

## 2017-12-19 DIAGNOSIS — E11.65 TYPE 2 DIABETES MELLITUS WITH HYPERGLYCEMIA, WITH LONG-TERM CURRENT USE OF INSULIN (H): ICD-10-CM

## 2017-12-19 DIAGNOSIS — Z79.4 TYPE 2 DIABETES MELLITUS WITH HYPERGLYCEMIA, WITH LONG-TERM CURRENT USE OF INSULIN (H): ICD-10-CM

## 2017-12-19 PROCEDURE — 99214 OFFICE O/P EST MOD 30 MIN: CPT | Performed by: NURSE PRACTITIONER

## 2017-12-19 RX ORDER — VERAPAMIL HYDROCHLORIDE 240 MG/1
240 TABLET, FILM COATED, EXTENDED RELEASE ORAL DAILY
COMMUNITY
End: 2018-06-28 | Stop reason: DRUGHIGH

## 2017-12-19 RX ORDER — HYDROCODONE BITARTRATE AND ACETAMINOPHEN 7.5; 325 MG/1; MG/1
1 TABLET ORAL AT BEDTIME
Qty: 30 TABLET | Refills: 0 | Status: SHIPPED | OUTPATIENT
Start: 2017-12-19 | End: 2018-01-08

## 2017-12-19 NOTE — ANESTHESIA PREPROCEDURE EVALUATION
Anesthesia Evaluation     .             ROS/MED HX    ENT/Pulmonary:     (+)asthma , . .    Neurologic:       Cardiovascular:     (+) Dyslipidemia, hypertension----. : . . . :. . Previous cardiac testing date:results:date: results:ECG reviewed date:3-21-13 results:SR date: results:          METS/Exercise Tolerance:     Hematologic:         Musculoskeletal: Comment: Osteoarthritis of both knees        GI/Hepatic: Comment: RUQ pain        Renal/Genitourinary:         Endo:     (+) type II DM Obesity, .      Psychiatric:         Infectious Disease:         Malignancy:         Other:                     Physical Exam  Normal systems: cardiovascular, pulmonary and dental    Airway   Mallampati: II  TM distance: >3 FB  Neck ROM: full    Dental     Cardiovascular       Pulmonary                     Anesthesia Plan      History & Physical Review  History and physical reviewed and following examination; no interval change.    ASA Status:  3 .    NPO Status:  > 6 hours    Plan for MAC Reason for MAC:  Deep or markedly invasive procedure (G8)         Postoperative Care      Consents  Anesthetic plan, risks, benefits and alternatives discussed with:  Patient..                          .

## 2017-12-19 NOTE — NURSING NOTE
"Chief Complaint   Patient presents with     Abdominal Pain       Initial /64 (BP Location: Right arm, Patient Position: Sitting, Cuff Size: Adult Large)  Pulse 84  Temp 98.5  F (36.9  C) (Tympanic)  Resp 20  Ht 5' 5\" (1.651 m)  Wt 264 lb (119.7 kg)  BMI 43.93 kg/m2 Estimated body mass index is 43.93 kg/(m^2) as calculated from the following:    Height as of this encounter: 5' 5\" (1.651 m).    Weight as of this encounter: 264 lb (119.7 kg).  Medication Reconciliation: complete    Health Maintenance that is potentially due pending provider review:  Mammogram and Colonoscopy/FIT    Pt declines to have mammogram at this time. Colonoscopy to be scheduled.     Is there anyone who you would like to be able to receive your results? No  If yes have patient fill out JAZMYN    "

## 2017-12-19 NOTE — TELEPHONE ENCOUNTER
Pt aware of colonoscopy order, given/ reviewed prep info for both colonoscopy and UGI.  JEFFREY Arias RN

## 2017-12-19 NOTE — PROGRESS NOTES
SUBJECTIVE:   Lucia Bobo is a 72 year old female who presents to clinic today for the following health issues:      Abdominal Pain      Duration: Recheck     Description (location/character/radiation): RIGHT UPPER QUADRANT  abdominal pain         Associated flank pain: None    Intensity:  severe    Therapies tried and outcome: Saw the Gastroenterologist and is scheduled for 02/09/2018 Colonoscopy and Upper endoscopy at [facility]. Would like to have these procedures scheduled at [facility] next week. Pain in uncontrolled.      Patient comes in today for continuation of RIGHT UPPER QUADRANT pain. She notes the Tramadol at bedtime isn't helping. She is not taking Tylenol during the day. She was was originally seen in ER on 10/16/17 for this. CT scan was unremarkable for contributing factors for RIGHT UPPER QUADRANT pain. She has a normal colonoscopy in 2009 and normal upper GI in 2015. She returned to clinic on 10/24/17 for follow-up of ER visit with no improvement of symptoms. US was unremarkable.     She was seen by Dr. Jacklyn Lunsford at [facility] on 12/11/17 and Colonoscopy and EGD ordered, unfortunately not until February 2018 and patient cannot wait that long. At that visit,  lifestyle education, Metamucil or Citrucel 1 tsp daily, LFT every 6 months were reviewed.       CT ABDOMEN AND PELVIS WITHOUT CONTRAST   10/16/2017 4:23 PM      HISTORY: Right flank and right upper quadrant pain with dysuria.     COMPARISON: 9/8/2015.     TECHNIQUE: Without intravenous or oral contrast, helical sections were  acquired from the top of the diaphragm through the pubic symphysis.  Coronal reconstructions were generated. Radiation dose for this scan  was reduced using automated exposure control, adjustment of the mA  and/or kV according to the patient's size, or iterative reconstruction  technique. (Renal stone protocol)     FINDINGS:      Right urinary tract: Four tiny nonobstructing renal calculi, all  measuring  less than 0.3 cm in diameter. No ureteral calculi. No  dilatation of the intrarenal collecting system or ureter.     Left urinary tract: Two nonobstructing calculi in the inferior pole of  kidney, both measuring less than 0.3 cm in diameter. No ureteral  calculi. No dilatation of the intrarenal collecting system or ureter.     Urinary bladder: No visualized calculi.     Remainder of the abdomen and pelvis: Several calculi within the spleen  and liver likely relate to prior granulomatous infection. The liver,  spleen, and pancreas are otherwise unremarkable to the limits of a  noncontrast CT scan. Small low-attenuation nodules within both adrenal  glands, likely representing adenomas. The gallbladder is not  visualized. The small and large bowel are normal in caliber. The  appendix is not visualized. No bowel wall thickening, pneumatosis or  free intraperitoneal gas. The uterus is present. No enlarged lymph  nodes or free fluid in the abdomen or pelvis. 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  Atherosclerotic calcification in the abdominal aorta.     Scan through the lower chest is significant for a small calcified  granuloma in the left lower lobe.     Impression     IMPRESSION:   1. No cause of acute pain identified in the abdomen or pelvis.  2. No ureteral calculi or evidence of urinary obstruction.  3. A few tiny bilateral nonobstructing renal calculi.     LIMITED ABDOMINAL ULTRASOUND  10/26/2017 9:44 AM      HISTORY:  Right upper quadrant pain. Acquired absence of other  specified parts of digestive tract.     COMPARISON: CT abdomen and pelvis dated 10/16/2017.     FINDINGS:    Gallbladder: Absent. 4.1 x 2.0 x 1.9 cm hypoechoic area is seen in the  gallbladder fossa and is of uncertain clinical significance and  etiology. This could represent a portion of the duodenum. Recommend  clinical  correlation.     Bile ducts:  Common bile duct is upper normal size measuring up to 0.8  cm. Portions of the common bile duct are obscured. No obvious  intrahepatic biliary ductal dilatation is seen.     Liver: Multiple hyperechoic granulomata are seen throughout the liver,  likely representing chronic granulomatous disease. This corresponds  with the prior CT. There is diffuse fatty infiltration of the liver as  evidenced by increased echogenicity. Liver is otherwise unremarkable.      Pancreas:  Completely obscured and could not be evaluated.     Right kidney:  Normal.      Aorta and Proximal IVC: Proximal inferior vena cava is normal in  appearance. The abdominal aorta is completely obscured and could not  be evaluated.         IMPRESSION:   1. Gallbladder is absent (surgically per history).  2. Mild prominence of the common bile duct is likely compensatory  status post cholecystectomy.  3. Calcified granulomata are again noted in the liver.  4. Diffuse fatty infiltration of the liver is seen.  5. Study is limited by body habitus and bowel gas completely obscuring  the pancreas and the abdominal aorta.   6. Hypoechoic structure in the gallbladder fossa could represent a  portion of the duodenum. Patient is status post cholecystectomy, per  history.     ALAN ADAMS MD  HPI:   PCP:  Keturah Chaney, [facility] [phone]    Patient Active Problem List   Diagnosis     Type 2 diabetes mellitus with hyperglycemia, with long-term current use of insulin (H) HgbA1c goal <7     Advanced directives, counseling/discussion     Osteoarthritis of both knees     Morbid obesity with BMI of 45.0-49.9, adult (H)     Hypertriglyceridemia, LDL goal <100     Benign essential hypertension, BP goal <140/90     Mild persistent asthma, uncomplicated     Current Outpatient Prescriptions   Medication     verapamil (CALAN-SR) 240 MG CR tablet     order for DME     HYDROcodone-acetaminophen (NORCO) 7.5-325 MG per tablet     metFORMIN  "(GLUCOPHAGE) 1000 MG tablet     furosemide (LASIX) 20 MG tablet     fluticasone-salmeterol (ADVAIR DISKUS) 250-50 MCG/DOSE diskus inhaler     traMADol (ULTRAM) 50 MG tablet     liraglutide (VICTOZA PEN) 18 MG/3ML soln     hydrALAZINE (APRESOLINE) 50 MG tablet     hydrALAZINE (APRESOLINE) 100 MG TABS tablet     insulin pen needle (BD SMILEY U/F) 32G X 4 MM     blood glucose monitoring (NO BRAND SPECIFIED) meter device kit     albuterol (VENTOLIN HFA) 108 (90 BASE) MCG/ACT Inhaler     valsartan (DIOVAN) 320 MG tablet     albuterol (2.5 MG/3ML) 0.083% nebulizer solution     blood glucose monitoring (NO BRAND SPECIFIED) test strip     ipratropium - albuterol 0.5 mg/2.5 mg/3 mL (DUONEB) 0.5-2.5 (3) MG/3ML nebulization     PHARMACIST CHOICE LANCETS MISC     Verapamil HCl  MG CP24     aspirin 81 MG tablet     No current facility-administered medications for this visit.        Health Maintenance Due   Topic Date Due     DEXA SCAN SCREENING (SYSTEM ASSIGNED)  07/29/2011     PNEUMOCOCCAL (2 of 2 - PCV13) 03/22/2014     MAMMO SCREEN Q2 YR (SYSTEM ASSIGNED)  08/23/2014     FALL RISK ASSESSMENT  07/20/2017     COLON CANCER SCREEN (SYSTEM ASSIGNED)  10/23/2017     FOOT EXAM Q1 YEAR  01/18/2018     LIPID MONITORING Q1 YEAR  01/18/2018     MICROALBUMIN Q1 YEAR  01/19/2018       Reviewed and updated:  Tobacco  Allergies  Meds  Med Hx  Surg Hx  Fam Hx  Soc Hx     ROS:  Constitutional, HEENT, cardiovascular, pulmonary, gi and gu systems are negative, except as otherwise noted.      PHYSICAL EXAM:   /64 (BP Location: Right arm, Patient Position: Sitting, Cuff Size: Adult Large)  Pulse 84  Temp 98.5  F (36.9  C) (Tympanic)  Resp 20  Ht 5' 5\" (1.651 m)  Wt 264 lb (119.7 kg)  BMI 43.93 kg/m2  Body mass index is 43.93 kg/(m^2).  GENERAL APPEARANCE: healthy, alert, no distress and over weight  NECK: no adenopathy, no asymmetry, masses, or scars and thyroid normal to palpation  RESP: lungs clear to auscultation - no " rales, rhonchi or wheezes  CV: regular rates and rhythm, normal S1 S2, no S3 or S4 and no murmur, click or rub  ABDOMEN: soft, without hepatosplenomegaly or masses, bowel sounds normal, obese and slight tenderness to RIGHT UPPER QUADRANT   MS: extremities normal- no gross deformities noted  PSYCH: mentation appears normal and affect normal/bright    ASSESSMENT & PLAN:     1. RUQ abdominal pain  Chronic, unstable  DDX: diverticulitis, IBS, constipation, kidney stone, AAA, hernia  - GASTROENTEROLOGY ADULT REF PROCEDURE ONLY  - GASTROENTEROLOGY ADULT REF PROCEDURE ONLY  - HYDROcodone-acetaminophen (NORCO) 7.5-325 MG per tablet; Take 1 tablet by mouth At Bedtime maximum 1 tablet(s) per day  Dispense: 30 tablet; Refill: 0    2. Benign essential hypertension, BP goal <140/90  Chronic, stable  - order for DME; Equipment being ordered: blood pressure monitor  Dispense: 1 Device; Refill: 0  - Start taking BP once and awhile. Let's see how you do with Verapamil 240. We might not have to increase to 300 mg.     3. Type 2 diabetes mellitus with hyperglycemia, with long-term current use of insulin (H) HgbA1c goal <7  Chronic, stable  Continue current medications without change  Lab Results   Component Value Date    A1C 7.3 10/24/2017    A1C 7.8 08/29/2017    A1C 7.7 01/18/2017    A1C 6.7 09/10/2015    A1C 6.9 03/23/2015     4. Morbid obesity with BMI of 45.0-49.9, adult (H)  Chronic, stable  Continue walking daily      Risks, benefits, side effects and rationale for treatment plan fully discussed with the patient and understanding expressed.    LUIS M [facility]

## 2017-12-19 NOTE — MR AVS SNAPSHOT
After Visit Summary   12/19/2017    Lucia Bobo    MRN: [id number]           Patient Information     Date Of Birth          1945        Visit Information        Provider Department      12/19/2017 10:20 AM Keturah Chaney, CNP [facility]        Today's Diagnoses     RUQ abdominal pain    -  1    Benign essential hypertension, BP goal <140/90        Type 2 diabetes mellitus with hyperglycemia, with long-term current use of insulin (H) HgbA1c goal <7        Morbid obesity with BMI of 45.0-49.9, adult (H)          Care Instructions    1. RUQ abdominal pain  Chronic, unstable  - GASTROENTEROLOGY ADULT REF PROCEDURE ONLY  - GASTROENTEROLOGY ADULT REF PROCEDURE ONLY  - HYDROcodone-acetaminophen (NORCO) 7.5-325 MG per tablet; Take 1 tablet by mouth At Bedtime maximum 1 tablet(s) per day  Dispense: 30 tablet; Refill: 0    2. Benign essential hypertension, BP goal <140/90  Chronic, stable  - order for DME; Equipment being ordered: blood pressure monitor  Dispense: 1 Device; Refill: 0  - Start taking BP once and awhile. Let's see how you do with Verapamil 240. We might not have to increase to 300 mg.     3. Type 2 diabetes mellitus with hyperglycemia, with long-term current use of insulin (H) HgbA1c goal <7  Chronic, stable  Continue current medications without change  Lab Results   Component Value Date    A1C 7.3 10/24/2017    A1C 7.8 08/29/2017    A1C 7.7 01/18/2017    A1C 6.7 09/10/2015    A1C 6.9 03/23/2015     4. Morbid obesity with BMI of 45.0-49.9, adult (H)  Chronic, stable  Continue walking daily              Follow-ups after your visit        Additional Services     GASTROENTEROLOGY ADULT REF PROCEDURE ONLY       Last Lab Result: Creatinine (mg/dL)       Date                     Value                 10/16/2017               0.89             ----------  Body mass index is 43.93 kg/(m^2).      Patient will be contacted to schedule procedure.     Please be aware that  coverage of these services is subject to the terms and limitations of your health insurance plan.  Call member services at your health plan with any benefit or coverage questions.  Any procedures must be performed at a [facility] facility OR coordinated by your clinic's referral office.    Please bring the following with you to your appointment:    (1) Any X-Rays, CTs or MRIs which have been performed.  Contact the facility where they were done to arrange for  prior to your scheduled appointment.    (2) List of current medications   (3) This referral request   (4) Any documents/labs given to you for this referral            GASTROENTEROLOGY ADULT REF PROCEDURE ONLY       Last Lab Result: Creatinine (mg/dL)       Date                     Value                 10/16/2017               0.89             ----------  Body mass index is 43.93 kg/(m^2).      Patient will be contacted to schedule procedure.     Please be aware that coverage of these services is subject to the terms and limitations of your health insurance plan.  Call member services at your health plan with any benefit or coverage questions.  Any procedures must be performed at a [facility] facility OR coordinated by your clinic's referral office.    Please bring the following with you to your appointment:    (1) Any X-Rays, CTs or MRIs which have been performed.  Contact the facility where they were done to arrange for  prior to your scheduled appointment.    (2) List of current medications   (3) This referral request   (4) Any documents/labs given to you for this referral                  Your next 10 appointments already scheduled     Feb 15, 2018 10:00 AM CST   Diabetic [facility] with Mic[facility] Angulo RD   [facility]    [address] [place] [id number]   [phone]              Who to contact     If you have questions or need follow up information about today's clinic visit or your  "schedule please contact [facility] directly at [phone].  Normal or non-critical lab and imaging results will be communicated to you by MyChart, letter or phone within 4 business days after the clinic has received the results. If you do not hear from us within 7 days, please contact the clinic through [employer] or phone. If you have a critical or abnormal lab result, we will notify you by phone as soon as possible.  Submit refill requests through [employer] or call your pharmacy and they will forward the refill request to us. Please allow 3 business days for your refill to be completed.          Additional Information About Your Visit        [employer] Information     [employer] lets you send messages to your doctor, view your test results, renew your prescriptions, schedule appointments and more. To sign up, go to [email or web address] . Click on \"Log in\" on the left side of the screen, which will take you to the Welcome page. Then click on \"Sign up Now\" on the right side of the page.     You will be asked to enter the access code listed below, as well as some personal information. Please follow the directions to create your username and password.     Your access code is: [email or web address]  Expires: 3/14/2018  9:20 AM     Your access code will  in 90 days. If you need help or a new code, please call your [facility] clinic or [phone].        Care EveryWhere ID     This is your Care EveryWhere ID. This could be used by other organizations to access your [facility] medical records  [id number]        Your Vitals Were     Pulse Temperature Respirations Height BMI (Body Mass Index)       84 98.5  F (36.9  C) (Tympanic) 20 5' 5\" (1.651 m) 43.93 kg/m2        Blood Pressure from Last 3 Encounters:   17 136/64   17 144/72   10/24/17 154/78    Weight from Last 3 Encounters:   17 264 lb (119.7 kg)   17 270 lb (122.5 kg)   10/24/17 273 lb (123.8 kg)              We Performed the Following  "    GASTROENTEROLOGY ADULT REF PROCEDURE ONLY     GASTROENTEROLOGY ADULT REF PROCEDURE ONLY          Today's Medication Changes          These changes are accurate as of: 12/19/17 11:11 AM.  If you have any questions, ask your nurse or doctor.               Start taking these medicines.        Dose/Directions    HYDROcodone-acetaminophen 7.5-325 MG per tablet   Commonly known as:  NORCO   Used for:  RUQ abdominal pain   Started by:  Keturah Chaney CNP        Dose:  1 tablet   Take 1 tablet by mouth At Bedtime maximum 1 tablet(s) per day   Quantity:  30 tablet   Refills:  0       order for DME   Used for:  Benign essential hypertension   Started by:  Keturah Chaney CNP        Equipment being ordered: blood pressure monitor   Quantity:  1 Device   Refills:  0            Where to get your medicines      Some of these will need a paper prescription and others can be bought over the counter.  Ask your nurse if you have questions.     Bring a paper prescription for each of these medications     HYDROcodone-acetaminophen 7.5-325 MG per tablet    order for DME                Primary Care Provider Office Phone # Fax #    Keturah Chaney [phone]       [address] [place] [address]        Equal Access to Services     [facility] : Hadii bianca prasad Soruba, waaxda luqadaha, qaybta kaalmada aderyan, celeste chance . So [facility] [phone].    ATENCIÓN: Si habla español, tiene a mcarthur disposición servicios gratuitos de asistencia lingüística. Llame al [phone].    We comply with applicable federal civil rights laws and [place] laws. We do not discriminate on the basis of race, color, national origin, age, disability, sex, sexual orientation, or gender identity.            Thank you!     Thank you for choosing [facility]  for your care. Our goal is always to provide you with excellent care. Hearing back from our patients  is one way we can continue to improve our services. Please take a few minutes to complete the written survey that you may receive in the mail after your visit with us. Thank you!             Your Updated Medication List - Protect others around you: Learn how to safely use, store and throw away your medicines at www.disposemymeds.org.          This list is accurate as of: 12/19/17 11:11 AM.  Always use your most recent med list.                   Brand Name Dispense Instructions for use Diagnosis    * albuterol (2.5 MG/3ML) 0.083% neb solution     1 Box    Take 3 mLs by nebulization every 4 hours as needed for shortness of breath / dyspnea.    Mild persistent asthma, uncomplicated       * albuterol 108 (90 BASE) MCG/ACT Inhaler    VENTOLIN HFA    1 Inhaler    Inhale 1-2 puffs into the lungs every 4 hours as needed    Mild persistent asthma, uncomplicated       aspirin 81 MG tablet     90 tablet    Take 1 tablet (81 mg) by mouth daily    Type 2 diabetes mellitus without complications (H)       blood glucose monitoring meter device kit    no brand specified    1 kit    Use to test blood sugar 2 times daily or as directed. Needs lancets and strips per insurance coverage.    Type 2 diabetes mellitus with hyperglycemia, with long-term current use of insulin (H)       blood glucose monitoring test strip    no brand specified    3 Box    1 strip by In Vitro route 2 times daily    Type 2 diabetes mellitus without complications (H)       fluticasone-salmeterol 250-50 MCG/DOSE diskus inhaler    ADVAIR DISKUS    3 Inhaler    Inhale 1 puff into the lungs 2 times daily    Mild persistent asthma, uncomplicated       furosemide 20 MG tablet    LASIX    90 tablet    Take 1 tablet (20 mg) by mouth daily    Benign essential hypertension       * hydrALAZINE 50 MG tablet    APRESOLINE    90 tablet    Take 1 tablet (50 mg) by mouth daily (take with 100 mg) Total= 150 mg daily    Benign essential hypertension       * hydrALAZINE 100 MG  Tabs tablet    APRESOLINE    60 tablet    Take 1 tablet  by mouth  Daily (take with 50 mg) total = 150 mg    Benign essential hypertension       HYDROcodone-acetaminophen 7.5-325 MG per tablet    NORCO    30 tablet    Take 1 tablet by mouth At Bedtime maximum 1 tablet(s) per day    RUQ abdominal pain       insulin pen needle 32G X 4 MM    BD SMILEY U/F    100 each    Inject 1 each Subcutaneous daily    Type 2 diabetes mellitus without complications (H)       ipratropium - albuterol 0.5 mg/2.5 mg/3 mL 0.5-2.5 (3) MG/3ML neb solution    DUONEB    1 Box    Inhale 1 vial (3 mLs) into the lungs every 6 hours as needed    Mild persistent asthma, uncomplicated       liraglutide 18 MG/3ML soln    VICTOZA PEN    27 mL    Inject 1.8 mg Subcutaneous daily    Type 2 diabetes mellitus with hyperglycemia, with long-term current use of insulin (H)       metFORMIN 1000 MG tablet    GLUCOPHAGE    75 tablet    Take 1 tablet with breakfast and 1.5 tablets at dinner    Type 2 diabetes mellitus with hyperglycemia, with long-term current use of insulin (H)       order for DME     1 Device    Equipment being ordered: blood pressure monitor    Benign essential hypertension       PHARMACIST CHOICE LANCETS Misc     100 each    Check blood sugars twice daily    Type 2 diabetes mellitus without complications (H)       traMADol 50 MG tablet    ULTRAM    40 tablet    Take 1-2 tablets ( mg) by mouth nightly as needed for pain maximum 2 tablet(s) per day    Right upper quadrant pain       valsartan 320 MG tablet    DIOVAN    30 tablet    Take 1 tablet (320 mg) by mouth daily    Benign essential hypertension       * verapamil 240 MG CR tablet    CALAN-SR     Take 240 mg by mouth daily        * Verapamil HCl  MG Cp24     30 capsule    Take one tablet daily    Benign essential hypertension       * Notice:  This list has 6 medication(s) that are the same as other medications prescribed for you. Read the directions carefully, and ask your  doctor or other care provider to review them with you.

## 2017-12-19 NOTE — TELEPHONE ENCOUNTER
Keturah- please see message below. Please clarify, add orders if needed.  Thanks-  JEFFREY Arias RN

## 2017-12-19 NOTE — PATIENT INSTRUCTIONS
1. RUQ abdominal pain  Chronic, unstable  - GASTROENTEROLOGY ADULT REF PROCEDURE ONLY  - GASTROENTEROLOGY ADULT REF PROCEDURE ONLY  - HYDROcodone-acetaminophen (NORCO) 7.5-325 MG per tablet; Take 1 tablet by mouth At Bedtime maximum 1 tablet(s) per day  Dispense: 30 tablet; Refill: 0    2. Benign essential hypertension, BP goal <140/90  Chronic, stable  - order for DME; Equipment being ordered: blood pressure monitor  Dispense: 1 Device; Refill: 0  - Start taking BP once and awhile. Let's see how you do with Verapamil 240. We might not have to increase to 300 mg.     3. Type 2 diabetes mellitus with hyperglycemia, with long-term current use of insulin (H) HgbA1c goal <7  Chronic, stable  Continue current medications without change  Lab Results   Component Value Date    A1C 7.3 10/24/2017    A1C 7.8 08/29/2017    A1C 7.7 01/18/2017    A1C 6.7 09/10/2015    A1C 6.9 03/23/2015     4. Morbid obesity with BMI of 45.0-49.9, adult (H)  Chronic, stable  Continue walking daily

## 2017-12-19 NOTE — TELEPHONE ENCOUNTER
Sorry. I thought I ordered the colonoscopy. I just placed the order. That is GREAT news. Thank you so much! Lucia will be pleased!!!  LUIS M Bean

## 2017-12-19 NOTE — TELEPHONE ENCOUNTER
Marilynn Shore in Surgery Scheduling says Mohini wanted patient to get in for Gastroscopy before the end of the year. There are 2 orders for the Upper GI Endoscopy. Did she want her to have a colonoscopy and gastroscopy? They would be able to do it this Friday. Would need orders for colonoscopy added.   Please call Marilynn back at [phone]

## 2017-12-22 ENCOUNTER — ANESTHESIA (OUTPATIENT)
Dept: GASTROENTEROLOGY | Facility: CLINIC | Age: 72
End: 2017-12-22
Payer: MEDICARE

## 2017-12-22 ENCOUNTER — SURGERY (OUTPATIENT)
Age: 72
End: 2017-12-22

## 2017-12-22 ENCOUNTER — HOSPITAL ENCOUNTER (OUTPATIENT)
Facility: CLINIC | Age: 72
Discharge: HOME | End: 2017-12-22
Attending: SURGERY | Admitting: SURGERY
Payer: MEDICARE

## 2017-12-22 VITALS
WEIGHT: 260 LBS | SYSTOLIC BLOOD PRESSURE: 125 MMHG | HEIGHT: 64 IN | HEART RATE: 108 BPM | RESPIRATION RATE: 16 BRPM | OXYGEN SATURATION: 98 % | BODY MASS INDEX: 44.39 KG/M2 | DIASTOLIC BLOOD PRESSURE: 63 MMHG | TEMPERATURE: 97.9 F

## 2017-12-22 LAB
COLONOSCOPY: NORMAL
GLUCOSE BLDC GLUCOMTR-MCNC: 144 MG/DL (ref 70–99)
UPPER GI ENDOSCOPY: NORMAL

## 2017-12-22 PROCEDURE — 88305 TISSUE EXAM BY PATHOLOGIST: CPT | Mod: 26 | Performed by: SURGERY

## 2017-12-22 PROCEDURE — 25000128 H RX IP 250 OP 636: Performed by: SURGERY

## 2017-12-22 PROCEDURE — 45378 DIAGNOSTIC COLONOSCOPY: CPT | Performed by: SURGERY

## 2017-12-22 PROCEDURE — 25000125 ZZHC RX 250: Performed by: NURSE ANESTHETIST, CERTIFIED REGISTERED

## 2017-12-22 PROCEDURE — 82962 GLUCOSE BLOOD TEST: CPT

## 2017-12-22 PROCEDURE — 37000008 ZZH ANESTHESIA TECHNICAL FEE, 1ST 30 MIN: Performed by: SURGERY

## 2017-12-22 PROCEDURE — 25000128 H RX IP 250 OP 636: Performed by: NURSE ANESTHETIST, CERTIFIED REGISTERED

## 2017-12-22 PROCEDURE — 37000009 ZZH ANESTHESIA TECHNICAL FEE, EACH ADDTL 15 MIN: Performed by: SURGERY

## 2017-12-22 PROCEDURE — 43235 EGD DIAGNOSTIC BRUSH WASH: CPT | Performed by: SURGERY

## 2017-12-22 PROCEDURE — 45385 COLONOSCOPY W/LESION REMOVAL: CPT | Performed by: SURGERY

## 2017-12-22 PROCEDURE — 88305 TISSUE EXAM BY PATHOLOGIST: CPT | Performed by: SURGERY

## 2017-12-22 RX ORDER — PROPOFOL 10 MG/ML
INJECTION, EMULSION INTRAVENOUS CONTINUOUS PRN
Status: DISCONTINUED | OUTPATIENT
Start: 2017-12-22 | End: 2017-12-22

## 2017-12-22 RX ORDER — LIDOCAINE 40 MG/G
CREAM TOPICAL
Status: DISCONTINUED | OUTPATIENT
Start: 2017-12-22 | End: 2017-12-22 | Stop reason: HOSPADM

## 2017-12-22 RX ORDER — SODIUM CHLORIDE, SODIUM LACTATE, POTASSIUM CHLORIDE, CALCIUM CHLORIDE 600; 310; 30; 20 MG/100ML; MG/100ML; MG/100ML; MG/100ML
INJECTION, SOLUTION INTRAVENOUS CONTINUOUS
Status: DISCONTINUED | OUTPATIENT
Start: 2017-12-22 | End: 2017-12-22 | Stop reason: HOSPADM

## 2017-12-22 RX ORDER — ONDANSETRON 2 MG/ML
4 INJECTION INTRAMUSCULAR; INTRAVENOUS
Status: DISCONTINUED | OUTPATIENT
Start: 2017-12-22 | End: 2017-12-22 | Stop reason: HOSPADM

## 2017-12-22 RX ORDER — PROPOFOL 10 MG/ML
INJECTION, EMULSION INTRAVENOUS PRN
Status: DISCONTINUED | OUTPATIENT
Start: 2017-12-22 | End: 2017-12-22

## 2017-12-22 RX ADMIN — SODIUM CHLORIDE, POTASSIUM CHLORIDE, SODIUM LACTATE AND CALCIUM CHLORIDE: 600; 310; 30; 20 INJECTION, SOLUTION INTRAVENOUS at 07:12

## 2017-12-22 RX ADMIN — PROPOFOL 200 MCG/KG/MIN: 10 INJECTION, EMULSION INTRAVENOUS at 07:15

## 2017-12-22 RX ADMIN — SODIUM CHLORIDE, POTASSIUM CHLORIDE, SODIUM LACTATE AND CALCIUM CHLORIDE: 600; 310; 30; 20 INJECTION, SOLUTION INTRAVENOUS at 06:35

## 2017-12-22 RX ADMIN — LIDOCAINE HYDROCHLORIDE 1 ML: 10 INJECTION, SOLUTION EPIDURAL; INFILTRATION; INTRACAUDAL; PERINEURAL at 06:35

## 2017-12-22 RX ADMIN — PROPOFOL 50 MG: 10 INJECTION, EMULSION INTRAVENOUS at 07:14

## 2017-12-22 RX ADMIN — PROPOFOL 50 MG: 10 INJECTION, EMULSION INTRAVENOUS at 07:15

## 2017-12-22 NOTE — ANESTHESIA POSTPROCEDURE EVALUATION
Patient: Lucia Bobo    Procedure(s):  Gastroscopy & Colonoscopy   - Wound Class: II-Clean Contaminated   - Wound Class: II-Clean Contaminated    Diagnosis:RUQ pain  Diagnosis Additional Information: No value filed.    Anesthesia Type:  MAC    Note:  Anesthesia Post Evaluation    Patient location during evaluation: Bedside  Patient participation: Able to fully participate in evaluation  Level of consciousness: awake and alert  Pain management: adequate  Airway patency: patent  Cardiovascular status: acceptable  Respiratory status: acceptable  Hydration status: acceptable  PONV: none     Anesthetic complications: None          Last vitals:  Vitals:    12/22/17 0609   BP: (!) 152/109   Pulse: 108   Resp: 20   Temp: 36.6  C (97.9  F)   SpO2: 95%         Electronically Signed By: Richa Villaseñor CRNA, MERRY MORA  December 22, 2017  7:46 AM

## 2017-12-22 NOTE — ANESTHESIA CARE TRANSFER NOTE
Patient: Lucia Bobo    Procedure(s):  Gastroscopy & Colonoscopy   - Wound Class: II-Clean Contaminated   - Wound Class: II-Clean Contaminated    Diagnosis: RUQ pain  Diagnosis Additional Information: No value filed.    Anesthesia Type:   MAC     Note:    Patient transferred to:Phase II  Handoff Report: Identifed the Patient, Identified the Reponsible Provider, Reviewed the pertinent medical history, Discussed the surgical course, Reviewed Intra-OP anesthesia mangement and issues during anesthesia, Set expectations for post-procedure period and Allowed opportunity for questions and acknowledgement of understanding      Vitals: (Last set prior to Anesthesia Care Transfer)    CRNA VITALS  12/22/2017 0716 - 12/22/2017 0746      12/22/2017             Pulse: 88    Ht Rate: 87    SpO2: 96 %                Electronically Signed By: Richa Villaseñor CRNA, MERRY MORA  December 22, 2017  7:46 AM

## 2017-12-27 LAB — COPATH REPORT: NORMAL

## 2018-01-04 ENCOUNTER — ANESTHESIA EVENT (OUTPATIENT)
Dept: GASTROENTEROLOGY | Facility: CLINIC | Age: 73
End: 2018-01-04
Payer: MEDICARE

## 2018-01-04 NOTE — ANESTHESIA PREPROCEDURE EVALUATION
Anesthesia Evaluation     . Pt has had prior anesthetic. Type: General and MAC    No history of anesthetic complications          ROS/MED HX    ENT/Pulmonary:     (+)MOSES risk factors hypertension, obese, Mild Persistent asthma Treatment: Inhaler prn, Nebulizer prn, Inhaled steroids and Inhaler daily,  , . .    Neurologic:  - neg neurologic ROS     Cardiovascular:     (+) Dyslipidemia, hypertension----. : . . . :. . Previous cardiac testing date:results:date: results:ECG reviewed date:3-21-13 results:SR date: results:          METS/Exercise Tolerance:  3 - Able to walk 1-2 blocks without stopping   Hematologic:  - neg hematologic  ROS       Musculoskeletal: Comment: Osteoarthritis of both knees  (+) arthritis, , , -       GI/Hepatic: Comment: RUQ pain    (+) Other GI/Hepatic polyps      Renal/Genitourinary:  - ROS Renal section negative       Endo:     (+) type II DM Using insulin - not using insulin pump Obesity, Other Endocrine Disorder morbid obesity.      Psychiatric:  - neg psychiatric ROS       Infectious Disease:  - neg infectious disease ROS       Malignancy:      - no malignancy   Other:    (+) No chance of pregnancy C-spine cleared: N/A, H/O Chronic Pain,H/O chronic opiod use , no other significant disability                    Physical Exam  Normal systems: cardiovascular, pulmonary and dental    Airway   Mallampati: III  TM distance: <3 FB  Neck ROM: full    Dental     Cardiovascular       Pulmonary                         Anesthesia Plan      History & Physical Review  History and physical reviewed and following examination; no interval change.    ASA Status:  3 .    NPO Status:  > 6 hours and > 4 hours    Plan for MAC with Propofol induction. Reason for MAC:  Deep or markedly invasive procedure (G8)         Postoperative Care      Consents  Anesthetic plan, risks, benefits and alternatives discussed with:  Patient..                          .

## 2018-01-08 ENCOUNTER — APPOINTMENT (OUTPATIENT)
Dept: CT IMAGING | Facility: CLINIC | Age: 73
End: 2018-01-08
Attending: EMERGENCY MEDICINE
Payer: MEDICARE

## 2018-01-08 ENCOUNTER — HOSPITAL ENCOUNTER (EMERGENCY)
Facility: CLINIC | Age: 73
Discharge: HOME | End: 2018-01-08
Attending: EMERGENCY MEDICINE | Admitting: EMERGENCY MEDICINE
Payer: MEDICARE

## 2018-01-08 VITALS
TEMPERATURE: 98.5 F | SYSTOLIC BLOOD PRESSURE: 141 MMHG | DIASTOLIC BLOOD PRESSURE: 58 MMHG | RESPIRATION RATE: 16 BRPM | BODY MASS INDEX: 44.65 KG/M2 | HEART RATE: 92 BPM | WEIGHT: 260.14 LBS | OXYGEN SATURATION: 96 %

## 2018-01-08 DIAGNOSIS — R10.11 ABDOMINAL PAIN, RIGHT UPPER QUADRANT: ICD-10-CM

## 2018-01-08 DIAGNOSIS — R82.81 PYURIA: ICD-10-CM

## 2018-01-08 DIAGNOSIS — R10.9 RIGHT FLANK PAIN: ICD-10-CM

## 2018-01-08 LAB
ALBUMIN SERPL-MCNC: 3.4 G/DL (ref 3.4–5)
ALBUMIN UR-MCNC: 10 MG/DL
ALP SERPL-CCNC: 87 U/L (ref 40–150)
ALT SERPL W P-5'-P-CCNC: 42 U/L (ref 0–50)
ANION GAP SERPL CALCULATED.3IONS-SCNC: 8 MMOL/L (ref 3–14)
APPEARANCE UR: ABNORMAL
AST SERPL W P-5'-P-CCNC: 28 U/L (ref 0–45)
BACTERIA #/AREA URNS HPF: ABNORMAL /HPF
BASOPHILS # BLD AUTO: 0.1 10E9/L (ref 0–0.2)
BASOPHILS NFR BLD AUTO: 0.8 %
BILIRUB SERPL-MCNC: 0.7 MG/DL (ref 0.2–1.3)
BILIRUB UR QL STRIP: NEGATIVE
BUN SERPL-MCNC: 21 MG/DL (ref 7–30)
CALCIUM SERPL-MCNC: 9 MG/DL (ref 8.5–10.1)
CHLORIDE SERPL-SCNC: 109 MMOL/L (ref 94–109)
CO2 SERPL-SCNC: 23 MMOL/L (ref 20–32)
COLOR UR AUTO: YELLOW
CREAT SERPL-MCNC: 0.73 MG/DL (ref 0.52–1.04)
DIFFERENTIAL METHOD BLD: NORMAL
EOSINOPHIL # BLD AUTO: 0.3 10E9/L (ref 0–0.7)
EOSINOPHIL NFR BLD AUTO: 2.6 %
ERYTHROCYTE [DISTWIDTH] IN BLOOD BY AUTOMATED COUNT: 13.1 % (ref 10–15)
GFR SERPL CREATININE-BSD FRML MDRD: 79 ML/MIN/1.7M2
GLUCOSE SERPL-MCNC: 161 MG/DL (ref 70–99)
GLUCOSE UR STRIP-MCNC: NEGATIVE MG/DL
HCT VFR BLD AUTO: 37.8 % (ref 35–47)
HGB BLD-MCNC: 12.5 G/DL (ref 11.7–15.7)
HGB UR QL STRIP: ABNORMAL
IMM GRANULOCYTES # BLD: 0 10E9/L (ref 0–0.4)
IMM GRANULOCYTES NFR BLD: 0.3 %
KETONES UR STRIP-MCNC: NEGATIVE MG/DL
LEUKOCYTE ESTERASE UR QL STRIP: ABNORMAL
LIPASE SERPL-CCNC: 365 U/L (ref 73–393)
LYMPHOCYTES # BLD AUTO: 2.9 10E9/L (ref 0.8–5.3)
LYMPHOCYTES NFR BLD AUTO: 27.4 %
MCH RBC QN AUTO: 32.6 PG (ref 26.5–33)
MCHC RBC AUTO-ENTMCNC: 33.1 G/DL (ref 31.5–36.5)
MCV RBC AUTO: 98 FL (ref 78–100)
MONOCYTES # BLD AUTO: 0.8 10E9/L (ref 0–1.3)
MONOCYTES NFR BLD AUTO: 7.3 %
MUCOUS THREADS #/AREA URNS LPF: PRESENT /LPF
NEUTROPHILS # BLD AUTO: 6.4 10E9/L (ref 1.6–8.3)
NEUTROPHILS NFR BLD AUTO: 61.6 %
NITRATE UR QL: NEGATIVE
PH UR STRIP: 5 PH (ref 5–7)
PLATELET # BLD AUTO: 403 10E9/L (ref 150–450)
POTASSIUM SERPL-SCNC: 4.5 MMOL/L (ref 3.4–5.3)
PROT SERPL-MCNC: 7.4 G/DL (ref 6.8–8.8)
RBC # BLD AUTO: 3.84 10E12/L (ref 3.8–5.2)
RBC #/AREA URNS AUTO: 10 /HPF (ref 0–2)
SODIUM SERPL-SCNC: 140 MMOL/L (ref 133–144)
SOURCE: ABNORMAL
SP GR UR STRIP: 1.02 (ref 1–1.03)
SQUAMOUS #/AREA URNS AUTO: 10 /HPF (ref 0–1)
TRANS CELLS #/AREA URNS HPF: 1 /HPF (ref 0–1)
UROBILINOGEN UR STRIP-MCNC: NORMAL MG/DL (ref 0–2)
WBC # BLD AUTO: 10.4 10E9/L (ref 4–11)
WBC #/AREA URNS AUTO: 72 /HPF (ref 0–2)

## 2018-01-08 PROCEDURE — 81001 URINALYSIS AUTO W/SCOPE: CPT | Performed by: EMERGENCY MEDICINE

## 2018-01-08 PROCEDURE — 96361 HYDRATE IV INFUSION ADD-ON: CPT

## 2018-01-08 PROCEDURE — 99285 EMERGENCY DEPT VISIT HI MDM: CPT | Mod: 25

## 2018-01-08 PROCEDURE — 80053 COMPREHEN METABOLIC PANEL: CPT | Performed by: EMERGENCY MEDICINE

## 2018-01-08 PROCEDURE — 96360 HYDRATION IV INFUSION INIT: CPT

## 2018-01-08 PROCEDURE — 74177 CT ABD & PELVIS W/CONTRAST: CPT

## 2018-01-08 PROCEDURE — 99285 EMERGENCY DEPT VISIT HI MDM: CPT | Mod: Z6 | Performed by: EMERGENCY MEDICINE

## 2018-01-08 PROCEDURE — 87086 URINE CULTURE/COLONY COUNT: CPT | Performed by: EMERGENCY MEDICINE

## 2018-01-08 PROCEDURE — 25000125 ZZHC RX 250: Performed by: EMERGENCY MEDICINE

## 2018-01-08 PROCEDURE — 25000128 H RX IP 250 OP 636: Performed by: EMERGENCY MEDICINE

## 2018-01-08 PROCEDURE — 85025 COMPLETE CBC W/AUTO DIFF WBC: CPT | Performed by: EMERGENCY MEDICINE

## 2018-01-08 PROCEDURE — 83690 ASSAY OF LIPASE: CPT | Performed by: EMERGENCY MEDICINE

## 2018-01-08 RX ORDER — OXYCODONE HYDROCHLORIDE 5 MG/1
5 TABLET ORAL EVERY 4 HOURS PRN
Qty: 5 TABLET | Refills: 0 | Status: SHIPPED | OUTPATIENT
Start: 2018-01-08 | End: 2018-01-10

## 2018-01-08 RX ORDER — IOPAMIDOL 755 MG/ML
100 INJECTION, SOLUTION INTRAVASCULAR ONCE
Status: COMPLETED | OUTPATIENT
Start: 2018-01-08 | End: 2018-01-08

## 2018-01-08 RX ADMIN — SODIUM CHLORIDE 72 ML: 9 INJECTION, SOLUTION INTRAVENOUS at 12:07

## 2018-01-08 RX ADMIN — IOPAMIDOL 100 ML: 755 INJECTION, SOLUTION INTRAVENOUS at 12:06

## 2018-01-08 RX ADMIN — SODIUM CHLORIDE, POTASSIUM CHLORIDE, SODIUM LACTATE AND CALCIUM CHLORIDE 1000 ML: 600; 310; 30; 20 INJECTION, SOLUTION INTRAVENOUS at 11:24

## 2018-01-08 NOTE — ED AVS SNAPSHOT
[facility] Emergency Department    [address] [address]    Phone:  [phone]    Fax:  [phone]                                       Lucia Bobo   MRN: [id number]    Department:  [facility] Emergency Department   Date of Visit:  1/8/2018           After Visit Summary Signature Page     I have received my discharge instructions, and my questions have been answered. I have discussed any challenges I see with this plan with the nurse or doctor.    ..........................................................................................................................................  Patient/Patient Representative Signature      ..........................................................................................................................................  Patient Representative Print Name and Relationship to Patient    ..................................................               ................................................  Date                                            Time    ..........................................................................................................................................  Reviewed by Signature/Title    ...................................................              ..............................................  Date                                                            Time

## 2018-01-08 NOTE — ED PROVIDER NOTES
History     Chief Complaint   Patient presents with     Abdominal Pain     right flank pain, has had pain for 3 months     HPI  Lucia Bobo is a 72 year old female who presents for right upper quadrant and right flank pain.  Symptoms are ongoing for the past 3 months, constant but it does wax and wane.  She is not sure what makes it better or worse.  She has been prescribed tramadol and hydrocodone and acetaminophen for this occasionally but says that this has not helped at all.  No nausea or vomiting.  She reports that she has had multiple workups for this before including endoscopy, colonoscopy, CT scans, and ultrasound and there has not been a cause found.  She is still eating and drinking.  Pain is rated as severe, described as sharp.  She has had loose stools, 2 this morning.  No blood.  She denies dysuria, states she does urinate all the time but this is unchanged from her norm, says that she is on furosemide and urinates normally.  Denies urinary frequency involving small amounts.  She has had a prior cholecystectomy.  She denies fever, chills, cough, shortness breath, chest pain, or rash.    Problem List:    Patient Active Problem List    Diagnosis Date Noted     Morbid obesity with BMI of 45.0-49.9, adult (H) 09/12/2012     Priority: High     Weight Watchers       Type 2 diabetes mellitus with hyperglycemia, with long-term current use of insulin (H) HgbA1c goal <7 10/31/2010     Priority: High     Prescribe statin       Colon polyps 12/27/2017     Priority: Medium     Tubular adenoma fragments.        Mild persistent asthma, uncomplicated 09/14/2015     Priority: Medium     Benign essential hypertension, BP goal <140/90 09/03/2015     Priority: Medium     Hypertriglyceridemia, LDL goal <100 01/28/2014     Priority: Medium     Osteoarthritis of both knees 07/16/2012     Priority: Medium     9/21/12 Bilateral knee Injection (Synvisc under fluoro)       Advanced directives, counseling/discussion  07/11/2011     Priority: Low     Advance Directive Problem List Overview:   Name Relationship Phone    Primary Health Care Agent            Alternative Health Care Agent          Discussed advance care planning with patient; however, patient declined at this time. 7/11/2011             Past Medical History:    Past Medical History:   Diagnosis Date     Asthma      HTN      Type II or unspecified type diabetes mellitus without mention of complication, not stated as uncontrolled 4/2/04       Past Surgical History:    Past Surgical History:   Procedure Laterality Date     APPENDECTOMY       CHOLECYSTECTOMY, OPEN       COLONOSCOPY  2007     COLONOSCOPY N/A 12/22/2017    Procedure: COLONOSCOPY;;  Surgeon: Aurelio Alanis MD;  Location: [facility]     ESOPHAGOSCOPY, GASTROSCOPY, DUODENOSCOPY (EGD), COMBINED N/A 11/20/2015    Procedure: COMBINED ESOPHAGOSCOPY, GASTROSCOPY, DUODENOSCOPY (EGD);  Surgeon: Anamika Boyer MD;  Location: [facility]     ESOPHAGOSCOPY, GASTROSCOPY, DUODENOSCOPY (EGD), COMBINED N/A 12/22/2017    Procedure: COMBINED ESOPHAGOSCOPY, GASTROSCOPY, DUODENOSCOPY (EGD);  Gastroscopy & Colonoscopy  ;  Surgeon: Aurelio Alanis MD;  Location: [facility]       Family History:    Family History   Problem Relation Age of Onset     CANCER Mother      Hypertension Father        Social History:  Marital Status:   [2]  Social History   Substance Use Topics     Smoking status: Never Smoker     Smokeless tobacco: Never Used     Alcohol use No        Medications:      oxyCODONE IR (ROXICODONE) 5 MG tablet   ranitidine (ZANTAC) 150 MG tablet   verapamil (CALAN-SR) 240 MG CR tablet   order for DME   metFORMIN (GLUCOPHAGE) 1000 MG tablet   furosemide (LASIX) 20 MG tablet   fluticasone-salmeterol (ADVAIR DISKUS) 250-50 MCG/DOSE diskus inhaler   traMADol (ULTRAM) 50 MG tablet   liraglutide (VICTOZA PEN) 18 MG/3ML soln   hydrALAZINE (APRESOLINE) 50 MG tablet   hydrALAZINE (APRESOLINE) 100 MG TABS tablet    insulin pen needle (BD SMILEY U/F) 32G X 4 MM   blood glucose monitoring (NO BRAND SPECIFIED) meter device kit   albuterol (VENTOLIN HFA) 108 (90 BASE) MCG/ACT Inhaler   valsartan (DIOVAN) 320 MG tablet   albuterol (2.5 MG/3ML) 0.083% nebulizer solution   aspirin 81 MG tablet   blood glucose monitoring (NO BRAND SPECIFIED) test strip   ipratropium - albuterol 0.5 mg/2.5 mg/3 mL (DUONEB) 0.5-2.5 (3) MG/3ML nebulization   PHARMACIST CHOICE LANCETS MISC   Verapamil HCl  MG CP24         Review of Systems  Pertinent positives and negatives listed in the HPI, all other systems reviewed and are negative.    Physical Exam   BP: 141/58  Pulse: 92  Temp: 98.5  F (36.9  C)  Resp: 16  Weight: 118 kg (260 lb 2.3 oz)  SpO2: 96 %      Physical Exam   Constitutional: She is oriented to person, place, and time. She appears well-developed and well-nourished. She appears distressed.   HENT:   Head: Normocephalic and atraumatic.   Right Ear: External ear normal.   Left Ear: External ear normal.   Nose: Nose normal.   Eyes: Conjunctivae are normal. No scleral icterus.   Neck: Normal range of motion.   Cardiovascular: Normal rate and regular rhythm.    Pulmonary/Chest: Effort normal. No stridor. No respiratory distress.   Abdominal: Soft. She exhibits no distension. There is no tenderness. There is no rebound and no guarding.   Neurological: She is alert and oriented to person, place, and time.   Skin: Skin is warm and dry. She is not diaphoretic.   Psychiatric: She has a normal mood and affect. Her behavior is normal.   Nursing note and vitals reviewed.      ED Course     ED Course     Procedures               Critical Care time:  none               Labs Ordered and Resulted from Time of ED Arrival Up to the Time of Departure from the ED   COMPREHENSIVE METABOLIC PANEL - Abnormal; Notable for the following:        Result Value    Glucose 161 (*)     All other components within normal limits   URINE MACROSCOPIC WITH REFLEX TO MICRO  - Abnormal; Notable for the following:     Blood Urine Trace (*)     Protein Albumin Urine 10 (*)     Leukocyte Esterase Urine Large (*)     RBC Urine 10 (*)     WBC Urine 72 (*)     Bacteria Urine Many (*)     Squamous Epithelial /HPF Urine 10 (*)     Mucous Urine Present (*)     All other components within normal limits   CBC WITH PLATELETS DIFFERENTIAL   LIPASE   PERIPHERAL IV CATHETER       Assessments & Plan (with Medical Decision Making)   72-year-old female presents with right upper quadrant and right flank pain.  Temperature is 36.9 C, blood pressure 141/58, heart rate 92, SPO2 96% on room air.  Urinalysis does show white blood cells and leukocyte esterase, no nitrite.  However given the patient's lack of urinary symptoms and no CVA tenderness on examination, I do believe this is unlikely to represent urinary tract infection, no indication for antibiotics at this time for this.  Urine culture is sent to see if this is concerning for pyelonephritis.  White blood cell count 10.4.  Hemoglobin 12.5.  No signs of ongoing bleeding.  Electro lites are within normal limits.  LFTs normal, unlikely signs of hepatitis.  Bilirubin is normal, no signs of retained stone.  Lipase is normal, no signs of pancreatitis.  CT of the abdomen/pelvis obtained, images reviewed independently as well as radiology read reviewed, no signs of internal abdominal infection, appendicitis, diverticulitis, or bowel obstruction.  Unclear cause of the patient's symptoms at this time.  She is tolerating oral intake and is breathing comfortably, she is safe to discharge with a short course of oxycodone as well as a 2 week course of ranitidine to see if this helps her symptoms.  The patient did express concerns of her Victoza and wondering if this could be causing symptoms.  I looked it up and it does appear that there is about a 5% chance of abdominal pain with an even larger chance of nausea on this medication.  This could be the source, however  she is encouraged to continue looking for other causes before attributed to this.  She is told to return if she has fevers, repeated vomiting, or other concerns, otherwise follow-up in clinic for a recheck.  The patient is in agreement with this plan.    I have reviewed the nursing notes.    I have reviewed the findings, diagnosis, plan and need for follow up with the patient.       Discharge Medication List as of 1/8/2018  1:03 PM      START taking these medications    Details   oxyCODONE IR (ROXICODONE) 5 MG tablet Take 1 tablet (5 mg) by mouth every 4 hours as needed for pain, Disp-5 tablet, R-0, Local Print      ranitidine (ZANTAC) 150 MG tablet Take 1 tablet (150 mg) by mouth 2 times daily for 15 days, Disp-30 tablet, R-0, E-Prescribe             Final diagnoses:   Pyuria   Abdominal pain, right upper quadrant   Right flank pain       1/8/2018   [facility] EMERGENCY DEPARTMENT     Nicolas Saunders MD  01/08/18 [id number]

## 2018-01-08 NOTE — DISCHARGE INSTRUCTIONS
I am not sure what is causing your pain.  Continue to follow-up in clinics to continue your evaluation.  Return to the emergency department if you have fever, repeated vomiting, no urine output over 8 hours, or other concerns.    Use ibuprofen and acetaminophen for your symptoms.  Use oxycodone as needed for pain that is not controlled by the prior two medications.    Oxycodone is an addictive opioid medication, please only take it when the pain is more than can be controlled by acetaminophen or ibuprofen alone. It will also make you lightheaded, nauseated, and constipated.  Do not drive, operate heavy machinery, or take care of young children while taking this medication.     Repeated studies have shown that the longer you use opioid pain medications, the longer it is until you return to normal function. It is our recommendation that you taper off opioids as quickly as possible with the goal of returning to normal function or near normal function. Long term use of opioids quickly results in growing tolerance to the medication (less of the benefits) and increased dependence (more of the bad side effects).     Pain is very difficult to treat and we can very rarely take away pain completely. If you are having difficulty with pain over several weeks after an injury, you may need to start different medications and therapies (such as physical therapy, graded exercise, massage, and acupuncture). Please talk about this with your regular doctor.     If you are interested in seeking free, confidential treatment referral and information service for individuals and families facing mental health and/or substance use disorders please call [phone])

## 2018-01-08 NOTE — ED AVS SNAPSHOT
[facility] Emergency Department    [address] [address]    Phone:  [phone]    Fax:  [phone]                                       Lucia Bobo   MRN: [id number]    Department:  [facility] Emergency Department   Date of Visit:  1/8/2018           Patient Information     Date Of Birth          1945        Your diagnoses for this visit were:     Pyuria     Abdominal pain, right upper quadrant     Right flank pain        You were seen by Nicolas Saunders MD.        Discharge Instructions       I am not sure what is causing your pain.  Continue to follow-up in clinics to continue your evaluation.  Return to the emergency department if you have fever, repeated vomiting, no urine output over 8 hours, or other concerns.    Use ibuprofen and acetaminophen for your symptoms.  Use oxycodone as needed for pain that is not controlled by the prior two medications.    Oxycodone is an addictive opioid medication, please only take it when the pain is more than can be controlled by acetaminophen or ibuprofen alone. It will also make you lightheaded, nauseated, and constipated.  Do not drive, operate heavy machinery, or take care of young children while taking this medication.     Repeated studies have shown that the longer you use opioid pain medications, the longer it is until you return to normal function. It is our recommendation that you taper off opioids as quickly as possible with the goal of returning to normal function or near normal function. Long term use of opioids quickly results in growing tolerance to the medication (less of the benefits) and increased dependence (more of the bad side effects).     Pain is very difficult to treat and we can very rarely take away pain completely. If you are having difficulty with pain over several weeks after an injury, you may need to start different medications and therapies (such as physical therapy, graded exercise, massage, and  acupuncture). Please talk about this with your regular doctor.     If you are interested in seeking free, confidential treatment referral and information service for individuals and families facing mental health and/or substance use disorders please call [phone] ([phone])    Future Appointments        Provider Department Dept Phone Center    2/15/2018 10:00 AM Michell Angulo RD [facility] [phone] [facility]      24 Hour Appointment Hotline       To make an appointment at any [facility], call [phone] ([phone]). If you don't have a family doctor or clinic, we will help you find one. [facility] are conveniently located to serve the needs of you and your family.             Review of your medicines      START taking        Dose / Directions Last dose taken    oxyCODONE IR 5 MG tablet   Commonly known as:  ROXICODONE   Dose:  5 mg   Quantity:  5 tablet        Take 1 tablet (5 mg) by mouth every 4 hours as needed for pain   Refills:  0        ranitidine 150 MG tablet   Commonly known as:  ZANTAC   Dose:  150 mg   Quantity:  30 tablet        Take 1 tablet (150 mg) by mouth 2 times daily for 15 days   Refills:  0          Our records show that you are taking the medicines listed below. If these are incorrect, please call your family doctor or clinic.        Dose / Directions Last dose taken    * albuterol (2.5 MG/3ML) 0.083% neb solution   Quantity:  1 Box        Take 3 mLs by nebulization every 4 hours as needed for shortness of breath / dyspnea.   Refills:  1        * albuterol 108 (90 BASE) MCG/ACT Inhaler   Commonly known as:  VENTOLIN HFA   Dose:  1-2 puff   Quantity:  1 Inhaler        Inhale 1-2 puffs into the lungs every 4 hours as needed   Refills:  1        aspirin 81 MG tablet   Dose:  81 mg   Quantity:  90 tablet        Take 1 tablet (81 mg) by mouth daily   Refills:  3        blood glucose monitoring meter device kit   Commonly known as:  no brand  specified   Quantity:  1 kit        Use to test blood sugar 2 times daily or as directed. Needs lancets and strips per insurance coverage.   Refills:  0        blood glucose monitoring test strip   Commonly known as:  no brand specified   Dose:  1 strip   Quantity:  3 Box        1 strip by In Vitro route 2 times daily   Refills:  3        fluticasone-salmeterol 250-50 MCG/DOSE diskus inhaler   Commonly known as:  ADVAIR DISKUS   Dose:  1 puff   Quantity:  3 Inhaler        Inhale 1 puff into the lungs 2 times daily   Refills:  3        furosemide 20 MG tablet   Commonly known as:  LASIX   Dose:  20 mg   Quantity:  90 tablet        Take 1 tablet (20 mg) by mouth daily   Refills:  1        * hydrALAZINE 50 MG tablet   Commonly known as:  APRESOLINE   Dose:  50 mg   Quantity:  90 tablet        Take 1 tablet (50 mg) by mouth daily (take with 100 mg) Total= 150 mg daily   Refills:  1        * hydrALAZINE 100 MG Tabs tablet   Commonly known as:  APRESOLINE   Dose:  100 mg   Quantity:  60 tablet        Take 100 mg by mouth daily Take 1 tablet  by mouth  Daily (take with 50 mg) total = 150 mg   Refills:  5        insulin pen needle 32G X 4 MM   Commonly known as:  BD SMILEY U/F   Dose:  1 each   Quantity:  100 each        Inject 1 each Subcutaneous daily   Refills:  1        ipratropium - albuterol 0.5 mg/2.5 mg/3 mL 0.5-2.5 (3) MG/3ML neb solution   Commonly known as:  DUONEB   Dose:  1 vial   Quantity:  1 Box        Inhale 1 vial (3 mLs) into the lungs every 6 hours as needed   Refills:  3        liraglutide 18 MG/3ML soln   Commonly known as:  VICTOZA PEN   Dose:  1.8 mg   Quantity:  27 mL        Inject 1.8 mg Subcutaneous daily   Refills:  3        metFORMIN 1000 MG tablet   Commonly known as:  GLUCOPHAGE   Quantity:  75 tablet        Take 1 tablet with breakfast and 1.5 tablets at dinner   Refills:  3        order for DME   Quantity:  1 Device        Equipment being ordered: blood pressure monitor   Refills:  0         PHARMACIST CHOICE LANCETS Misc   Quantity:  100 each        Check blood sugars twice daily   Refills:  3        traMADol 50 MG tablet   Commonly known as:  ULTRAM   Dose:   mg   Quantity:  40 tablet        Take 1-2 tablets ( mg) by mouth nightly as needed for pain maximum 2 tablet(s) per day   Refills:  0        valsartan 320 MG tablet   Commonly known as:  DIOVAN   Dose:  320 mg   Quantity:  30 tablet        Take 1 tablet (320 mg) by mouth daily   Refills:  11        * verapamil 240 MG CR tablet   Commonly known as:  CALAN-SR   Dose:  240 mg        Take 240 mg by mouth daily   Refills:  0        * Verapamil HCl  MG Cp24   Quantity:  30 capsule        Take one tablet daily   Refills:  0        * Notice:  This list has 6 medication(s) that are the same as other medications prescribed for you. Read the directions carefully, and ask your doctor or other care provider to review them with you.      STOP taking        Dose Reason for stopping Comments    HYDROcodone-acetaminophen 7.5-325 MG per tablet   Commonly known as:  NORCO                      Prescriptions were sent or printed at these locations (2 Prescriptions)                   [facility] [address] [address]    Telephone:  [phone]   Fax:  [phone]   Hours:                  E-Prescribed (1 of 1)         ranitidine (ZANTAC) 150 MG tablet                     Other Prescriptions                Printed at Department/Unit printer (1 of 1)         oxyCODONE IR (ROXICODONE) 5 MG tablet                Procedures and tests performed during your visit     CBC with platelets differential    CT Abdomen Pelvis w Contrast    Comprehensive metabolic panel    Lipase    UA reflex to Microscopic      Orders Needing Specimen Collection     None      Pending Results     Date and Time Order Name Status Description    1/8/2018 1140 CT Abdomen Pelvis w Contrast Preliminary              Pending Culture Results     No orders found from 1/6/2018 to 1/9/2018.            Pending Results Instructions     If you had any lab results that were not finalized at the time of your Discharge, you can call the ED Lab Result RN at [phone]. You will be contacted by this team for any positive Lab results or changes in treatment. The nurses are available 7 days a week from 10A to 6:30P.  You can leave a message 24 hours per day and they will return your call.        Test Results From Your Hospital Stay        1/8/2018 11:22 AM      Component Results     Component Value Ref Range & Units Status    WBC 10.4 4.0 - 11.0 10e9/L Final    RBC Count 3.84 3.8 - 5.2 10e12/L Final    Hemoglobin 12.5 11.7 - 15.7 g/dL Final    Hematocrit 37.8 35.0 - 47.0 % Final    MCV 98 78 - 100 fl Final    MCH 32.6 26.5 - 33.0 pg Final    MCHC 33.1 31.5 - 36.5 g/dL Final    RDW 13.1 10.0 - 15.0 % Final    Platelet Count 403 150 - 450 10e9/L Final    Diff Method Automated Method  Final    % Neutrophils 61.6 % Final    % Lymphocytes 27.4 % Final    % Monocytes 7.3 % Final    % Eosinophils 2.6 % Final    % Basophils 0.8 % Final    % Immature Granulocytes 0.3 % Final    Absolute Neutrophil 6.4 1.6 - 8.3 10e9/L Final    Absolute Lymphocytes 2.9 0.8 - 5.3 10e9/L Final    Absolute Monocytes 0.8 0.0 - 1.3 10e9/L Final    Absolute Eosinophils 0.3 0.0 - 0.7 10e9/L Final    Absolute Basophils 0.1 0.0 - 0.2 10e9/L Final    Abs Immature Granulocytes 0.0 0 - 0.4 10e9/L Final         1/8/2018 11:39 AM      Component Results     Component Value Ref Range & Units Status    Sodium 140 133 - 144 mmol/L Final    Potassium 4.5 3.4 - 5.3 mmol/L Final    Chloride 109 94 - 109 mmol/L Final    Carbon Dioxide 23 20 - 32 mmol/L Final    Anion Gap 8 3 - 14 mmol/L Final    Glucose 161 (H) 70 - 99 mg/dL Final    Urea Nitrogen 21 7 - 30 mg/dL Final    Creatinine 0.73 0.52 - 1.04 mg/dL Final    GFR Estimate 79 >60 mL/min/1.7m2 Final    Non African American GFR Calc     GFR Estimate If Black >90 >60 mL/min/1.7m2 Final    African American GFR Calc    Calcium 9.0 8.5 - 10.1 mg/dL Final    Bilirubin Total 0.7 0.2 - 1.3 mg/dL Final    Albumin 3.4 3.4 - 5.0 g/dL Final    Protein Total 7.4 6.8 - 8.8 g/dL Final    Alkaline Phosphatase 87 40 - 150 U/L Final    ALT 42 0 - 50 U/L Final    AST 28 0 - 45 U/L Final         1/8/2018 11:38 AM      Component Results     Component Value Ref Range & Units Status    Lipase 365 73 - 393 U/L Final         1/8/2018 11:16 AM      Component Results     Component Value Ref Range & Units Status    Color Urine Yellow  Final    Appearance Urine Slightly Cloudy  Final    Glucose Urine Negative NEG^Negative mg/dL Final    Bilirubin Urine Negative NEG^Negative Final    Ketones Urine Negative NEG^Negative mg/dL Final    Specific Gravity Urine 1.020 1.003 - 1.035 Final    Blood Urine Trace (A) NEG^Negative Final    pH Urine 5.0 5.0 - 7.0 pH Final    Protein Albumin Urine 10 (A) NEG^Negative mg/dL Final    Urobilinogen mg/dL Normal 0.0 - 2.0 mg/dL Final    Nitrite Urine Negative NEG^Negative Final    Leukocyte Esterase Urine Large (A) NEG^Negative Final    Source Midstream Urine  Final    RBC Urine 10 (H) 0 - 2 /HPF Final    WBC Urine 72 (H) 0 - 2 /HPF Final    Bacteria Urine Many (A) NEG^Negative /HPF Final    Squamous Epithelial /HPF Urine 10 (H) 0 - 1 /HPF Final    Transitional Epi 1 0 - 1 /HPF Final    Mucous Urine Present (A) NEG^Negative /LPF Final         1/8/2018 12:37 PM      Narrative     CT ABDOMEN AND PELVIS WITH CONTRAST  1/8/2018 12:22 PM    HISTORY: Right upper quadrant and right flank pain. Cholecystectomy  and appendectomy in 1984.    COMPARISON: A CT without contrast on 10/16/2017.    TECHNIQUE: Routine transverse CT imaging of the abdomen and pelvis was  performed following the uneventful administration of Isovue 370, 100  mL intravenous contrast. Radiation dose for this scan was reduced  using automated exposure control, adjustment of the mA  "and/or kV  according to patient size, or iterative reconstruction technique.    FINDINGS: The visualized lung bases are clear. Again seen are a few  small nonobstructing calculi in both kidneys. These do not appear  significantly changed. No other urinary tract abnormality is  identified. The liver, spleen, and pancreas are normal. There has been  a cholecystectomy. Again seen is mild prominence of the adrenal glands  bilaterally. This is unchanged and these again likely represent small  adenomas. No enlarged lymph node or other abnormal mass is  demonstrated. No free fluid is seen. No free intraperitoneal gas is  identified. The gastrointestinal tract is unremarkable. There has been  an appendectomy. Again seen is calcification in the vascular  structures. There is a 2.0 cm splenic artery aneurysm. This does not  appear significantly changed. No other vascular abnormality is noted.  There are degenerative changes of the spine. The osseous structures  are otherwise unremarkable. No abdominal or pelvic wall pathology is  demonstrated.         Impression     IMPRESSION: I see no significant change since an unenhanced CT on  10/16/2017. Again seen are small nonobstructing renal calculi. No  acute abnormality is demonstrated.                Thank you for choosing [facility]       Thank you for choosing [facility] for your care. Our goal is always to provide you with excellent care. Hearing back from our patients is one way we can continue to improve our services. Please take a few minutes to complete the written survey that you may receive in the mail after you visit with us. Thank you!        [employer] Information     [employer] lets you send messages to your doctor, view your test results, renew your prescriptions, schedule appointments and more. To sign up, go to [email or web address] . Click on \"Log in\" on the left side of the screen, which will take you to the Welcome page. Then click on \"Sign up Now\" on the right side " of the page.     You will be asked to enter the access code listed below, as well as some personal information. Please follow the directions to create your username and password.     Your access code is: [email or web address]  Expires: 3/14/2018  9:20 AM     Your access code will  in 90 days. If you need help or a new code, please call your [facility] clinic or [phone].        Care EveryWhere ID     This is your Care EveryWhere ID. This could be used by other organizations to access your [facility] medical records  [id number]        Equal Access to Services     [facility]: Hadamy Herrera, aura elkins, jose alegria, celeste chance . So [facility] [phone].    ATENCIÓN: Si habla español, tiene a mcarthur disposición servicios gratuitos de asistencia lingüística. Llame al [phone].    We comply with applicable federal civil rights laws and [place] laws. We do not discriminate on the basis of race, color, national origin, age, disability, sex, sexual orientation, or gender identity.            After Visit Summary       This is your record. Keep this with you and show to your community pharmacist(s) and doctor(s) at your next visit.

## 2018-01-09 LAB
BACTERIA SPEC CULT: NORMAL
BACTERIA SPEC CULT: NORMAL
Lab: NORMAL
SPECIMEN SOURCE: NORMAL

## 2018-01-10 ENCOUNTER — OFFICE VISIT (OUTPATIENT)
Dept: FAMILY MEDICINE | Facility: CLINIC | Age: 73
End: 2018-01-10
Payer: COMMERCIAL

## 2018-01-10 VITALS
DIASTOLIC BLOOD PRESSURE: 66 MMHG | WEIGHT: 264 LBS | SYSTOLIC BLOOD PRESSURE: 136 MMHG | HEIGHT: 64 IN | RESPIRATION RATE: 20 BRPM | TEMPERATURE: 99.3 F | HEART RATE: 96 BPM | BODY MASS INDEX: 45.07 KG/M2

## 2018-01-10 DIAGNOSIS — R10.11 ABDOMINAL PAIN, RIGHT UPPER QUADRANT: Primary | ICD-10-CM

## 2018-01-10 PROCEDURE — 99213 OFFICE O/P EST LOW 20 MIN: CPT | Performed by: NURSE PRACTITIONER

## 2018-01-10 RX ORDER — OXYCODONE HYDROCHLORIDE 5 MG/1
5 TABLET ORAL 2 TIMES DAILY
Qty: 60 TABLET | Refills: 0 | Status: SHIPPED | OUTPATIENT
Start: 2018-01-10 | End: 2018-02-09

## 2018-01-10 NOTE — PROGRESS NOTES
SUBJECTIVE:   Lucia Bobo is a 72 year old female who presents to clinic today for the following health issues:      ED/UC Followup:    Facility:  [facility]   Date of visit: 01/08/2018  Reason for visit: Abdominal pain   Current Status: Oxycodone is making the pain tolerable      She has had chronic RIGHT UPPER QUADRANT abdominal pain since October 2017.     She sees DR. Jacklyn Lunsford at [facility]. Last OV 12/11/17- they could only schedule her for EGD/Colonoscopy in February, so I referred her to Dr. Alanis in [place] to get these tests done. She does not yet have a follow-up appointment scheduled with GI- she will do that today.   Colonoscopy was stopped group home due to scope failure on 12/22/17 she had some polyps.  Colonoscopy coming up 11/17/2018    HPI:   PCP:  Keturah Chaney, [facility] [phone]    Patient Active Problem List   Diagnosis     Type 2 diabetes mellitus with hyperglycemia, with long-term current use of insulin (H) HgbA1c goal <7     Advanced directives, counseling/discussion     Osteoarthritis of both knees     Morbid obesity with BMI of 45.0-49.9, adult (H)     Hypertriglyceridemia, LDL goal <100     Benign essential hypertension, BP goal <140/90     Mild persistent asthma, uncomplicated     Colon polyps     Current Outpatient Prescriptions   Medication     oxyCODONE IR (ROXICODONE) 5 MG tablet     ranitidine (ZANTAC) 150 MG tablet     verapamil (CALAN-SR) 240 MG CR tablet     metFORMIN (GLUCOPHAGE) 1000 MG tablet     furosemide (LASIX) 20 MG tablet     fluticasone-salmeterol (ADVAIR DISKUS) 250-50 MCG/DOSE diskus inhaler     liraglutide (VICTOZA PEN) 18 MG/3ML soln     hydrALAZINE (APRESOLINE) 50 MG tablet     hydrALAZINE (APRESOLINE) 100 MG TABS tablet     insulin pen needle (BD SMILEY U/F) 32G X 4 MM     blood glucose monitoring (NO BRAND SPECIFIED) meter device kit     albuterol (VENTOLIN HFA) 108 (90 BASE) MCG/ACT Inhaler     valsartan (DIOVAN) 320 MG tablet     albuterol  "(2.5 MG/3ML) 0.083% nebulizer solution     aspirin 81 MG tablet     blood glucose monitoring (NO BRAND SPECIFIED) test strip     ipratropium - albuterol 0.5 mg/2.5 mg/3 mL (DUONEB) 0.5-2.5 (3) MG/3ML nebulization     PHARMACIST CHOICE LANCETS [facility]     order for DME     Verapamil HCl  MG CP24     No current facility-administered medications for this visit.        Health Maintenance Due   Topic Date Due     DEXA SCAN SCREENING (SYSTEM ASSIGNED)  07/29/2011     PNEUMOCOCCAL (2 of 2 - PCV13) 03/22/2014     MAMMO SCREEN Q2 YR (SYSTEM ASSIGNED)  08/23/2014     FALL RISK ASSESSMENT  07/20/2017     FOOT EXAM Q1 YEAR  01/18/2018     LIPID MONITORING Q1 YEAR  01/18/2018     MICROALBUMIN Q1 YEAR  01/19/2018     ASTHMA CONTROL TEST Q6 MOS  02/02/2018       Reviewed and updated:  Tobacco  Allergies  Meds  Med Hx  Surg Hx  Fam Hx  Soc Hx     ROS:  Constitutional, HEENT, cardiovascular, pulmonary, gi and gu systems are negative, except as otherwise noted.      PHYSICAL EXAM:   /66 (BP Location: Right arm, Patient Position: Sitting, Cuff Size: Adult Large)  Pulse 96  Temp 99.3  F (37.4  C) (Tympanic)  Resp 20  Ht 5' 4\" (1.626 m)  Wt 264 lb (119.7 kg)  BMI 45.32 kg/m2  Body mass index is 45.32 kg/(m^2).  GENERAL APPEARANCE: healthy, alert, no distress and over weight  RESP: lungs clear to auscultation - no rales, rhonchi or wheezes  CV: regular rates and rhythm, normal S1 S2, no S3 or S4 and no murmur, click or rub  ABDOMEN: soft, nontender, without hepatosplenomegaly or masses and bowel sounds normal  PSYCH: mentation appears normal and affect normal/bright    ASSESSMENT & PLAN:       1. Abdominal pain, right upper quadrant  Chronic, uncontrolled  - oxyCODONE IR (ROXICODONE) 5 MG tablet; Take 1 tablet (5 mg) by mouth 2 times daily  Dispense: 60 tablet; Refill: 0  - Follow-up with your Gastroenterologist  - Complete Colonoscopy next week    Risks, benefits, side effects and rationale for treatment plan fully " discussed with the patient and understanding expressed.    Keturah Chaney, FNP-BC  [facility]

## 2018-01-10 NOTE — MR AVS SNAPSHOT
After Visit Summary   1/10/2018    Lucia Bobo    MRN: [id number]           Patient Information     Date Of Birth          1945        Visit Information        Provider Department      1/10/2018 10:40 AM Keturah Chaney CNP [facility]        Today's Diagnoses     Abdominal pain, right upper quadrant    -  1      Care Instructions    1. Abdominal pain, right upper quadrant  Chronic, uncontrolled  - oxyCODONE IR (ROXICODONE) 5 MG tablet; Take 1 tablet (5 mg) by mouth 2 times daily  Dispense: 60 tablet; Refill: 0  - Follow-up with your Gastroenterologist  - Complete Colonoscopy next week          Follow-ups after your visit        Your next 10 appointments already scheduled     Jan 17, 2018   Procedure with Aurelio Alanis MD   [facility] ([facility])    [address] [address]   [phone]           The medical center is located at [address] (between [address] in [place], [place] north of [place]).            Feb 15, 2018 10:00 AM CST   Diabetic Education with Michell Angulo RD   [facility] [place] ([facility])    [address] [id number] [phone]              Who to contact     If you have questions or need follow up information about today's clinic visit or your schedule please contact [facility] directly at [phone].  Normal or non-critical lab and imaging results will be communicated to you by MyChart, letter or phone within 4 business days after the clinic has received the results. If you do not hear from us within 7 days, please contact the clinic through MyChart or phone. If you have a critical or abnormal lab result, we will notify you by phone as soon as possible.  Submit refill requests through [employer] or call your pharmacy and they will forward the refill request to us. Please allow 3 business days  "for your refill to be completed.          Additional Information About Your Visit        farmbuyhart Information     gumi lets you send messages to your doctor, view your test results, renew your prescriptions, schedule appointments and more. To sign up, go to [email or web address] . Click on \"Log in\" on the left side of the screen, which will take you to the Welcome page. Then click on \"Sign up Now\" on the right side of the page.     You will be asked to enter the access code listed below, as well as some personal information. Please follow the directions to create your username and password.     Your access code is: [email or web address]  Expires: 3/14/2018  9:20 AM     Your access code will  in 90 days. If you need help or a new code, please call your [place] clinic or [phone].        Care EveryWhere ID     This is your [facility] EveryWhere ID. This could be used by other organizations to access your [place] medical records  [id number]        Your Vitals Were     Pulse Temperature Respirations Height BMI (Body Mass Index)       96 99.3  F (37.4  C) (Tympanic) 20 5' 4\" (1.626 m) 45.32 kg/m2        Blood Pressure from Last 3 Encounters:   01/10/18 136/66   18 141/58   17 125/63    Weight from Last 3 Encounters:   01/10/18 264 lb (119.7 kg)   18 260 lb 2.3 oz (118 kg)   17 260 lb (117.9 kg)              Today, you had the following     No orders found for display         Today's Medication Changes          These changes are accurate as of: 1/10/18 11:20 AM.  If you have any questions, ask your nurse or doctor.               These medicines have changed or have updated prescriptions.        Dose/Directions    oxyCODONE IR 5 MG tablet   Commonly known as:  ROXICODONE   This may have changed:    - when to take this  - reasons to take this   Used for:  Abdominal pain, right upper quadrant   Changed by:  Keturah Chaney CNP        Dose:  5 mg   Take 1 tablet (5 mg) by mouth 2 times daily "   Quantity:  60 tablet   Refills:  0       * verapamil 240 MG CR tablet   Commonly known as:  CALAN-SR   This may have changed:  Another medication with the same name was changed. Make sure you understand how and when to take each.   Changed by:  Keturah Chaney CNP        Dose:  240 mg   Take 240 mg by mouth daily   Refills:  0       * Verapamil HCl  MG Cp24   This may have changed:    - how much to take  - how to take this  - when to take this  - additional instructions   Used for:  Benign essential hypertension   Changed by:  Keturah Chaney CNP        Take one tablet daily   Quantity:  30 capsule   Refills:  0       * Notice:  This list has 2 medication(s) that are the same as other medications prescribed for you. Read the directions carefully, and ask your doctor or other care provider to review them with you.      Stop taking these medicines if you haven't already. Please contact your care team if you have questions.     traMADol 50 MG tablet   Commonly known as:  ULTRAM   Stopped by:  Keturah Chaney CNP                Where to get your medicines      Some of these will need a paper prescription and others can be bought over the counter.  Ask your nurse if you have questions.     Bring a paper prescription for each of these medications     oxyCODONE IR 5 MG tablet                Primary Care Provider Office Phone # Fax #    Keturah Chaney [phone]       [address] [place] [address]        Equal Access to Services     SANJUANA MORGAN AH: Danielle prasad Soruba, waaxda luqadaha, qaybta kaalmada airam, celeste goodman. So [facility] [phone].    ATENCIÓN: Si habla español, tiene a mcartuhr disposición servicios gratuitos de asistencia lingüística. German al [phone].    We comply with applicable federal civil rights laws and [place] laws. We do not discriminate on the basis of race, color, national origin, age,  disability, sex, sexual orientation, or gender identity.            Thank you!     Thank you for choosing [facility]  for your care. Our goal is always to provide you with excellent care. Hearing back from our patients is one way we can continue to improve our services. Please take a few minutes to complete the written survey that you may receive in the mail after your visit with us. Thank you!             Your Updated Medication List - Protect others around you: Learn how to safely use, store and throw away your medicines at www.disposemymeds.org.          This list is accurate as of: 1/10/18 11:20 AM.  Always use your most recent med list.                   Brand Name Dispense Instructions for use Diagnosis    * albuterol (2.5 MG/3ML) 0.083% neb solution     1 Box    Take 3 mLs by nebulization every 4 hours as needed for shortness of breath / dyspnea.    Mild persistent asthma, uncomplicated       * albuterol 108 (90 BASE) MCG/ACT Inhaler    VENTOLIN HFA    1 Inhaler    Inhale 1-2 puffs into the lungs every 4 hours as needed    Mild persistent asthma, uncomplicated       aspirin 81 MG tablet     90 tablet    Take 1 tablet (81 mg) by mouth daily    Type 2 diabetes mellitus without complications (H)       blood glucose monitoring meter device kit    no brand specified    1 kit    Use to test blood sugar 2 times daily or as directed. Needs lancets and strips per insurance coverage.    Type 2 diabetes mellitus with hyperglycemia, with long-term current use of insulin (H)       blood glucose monitoring test strip    no brand specified    3 Box    1 strip by In Vitro route 2 times daily    Type 2 diabetes mellitus without complications (H)       fluticasone-salmeterol 250-50 MCG/DOSE diskus inhaler    ADVAIR DISKUS    3 Inhaler    Inhale 1 puff into the lungs 2 times daily    Mild persistent asthma, uncomplicated       furosemide 20 MG tablet    LASIX    90 tablet    Take 1 tablet (20 mg) by mouth daily     Benign essential hypertension       * hydrALAZINE 50 MG tablet    APRESOLINE    90 tablet    Take 1 tablet (50 mg) by mouth daily (take with 100 mg) Total= 150 mg daily    Benign essential hypertension       * hydrALAZINE 100 MG Tabs tablet    APRESOLINE    60 tablet    Take 100 mg by mouth daily Take 1 tablet  by mouth  Daily (take with 50 mg) total = 150 mg    Benign essential hypertension       insulin pen needle 32G X 4 MM    BD SMILEY U/F    100 each    Inject 1 each Subcutaneous daily    Type 2 diabetes mellitus without complications (H)       ipratropium - albuterol 0.5 mg/2.5 mg/3 mL 0.5-2.5 (3) MG/3ML neb solution    DUONEB    1 Box    Inhale 1 vial (3 mLs) into the lungs every 6 hours as needed    Mild persistent asthma, uncomplicated       liraglutide 18 MG/3ML soln    VICTOZA PEN    27 mL    Inject 1.8 mg Subcutaneous daily    Type 2 diabetes mellitus with hyperglycemia, with long-term current use of insulin (H)       metFORMIN 1000 MG tablet    GLUCOPHAGE    75 tablet    Take 1 tablet with breakfast and 1.5 tablets at dinner    Type 2 diabetes mellitus with hyperglycemia, with long-term current use of insulin (H)       order for DME     1 Device    Equipment being ordered: blood pressure monitor    Benign essential hypertension       oxyCODONE IR 5 MG tablet    ROXICODONE    60 tablet    Take 1 tablet (5 mg) by mouth 2 times daily    Abdominal pain, right upper quadrant       PHARMACIST CHOICE LANCETS Misc     100 each    Check blood sugars twice daily    Type 2 diabetes mellitus without complications (H)       ranitidine 150 MG tablet    ZANTAC    30 tablet    Take 1 tablet (150 mg) by mouth 2 times daily for 15 days        valsartan 320 MG tablet    DIOVAN    30 tablet    Take 1 tablet (320 mg) by mouth daily    Benign essential hypertension       * verapamil 240 MG CR tablet    CALAN-SR     Take 240 mg by mouth daily        * Verapamil HCl  MG Cp24     30 capsule    Take one tablet daily     Benign essential hypertension       * Notice:  This list has 6 medication(s) that are the same as other medications prescribed for you. Read the directions carefully, and ask your doctor or other care provider to review them with you.

## 2018-01-10 NOTE — PATIENT INSTRUCTIONS
1. Abdominal pain, right upper quadrant  Chronic, uncontrolled  - oxyCODONE IR (ROXICODONE) 5 MG tablet; Take 1 tablet (5 mg) by mouth 2 times daily  Dispense: 60 tablet; Refill: 0  - Follow-up with your Gastroenterologist  - Complete Colonoscopy next week

## 2018-01-10 NOTE — NURSING NOTE
"Chief Complaint   Patient presents with     ER F/U       Initial /66 (BP Location: Right arm, Patient Position: Sitting, Cuff Size: Adult Large)  Pulse 96  Temp 99.3  F (37.4  C) (Tympanic)  Resp 20  Ht 5' 4\" (1.626 m)  Wt 264 lb (119.7 kg)  BMI 45.32 kg/m2 Estimated body mass index is 45.32 kg/(m^2) as calculated from the following:    Height as of this encounter: 5' 4\" (1.626 m).    Weight as of this encounter: 264 lb (119.7 kg).  Medication Reconciliation: complete    Health Maintenance that is potentially due pending provider review:  Mammogram    Pt declines to have mammogram .    Is there anyone who you would like to be able to receive your results? No  If yes have patient fill out JAZMYN    "

## 2018-01-17 ENCOUNTER — SURGERY (OUTPATIENT)
Age: 73
End: 2018-01-17

## 2018-01-17 ENCOUNTER — ANESTHESIA (OUTPATIENT)
Dept: GASTROENTEROLOGY | Facility: CLINIC | Age: 73
End: 2018-01-17
Payer: MEDICARE

## 2018-01-17 ENCOUNTER — HOSPITAL ENCOUNTER (OUTPATIENT)
Facility: CLINIC | Age: 73
Discharge: HOME | End: 2018-01-17
Attending: SURGERY | Admitting: SURGERY
Payer: MEDICARE

## 2018-01-17 VITALS
RESPIRATION RATE: 20 BRPM | BODY MASS INDEX: 45.07 KG/M2 | WEIGHT: 264 LBS | TEMPERATURE: 98.4 F | HEIGHT: 64 IN | DIASTOLIC BLOOD PRESSURE: 84 MMHG | SYSTOLIC BLOOD PRESSURE: 163 MMHG | OXYGEN SATURATION: 98 %

## 2018-01-17 LAB
COLONOSCOPY: NORMAL
GLUCOSE BLDC GLUCOMTR-MCNC: 155 MG/DL (ref 70–99)

## 2018-01-17 PROCEDURE — 45380 COLONOSCOPY AND BIOPSY: CPT | Performed by: SURGERY

## 2018-01-17 PROCEDURE — 88305 TISSUE EXAM BY PATHOLOGIST: CPT | Performed by: SURGERY

## 2018-01-17 PROCEDURE — 25000125 ZZHC RX 250: Performed by: NURSE ANESTHETIST, CERTIFIED REGISTERED

## 2018-01-17 PROCEDURE — 25000128 H RX IP 250 OP 636: Performed by: NURSE ANESTHETIST, CERTIFIED REGISTERED

## 2018-01-17 PROCEDURE — 45385 COLONOSCOPY W/LESION REMOVAL: CPT | Performed by: SURGERY

## 2018-01-17 PROCEDURE — 45385 COLONOSCOPY W/LESION REMOVAL: CPT | Mod: PT | Performed by: SURGERY

## 2018-01-17 PROCEDURE — 82962 GLUCOSE BLOOD TEST: CPT

## 2018-01-17 PROCEDURE — 37000008 ZZH ANESTHESIA TECHNICAL FEE, 1ST 30 MIN: Performed by: SURGERY

## 2018-01-17 PROCEDURE — 88305 TISSUE EXAM BY PATHOLOGIST: CPT | Mod: 26 | Performed by: SURGERY

## 2018-01-17 RX ORDER — LIDOCAINE 40 MG/G
CREAM TOPICAL
Status: DISCONTINUED | OUTPATIENT
Start: 2018-01-17 | End: 2018-01-17 | Stop reason: HOSPADM

## 2018-01-17 RX ORDER — ONDANSETRON 2 MG/ML
4 INJECTION INTRAMUSCULAR; INTRAVENOUS
Status: DISCONTINUED | OUTPATIENT
Start: 2018-01-17 | End: 2018-01-17 | Stop reason: HOSPADM

## 2018-01-17 RX ORDER — SODIUM CHLORIDE, SODIUM LACTATE, POTASSIUM CHLORIDE, CALCIUM CHLORIDE 600; 310; 30; 20 MG/100ML; MG/100ML; MG/100ML; MG/100ML
INJECTION, SOLUTION INTRAVENOUS CONTINUOUS
Status: DISCONTINUED | OUTPATIENT
Start: 2018-01-17 | End: 2018-01-17 | Stop reason: HOSPADM

## 2018-01-17 RX ORDER — PROPOFOL 10 MG/ML
INJECTION, EMULSION INTRAVENOUS PRN
Status: DISCONTINUED | OUTPATIENT
Start: 2018-01-17 | End: 2018-01-17

## 2018-01-17 RX ORDER — GLYCOPYRROLATE 0.2 MG/ML
INJECTION, SOLUTION INTRAMUSCULAR; INTRAVENOUS PRN
Status: DISCONTINUED | OUTPATIENT
Start: 2018-01-17 | End: 2018-01-17

## 2018-01-17 RX ORDER — LIDOCAINE HYDROCHLORIDE 10 MG/ML
INJECTION, SOLUTION INFILTRATION; PERINEURAL PRN
Status: DISCONTINUED | OUTPATIENT
Start: 2018-01-17 | End: 2018-01-17

## 2018-01-17 RX ORDER — PROPOFOL 10 MG/ML
INJECTION, EMULSION INTRAVENOUS CONTINUOUS PRN
Status: DISCONTINUED | OUTPATIENT
Start: 2018-01-17 | End: 2018-01-17

## 2018-01-17 RX ADMIN — GLYCOPYRROLATE 0.1 MG: 0.2 INJECTION, SOLUTION INTRAMUSCULAR; INTRAVENOUS at 07:34

## 2018-01-17 RX ADMIN — LIDOCAINE HYDROCHLORIDE 50 MG: 10 INJECTION, SOLUTION INFILTRATION; PERINEURAL at 07:34

## 2018-01-17 RX ADMIN — SODIUM CHLORIDE, POTASSIUM CHLORIDE, SODIUM LACTATE AND CALCIUM CHLORIDE: 600; 310; 30; 20 INJECTION, SOLUTION INTRAVENOUS at 07:03

## 2018-01-17 RX ADMIN — PROPOFOL 30 MG: 10 INJECTION, EMULSION INTRAVENOUS at 07:48

## 2018-01-17 RX ADMIN — PROPOFOL 30 MG: 10 INJECTION, EMULSION INTRAVENOUS at 07:43

## 2018-01-17 RX ADMIN — GLYCOPYRROLATE 0.1 MG: 0.2 INJECTION, SOLUTION INTRAMUSCULAR; INTRAVENOUS at 07:33

## 2018-01-17 RX ADMIN — PROPOFOL 200 MCG/KG/MIN: 10 INJECTION, EMULSION INTRAVENOUS at 07:34

## 2018-01-17 RX ADMIN — LIDOCAINE HYDROCHLORIDE 1 ML: 10 INJECTION, SOLUTION EPIDURAL; INFILTRATION; INTRACAUDAL; PERINEURAL at 07:03

## 2018-01-17 NOTE — H&P
[facility]  GI Pre-Procedure History & Physical      Name: Lucia Bobo MRN#: [id number]   Age: 72 year old YOB: 1945     Date of Procedure: 1/17/2018    Primary care provider: Keturah Chaney      Reason for Procedure:   Screening (V76.51), Personal History of Polyps (V12.72)    Problem List:     Patient Active Problem List   Diagnosis     Type 2 diabetes mellitus with hyperglycemia, with long-term current use of insulin (H) HgbA1c goal <7     Advanced directives, counseling/discussion     Osteoarthritis of both knees     Morbid obesity with BMI of 45.0-49.9, adult (H)     Hypertriglyceridemia, LDL goal <100     Benign essential hypertension, BP goal <140/90     Mild persistent asthma, uncomplicated     Colon polyps       Current Outpatient Medications:      No current outpatient prescriptions on file.        Allergies:      Allergies   Allergen Reactions     Amoxicillin Hives and Itching     Lisinopril Cough     Penicillins Other (See Comments)     Has only had reaction to Amoxicillin so does not take any PCN        History:   Medical:  Past Medical History:   Diagnosis Date     Asthma      HTN      Type II or unspecified type diabetes mellitus without mention of complication, not stated as uncontrolled 4/2/04     Surgical:  Past Surgical History:   Procedure Laterality Date     APPENDECTOMY       CHOLECYSTECTOMY, OPEN       COLONOSCOPY  2007     COLONOSCOPY N/A 12/22/2017    Procedure: COLONOSCOPY;;  Surgeon: Aurelio Alanis MD;  Location: [facility]     ESOPHAGOSCOPY, GASTROSCOPY, DUODENOSCOPY (EGD), COMBINED N/A 11/20/2015    Procedure: COMBINED ESOPHAGOSCOPY, GASTROSCOPY, DUODENOSCOPY (EGD);  Surgeon: Anamika Boyer MD;  Location: [facility]     ESOPHAGOSCOPY, GASTROSCOPY, DUODENOSCOPY (EGD), COMBINED N/A 12/22/2017    Procedure: COMBINED ESOPHAGOSCOPY, GASTROSCOPY, DUODENOSCOPY (EGD);  Gastroscopy & Colonoscopy  ;  Surgeon: Aurelio Alanis MD;  Location: [facility]  "GI     Family:  Family History   Problem Relation Age of Onset     CANCER Mother      Hypertension Father      Social:  Social History   Substance Use Topics     Smoking status: Never Smoker     Smokeless tobacco: Never Used     Alcohol use No       Physical Exam:   Vital Signs:  /80  Temp 98.4  F (36.9  C) (Oral)  Resp 20  Ht 1.626 m (5' 4\")  Wt 119.7 kg (264 lb)  SpO2 97%  BMI 45.32 kg/m2  Airway assessment:  Patient is able to open mouth wide  Patient is able to stick out tongue       Lungs:  No increased work of breathing, good air exchange, clear to auscultation bilaterally, no crackles or wheezing.   Cardiovascular:  Normal- regular rate and rhythm, normal S1 - S2, no S3 or S4, and no murmur noted.  Gastrointestinal:  Abdomen soft, non-tender.  BS normal. No masses, organomegaly.    Assessment:   Appropriately NPO.  No significant changes since H&P.    Plan:   Moderate (conscious) sedation     General and specific risks of the procedure of the procedure including pain, bleeding, and perforation were discussed. Possibility of missing lesions discussed. Prep and recovery were discussed. She asked appropriate questions and provided consent.      Patient's active problems diagnostically and therapeutically optimized for the planned procedure. Risks, benefits, alternatives to sedation and blood explained and consent obtained.  Risks, benefits, alternatives to procedure explained and consent obtained.    I have reviewed the history and physical, lab finding(s), diagnostic data, medications, and the plan for sedation.  I have determined this patient to be an appropriate candidate for the planned sedation/procedure and have reassessed the patient immediately prior to sedation/procedure.    Aurelio Alanis MD     "

## 2018-01-17 NOTE — ANESTHESIA POSTPROCEDURE EVALUATION
Patient: Lucia Bobo    Procedure(s):  Colonoscopy - Wound Class: II-Clean Contaminated    Diagnosis:polyps-rescheduled due to scope light problem 12/22  Diagnosis Additional Information: No value filed.    Anesthesia Type:  MAC    Note:  Anesthesia Post Evaluation    Patient location during evaluation: Phase 2  Patient participation: Able to fully participate in evaluation  Level of consciousness: awake  Pain management: adequate  Airway patency: patent  Cardiovascular status: acceptable and hemodynamically stable  Respiratory status: acceptable, room air and spontaneous ventilation  Hydration status: acceptable  PONV: none     Anesthetic complications: None          Last vitals:  Vitals:    01/17/18 0641   BP: 176/80   Resp: 20   Temp: 36.9  C (98.4  F)   SpO2: 97%         Electronically Signed By: MERRY Nelson CRNA  January 17, 2018  7:39 AM

## 2018-01-17 NOTE — ANESTHESIA CARE TRANSFER NOTE
Patient: Lucia Bobo    Procedure(s):  Colonoscopy with polypectomy by snare and cold biopsy - Wound Class: II-Clean Contaminated    Diagnosis: polyps-rescheduled due to scope light problem 12/22  Diagnosis Additional Information: No value filed.    Anesthesia Type:   MAC     Note:  Airway :Room Air  Patient transferred to:Phase II  Handoff Report: Identifed the Patient, Identified the Reponsible Provider, Reviewed the pertinent medical history, Discussed the surgical course, Reviewed Intra-OP anesthesia mangement and issues during anesthesia, Set expectations for post-procedure period and Allowed opportunity for questions and acknowledgement of understanding      Vitals: (Last set prior to Anesthesia Care Transfer)    CRNA VITALS  1/17/2018 0727 - 1/17/2018 0757      1/17/2018             Pulse: 111    SpO2: 97 %                Electronically Signed By: MERRY Nelson CRNA  January 17, 2018  7:57 AM

## 2018-01-18 PROBLEM — K63.5 COLON POLYPS: Status: ACTIVE | Noted: 2017-12-27

## 2018-01-18 LAB — COPATH REPORT: NORMAL

## 2018-01-19 NOTE — PROGRESS NOTES
Patient did not return for follow up treatments as directed.  Goal status and current objective information is therefore unknown.  Discharge from PT services at this time for this episode of treatment. Please see attached documentation under this episode of care for further information including dates of service, start of care date, referring physician, Dx, treatment plan, treatments, etc.    Please contact me with any questions or concerns.    Thank you for your referral.    Lucia Campoverde, PT, DPT  Doctor of Physical Therapy  [facility]  [phone]

## 2018-01-31 ENCOUNTER — OFFICE VISIT (OUTPATIENT)
Dept: FAMILY MEDICINE | Facility: CLINIC | Age: 73
End: 2018-01-31
Payer: COMMERCIAL

## 2018-01-31 VITALS
BODY MASS INDEX: 45.32 KG/M2 | RESPIRATION RATE: 16 BRPM | TEMPERATURE: 98.9 F | HEART RATE: 108 BPM | OXYGEN SATURATION: 97 % | WEIGHT: 264 LBS | DIASTOLIC BLOOD PRESSURE: 85 MMHG | SYSTOLIC BLOOD PRESSURE: 138 MMHG

## 2018-01-31 DIAGNOSIS — R10.11 RUQ ABDOMINAL PAIN: Primary | ICD-10-CM

## 2018-01-31 DIAGNOSIS — E66.01 MORBID OBESITY WITH BMI OF 45.0-49.9, ADULT (H): Chronic | ICD-10-CM

## 2018-01-31 DIAGNOSIS — K76.0 FATTY INFILTRATION OF LIVER: ICD-10-CM

## 2018-01-31 DIAGNOSIS — E11.65 TYPE 2 DIABETES MELLITUS WITH HYPERGLYCEMIA, WITH LONG-TERM CURRENT USE OF INSULIN (H): ICD-10-CM

## 2018-01-31 DIAGNOSIS — Z79.4 TYPE 2 DIABETES MELLITUS WITH HYPERGLYCEMIA, WITH LONG-TERM CURRENT USE OF INSULIN (H): ICD-10-CM

## 2018-01-31 PROCEDURE — 99214 OFFICE O/P EST MOD 30 MIN: CPT | Performed by: FAMILY MEDICINE

## 2018-01-31 RX ORDER — ATORVASTATIN CALCIUM 10 MG/1
20 TABLET, FILM COATED ORAL AT BEDTIME
Qty: 180 TABLET | Refills: 1 | Status: SHIPPED | OUTPATIENT
Start: 2018-01-31 | End: 2018-06-28

## 2018-01-31 NOTE — NURSING NOTE
"Chief Complaint   Patient presents with     Abdominal Pain     Right side upper quadrant pain        Initial /70  Pulse 108  Temp 98.9  F (37.2  C) (Tympanic)  Resp 16  Wt 264 lb (119.7 kg)  SpO2 97%  BMI 45.32 kg/m2 Estimated body mass index is 45.32 kg/(m^2) as calculated from the following:    Height as of 1/17/18: 5' 4\" (1.626 m).    Weight as of this encounter: 264 lb (119.7 kg).  Medication Reconciliation: complete    Health Maintenance that is potentially due pending provider review:  Mammogram    Pt declines to have mammogram.    Is there anyone who you would like to be able to receive your results? No  If yes have patient fill out JAZMYN      "

## 2018-01-31 NOTE — MR AVS SNAPSHOT
After Visit Summary   1/31/2018    Lucia Bobo    MRN: [id number]           Patient Information     Date Of Birth          1945        Visit Information        Provider Department      1/31/2018 9:20 AM Az Hope MD [facility]        Today's Diagnoses     Morbid obesity with BMI of 45.0-49.9, adult (H)    -  1    Type 2 diabetes mellitus with hyperglycemia, with long-term current use of insulin (H) HgbA1c goal <7        RUQ abdominal pain        Fatty infiltration of liver          Care Instructions      Abdominal Pain    Abdominal pain is pain in the stomach or belly area. Everyone has this pain from time to time. In many cases it goes away on its own. But abdominal pain can sometimes be due to a serious problem, such as appendicitis. So it s important to know when to seek help.  Causes of abdominal pain  There are many possible causes of abdominal pain. Common causes in adults include:    Constipation, diarrhea, or gas    Stomach acid flowing back up into the esophagus (acid reflux or heartburn)    Severe acid reflux, called GERD (gastroesophageal reflux disease)    A sore in the lining of the stomach or small intestine (peptic ulcer)    Inflammation of the gallbladder, liver, or pancreas    Gallstones or kidney stones    Appendicitis     Intestinal blockage     An internal organ pushing through a muscle or other tissue (hernia)    Urinary tract infections    In women, menstrual cramps, fibroids, or endometriosis    Inflammation or infection of the intestines  Diagnosing the cause of abdominal pain  Your healthcare provider will do a physical exam help find the cause of your pain. If needed, tests will be ordered. Belly pain has many possible causes. So it can be hard to find the reason for your pain. Giving details about your pain can help. Tell your provider where and when you feel the pain, and what makes it better or worse. Also let your provider know if you have  other symptoms such as:    Fever    Tiredness    Upset stomach (nausea)    Vomiting    Changes in bathroom habits  Treating abdominal pain  Some causes of pain need emergency medical treatment right away. These include appendicitis or a bowel blockage. Other problems can be treated with rest, fluids, or medicines. Your healthcare provider can give you specific instructions for treatment or self-care based on what is causing your pain.  If you have vomiting or diarrhea, sip water or other clear fluids. When you are ready to eat solid foods again, start with small amounts of easy-to-digest, low-fat foods. These include apple sauce, toast, or crackers.   When to seek medical care  Call 911 or go to the hospital right away if you:    Can t pass stool and are vomiting    Are vomiting blood or have bloody diarrhea or black, tarry diarrhea    Have chest, neck, or shoulder pain    Feel like you might pass out    Have pain in your shoulder blades with nausea    Have sudden, severe belly pain    Have new, severe pain unlike any you have felt before    Have a belly that is rigid, hard, and tender to touch  Call your healthcare provider if you have:    Pain for more than 5 days    Bloating for more than 2 days    Diarrhea for more than 5 days    A fever of 100.4 F (38.0 C) or higher, or as directed by your provider    Pain that gets worse    Weight loss for no reason    Continued lack of appetite    Blood in your stool  How to prevent abdominal pain  Here are some tips to help prevent abdominal pain:    Eat smaller amounts of food at one time.    Avoid greasy, fried, or other high-fat foods.    Avoid foods that give you gas.    Exercise regularly.    Drink plenty of fluids.  To help prevent GERD symptoms:    Quit smoking.    Reduce alcohol and certain foods that increase stomach acid.    Avoid aspirin and over-the-counter pain and fever medicines (NSAIDS or nonsteroidal anti-inflammatory drugs), if possible    Lose extra  weight.    Finish eating at least 2 hours before you go to bed or lie down.    Raise the head of your bed.  Date Last Reviewed: 7/1/2016 2000-2017 [employer]. [address] [place] [address], [place] [address]. All rights reserved. This information is not intended as a substitute for professional medical care. Always follow your healthcare professional's instructions.        Nonalcoholic Fatty Liver Disease (NAFLD)  Nonalcoholic fatty liver disease (NAFLD) is a common disease of the liver. It occurs when you have too much fat in the liver. If NAFLD is severe, it can cause liver damage that seems like the damage caused by drinking too much alcohol. But NAFLD is not caused by drinking alcohol. This sheet tells you more about NAFLD and how it can be managed.    How the liver works   The liver is an organ in the upper right side of the belly (abdomen). It has many important jobs. These include:    Breaking down (metabolizing) proteins, carbohydrates, and fats    Making a substance called bile that helps break down fats    Storing and releasing sugar (glucose) into the blood to give the body energy    Removing toxins from the blood    Helping with blood clotting  Understanding NAFLD  A healthy liver may contain some fat. But if too much fat builds up in the liver, this causes NAFLD. NAFLD can be mild, causing fatty liver. Or it can be more severe and show inflammation, as well as the fat. This can cause non-alcoholic steatohepatitis (CRAWFORD).    Fatty liver. With fatty liver, the liver simply has more fat than normal. This extra fat usually does not harm the liver.    CRAWFORD. With CRAWFORD, the fatty liver becomes inflamed over time. CRAWFORD is serious because it can lead to scarring of the liver (fibrosis). Over time, the scarring may lead to cirrhosis of the liver. This can eventually cause liver failure or liver cancer.  Causes and risk factors of NAFLD  Doctors don't know what causes NAFLD. But certain things make the  problem more likely to happen. These include:    Obesity    Prediabetes or diabetes    High levels of fat found in the blood (cholesterol and triglycerides)    Being exposed to certain medicines   Symptoms of NAFLD  Most people with NAFLD have no symptoms. If symptoms do occur, they can include:    Tiredness    Weakness    Weight loss    Loss of appetite    Nausea and vomiting    Belly pain and cramping    Yellowing of the skin and eyes (jaundice), as well as dark urine, or light-colored stools    Swelling in the belly or legs  Diagnosing NAFLD  Your healthcare provider may think you have NAFLD if routine blood tests show high levels of liver enzymes. This may mean you have a liver problem. You may need one or more imaging tests, such as an ultrasound, CT, or MRI. You may need more blood tests to look for other causes of liver disease. You may also need a liver biopsy. During this test, a hollow needle is used to remove a tiny tissue sample from your liver. This tissue is then checked in a lab. This test can find signs of damage to liver tissue. It can also help figure out the cause of the damage and tell the difference between fatty liver and CRAWFORD.  Treating NAFLD  Treatment for NAFLD varies for each person. The best early treatment is to treat any underlying conditions causing metabolic syndrome. This is the name for a group of conditions that includes:    High blood pressure    High levels of cholesterol and triglycerides    Being overweight or obese    Diabetes  Your healthcare provider will monitor your health and treat any symptoms or underlying health problems you have. Your provider will also work with you to control your risk factors. This will make liver damage less likely. In fact, treating those underlying conditions can often improve liver disease. You may need to take certain medicines, but no medicine will cure NAFLD. This is why treating the underlying conditions is most important. Your plan may  include:    Losing extra weight    Getting regular exercise    Controlling diabetes and high cholesterol or triglyceride levels    Taking medicines and vitamins as prescribed by your provider    Quitting smoking    Not drinking alcohol    Eating a healthy and balanced diet  Living with NAFLD  If NAFLD is caught early, it can be managed with treatment. Your healthcare provider will discuss further treatment choices with you as needed.  Be sure to ask your provider about recommended vaccines. These include vaccines for viruses that can cause liver disease.  Date Last Reviewed: 12/1/2016 2000-2017 [employer]. [address] [place] [address]. All rights reserved. This information is not intended as a substitute for professional medical care. Always follow your healthcare professional's instructions.        Atorvastatin Calcium Oral tablet  What is this medicine?  ATORVASTATIN (a TORE va sta tin) is known as a HMG-CoA reductase inhibitor or 'statin'. It lowers the level of cholesterol and triglycerides in the blood. This drug may also reduce the risk of heart attack, stroke, or other health problems in patients with risk factors for heart disease. Diet and lifestyle changes are often used with this drug.  This medicine may be used for other purposes; ask your health care provider or pharmacist if you have questions.  What should I tell my health care provider before I take this medicine?  They need to know if you have any of these conditions:    frequently drink alcoholic beverages    history of stroke, TIA    kidney disease    liver disease    muscle aches or weakness    other medical condition    an unusual or allergic reaction to atorvastatin, other medicines, foods, dyes, or preservatives    pregnant or trying to get pregnant    breast-feeding  How should I use this medicine?  Take this medicine by mouth with a glass of water. Follow the directions on the prescription label. You can take  this medicine with or without food. Take your doses at regular intervals. Do not take your medicine more often than directed.  Talk to your pediatrician regarding the use of this medicine in children. While this drug may be prescribed for children as young as 10 years old for selected conditions, precautions do apply.  Overdosage: If you think you have taken too much of this medicine contact a poison control center or emergency room at once.  NOTE: This medicine is only for you. Do not share this medicine with others.  What if I miss a dose?  If you miss a dose, take it as soon as you can. If it is almost time for your next dose, take only that dose. Do not take double or extra doses.  What may interact with this medicine?  Do not take this medicine with any of the following medications:    red yeast rice    telaprevir    telithromycin    voriconazole  This medicine may also interact with the following medications:    alcohol    antiviral medicines for HIV or AIDS    boceprevir    certain antibiotics like clarithromycin, erythromycin, troleandomycin    certain medicines for cholesterol like fenofibrate or gemfibrozil    cimetidine    clarithromycin    colchicine    cyclosporine    digoxin    female hormones, like estrogens or progestins and birth control pills    grapefruit juice    medicines for fungal infections like fluconazole, itraconazole, ketoconazole    niacin    rifampin    spironolactone  This list may not describe all possible interactions. Give your health care provider a list of all the medicines, herbs, non-prescription drugs, or dietary supplements you use. Also tell them if you smoke, drink alcohol, or use illegal drugs. Some items may interact with your medicine.  What should I watch for while using this medicine?  Visit your doctor or health care professional for regular check-ups. You may need regular tests to make sure your liver is working properly.  Tell your doctor or health care professional  right away if you get any unexplained muscle pain, tenderness, or weakness, especially if you also have a fever and tiredness. Your doctor or health care professional may tell you to stop taking this medicine if you develop muscle problems. If your muscle problems do not go away after stopping this medicine, contact your health care professional.  This drug is only part of a total heart-health program. Your doctor or a dietician can suggest a low-cholesterol and low-fat diet to help. Avoid alcohol and smoking, and keep a proper exercise schedule.  Do not use this drug if you are pregnant or breast-feeding. Serious side effects to an unborn child or to an infant are possible. Talk to your doctor or pharmacist for more information.  This medicine may affect blood sugar levels. If you have diabetes, check with your doctor or health care professional before you change your diet or the dose of your diabetic medicine.  If you are going to have surgery tell your health care professional that you are taking this drug.  What side effects may I notice from receiving this medicine?  Side effects that you should report to your doctor or health care professional as soon as possible:    allergic reactions like skin rash, itching or hives, swelling of the face, lips, or tongue    dark urine    fever    joint pain    muscle cramps, pain    redness, blistering, peeling or loosening of the skin, including inside the mouth    trouble passing urine or change in the amount of urine    unusually weak or tired    yellowing of eyes or skin  Side effects that usually do not require medical attention (report to your doctor or health care professional if they continue or are bothersome):    constipation    heartburn    stomach gas, pain, upset  This list may not describe all possible side effects. Call your doctor for medical advice about side effects. You may report side effects to FDA at 0-644-FDA-2770.  Where should I keep my  "medicine?  Keep out of the reach of children.  Store at room temperature between 20 to 25 degrees C (68 to 77 degrees F). Throw away any unused medicine after the expiration date.  NOTE:This sheet is a summary. It may not cover all possible information. If you have questions about this medicine, talk to your doctor, pharmacist, or health care provider. Copyright  2016 [employer]                Follow-ups after your visit        Follow-up notes from your care team     Return in about 3 months (around 4/30/2018).      Your next 10 appointments already scheduled     Feb 15, 2018 10:00 AM CST   Diabetic Education with Michell Angulo RD   [facility] ([facility])    [address] [facility] [id number] [phone]              Who to contact     If you have questions or need follow up information about today's clinic visit or your schedule please contact [facility] directly at [phone].  Normal or non-critical lab and imaging results will be communicated to you by MyChart, letter or phone within 4 business days after the clinic has received the results. If you do not hear from us within 7 days, please contact the clinic through [email or web address] or phone. If you have a critical or abnormal lab result, we will notify you by phone as soon as possible.  Submit refill requests through [employer] or call your pharmacy and they will forward the refill request to us. Please allow 3 business days for your refill to be completed.          Additional Information About Your Visit        [email or web address] Information     The Shared Web lets you send messages to your doctor, view your test results, renew your prescriptions, schedule appointments and more. To sign up, go to [email or web address] . Click on \"Log in\" on the left side of the screen, which will take you to the Welcome page. Then click on \"Sign up Now\" on the right side of the page.     You will be asked to enter the access " code listed below, as well as some personal information. Please follow the directions to create your username and password.     Your access code is: [email or web address]  Expires: 3/14/2018  9:20 AM     Your access code will  in 90 days. If you need help or a new code, please call your [facility] clinic or [phone].        Care EveryWhere ID     This is your Care EveryWhere ID. This could be used by other organizations to access your [facility] medical records  [id number]        Your Vitals Were     Pulse Temperature Respirations Pulse Oximetry BMI (Body Mass Index)       108 98.9  F (37.2  C) (Tympanic) 16 97% 45.32 kg/m2        Blood Pressure from Last 3 Encounters:   18 138/85   18 163/84   01/10/18 136/66    Weight from Last 3 Encounters:   18 264 lb (119.7 kg)   18 264 lb (119.7 kg)   01/10/18 264 lb (119.7 kg)              Today, you had the following     No orders found for display         Today's Medication Changes          These changes are accurate as of 18  9:46 AM.  If you have any questions, ask your nurse or doctor.               Start taking these medicines.        Dose/Directions    atorvastatin 10 MG tablet   Commonly known as:  LIPITOR   Used for:  Type 2 diabetes mellitus with hyperglycemia, with long-term current use of insulin (H)   Started by:  Az Hope MD        Dose:  20 mg   Take 2 tablets (20 mg) by mouth At Bedtime   Quantity:  180 tablet   Refills:  1            Where to get your medicines      These medications were sent to [facility]-[address] [address]     Phone:  [phone]     atorvastatin 10 MG tablet                Primary Care Provider Office Phone # Fax #    Keturah Chaney [phone]       [address] [facility] [address]        Equal Access to Services     SANJUANA MORGAN AH: arua Duran,  celeste mccullough ah. So [facility] [phone].    ATENCIÓN: Si mike flores, tiene a mcarthur disposición servicios gratuitos de asistencia lingüística. German al [phone].    We comply with applicable federal civil rights laws and [place] laws. We do not discriminate on the basis of race, color, national origin, age, disability, sex, sexual orientation, or gender identity.            Thank you!     Thank you for choosing [facility]  for your care. Our goal is always to provide you with excellent care. Hearing back from our patients is one way we can continue to improve our services. Please take a few minutes to complete the written survey that you may receive in the mail after your visit with us. Thank you!             Your Updated Medication List - Protect others around you: Learn how to safely use, store and throw away your medicines at www.disposemymeds.org.          This list is accurate as of 1/31/18  9:46 AM.  Always use your most recent med list.                   Brand Name Dispense Instructions for use Diagnosis    * albuterol (2.5 MG/3ML) 0.083% neb solution     1 Box    Take 3 mLs by nebulization every 4 hours as needed for shortness of breath / dyspnea.    Mild persistent asthma, uncomplicated       * albuterol 108 (90 BASE) MCG/ACT Inhaler    VENTOLIN HFA    1 Inhaler    Inhale 1-2 puffs into the lungs every 4 hours as needed    Mild persistent asthma, uncomplicated       aspirin 81 MG tablet     90 tablet    Take 1 tablet (81 mg) by mouth daily    Type 2 diabetes mellitus without complications (H)       atorvastatin 10 MG tablet    LIPITOR    180 tablet    Take 2 tablets (20 mg) by mouth At Bedtime    Type 2 diabetes mellitus with hyperglycemia, with long-term current use of insulin (H)       blood glucose monitoring meter device kit    no brand specified    1 kit    Use to test blood sugar 2 times daily or as directed. Needs lancets and  strips per insurance coverage.    Type 2 diabetes mellitus with hyperglycemia, with long-term current use of insulin (H)       blood glucose monitoring test strip    no brand specified    3 Box    1 strip by In Vitro route 2 times daily    Type 2 diabetes mellitus without complications (H)       fluticasone-salmeterol 250-50 MCG/DOSE diskus inhaler    ADVAIR DISKUS    3 Inhaler    Inhale 1 puff into the lungs 2 times daily    Mild persistent asthma, uncomplicated       furosemide 20 MG tablet    LASIX    90 tablet    Take 1 tablet (20 mg) by mouth daily    Benign essential hypertension       * hydrALAZINE 50 MG tablet    APRESOLINE    90 tablet    Take 1 tablet (50 mg) by mouth daily (take with 100 mg) Total= 150 mg daily    Benign essential hypertension       * hydrALAZINE 100 MG Tabs tablet    APRESOLINE    60 tablet    Take 100 mg by mouth daily Take 1 tablet  by mouth  Daily (take with 50 mg) total = 150 mg    Benign essential hypertension       insulin pen needle 32G X 4 MM    BD SMILEY U/F    100 each    Inject 1 each Subcutaneous daily    Type 2 diabetes mellitus without complications (H)       ipratropium - albuterol 0.5 mg/2.5 mg/3 mL 0.5-2.5 (3) MG/3ML neb solution    DUONEB    1 Box    Inhale 1 vial (3 mLs) into the lungs every 6 hours as needed    Mild persistent asthma, uncomplicated       liraglutide 18 MG/3ML soln    VICTOZA PEN    27 mL    Inject 1.8 mg Subcutaneous daily    Type 2 diabetes mellitus with hyperglycemia, with long-term current use of insulin (H)       metFORMIN 1000 MG tablet    GLUCOPHAGE    75 tablet    Take 1 tablet with breakfast and 1.5 tablets at dinner    Type 2 diabetes mellitus with hyperglycemia, with long-term current use of insulin (H)       order for DME     1 Device    Equipment being ordered: blood pressure monitor    Benign essential hypertension       oxyCODONE IR 5 MG tablet    ROXICODONE    60 tablet    Take 1 tablet (5 mg) by mouth 2 times daily    Abdominal pain,  right upper quadrant       PHARMACIST CHOICE LANCETS Misc     100 each    Check blood sugars twice daily    Type 2 diabetes mellitus without complications (H)       valsartan 320 MG tablet    DIOVAN    30 tablet    Take 1 tablet (320 mg) by mouth daily    Benign essential hypertension       verapamil 240 MG CR tablet    CALAN-SR     Take 240 mg by mouth daily        * Notice:  This list has 4 medication(s) that are the same as other medications prescribed for you. Read the directions carefully, and ask your doctor or other care provider to review them with you.

## 2018-01-31 NOTE — PROGRESS NOTES
SUBJECTIVE:   Lucia Bobo is a 72 year old female who presents to clinic today for the following health issues:      Abdominal Pain  72-year-old female presents with right upper quadrant pain which she is experiencing for several months.  Past medical history significant for type 2 diabetes mellitus, hypertriglyceridemia, hypertension, morbid obesity.  Patient has undergone comprehensive testing including CT abdomen/pelvis, upper and lower GI endoscopy along with blood workup, all came back pretty unremarkable. CT abdomen did show diffuse fatty infiltration of liver.  History of cholecystectomy and appendectomy.  She drinks 2-3 cans of diet soda daily, lifestyle pretty sedentary. Patient denies any vomiting, constipation, diarrhea or urinary symptoms.          Problem list and histories reviewed & adjusted, as indicated.  Additional history: as documented    Patient Active Problem List   Diagnosis     Type 2 diabetes mellitus with hyperglycemia, with long-term current use of insulin (H) HgbA1c goal <7     Advanced directives, counseling/discussion     Osteoarthritis of both knees     Morbid obesity with BMI of 45.0-49.9, adult (H)     Hypertriglyceridemia, LDL goal <100     Benign essential hypertension, BP goal <140/90     Mild persistent asthma, uncomplicated     Colon polyps     Past Surgical History:   Procedure Laterality Date     APPENDECTOMY       CHOLECYSTECTOMY, OPEN       COLONOSCOPY  2007     COLONOSCOPY N/A 12/22/2017    Procedure: COLONOSCOPY;;  Surgeon: Aurelio Alanis MD;  Location: [facility]     COLONOSCOPY N/A 1/17/2018    Procedure: COMBINED COLONOSCOPY, SINGLE OR MULTIPLE BIOPSY/POLYPECTOMY BY BIOPSY;  Colonoscopy with polypectomy by snare and cold biopsy;  Surgeon: Aurelio Alanis MD;  Location: [facility]     ESOPHAGOSCOPY, GASTROSCOPY, DUODENOSCOPY (EGD), COMBINED N/A 11/20/2015    Procedure: COMBINED ESOPHAGOSCOPY, GASTROSCOPY, DUODENOSCOPY (EGD);  Surgeon: Anamika Boyer  MD [place];  Location: [facility]     ESOPHAGOSCOPY, GASTROSCOPY, DUODENOSCOPY (EGD), COMBINED N/A 12/22/2017    Procedure: COMBINED ESOPHAGOSCOPY, GASTROSCOPY, DUODENOSCOPY (EGD);  Gastroscopy & Colonoscopy  ;  Surgeon: Aurelio Alanis MD;  Location: [facility]       Social History   Substance Use Topics     Smoking status: Never Smoker     Smokeless tobacco: Never Used     Alcohol use No     Family History   Problem Relation Age of Onset     CANCER Mother      Hypertension Father          Current Outpatient Prescriptions   Medication Sig Dispense Refill     oxyCODONE IR (ROXICODONE) 5 MG tablet Take 1 tablet (5 mg) by mouth 2 times daily 60 tablet 0     verapamil (CALAN-SR) 240 MG CR tablet Take 240 mg by mouth daily       order for DME Equipment being ordered: blood pressure monitor 1 Device 0     metFORMIN (GLUCOPHAGE) 1000 MG tablet Take 1 tablet with breakfast and 1.5 tablets at dinner 75 tablet 3     furosemide (LASIX) 20 MG tablet Take 1 tablet (20 mg) by mouth daily 90 tablet 1     fluticasone-salmeterol (ADVAIR DISKUS) 250-50 MCG/DOSE diskus inhaler Inhale 1 puff into the lungs 2 times daily 3 Inhaler 3     liraglutide (VICTOZA PEN) 18 MG/3ML soln Inject 1.8 mg Subcutaneous daily 27 mL 3     hydrALAZINE (APRESOLINE) 50 MG tablet Take 1 tablet (50 mg) by mouth daily (take with 100 mg) Total= 150 mg daily 90 tablet 1     hydrALAZINE (APRESOLINE) 100 MG TABS tablet Take 100 mg by mouth daily Take 1 tablet  by mouth  Daily (take with 50 mg) total = 150 mg 60 tablet 5     insulin pen needle (BD SMILEY U/F) 32G X 4 MM Inject 1 each Subcutaneous daily 100 each 1     blood glucose monitoring (NO BRAND SPECIFIED) meter device kit Use to test blood sugar 2 times daily or as directed. Needs lancets and strips per insurance coverage. 1 kit 0     albuterol (VENTOLIN HFA) 108 (90 BASE) MCG/ACT Inhaler Inhale 1-2 puffs into the lungs every 4 hours as needed 1 Inhaler 1     valsartan (DIOVAN) 320 MG tablet Take 1 tablet (320  mg) by mouth daily 30 tablet 11     albuterol (2.5 MG/3ML) 0.083% nebulizer solution Take 3 mLs by nebulization every 4 hours as needed for shortness of breath / dyspnea. 1 Box 1     aspirin 81 MG tablet Take 1 tablet (81 mg) by mouth daily 90 tablet 3     blood glucose monitoring (NO BRAND SPECIFIED) test strip 1 strip by In Vitro route 2 times daily 3 Box 3     ipratropium - albuterol 0.5 mg/2.5 mg/3 mL (DUONEB) 0.5-2.5 (3) MG/3ML nebulization Inhale 1 vial (3 mLs) into the lungs every 6 hours as needed 1 Box 3     PHARMACIST CHOICE LANCETS MISC Check blood sugars twice daily 100 each 3     Allergies   Allergen Reactions     Amoxicillin Hives and Itching     Lisinopril Cough     Penicillins Other (See Comments)     Has only had reaction to Amoxicillin so does not take any PCN     Recent Labs   Lab Test  01/08/18   1110  10/24/17   0926  10/16/17   1343  10/16/17   1155  08/29/17   0918  01/18/17   0920   09/30/15   0950   08/05/14   0942   A1C   --   7.3*   --    --   7.8*  7.7*   --    --    < >  7.8*   LDL   --    --    --    --    --   55   --   66   --   83   HDL   --    --    --    --    --   55   --   51   --   59   TRIG   --    --    --    --    --   281*   --   255*   --   202*   ALT  42   --   82*  77*   --   72*   --    --    < >   --    CR  0.73   --   0.89  0.89   --   0.81   < >   --    < >   --    GFRESTIMATED  79   --   62  62   --   70   < >   --    < >   --    GFRESTBLACK  >90   --   75  76   --   84   < >   --    < >   --    POTASSIUM  4.5   --   4.0  4.0   --   4.0   < >   --    < >   --    TSH   --   3.48   --    --    --   5.00*   --    --    --    --     < > = values in this interval not displayed.      BP Readings from Last 3 Encounters:   01/31/18 148/70   01/17/18 163/84   01/10/18 136/66    Wt Readings from Last 3 Encounters:   01/31/18 264 lb (119.7 kg)   01/17/18 264 lb (119.7 kg)   01/10/18 264 lb (119.7 kg)                  Labs reviewed in EPIC    Reviewed and updated as needed this  visit by clinical staff  Tobacco  Allergies  Med Hx  Surg Hx  Fam Hx  Soc Hx      Reviewed and updated as needed this visit by Provider         ROS:  Constitutional, HEENT, cardiovascular, pulmonary, GI, , musculoskeletal, neuro, skin, endocrine and psych systems are negative, except as otherwise noted.    OBJECTIVE:     /85  Pulse 108  Temp 98.9  F (37.2  C) (Tympanic)  Resp 16  Wt 264 lb (119.7 kg)  SpO2 97%  BMI 45.32 kg/m2  GENERAL: alert, no distress and obese  NECK: no adenopathy, no asymmetry, masses, or scars and thyroid normal to palpation  RESP: lungs clear to auscultation - no rales, rhonchi or wheezes  CV: regular rate and rhythm, normal S1 S2, no S3 or S4, no murmur, click or rub, no peripheral edema and peripheral pulses strong  ABDOMEN: soft, nontender, no guarding or rigidity, right upper quadrant surgical scar C/D/I, bowel sounds normal  SKIN: no suspicious lesions or rashes  NEURO: Normal strength and tone, mentation intact and speech normal  PSYCH: mentation appears normal, affect normal/bright  LYMPH: no cervical, supraclavicular, or axillary adenopathy    The 10-year ASCVD risk score (Janel DAVID Jr, et al., 2013) is: 27.6%    Values used to calculate the score:      Age: 72 years      Sex: Female      Is Non- : No      Diabetic: Yes      Tobacco smoker: No      Systolic Blood Pressure: 138 mmHg      Is BP treated: Yes      HDL Cholesterol: 55 mg/dL      Total Cholesterol: 166 mg/dL    ASSESSMENT/PLAN:         ICD-10-CM    1. RUQ abdominal pain R10.11    2. Morbid obesity with BMI of 45.0-49.9, adult (H) E66.01     Z68.42    3. Type 2 diabetes mellitus with hyperglycemia, with long-term current use of insulin (H) HgbA1c goal <7 E11.65 atorvastatin (LIPITOR) 10 MG tablet    Z79.4 Lipid panel   4. Fatty infiltration of liver K76.0 Lipid panel       72-year-old female presents with right upper quadrant pain, experiencing symptoms for last several months.   Comprehensive workup including upper and lower GI endoscopy was unremarkable, CT abdomen did show diffuse fatty infiltration of liver.  Suspect symptom is muscular in origin, related to stretching of surgical scar due to obesity. Healthy lifestyle modifications stressed including regular exercise, balanced diet, weight loss and limiting salt/caffeine/pop intake. 10-year cardiovascular risk 27.6%. Lipitor prescribed, common side effect discussed.  Will repeat fasting lipid panel.  Other medications reviewed and no changes made.  Patient will be following with GI next month ( Dr Jacklyn Lunsford at [facility]). Follow-up in 3 months or earlier if needed. Patient understood and in agreement with above plan.  All questions answered.        Patient Instructions       Abdominal Pain    Abdominal pain is pain in the stomach or belly area. Everyone has this pain from time to time. In many cases it goes away on its own. But abdominal pain can sometimes be due to a serious problem, such as appendicitis. So it s important to know when to seek help.  Causes of abdominal pain  There are many possible causes of abdominal pain. Common causes in adults include:    Constipation, diarrhea, or gas    Stomach acid flowing back up into the esophagus (acid reflux or heartburn)    Severe acid reflux, called GERD (gastroesophageal reflux disease)    A sore in the lining of the stomach or small intestine (peptic ulcer)    Inflammation of the gallbladder, liver, or pancreas    Gallstones or kidney stones    Appendicitis     Intestinal blockage     An internal organ pushing through a muscle or other tissue (hernia)    Urinary tract infections    In women, menstrual cramps, fibroids, or endometriosis    Inflammation or infection of the intestines  Diagnosing the cause of abdominal pain  Your healthcare provider will do a physical exam help find the cause of your pain. If needed, tests will be ordered. Belly pain has many possible  causes. So it can be hard to find the reason for your pain. Giving details about your pain can help. Tell your provider where and when you feel the pain, and what makes it better or worse. Also let your provider know if you have other symptoms such as:    Fever    Tiredness    Upset stomach (nausea)    Vomiting    Changes in bathroom habits  Treating abdominal pain  Some causes of pain need emergency medical treatment right away. These include appendicitis or a bowel blockage. Other problems can be treated with rest, fluids, or medicines. Your healthcare provider can give you specific instructions for treatment or self-care based on what is causing your pain.  If you have vomiting or diarrhea, sip water or other clear fluids. When you are ready to eat solid foods again, start with small amounts of easy-to-digest, low-fat foods. These include apple sauce, toast, or crackers.   When to seek medical care  Call 911 or go to the hospital right away if you:    Can t pass stool and are vomiting    Are vomiting blood or have bloody diarrhea or black, tarry diarrhea    Have chest, neck, or shoulder pain    Feel like you might pass out    Have pain in your shoulder blades with nausea    Have sudden, severe belly pain    Have new, severe pain unlike any you have felt before    Have a belly that is rigid, hard, and tender to touch  Call your healthcare provider if you have:    Pain for more than 5 days    Bloating for more than 2 days    Diarrhea for more than 5 days    A fever of 100.4 F (38.0 C) or higher, or as directed by your provider    Pain that gets worse    Weight loss for no reason    Continued lack of appetite    Blood in your stool  How to prevent abdominal pain  Here are some tips to help prevent abdominal pain:    Eat smaller amounts of food at one time.    Avoid greasy, fried, or other high-fat foods.    Avoid foods that give you gas.    Exercise regularly.    Drink plenty of fluids.  To help prevent GERD  symptoms:    Quit smoking.    Reduce alcohol and certain foods that increase stomach acid.    Avoid aspirin and over-the-counter pain and fever medicines (NSAIDS or nonsteroidal anti-inflammatory drugs), if possible    Lose extra weight.    Finish eating at least 2 hours before you go to bed or lie down.    Raise the head of your bed.  Date Last Reviewed: 7/1/2016 2000-2017 [employer]. [address] [place] [address], [place] [address]. All rights reserved. This information is not intended as a substitute for professional medical care. Always follow your healthcare professional's instructions.        Nonalcoholic Fatty Liver Disease (NAFLD)  Nonalcoholic fatty liver disease (NAFLD) is a common disease of the liver. It occurs when you have too much fat in the liver. If NAFLD is severe, it can cause liver damage that seems like the damage caused by drinking too much alcohol. But NAFLD is not caused by drinking alcohol. This sheet tells you more about NAFLD and how it can be managed.    How the liver works   The liver is an organ in the upper right side of the belly (abdomen). It has many important jobs. These include:    Breaking down (metabolizing) proteins, carbohydrates, and fats    Making a substance called bile that helps break down fats    Storing and releasing sugar (glucose) into the blood to give the body energy    Removing toxins from the blood    Helping with blood clotting  Understanding NAFLD  A healthy liver may contain some fat. But if too much fat builds up in the liver, this causes NAFLD. NAFLD can be mild, causing fatty liver. Or it can be more severe and show inflammation, as well as the fat. This can cause non-alcoholic steatohepatitis (CRAWFORD).    Fatty liver. With fatty liver, the liver simply has more fat than normal. This extra fat usually does not harm the liver.    CRAWFORD. With CRAWFORD, the fatty liver becomes inflamed over time. CRAWFORD is serious because it can lead to scarring of the liver  (fibrosis). Over time, the scarring may lead to cirrhosis of the liver. This can eventually cause liver failure or liver cancer.  Causes and risk factors of NAFLD  Doctors don't know what causes NAFLD. But certain things make the problem more likely to happen. These include:    Obesity    Prediabetes or diabetes    High levels of fat found in the blood (cholesterol and triglycerides)    Being exposed to certain medicines   Symptoms of NAFLD  Most people with NAFLD have no symptoms. If symptoms do occur, they can include:    Tiredness    Weakness    Weight loss    Loss of appetite    Nausea and vomiting    Belly pain and cramping    Yellowing of the skin and eyes (jaundice), as well as dark urine, or light-colored stools    Swelling in the belly or legs  Diagnosing NAFLD  Your healthcare provider may think you have NAFLD if routine blood tests show high levels of liver enzymes. This may mean you have a liver problem. You may need one or more imaging tests, such as an ultrasound, CT, or MRI. You may need more blood tests to look for other causes of liver disease. You may also need a liver biopsy. During this test, a hollow needle is used to remove a tiny tissue sample from your liver. This tissue is then checked in a lab. This test can find signs of damage to liver tissue. It can also help figure out the cause of the damage and tell the difference between fatty liver and CRAWFORD.  Treating NAFLD  Treatment for NAFLD varies for each person. The best early treatment is to treat any underlying conditions causing metabolic syndrome. This is the name for a group of conditions that includes:    High blood pressure    High levels of cholesterol and triglycerides    Being overweight or obese    Diabetes  Your healthcare provider will monitor your health and treat any symptoms or underlying health problems you have. Your provider will also work with you to control your risk factors. This will make liver damage less likely. In  fact, treating those underlying conditions can often improve liver disease. You may need to take certain medicines, but no medicine will cure NAFLD. This is why treating the underlying conditions is most important. Your plan may include:    Losing extra weight    Getting regular exercise    Controlling diabetes and high cholesterol or triglyceride levels    Taking medicines and vitamins as prescribed by your provider    Quitting smoking    Not drinking alcohol    Eating a healthy and balanced diet  Living with NAFLD  If NAFLD is caught early, it can be managed with treatment. Your healthcare provider will discuss further treatment choices with you as needed.  Be sure to ask your provider about recommended vaccines. These include vaccines for viruses that can cause liver disease.  Date Last Reviewed: 12/1/2016 2000-2017 [employer]. [address] [place] [address], [place] [address]. All rights reserved. This information is not intended as a substitute for professional medical care. Always follow your healthcare professional's instructions.        Atorvastatin Calcium Oral tablet  What is this medicine?  ATORVASTATIN (a TORE va sta tin) is known as a HMG-CoA reductase inhibitor or 'statin'. It lowers the level of cholesterol and triglycerides in the blood. This drug may also reduce the risk of heart attack, stroke, or other health problems in patients with risk factors for heart disease. Diet and lifestyle changes are often used with this drug.  This medicine may be used for other purposes; ask your health care provider or pharmacist if you have questions.  What should I tell my health care provider before I take this medicine?  They need to know if you have any of these conditions:    frequently drink alcoholic beverages    history of stroke, TIA    kidney disease    liver disease    muscle aches or weakness    other medical condition    an unusual or allergic reaction to atorvastatin, other medicines,  foods, dyes, or preservatives    pregnant or trying to get pregnant    breast-feeding  How should I use this medicine?  Take this medicine by mouth with a glass of water. Follow the directions on the prescription label. You can take this medicine with or without food. Take your doses at regular intervals. Do not take your medicine more often than directed.  Talk to your pediatrician regarding the use of this medicine in children. While this drug may be prescribed for children as young as 10 years old for selected conditions, precautions do apply.  Overdosage: If you think you have taken too much of this medicine contact a poison control center or emergency room at once.  NOTE: This medicine is only for you. Do not share this medicine with others.  What if I miss a dose?  If you miss a dose, take it as soon as you can. If it is almost time for your next dose, take only that dose. Do not take double or extra doses.  What may interact with this medicine?  Do not take this medicine with any of the following medications:    red yeast rice    telaprevir    telithromycin    voriconazole  This medicine may also interact with the following medications:    alcohol    antiviral medicines for HIV or AIDS    boceprevir    certain antibiotics like clarithromycin, erythromycin, troleandomycin    certain medicines for cholesterol like fenofibrate or gemfibrozil    cimetidine    clarithromycin    colchicine    cyclosporine    digoxin    female hormones, like estrogens or progestins and birth control pills    grapefruit juice    medicines for fungal infections like fluconazole, itraconazole, ketoconazole    niacin    rifampin    spironolactone  This list may not describe all possible interactions. Give your health care provider a list of all the medicines, herbs, non-prescription drugs, or dietary supplements you use. Also tell them if you smoke, drink alcohol, or use illegal drugs. Some items may interact with your medicine.  What  should I watch for while using this medicine?  Visit your doctor or health care professional for regular check-ups. You may need regular tests to make sure your liver is working properly.  Tell your doctor or health care professional right away if you get any unexplained muscle pain, tenderness, or weakness, especially if you also have a fever and tiredness. Your doctor or health care professional may tell you to stop taking this medicine if you develop muscle problems. If your muscle problems do not go away after stopping this medicine, contact your health care professional.  This drug is only part of a total heart-health program. Your doctor or a dietician can suggest a low-cholesterol and low-fat diet to help. Avoid alcohol and smoking, and keep a proper exercise schedule.  Do not use this drug if you are pregnant or breast-feeding. Serious side effects to an unborn child or to an infant are possible. Talk to your doctor or pharmacist for more information.  This medicine may affect blood sugar levels. If you have diabetes, check with your doctor or health care professional before you change your diet or the dose of your diabetic medicine.  If you are going to have surgery tell your health care professional that you are taking this drug.  What side effects may I notice from receiving this medicine?  Side effects that you should report to your doctor or health care professional as soon as possible:    allergic reactions like skin rash, itching or hives, swelling of the face, lips, or tongue    dark urine    fever    joint pain    muscle cramps, pain    redness, blistering, peeling or loosening of the skin, including inside the mouth    trouble passing urine or change in the amount of urine    unusually weak or tired    yellowing of eyes or skin  Side effects that usually do not require medical attention (report to your doctor or health care professional if they continue or are  bothersome):    constipation    heartburn    stomach gas, pain, upset  This list may not describe all possible side effects. Call your doctor for medical advice about side effects. You may report side effects to FDA at 7-097-FDP-0740.  Where should I keep my medicine?  Keep out of the reach of children.  Store at room temperature between 20 to 25 degrees C (68 to 77 degrees F). Throw away any unused medicine after the expiration date.  NOTE:This sheet is a summary. It may not cover all possible information. If you have questions about this medicine, talk to your doctor, pharmacist, or health care provider. Copyright  2016 [employer]            Az Hope MD  [facility]

## 2018-01-31 NOTE — PATIENT INSTRUCTIONS
Abdominal Pain    Abdominal pain is pain in the stomach or belly area. Everyone has this pain from time to time. In many cases it goes away on its own. But abdominal pain can sometimes be due to a serious problem, such as appendicitis. So it s important to know when to seek help.  Causes of abdominal pain  There are many possible causes of abdominal pain. Common causes in adults include:    Constipation, diarrhea, or gas    Stomach acid flowing back up into the esophagus (acid reflux or heartburn)    Severe acid reflux, called GERD (gastroesophageal reflux disease)    A sore in the lining of the stomach or small intestine (peptic ulcer)    Inflammation of the gallbladder, liver, or pancreas    Gallstones or kidney stones    Appendicitis     Intestinal blockage     An internal organ pushing through a muscle or other tissue (hernia)    Urinary tract infections    In women, menstrual cramps, fibroids, or endometriosis    Inflammation or infection of the intestines  Diagnosing the cause of abdominal pain  Your healthcare provider will do a physical exam help find the cause of your pain. If needed, tests will be ordered. Belly pain has many possible causes. So it can be hard to find the reason for your pain. Giving details about your pain can help. Tell your provider where and when you feel the pain, and what makes it better or worse. Also let your provider know if you have other symptoms such as:    Fever    Tiredness    Upset stomach (nausea)    Vomiting    Changes in bathroom habits  Treating abdominal pain  Some causes of pain need emergency medical treatment right away. These include appendicitis or a bowel blockage. Other problems can be treated with rest, fluids, or medicines. Your healthcare provider can give you specific instructions for treatment or self-care based on what is causing your pain.  If you have vomiting or diarrhea, sip water or other clear fluids. When you are ready to eat solid foods again,  start with small amounts of easy-to-digest, low-fat foods. These include apple sauce, toast, or crackers.   When to seek medical care  Call 911 or go to the hospital right away if you:    Can t pass stool and are vomiting    Are vomiting blood or have bloody diarrhea or black, tarry diarrhea    Have chest, neck, or shoulder pain    Feel like you might pass out    Have pain in your shoulder blades with nausea    Have sudden, severe belly pain    Have new, severe pain unlike any you have felt before    Have a belly that is rigid, hard, and tender to touch  Call your healthcare provider if you have:    Pain for more than 5 days    Bloating for more than 2 days    Diarrhea for more than 5 days    A fever of 100.4 F (38.0 C) or higher, or as directed by your provider    Pain that gets worse    Weight loss for no reason    Continued lack of appetite    Blood in your stool  How to prevent abdominal pain  Here are some tips to help prevent abdominal pain:    Eat smaller amounts of food at one time.    Avoid greasy, fried, or other high-fat foods.    Avoid foods that give you gas.    Exercise regularly.    Drink plenty of fluids.  To help prevent GERD symptoms:    Quit smoking.    Reduce alcohol and certain foods that increase stomach acid.    Avoid aspirin and over-the-counter pain and fever medicines (NSAIDS or nonsteroidal anti-inflammatory drugs), if possible    Lose extra weight.    Finish eating at least 2 hours before you go to bed or lie down.    Raise the head of your bed.  Date Last Reviewed: 7/1/2016 2000-2017 [employer]. [address] [place] [address], [place] [address]. All rights reserved. This information is not intended as a substitute for professional medical care. Always follow your healthcare professional's instructions.        Nonalcoholic Fatty Liver Disease (NAFLD)  Nonalcoholic fatty liver disease (NAFLD) is a common disease of the liver. It occurs when you have too much fat in the liver. If  NAFLD is severe, it can cause liver damage that seems like the damage caused by drinking too much alcohol. But NAFLD is not caused by drinking alcohol. This sheet tells you more about NAFLD and how it can be managed.    How the liver works   The liver is an organ in the upper right side of the belly (abdomen). It has many important jobs. These include:    Breaking down (metabolizing) proteins, carbohydrates, and fats    Making a substance called bile that helps break down fats    Storing and releasing sugar (glucose) into the blood to give the body energy    Removing toxins from the blood    Helping with blood clotting  Understanding NAFLD  A healthy liver may contain some fat. But if too much fat builds up in the liver, this causes NAFLD. NAFLD can be mild, causing fatty liver. Or it can be more severe and show inflammation, as well as the fat. This can cause non-alcoholic steatohepatitis (CRAWFORD).    Fatty liver. With fatty liver, the liver simply has more fat than normal. This extra fat usually does not harm the liver.    CRAWFORD. With CRAWFORD, the fatty liver becomes inflamed over time. CRAWFORD is serious because it can lead to scarring of the liver (fibrosis). Over time, the scarring may lead to cirrhosis of the liver. This can eventually cause liver failure or liver cancer.  Causes and risk factors of NAFLD  Doctors don't know what causes NAFLD. But certain things make the problem more likely to happen. These include:    Obesity    Prediabetes or diabetes    High levels of fat found in the blood (cholesterol and triglycerides)    Being exposed to certain medicines   Symptoms of NAFLD  Most people with NAFLD have no symptoms. If symptoms do occur, they can include:    Tiredness    Weakness    Weight loss    Loss of appetite    Nausea and vomiting    Belly pain and cramping    Yellowing of the skin and eyes (jaundice), as well as dark urine, or light-colored stools    Swelling in the belly or legs  Diagnosing NAFLD  Your  healthcare provider may think you have NAFLD if routine blood tests show high levels of liver enzymes. This may mean you have a liver problem. You may need one or more imaging tests, such as an ultrasound, CT, or MRI. You may need more blood tests to look for other causes of liver disease. You may also need a liver biopsy. During this test, a hollow needle is used to remove a tiny tissue sample from your liver. This tissue is then checked in a lab. This test can find signs of damage to liver tissue. It can also help figure out the cause of the damage and tell the difference between fatty liver and CRAWFORD.  Treating NAFLD  Treatment for NAFLD varies for each person. The best early treatment is to treat any underlying conditions causing metabolic syndrome. This is the name for a group of conditions that includes:    High blood pressure    High levels of cholesterol and triglycerides    Being overweight or obese    Diabetes  Your healthcare provider will monitor your health and treat any symptoms or underlying health problems you have. Your provider will also work with you to control your risk factors. This will make liver damage less likely. In fact, treating those underlying conditions can often improve liver disease. You may need to take certain medicines, but no medicine will cure NAFLD. This is why treating the underlying conditions is most important. Your plan may include:    Losing extra weight    Getting regular exercise    Controlling diabetes and high cholesterol or triglyceride levels    Taking medicines and vitamins as prescribed by your provider    Quitting smoking    Not drinking alcohol    Eating a healthy and balanced diet  Living with NAFLD  If NAFLD is caught early, it can be managed with treatment. Your healthcare provider will discuss further treatment choices with you as needed.  Be sure to ask your provider about recommended vaccines. These include vaccines for viruses that can cause liver  disease.  Date Last Reviewed: 12/1/2016 2000-2017 [employer] [address] [place] [address], [place] [address]. All rights reserved. This information is not intended as a substitute for professional medical care. Always follow your healthcare professional's instructions.        Atorvastatin Calcium Oral tablet  What is this medicine?  ATORVASTATIN (a TORE va sta tin) is known as a HMG-CoA reductase inhibitor or 'statin'. It lowers the level of cholesterol and triglycerides in the blood. This drug may also reduce the risk of heart attack, stroke, or other health problems in patients with risk factors for heart disease. Diet and lifestyle changes are often used with this drug.  This medicine may be used for other purposes; ask your health care provider or pharmacist if you have questions.  What should I tell my health care provider before I take this medicine?  They need to know if you have any of these conditions:    frequently drink alcoholic beverages    history of stroke, TIA    kidney disease    liver disease    muscle aches or weakness    other medical condition    an unusual or allergic reaction to atorvastatin, other medicines, foods, dyes, or preservatives    pregnant or trying to get pregnant    breast-feeding  How should I use this medicine?  Take this medicine by mouth with a glass of water. Follow the directions on the prescription label. You can take this medicine with or without food. Take your doses at regular intervals. Do not take your medicine more often than directed.  Talk to your pediatrician regarding the use of this medicine in children. While this drug may be prescribed for children as young as 10 years old for selected conditions, precautions do apply.  Overdosage: If you think you have taken too much of this medicine contact a poison control center or emergency room at once.  NOTE: This medicine is only for you. Do not share this medicine with others.  What if I miss a dose?  If  you miss a dose, take it as soon as you can. If it is almost time for your next dose, take only that dose. Do not take double or extra doses.  What may interact with this medicine?  Do not take this medicine with any of the following medications:    red yeast rice    telaprevir    telithromycin    voriconazole  This medicine may also interact with the following medications:    alcohol    antiviral medicines for HIV or AIDS    boceprevir    certain antibiotics like clarithromycin, erythromycin, troleandomycin    certain medicines for cholesterol like fenofibrate or gemfibrozil    cimetidine    clarithromycin    colchicine    cyclosporine    digoxin    female hormones, like estrogens or progestins and birth control pills    grapefruit juice    medicines for fungal infections like fluconazole, itraconazole, ketoconazole    niacin    rifampin    spironolactone  This list may not describe all possible interactions. Give your health care provider a list of all the medicines, herbs, non-prescription drugs, or dietary supplements you use. Also tell them if you smoke, drink alcohol, or use illegal drugs. Some items may interact with your medicine.  What should I watch for while using this medicine?  Visit your doctor or health care professional for regular check-ups. You may need regular tests to make sure your liver is working properly.  Tell your doctor or health care professional right away if you get any unexplained muscle pain, tenderness, or weakness, especially if you also have a fever and tiredness. Your doctor or health care professional may tell you to stop taking this medicine if you develop muscle problems. If your muscle problems do not go away after stopping this medicine, contact your health care professional.  This drug is only part of a total heart-health program. Your doctor or a dietician can suggest a low-cholesterol and low-fat diet to help. Avoid alcohol and smoking, and keep a proper exercise  schedule.  Do not use this drug if you are pregnant or breast-feeding. Serious side effects to an unborn child or to an infant are possible. Talk to your doctor or pharmacist for more information.  This medicine may affect blood sugar levels. If you have diabetes, check with your doctor or health care professional before you change your diet or the dose of your diabetic medicine.  If you are going to have surgery tell your health care professional that you are taking this drug.  What side effects may I notice from receiving this medicine?  Side effects that you should report to your doctor or health care professional as soon as possible:    allergic reactions like skin rash, itching or hives, swelling of the face, lips, or tongue    dark urine    fever    joint pain    muscle cramps, pain    redness, blistering, peeling or loosening of the skin, including inside the mouth    trouble passing urine or change in the amount of urine    unusually weak or tired    yellowing of eyes or skin  Side effects that usually do not require medical attention (report to your doctor or health care professional if they continue or are bothersome):    constipation    heartburn    stomach gas, pain, upset  This list may not describe all possible side effects. Call your doctor for medical advice about side effects. You may report side effects to FDA at 4-536-FDA-9042.  Where should I keep my medicine?  Keep out of the reach of children.  Store at room temperature between 20 to 25 degrees C (68 to 77 degrees F). Throw away any unused medicine after the expiration date.  NOTE:This sheet is a summary. It may not cover all possible information. If you have questions about this medicine, talk to your doctor, pharmacist, or health care provider. Copyright  2016 [employer]

## 2018-02-01 DIAGNOSIS — Z79.4 TYPE 2 DIABETES MELLITUS WITH HYPERGLYCEMIA, WITH LONG-TERM CURRENT USE OF INSULIN (H): ICD-10-CM

## 2018-02-01 DIAGNOSIS — K76.0 FATTY INFILTRATION OF LIVER: ICD-10-CM

## 2018-02-01 DIAGNOSIS — E11.65 TYPE 2 DIABETES MELLITUS WITH HYPERGLYCEMIA, WITH LONG-TERM CURRENT USE OF INSULIN (H): ICD-10-CM

## 2018-02-01 LAB
CHOLEST SERPL-MCNC: 158 MG/DL
HDLC SERPL-MCNC: 57 MG/DL
LDLC SERPL CALC-MCNC: 64 MG/DL
NONHDLC SERPL-MCNC: 101 MG/DL
TRIGL SERPL-MCNC: 187 MG/DL

## 2018-02-01 PROCEDURE — 80061 LIPID PANEL: CPT | Performed by: FAMILY MEDICINE

## 2018-02-01 PROCEDURE — 36415 COLL VENOUS BLD VENIPUNCTURE: CPT | Performed by: FAMILY MEDICINE

## 2018-02-05 DIAGNOSIS — E11.65 TYPE 2 DIABETES MELLITUS WITH HYPERGLYCEMIA, WITH LONG-TERM CURRENT USE OF INSULIN (H): Primary | ICD-10-CM

## 2018-02-05 DIAGNOSIS — Z79.4 TYPE 2 DIABETES MELLITUS WITH HYPERGLYCEMIA, WITH LONG-TERM CURRENT USE OF INSULIN (H): Primary | ICD-10-CM

## 2018-02-06 RX ORDER — VERAPAMIL HYDROCHLORIDE 240 MG/1
TABLET, FILM COATED, EXTENDED RELEASE ORAL
Qty: 90 TABLET | Refills: 0 | Status: SHIPPED | OUTPATIENT
Start: 2018-02-06 | End: 2018-05-30

## 2018-02-06 RX ORDER — VALSARTAN 320 MG/1
TABLET ORAL
Qty: 90 TABLET | Refills: 0 | Status: SHIPPED | OUTPATIENT
Start: 2018-02-06 | End: 2018-05-30

## 2018-02-06 NOTE — TELEPHONE ENCOUNTER
Per 12-19-17 OV-    2. Benign essential hypertension, BP goal <140/90  Chronic, stable  - order for DME; Equipment being ordered: blood pressure monitor  Dispense: 1 Device; Refill: 0  - Start taking BP once and awhile. Let's see how you do with Verapamil 240. We might not have to increase to 300 mg.       HM reviewed. Sees CDE 02-15-18- will collect ACT, lab.   Due for microalbumin, A1C- lab orders placed.   Refills given.  JEFFREY Arias RN

## 2018-02-06 NOTE — TELEPHONE ENCOUNTER
"Requested Prescriptions   Pending Prescriptions Disp Refills     verapamil (CALAN-SR) 240 MG CR tablet [Pharmacy Med Name: VERAPAMIL HCL ER 240MG TBCR] 30 tablet 11     Sig: TAKE ONE TABLET BY MOUTH EVERY DAY    Calcium Channel Blockers Protocol  Passed    2/5/2018  6:41 PM       Passed - Blood pressure under 140/90    BP Readings from Last 3 Encounters:   01/31/18 138/85   01/17/18 163/84   01/10/18 136/66                Passed - Normal ALT in past 12 months    Recent Labs   Lab Test  01/08/18   1110   ALT  42            Passed - Recent or future visit with authorizing provider    Patient had office visit in the last year or has a visit in the next 30 days with authorizing provider.  See \"Patient Info\" tab in inbasket, or \"Choose Columns\" in Meds & Orders section of the refill encounter.     Medication list as reported by patient now would like new prescription for verapamil (CALAN-SR) 240 MG CR tablet .    Last office visit 1/31/18         Passed - Patient is age 18 or older       Passed - No active pregnancy on record       Passed - Normal serum creatinine on file in past 12 months    Recent Labs   Lab Test  01/08/18   1110   CR  0.73            Passed - No positive pregnancy test in past 12 months        valsartan (DIOVAN) 320 MG tablet [Pharmacy Med Name: VALSARTAN 320MG TABS] 30 tablet 11     Sig: TAKE ONE TABLET BY MOUTH EVERY DAY    Angiotensin-II Receptors Passed    2/5/2018  6:41 PM       Passed - Blood pressure under 140/90 in past 12 months.    BP Readings from Last 3 Encounters:   01/31/18 138/85   01/17/18 163/84   01/10/18 136/66                Passed - Recent or future visit with authorizing provider's specialty    Patient had office visit in the last year or has a visit in the next 30 days with authorizing provider.  See \"Patient Info\" tab in inbasket, or \"Choose Columns\" in Meds & Orders section of the refill encounter.     Last Written Prescription Date:  1/18/17  Last Fill Quantity: 30,  # " refills: 11   Last Office Visit with [facility] provider:  1/31/18   Future Office Visit:                Passed - Patient is age 18 or older       Passed - No active pregnancy on record       Passed - Normal serum creatinine on file in past 12 months    Recent Labs   Lab Test  01/08/18   1110   CR  0.73            Passed - Normal serum potassium on file in past 12 months    Recent Labs   Lab Test  01/08/18   1110   POTASSIUM  4.5                   Passed - No positive pregnancy test in past 12 months

## 2018-02-19 ENCOUNTER — TRANSFERRED RECORDS (OUTPATIENT)
Dept: HEALTH INFORMATION MANAGEMENT | Facility: CLINIC | Age: 73
End: 2018-02-19

## 2018-02-19 DIAGNOSIS — R10.11 ABDOMINAL PAIN, RIGHT UPPER QUADRANT: Primary | ICD-10-CM

## 2018-02-26 ENCOUNTER — RADIANT APPOINTMENT (OUTPATIENT)
Dept: GENERAL RADIOLOGY | Facility: CLINIC | Age: 73
End: 2018-02-26
Payer: COMMERCIAL

## 2018-02-26 DIAGNOSIS — R10.11 ABDOMINAL PAIN, RIGHT UPPER QUADRANT: ICD-10-CM

## 2018-02-26 DIAGNOSIS — Z79.4 TYPE 2 DIABETES MELLITUS WITH HYPERGLYCEMIA, WITH LONG-TERM CURRENT USE OF INSULIN (H): ICD-10-CM

## 2018-02-26 DIAGNOSIS — E11.65 TYPE 2 DIABETES MELLITUS WITH HYPERGLYCEMIA, WITH LONG-TERM CURRENT USE OF INSULIN (H): ICD-10-CM

## 2018-02-26 LAB
ALBUMIN UR-MCNC: NEGATIVE MG/DL
APPEARANCE UR: CLEAR
BILIRUB UR QL STRIP: NEGATIVE
COLOR UR AUTO: YELLOW
GLUCOSE UR STRIP-MCNC: NEGATIVE MG/DL
HGB UR QL STRIP: NEGATIVE
KETONES UR STRIP-MCNC: NEGATIVE MG/DL
LEUKOCYTE ESTERASE UR QL STRIP: ABNORMAL
NITRATE UR QL: NEGATIVE
NON-SQ EPI CELLS #/AREA URNS LPF: ABNORMAL /LPF
PH UR STRIP: 5 PH (ref 5–7)
RBC #/AREA URNS AUTO: ABNORMAL /HPF
SOURCE: ABNORMAL
SP GR UR STRIP: 1.01 (ref 1–1.03)
UROBILINOGEN UR STRIP-ACNC: 0.2 EU/DL (ref 0.2–1)
WBC #/AREA URNS AUTO: ABNORMAL /HPF

## 2018-02-26 PROCEDURE — 82043 UR ALBUMIN QUANTITATIVE: CPT | Performed by: NURSE PRACTITIONER

## 2018-02-26 PROCEDURE — 74018 RADEX ABDOMEN 1 VIEW: CPT | Mod: FY

## 2018-02-26 PROCEDURE — 81001 URINALYSIS AUTO W/SCOPE: CPT | Performed by: INTERNAL MEDICINE

## 2018-02-26 NOTE — LETTER
February 27, 2018      Luciaguille Bobo  [address] [facility] [address]        Dear ,    We are writing to inform you of your test results.    Microalbumin urine is normal.     Resulted Orders   Albumin Random Urine Quantitative with Creat Ratio   Result Value Ref Range    Creatinine Urine 52 mg/dL    Albumin Urine mg/L 8 mg/L    Albumin Urine mg/g Cr 14.86 0 - 25 mg/g Cr       If you have any questions or concerns, please call the clinic at the number listed above.       Sincerely,      ACACIA Chaney CNP

## 2018-02-27 LAB
CREAT UR-MCNC: 52 MG/DL
MICROALBUMIN UR-MCNC: 8 MG/L
MICROALBUMIN/CREAT UR: 14.86 MG/G CR (ref 0–25)

## 2018-02-27 NOTE — PROGRESS NOTES
Microalbumin urine is normal.   Please notify her of these results and send a hard copy if not on myChart.   Thanks. LUIS M Bean

## 2018-02-28 ENCOUNTER — RADIANT APPOINTMENT (OUTPATIENT)
Dept: GENERAL RADIOLOGY | Facility: CLINIC | Age: 73
End: 2018-02-28
Payer: COMMERCIAL

## 2018-02-28 ENCOUNTER — TRANSFERRED RECORDS (OUTPATIENT)
Dept: HEALTH INFORMATION MANAGEMENT | Facility: CLINIC | Age: 73
End: 2018-02-28

## 2018-02-28 DIAGNOSIS — R10.11 ABDOMINAL PAIN, RIGHT UPPER QUADRANT: ICD-10-CM

## 2018-02-28 PROCEDURE — 74018 RADEX ABDOMEN 1 VIEW: CPT | Mod: FY

## 2018-03-02 ENCOUNTER — RADIANT APPOINTMENT (OUTPATIENT)
Dept: GENERAL RADIOLOGY | Facility: CLINIC | Age: 73
End: 2018-03-02
Payer: COMMERCIAL

## 2018-03-02 DIAGNOSIS — R10.11 ABDOMINAL PAIN, RIGHT UPPER QUADRANT: ICD-10-CM

## 2018-03-02 DIAGNOSIS — E11.9 TYPE 2 DIABETES MELLITUS WITHOUT COMPLICATIONS (H): ICD-10-CM

## 2018-03-02 DIAGNOSIS — I10 BENIGN ESSENTIAL HYPERTENSION: ICD-10-CM

## 2018-03-02 PROCEDURE — 74018 RADEX ABDOMEN 1 VIEW: CPT | Mod: FY

## 2018-03-02 RX ORDER — HYDRALAZINE HYDROCHLORIDE 100 MG/1
TABLET, FILM COATED ORAL
Qty: 60 TABLET | Refills: 2 | Status: SHIPPED | OUTPATIENT
Start: 2018-03-02 | End: 2018-06-28

## 2018-03-02 NOTE — TELEPHONE ENCOUNTER
"Requested Prescriptions   Pending Prescriptions Disp Refills     ONETOUCH ULTRA test strip [Pharmacy Med Name: ONETOUCH ULTRA BLUE  STRP] 100 each 0     Sig: USE TO TEST BLOOD SUGAR TWO TIMES A DAY OR AS DIRECTED    Diabetic Supplies Protocol Passed    3/2/2018 11:34 AM       Passed - Patient is 18 years of age or older       Passed - Patient has had appt within past 6 mos    Patient had office visit in the last 6 months or has a visit in the next 30 days with authorizing provider.  See \"Patient Info\" tab in inbasket, or \"Choose Columns\" in Meds & Orders section of the refill encounter.            hydrALAZINE (APRESOLINE) 100 MG TABS tablet [Pharmacy Med Name: hydrALAZINE * 100MG * TAB] 60 tablet 5     Sig: TAKE ONE TABLET BY MOUTH TWICE A DAY    There is no refill protocol information for this order        blood glucose monitoring (NO BRAND SPECIFIED) test strip  Last Written Prescription Date:  07/20/2016  Last Fill Quantity: 3 box,  # refills: 3   Last office visit: 2/28/2018 with prescribing provider:  01/31/2018 Dr. Hope, 01/10/2018 ACACIA Chaney   Future Office Visit:      hydrALAZINE (APRESOLINE) 100 MG TABS tablet  Last Written Prescription Date:  11/09/2017  Last Fill Quantity: 60 tablet,  # refills: 5   Last office visit: 2/28/2018 with prescribing provider:  01/31/2018 Dr. Hope, 01/10/2018 DAMIEN Chaney   Future Office Visit:      Maile De La Rosa RT (JIGNA) (M)    "

## 2018-03-02 NOTE — TELEPHONE ENCOUNTER
Lab Results   Component Value Date    A1C 7.3 10/24/2017    A1C 7.8 08/29/2017    A1C 7.7 01/18/2017    A1C 6.7 09/10/2015    A1C 6.9 03/23/2015     BP Readings from Last 6 Encounters:   01/31/18 138/85   01/17/18 163/84   01/10/18 136/66   01/08/18 141/58   12/22/17 125/63   12/19/17 136/64     Component      Latest Ref Rng & Units 1/8/2018   Sodium      133 - 144 mmol/L 140   Potassium      3.4 - 5.3 mmol/L 4.5   Chloride      94 - 109 mmol/L 109   Carbon Dioxide      20 - 32 mmol/L 23   Anion Gap      3 - 14 mmol/L 8   Glucose      70 - 99 mg/dL 161 (H)   Urea Nitrogen      7 - 30 mg/dL 21   Creatinine      0.52 - 1.04 mg/dL 0.73   GFR Estimate      >60 mL/min/1.7m2 79   GFR Estimate If Black      >60 mL/min/1.7m2 >90   Calcium      8.5 - 10.1 mg/dL 9.0   Bilirubin Total      0.2 - 1.3 mg/dL 0.7   Albumin      3.4 - 5.0 g/dL 3.4   Protein Total      6.8 - 8.8 g/dL 7.4   Alkaline Phosphatase      40 - 150 U/L 87   ALT      0 - 50 U/L 42   AST      0 - 45 U/L 28     Refilled.  JEFFREY Arias RN

## 2018-03-26 ENCOUNTER — TRANSFERRED RECORDS (OUTPATIENT)
Dept: HEALTH INFORMATION MANAGEMENT | Facility: CLINIC | Age: 73
End: 2018-03-26

## 2018-04-09 DIAGNOSIS — Z79.4 TYPE 2 DIABETES MELLITUS WITH HYPERGLYCEMIA, WITH LONG-TERM CURRENT USE OF INSULIN (H): ICD-10-CM

## 2018-04-09 DIAGNOSIS — E11.65 TYPE 2 DIABETES MELLITUS WITH HYPERGLYCEMIA, WITH LONG-TERM CURRENT USE OF INSULIN (H): ICD-10-CM

## 2018-04-09 NOTE — TELEPHONE ENCOUNTER
"Requested Prescriptions   Pending Prescriptions Disp Refills     metFORMIN (GLUCOPHAGE) 1000 MG tablet [Pharmacy Med Name: METFORMIN HCL 1000MG TABS] 75 tablet 3     Sig: TAKE ONE TABLET BY MOUTH WITH BREAKFAST AND ONE AND ONE-HALF TABLETS AT DINNER    Biguanide Agents Passed    4/9/2018  8:56 AM       Passed - Blood pressure less than 140/90 in past 6 months    BP Readings from Last 3 Encounters:   01/31/18 138/85   01/17/18 163/84   01/10/18 136/66                Passed - Patient has documented LDL within the past 12 mos.    Recent Labs   Lab Test  02/01/18   0830   LDL  64            Passed - Patient has had a Microalbumin in the past 12 mos.    Recent Labs   Lab Test  02/26/18   1130   MICROL  8   UMALCR  14.86            Passed - Patient is age 10 or older       Passed - Patient has documented A1c within the specified period of time.    Recent Labs   Lab Test  10/24/17   0926   A1C  7.3*            Passed - Patient's CR is NOT>1.4 OR Patient's EGFR is NOT<45 within past 12 mos.    Recent Labs   Lab Test  01/08/18   1110   GFRESTIMATED  79   GFRESTBLACK  >90       Recent Labs   Lab Test  01/08/18   1110   CR  0.73            Passed - Patient does NOT have a diagnosis of CHF.       Passed - Patient is not pregnant       Passed - Patient has not had a positive pregnancy test within the past 12 mos.        Passed - Recent (6 mo) or future (30 days) visit within the authorizing provider's specialty    Patient had office visit in the last 6 months or has a visit in the next 30 days with authorizing provider or within the authorizing provider's specialty.  See \"Patient Info\" tab in inbasket, or \"Choose Columns\" in Meds & Orders section of the refill encounter.            metFORMIN (GLUCOPHAGE) 1000 MG tablet  Last Written Prescription Date:  12/05/2017  Last Fill Quantity: 75 tablet,  # refills: 3   Last office visit: 3/2/2018 with prescribing provider:  ADINA Hope   Future Office Visit:      Maile De La Rosa RT (R) " (M)

## 2018-04-23 DIAGNOSIS — I10 BENIGN ESSENTIAL HYPERTENSION: Chronic | ICD-10-CM

## 2018-04-23 RX ORDER — HYDRALAZINE HYDROCHLORIDE 50 MG/1
50 TABLET, FILM COATED ORAL DAILY
Qty: 90 TABLET | Refills: 0 | Status: SHIPPED | OUTPATIENT
Start: 2018-04-23 | End: 2018-06-28

## 2018-05-14 ENCOUNTER — TRANSFERRED RECORDS (OUTPATIENT)
Dept: HEALTH INFORMATION MANAGEMENT | Facility: CLINIC | Age: 73
End: 2018-05-14

## 2018-05-30 DIAGNOSIS — I10 BENIGN ESSENTIAL HYPERTENSION: Primary | Chronic | ICD-10-CM

## 2018-05-30 NOTE — TELEPHONE ENCOUNTER
"Requested Prescriptions   Pending Prescriptions Disp Refills     valsartan (DIOVAN) 320 MG tablet [Pharmacy Med Name: VALSARTAN 320MG TABS] 90 tablet 0     Sig: TAKE ONE TABLET BY MOUTH EVERY DAY    Angiotensin-II Receptors Passed    5/30/2018 11:07 AM       Passed - Blood pressure under 140/90 in past 12 months    BP Readings from Last 3 Encounters:   01/31/18 138/85   01/17/18 163/84   01/10/18 136/66                Passed - Recent (12 mo) or future (30 days) visit within the authorizing provider's specialty    Patient had office visit in the last 12 months or has a visit in the next 30 days with authorizing provider or within the authorizing provider's specialty.  See \"Patient Info\" tab in inbasket, or \"Choose Columns\" in Meds & Orders section of the refill encounter.      Last Written Prescription Date:  2/6/18  Last Fill Quantity: 90,  # refills: 0   Last office visit: 1/31/2018 with prescribing provider:     Future Office Visit:             Passed - Patient is age 18 or older       Passed - No active pregnancy on record       Passed - Normal serum creatinine on file in past 12 months    Recent Labs   Lab Test  01/08/18   1110   CR  0.73            Passed - Normal serum potassium on file in past 12 months    Recent Labs   Lab Test  01/08/18   1110   POTASSIUM  4.5                   Passed - No positive pregnancy test in past 12 months        verapamil (CALAN-SR) 240 MG CR tablet [Pharmacy Med Name: VERAPAMIL HCL ER 240MG TBCR] 90 tablet 0     Sig: TAKE ONE TABLET BY MOUTH EVERY DAY    Calcium Channel Blockers Protocol  Passed    5/30/2018 11:07 AM       Passed - Blood pressure under 140/90 in past 12 months    BP Readings from Last 3 Encounters:   01/31/18 138/85   01/17/18 163/84   01/10/18 136/66                Passed - Normal ALT in past 12 months    Recent Labs   Lab Test  01/08/18   1110   ALT  42            Passed - Recent (12 mo) or future (30 days) visit within the authorizing provider's specialty    " "Patient had office visit in the last 12 months or has a visit in the next 30 days with authorizing provider or within the authorizing provider's specialty.  See \"Patient Info\" tab in inbasket, or \"Choose Columns\" in Meds & Orders section of the refill encounter.           Passed - Patient is age 18 or older       Passed - No active pregnancy on record       Passed - Normal serum creatinine on file in past 12 months    Recent Labs   Lab Test  01/08/18   1110   CR  0.73            Passed - No positive pregnancy test in past 12 months        Last Written Prescription Date:  2/6/18  Last Fill Quantity: 90,  # refills: 0   Last office visit: 1/31/2018 with prescribing provider:     Future Office Visit:      "

## 2018-05-31 RX ORDER — VERAPAMIL HYDROCHLORIDE 240 MG/1
TABLET, FILM COATED, EXTENDED RELEASE ORAL
Qty: 90 TABLET | Refills: 0 | Status: SHIPPED | OUTPATIENT
Start: 2018-05-31 | End: 2018-06-28

## 2018-05-31 RX ORDER — VALSARTAN 320 MG/1
TABLET ORAL
Qty: 90 TABLET | Refills: 0 | Status: SHIPPED | OUTPATIENT
Start: 2018-05-31 | End: 2018-06-28

## 2018-06-28 ENCOUNTER — OFFICE VISIT (OUTPATIENT)
Dept: FAMILY MEDICINE | Facility: CLINIC | Age: 73
End: 2018-06-28
Payer: COMMERCIAL

## 2018-06-28 VITALS
WEIGHT: 266 LBS | RESPIRATION RATE: 20 BRPM | TEMPERATURE: 98.6 F | SYSTOLIC BLOOD PRESSURE: 124 MMHG | BODY MASS INDEX: 45.41 KG/M2 | DIASTOLIC BLOOD PRESSURE: 66 MMHG | HEART RATE: 86 BPM | HEIGHT: 64 IN

## 2018-06-28 DIAGNOSIS — E66.01 MORBID OBESITY WITH BMI OF 45.0-49.9, ADULT (H): Chronic | ICD-10-CM

## 2018-06-28 DIAGNOSIS — E78.1 HYPERTRIGLYCERIDEMIA: ICD-10-CM

## 2018-06-28 DIAGNOSIS — K59.01 SLOW TRANSIT CONSTIPATION: ICD-10-CM

## 2018-06-28 DIAGNOSIS — I10 BENIGN ESSENTIAL HYPERTENSION: ICD-10-CM

## 2018-06-28 DIAGNOSIS — J45.30 MILD PERSISTENT ASTHMA, UNCOMPLICATED: ICD-10-CM

## 2018-06-28 DIAGNOSIS — E11.65 TYPE 2 DIABETES MELLITUS WITH HYPERGLYCEMIA, WITH LONG-TERM CURRENT USE OF INSULIN (H): Primary | ICD-10-CM

## 2018-06-28 DIAGNOSIS — Z79.4 TYPE 2 DIABETES MELLITUS WITH HYPERGLYCEMIA, WITH LONG-TERM CURRENT USE OF INSULIN (H): Primary | ICD-10-CM

## 2018-06-28 LAB — HBA1C MFR BLD: 6.6 % (ref 0–5.6)

## 2018-06-28 PROCEDURE — 99207 C FOOT EXAM  NO CHARGE: CPT | Performed by: NURSE PRACTITIONER

## 2018-06-28 PROCEDURE — 36415 COLL VENOUS BLD VENIPUNCTURE: CPT | Performed by: NURSE PRACTITIONER

## 2018-06-28 PROCEDURE — 99214 OFFICE O/P EST MOD 30 MIN: CPT | Performed by: NURSE PRACTITIONER

## 2018-06-28 PROCEDURE — 83036 HEMOGLOBIN GLYCOSYLATED A1C: CPT | Performed by: NURSE PRACTITIONER

## 2018-06-28 RX ORDER — FUROSEMIDE 20 MG
20 TABLET ORAL DAILY
Qty: 90 TABLET | Refills: 3 | Status: SHIPPED | OUTPATIENT
Start: 2018-06-28 | End: 2019-03-27

## 2018-06-28 RX ORDER — VERAPAMIL HYDROCHLORIDE 240 MG/1
240 TABLET, FILM COATED, EXTENDED RELEASE ORAL DAILY
Qty: 90 TABLET | Refills: 3 | Status: SHIPPED | OUTPATIENT
Start: 2018-06-28 | End: 2019-03-27

## 2018-06-28 RX ORDER — LIRAGLUTIDE 6 MG/ML
1.8 INJECTION SUBCUTANEOUS DAILY
Qty: 27 ML | Refills: 3 | Status: SHIPPED | OUTPATIENT
Start: 2018-06-28 | End: 2019-03-27

## 2018-06-28 RX ORDER — ATORVASTATIN CALCIUM 20 MG/1
20 TABLET, FILM COATED ORAL AT BEDTIME
Qty: 90 TABLET | Refills: 3 | Status: SHIPPED | OUTPATIENT
Start: 2018-06-28 | End: 2019-03-27

## 2018-06-28 RX ORDER — HYDRALAZINE HYDROCHLORIDE 100 MG/1
100 TABLET, FILM COATED ORAL 2 TIMES DAILY
Qty: 60 TABLET | Refills: 5 | Status: SHIPPED | OUTPATIENT
Start: 2018-06-28 | End: 2018-12-27

## 2018-06-28 RX ORDER — HYDRALAZINE HYDROCHLORIDE 50 MG/1
50 TABLET, FILM COATED ORAL DAILY
Qty: 90 TABLET | Refills: 0 | Status: SHIPPED | OUTPATIENT
Start: 2018-06-28 | End: 2018-10-24

## 2018-06-28 RX ORDER — POLYETHYLENE GLYCOL 3350 17 G/17G
POWDER, FOR SOLUTION ORAL
Qty: 510 G | Refills: 1 | COMMUNITY
Start: 2018-06-28 | End: 2019-03-27

## 2018-06-28 RX ORDER — VALSARTAN 320 MG/1
320 TABLET ORAL DAILY
Qty: 90 TABLET | Refills: 3 | Status: SHIPPED | OUTPATIENT
Start: 2018-06-28 | End: 2018-07-31

## 2018-06-28 NOTE — PATIENT INSTRUCTIONS
1. Type 2 diabetes mellitus with hyperglycemia, with long-term current use of insulin (H) HgbA1c goal <7  Chronic, stable  - atorvastatin (LIPITOR) 20 MG tablet; Take 1 tablet (20 mg) by mouth At Bedtime  Dispense: 90 tablet; Refill: 3  - liraglutide (VICTOZA PEN) 18 MG/3ML soln; Inject 1.8 mg Subcutaneous daily  Dispense: 27 mL; Refill: 3  - metFORMIN (GLUCOPHAGE) 1000 MG tablet; Take 1 tablet by mouth at breakfast and 1.5 tablets at dinner  Dispense: 75 tablet; Refill: 5  - FOOT EXAM  - Hemoglobin A1c    2. Mild persistent asthma, uncomplicated  Chronic, stable  - fluticasone-salmeterol (ADVAIR DISKUS) 250-50 MCG/DOSE diskus inhaler; Inhale 1 puff into the lungs 2 times daily  Dispense: 1 Inhaler; Refill: 11    3. Benign essential hypertension, BP goal <140/90  Chronic, stable  - furosemide (LASIX) 20 MG tablet; Take 1 tablet (20 mg) by mouth daily  Dispense: 90 tablet; Refill: 3  - hydrALAZINE (APRESOLINE) 100 MG TABS tablet; Take 1 tablet (100 mg) by mouth 2 times daily In morning and dinner  Dispense: 60 tablet; Refill: 5  - hydrALAZINE (APRESOLINE) 50 MG tablet; Take 1 tablet (50 mg) by mouth daily In morning  Dispense: 90 tablet; Refill: 0  - valsartan (DIOVAN) 320 MG tablet; Take 1 tablet (320 mg) by mouth daily  Dispense: 90 tablet; Refill: 3  - verapamil (CALAN-SR) 240 MG CR tablet; Take 1 tablet (240 mg) by mouth daily  Dispense: 90 tablet; Refill: 3    4. Hypertriglyceridemia, LDL goal <100  Chronic, stable    5. Morbid obesity with BMI of 45.0-49.9, adult (H)  Chronic, stable  Continue increasing physical activity daily    6. Slow transit constipation  Chronic, improved  - polyethylene glycol (MIRALAX) powder; Take 1 capful every other day  Dispense: 510 g; Refill: 1

## 2018-06-28 NOTE — NURSING NOTE
"Chief Complaint   Patient presents with     Hypertension     Diabetes     Lipids       Initial /66 (BP Location: Right arm, Patient Position: Sitting, Cuff Size: Adult Large)  Pulse 86  Temp 98.6  F (37  C) (Tympanic)  Resp 20  Ht 5' 4\" (1.626 m)  Wt 266 lb (120.7 kg)  BMI 45.66 kg/m2 Estimated body mass index is 45.66 kg/(m^2) as calculated from the following:    Height as of this encounter: 5' 4\" (1.626 m).    Weight as of this encounter: 266 lb (120.7 kg).      Health Maintenance that is potentially due pending provider review:  Mammogram and ACT    Pt declines to have mammogram. ACT done    Is there anyone who you would like to be able to receive your results? No  If yes have patient fill out JAZMYN    "

## 2018-06-28 NOTE — PROGRESS NOTES
SUBJECTIVE:   Lucia Bobo is a 72 year old female who presents to clinic today for the following health issues:    Diabetes Follow-up    Patient is checking blood sugars: once daily.  Results are as follows:         am - 142 this morning    Diabetic concerns: None     Symptoms of hypoglycemia (low blood sugar): none     Paresthesias (numbness or burning in feet) or sores: Yes some tingling      Date of last diabetic eye exam: 08/2017    Diabetes Management Resources    Hyperlipidemia Follow-Up      Rate your low fat/cholesterol diet?: fair    Taking statin?  Yes, no muscle aches from statin    Other lipid medications/supplements?:  none    Hypertension Follow-up      Outpatient blood pressures are not being checked.    Low Salt Diet: minimal added salt     BP Readings from Last 2 Encounters:   06/28/18 124/66   01/31/18 138/85     Hemoglobin A1C (%)   Date Value   10/24/2017 7.3 (H)   08/29/2017 7.8 (H)     LDL Cholesterol Calculated (mg/dL)   Date Value   02/01/2018 64   01/18/2017 55       Amount of exercise or physical activity: walking     Problems taking medications regularly: No    Medication side effects: none    Diet: low salt, low fat/cholesterol and carbohydrate counting      HPI:   PCP:  Keturah Chaney, [facility] [phone]    Patient Active Problem List   Diagnosis     Type 2 diabetes mellitus with hyperglycemia, with long-term current use of insulin (H) HgbA1c goal <7     Advanced directives, counseling/discussion     Osteoarthritis of both knees     Morbid obesity with BMI of 45.0-49.9, adult (H)     Hypertriglyceridemia, LDL goal <100     Benign essential hypertension, BP goal <140/90     Mild persistent asthma, uncomplicated     Colon polyps     Slow transit constipation     Current Outpatient Prescriptions   Medication     albuterol (2.5 MG/3ML) 0.083% nebulizer solution     albuterol (VENTOLIN HFA) 108 (90 BASE) MCG/ACT Inhaler     aspirin 81 MG tablet     atorvastatin (LIPITOR) 20 MG  tablet     blood glucose monitoring (NO BRAND SPECIFIED) meter device kit     fluticasone-salmeterol (ADVAIR DISKUS) 250-50 MCG/DOSE diskus inhaler     furosemide (LASIX) 20 MG tablet     hydrALAZINE (APRESOLINE) 100 MG TABS tablet     hydrALAZINE (APRESOLINE) 100 MG TABS tablet     hydrALAZINE (APRESOLINE) 50 MG tablet     insulin pen needle (BD SMILEY U/F) 32G X 4 MM     ipratropium - albuterol 0.5 mg/2.5 mg/3 mL (DUONEB) 0.5-2.5 (3) MG/3ML nebulization     liraglutide (VICTOZA PEN) 18 MG/3ML soln     metFORMIN (GLUCOPHAGE) 1000 MG tablet     ONETOUCH ULTRA test strip     polyethylene glycol (MIRALAX) powder     valsartan (DIOVAN) 320 MG tablet     verapamil (CALAN-SR) 240 MG CR tablet     order for DME     PHARMACIST CHOICE LANCETS MISC     [DISCONTINUED] atorvastatin (LIPITOR) 10 MG tablet     [DISCONTINUED] fluticasone-salmeterol (ADVAIR DISKUS) 250-50 MCG/DOSE diskus inhaler     [DISCONTINUED] furosemide (LASIX) 20 MG tablet     [DISCONTINUED] hydrALAZINE (APRESOLINE) 100 MG TABS tablet     [DISCONTINUED] hydrALAZINE (APRESOLINE) 50 MG tablet     [DISCONTINUED] hydrALAZINE (APRESOLINE) 50 MG tablet     [DISCONTINUED] liraglutide (VICTOZA PEN) 18 MG/3ML soln     [DISCONTINUED] metFORMIN (GLUCOPHAGE) 1000 MG tablet     [DISCONTINUED] valsartan (DIOVAN) 320 MG tablet     [DISCONTINUED] verapamil (CALAN-SR) 240 MG CR tablet     [DISCONTINUED] verapamil (CALAN-SR) 240 MG CR tablet     No current facility-administered medications for this visit.        Health Maintenance Due   Topic Date Due     DEXA SCAN SCREENING (SYSTEM ASSIGNED)  07/29/2011     PNEUMOCOCCAL (2 of 2 - PCV13) 03/22/2014     MAMMO SCREEN Q2 YR (SYSTEM ASSIGNED)  08/23/2014     FOOT EXAM Q1 YEAR  01/18/2018     ASTHMA CONTROL TEST Q6 MOS  02/02/2018     A1C Q6 MO  04/24/2018       Reviewed and updated:  Tobacco  Allergies  Meds  Med Hx  Surg Hx  Fam Hx  Soc Hx     ROS:  Constitutional, HEENT, cardiovascular, pulmonary, gi and gu systems are  "negative, except as otherwise noted.    PHYSICAL EXAM:   /66 (BP Location: Right arm, Patient Position: Sitting, Cuff Size: Adult Large)  Pulse 86  Temp 98.6  F (37  C) (Tympanic)  Resp 20  Ht 5' 4\" (1.626 m)  Wt 266 lb (120.7 kg)  BMI 45.66 kg/m2  Body mass index is 45.66 kg/(m^2).  GENERAL APPEARANCE: healthy, alert, no distress and over weight  NECK: no adenopathy, no asymmetry, masses, or scars and thyroid normal to palpation  RESP: lungs clear to auscultation - no rales, rhonchi or wheezes  CV: regular rates and rhythm, normal S1 S2, no S3 or S4 and no murmur, click or rub  MS: extremities normal- no gross deformities noted  DIABETIC FOOT EXAM: normal DP and PT pulses, no trophic changes or ulcerative lesions, normal sensory exam and normal monofilament exam  PSYCH: mentation appears normal and affect normal/bright    ASSESSMENT & PLAN:       1. Type 2 diabetes mellitus with hyperglycemia, with long-term current use of insulin (H) HgbA1c goal <7  Chronic, stable  - atorvastatin (LIPITOR) 20 MG tablet; Take 1 tablet (20 mg) by mouth At Bedtime  Dispense: 90 tablet; Refill: 3  - liraglutide (VICTOZA PEN) 18 MG/3ML soln; Inject 1.8 mg Subcutaneous daily  Dispense: 27 mL; Refill: 3  - metFORMIN (GLUCOPHAGE) 1000 MG tablet; Take 1 tablet by mouth at breakfast and 1.5 tablets at dinner  Dispense: 75 tablet; Refill: 5  - FOOT EXAM  - Hemoglobin A1c    2. Mild persistent asthma, uncomplicated  Chronic, stable  - fluticasone-salmeterol (ADVAIR DISKUS) 250-50 MCG/DOSE diskus inhaler; Inhale 1 puff into the lungs 2 times daily  Dispense: 1 Inhaler; Refill: 11    3. Benign essential hypertension, BP goal <140/90  Chronic, stable  - furosemide (LASIX) 20 MG tablet; Take 1 tablet (20 mg) by mouth daily  Dispense: 90 tablet; Refill: 3  - hydrALAZINE (APRESOLINE) 100 MG TABS tablet; Take 1 tablet (100 mg) by mouth 2 times daily In morning and dinner  Dispense: 60 tablet; Refill: 5  - hydrALAZINE (APRESOLINE) 50 MG " tablet; Take 1 tablet (50 mg) by mouth daily In morning  Dispense: 90 tablet; Refill: 0  - valsartan (DIOVAN) 320 MG tablet; Take 1 tablet (320 mg) by mouth daily  Dispense: 90 tablet; Refill: 3  - verapamil (CALAN-SR) 240 MG CR tablet; Take 1 tablet (240 mg) by mouth daily  Dispense: 90 tablet; Refill: 3    4. Hypertriglyceridemia, LDL goal <100  Chronic, stable    5. Morbid obesity with BMI of 45.0-49.9, adult (H)  Chronic, stable  Continue increasing physical activity daily    6. Slow transit constipation  Chronic, improved  - polyethylene glycol (MIRALAX) powder; Take 1 capful every other day  Dispense: 510 g; Refill: 1      Risks, benefits, side effects and rationale for treatment plan fully discussed with the patient and understanding expressed.    LUIS M [facility]

## 2018-06-28 NOTE — MR AVS SNAPSHOT
After Visit Summary   6/28/2018    Lucia Bobo    MRN: [id number]           Patient Information     Date Of Birth          1945        Visit Information        Provider Department      6/28/2018 9:00 AM Keturah Chaney, CNP [facility]        Today's Diagnoses     Type 2 diabetes mellitus with hyperglycemia, with long-term current use of insulin (H) HgbA1c goal <7    -  1    Mild persistent asthma, uncomplicated        Benign essential hypertension, BP goal <140/90        Hypertriglyceridemia, LDL goal <100        Morbid obesity with BMI of 45.0-49.9, adult (H)        Slow transit constipation          Care Instructions    1. Type 2 diabetes mellitus with hyperglycemia, with long-term current use of insulin (H) HgbA1c goal <7  Chronic, stable  - atorvastatin (LIPITOR) 20 MG tablet; Take 1 tablet (20 mg) by mouth At Bedtime  Dispense: 90 tablet; Refill: 3  - liraglutide (VICTOZA PEN) 18 MG/3ML soln; Inject 1.8 mg Subcutaneous daily  Dispense: 27 mL; Refill: 3  - metFORMIN (GLUCOPHAGE) 1000 MG tablet; Take 1 tablet by mouth at breakfast and 1.5 tablets at dinner  Dispense: 75 tablet; Refill: 5  - FOOT EXAM  - Hemoglobin A1c    2. Mild persistent asthma, uncomplicated  Chronic, stable  - fluticasone-salmeterol (ADVAIR DISKUS) 250-50 MCG/DOSE diskus inhaler; Inhale 1 puff into the lungs 2 times daily  Dispense: 1 Inhaler; Refill: 11    3. Benign essential hypertension, BP goal <140/90  Chronic, stable  - furosemide (LASIX) 20 MG tablet; Take 1 tablet (20 mg) by mouth daily  Dispense: 90 tablet; Refill: 3  - hydrALAZINE (APRESOLINE) 100 MG TABS tablet; Take 1 tablet (100 mg) by mouth 2 times daily In morning and dinner  Dispense: 60 tablet; Refill: 5  - hydrALAZINE (APRESOLINE) 50 MG tablet; Take 1 tablet (50 mg) by mouth daily In morning  Dispense: 90 tablet; Refill: 0  - valsartan (DIOVAN) 320 MG tablet; Take 1 tablet (320 mg) by mouth daily  Dispense: 90 tablet; Refill:  "3  - verapamil (CALAN-SR) 240 MG CR tablet; Take 1 tablet (240 mg) by mouth daily  Dispense: 90 tablet; Refill: 3    4. Hypertriglyceridemia, LDL goal <100  Chronic, stable    5. Morbid obesity with BMI of 45.0-49.9, adult (H)  Chronic, stable  Continue increasing physical activity daily    6. Slow transit constipation  Chronic, improved  - polyethylene glycol (MIRALAX) powder; Take 1 capful every other day  Dispense: 510 g; Refill: 1            Follow-ups after your visit        Who to contact     If you have questions or need follow up information about today's clinic visit or your schedule please contact [facility] directly at [phone].  Normal or non-critical lab and imaging results will be communicated to you by MyChart, letter or phone within 4 business days after the clinic has received the results. If you do not hear from us within 7 days, please contact the clinic through MyChart or phone. If you have a critical or abnormal lab result, we will notify you by phone as soon as possible.  Submit refill requests through [employer] or call your pharmacy and they will forward the refill request to us. Please allow 3 business days for your refill to be completed.          Additional Information About Your Visit        Care EveryWhere ID     This is your Care EveryWhere ID. This could be used by other organizations to access your [facility] medical records  [id number]        Your Vitals Were     Pulse Temperature Respirations Height BMI (Body Mass Index)       86 98.6  F (37  C) (Tympanic) 20 5' 4\" (1.626 m) 45.66 kg/m2        Blood Pressure from Last 3 Encounters:   06/28/18 124/66   01/31/18 138/85   01/17/18 163/84    Weight from Last 3 Encounters:   06/28/18 266 lb (120.7 kg)   01/31/18 264 lb (119.7 kg)   01/17/18 264 lb (119.7 kg)              We Performed the Following     FOOT EXAM     Hemoglobin A1c          Today's Medication Changes          These changes are accurate as of 6/28/18  9:30 " AM.  If you have any questions, ask your nurse or doctor.               These medicines have changed or have updated prescriptions.        Dose/Directions    atorvastatin 20 MG tablet   Commonly known as:  LIPITOR   This may have changed:  medication strength   Used for:  Type 2 diabetes mellitus with hyperglycemia, with long-term current use of insulin (H)   Changed by:  Keturah Chaney CNP        Dose:  20 mg   Take 1 tablet (20 mg) by mouth At Bedtime   Quantity:  90 tablet   Refills:  3       * hydrALAZINE 100 MG Tabs tablet   Commonly known as:  APRESOLINE   This may have changed:    - Another medication with the same name was changed. Make sure you understand how and when to take each.  - Another medication with the same name was removed. Continue taking this medication, and follow the directions you see here.   Used for:  Benign essential hypertension   Changed by:  Keturah Chaney CNP        Dose:  100 mg   Take 100 mg by mouth daily Take 1 tablet  by mouth  Daily (take with 50 mg) total = 150 mg   Quantity:  60 tablet   Refills:  5       * hydrALAZINE 100 MG Tabs tablet   Commonly known as:  APRESOLINE   This may have changed:    - See the new instructions.  - Another medication with the same name was removed. Continue taking this medication, and follow the directions you see here.   Used for:  Benign essential hypertension   Changed by:  Keturah Chaney CNP        Dose:  100 mg   Take 1 tablet (100 mg) by mouth 2 times daily In morning and dinner   Quantity:  60 tablet   Refills:  5       * hydrALAZINE 50 MG tablet   Commonly known as:  APRESOLINE   This may have changed:    - additional instructions  - Another medication with the same name was removed. Continue taking this medication, and follow the directions you see here.   Used for:  Benign essential hypertension   Changed by:  Keturah Chaney CNP        Dose:  50 mg   Take 1 tablet (50 mg) by mouth daily In morning    Quantity:  90 tablet   Refills:  0       metFORMIN 1000 MG tablet   Commonly known as:  GLUCOPHAGE   This may have changed:  See the new instructions.   Used for:  Type 2 diabetes mellitus with hyperglycemia, with long-term current use of insulin (H)   Changed by:  Keturah Chaney CNP        Take 1 tablet by mouth at breakfast and 1.5 tablets at dinner   Quantity:  75 tablet   Refills:  5       valsartan 320 MG tablet   Commonly known as:  DIOVAN   This may have changed:  See the new instructions.   Used for:  Benign essential hypertension   Changed by:  Keturah Chaney CNP        Dose:  320 mg   Take 1 tablet (320 mg) by mouth daily   Quantity:  90 tablet   Refills:  3       verapamil 240 MG CR tablet   Commonly known as:  CALAN-SR   This may have changed:    - See the new instructions.  - Another medication with the same name was removed. Continue taking this medication, and follow the directions you see here.   Used for:  Benign essential hypertension   Changed by:  Keturah Chaney CNP        Dose:  240 mg   Take 1 tablet (240 mg) by mouth daily   Quantity:  90 tablet   Refills:  3       * Notice:  This list has 3 medication(s) that are the same as other medications prescribed for you. Read the directions carefully, and ask your doctor or other care provider to review them with you.         Where to get your medicines      These medications were sent to [facility] - [address] [address]     Phone:  [phone]     atorvastatin 20 MG tablet    fluticasone-salmeterol 250-50 MCG/DOSE diskus inhaler    furosemide 20 MG tablet    hydrALAZINE 100 MG Tabs tablet    hydrALAZINE 50 MG tablet    liraglutide 18 MG/3ML soln    metFORMIN 1000 MG tablet    valsartan 320 MG tablet    verapamil 240 MG CR tablet                Primary Care Provider Office Phone # Fax #    Keturah Chaney [phone]        [address] [facility] [address]        Equal Access to Services     SANJUANA MORGAN : Hadii aad ku hadgoyomikel Herrera, wanacnyda bernardinoadaha, qaybta abeljeniffermilana alegria, celeste mohamudadeliahiram goodman. So [facility] [phone].    ATENCIÓN: Si habla español, tiene a mcarthur disposición servicios gratuitos de asistencia lingüística. Llame al [phone].    We comply with applicable federal civil rights laws and [place] laws. We do not discriminate on the basis of race, color, national origin, age, disability, sex, sexual orientation, or gender identity.            Thank you!     Thank you for choosing [facility]  for your care. Our goal is always to provide you with excellent care. Hearing back from our patients is one way we can continue to improve our services. Please take a few minutes to complete the written survey that you may receive in the mail after your visit with us. Thank you!             Your Updated Medication List - Protect others around you: Learn how to safely use, store and throw away your medicines at www.disposemymeds.org.          This list is accurate as of 6/28/18  9:30 AM.  Always use your most recent med list.                   Brand Name Dispense Instructions for use Diagnosis    * albuterol (2.5 MG/3ML) 0.083% neb solution     1 Box    Take 3 mLs by nebulization every 4 hours as needed for shortness of breath / dyspnea.    Mild persistent asthma, uncomplicated       * albuterol 108 (90 Base) MCG/ACT Inhaler    VENTOLIN HFA    1 Inhaler    Inhale 1-2 puffs into the lungs every 4 hours as needed    Mild persistent asthma, uncomplicated       aspirin 81 MG tablet     90 tablet    Take 1 tablet (81 mg) by mouth daily    Type 2 diabetes mellitus without complications (H)       atorvastatin 20 MG tablet    LIPITOR    90 tablet    Take 1 tablet (20 mg) by mouth At Bedtime    Type 2 diabetes mellitus with hyperglycemia, with long-term current use of insulin (H)       blood  glucose monitoring meter device kit    no brand specified    1 kit    Use to test blood sugar 2 times daily or as directed. Needs lancets and strips per insurance coverage.    Type 2 diabetes mellitus with hyperglycemia, with long-term current use of insulin (H)       fluticasone-salmeterol 250-50 MCG/DOSE diskus inhaler    ADVAIR DISKUS    1 Inhaler    Inhale 1 puff into the lungs 2 times daily    Mild persistent asthma, uncomplicated       furosemide 20 MG tablet    LASIX    90 tablet    Take 1 tablet (20 mg) by mouth daily    Benign essential hypertension       * hydrALAZINE 100 MG Tabs tablet    APRESOLINE    60 tablet    Take 100 mg by mouth daily Take 1 tablet  by mouth  Daily (take with 50 mg) total = 150 mg    Benign essential hypertension       * hydrALAZINE 100 MG Tabs tablet    APRESOLINE    60 tablet    Take 1 tablet (100 mg) by mouth 2 times daily In morning and dinner    Benign essential hypertension       * hydrALAZINE 50 MG tablet    APRESOLINE    90 tablet    Take 1 tablet (50 mg) by mouth daily In morning    Benign essential hypertension       insulin pen needle 32G X 4 MM    BD SMILEY U/F    100 each    Inject 1 each Subcutaneous daily    Type 2 diabetes mellitus without complications (H)       ipratropium - albuterol 0.5 mg/2.5 mg/3 mL 0.5-2.5 (3) MG/3ML neb solution    DUONEB    1 Box    Inhale 1 vial (3 mLs) into the lungs every 6 hours as needed    Mild persistent asthma, uncomplicated       liraglutide 18 MG/3ML soln    VICTOZA PEN    27 mL    Inject 1.8 mg Subcutaneous daily    Type 2 diabetes mellitus with hyperglycemia, with long-term current use of insulin (H)       metFORMIN 1000 MG tablet    GLUCOPHAGE    75 tablet    Take 1 tablet by mouth at breakfast and 1.5 tablets at dinner    Type 2 diabetes mellitus with hyperglycemia, with long-term current use of insulin (H)       MIRALAX powder   Generic drug:  polyethylene glycol     510 g    Take 1 capful every other day    Slow transit  constipation       ONETOUCH ULTRA test strip   Generic drug:  blood glucose monitoring     100 each    USE TO TEST BLOOD SUGAR TWO TIMES A DAY OR AS DIRECTED    Type 2 diabetes mellitus without complications (H)       order for DME     1 Device    Equipment being ordered: blood pressure monitor    Benign essential hypertension       PHARMACIST CHOICE LANCETS Misc     100 each    Check blood sugars twice daily    Type 2 diabetes mellitus without complications (H)       valsartan 320 MG tablet    DIOVAN    90 tablet    Take 1 tablet (320 mg) by mouth daily    Benign essential hypertension       verapamil 240 MG CR tablet    CALAN-SR    90 tablet    Take 1 tablet (240 mg) by mouth daily    Benign essential hypertension       * Notice:  This list has 5 medication(s) that are the same as other medications prescribed for you. Read the directions carefully, and ask your doctor or other care provider to review them with you.

## 2018-06-28 NOTE — LETTER
June 28, 2018      Lucia A Jihan  [address] [facility] [address]        Dear ,    We are writing to inform you of your test results.    HgbA1c looks great! Repeat in 6 months.     Resulted Orders   Hemoglobin A1c   Result Value Ref Range    Hemoglobin A1C 6.6 (H) 0 - 5.6 %      Comment:      Normal <5.7% Prediabetes 5.7-6.4%  Diabetes 6.5% or higher - adopted from ADA   consensus guidelines.         If you have any questions or concerns, please call the clinic at the number listed above.       Sincerely,        Keturah Chaney, CNP

## 2018-06-28 NOTE — PROGRESS NOTES
HgbA1c looks great! Repeat in 6 months.  Please notify her of these results and send a hard copy if not on [facility].   Thanks. LUIS M Bean

## 2018-06-29 ASSESSMENT — ASTHMA QUESTIONNAIRES: ACT_TOTALSCORE: 25

## 2018-07-03 ENCOUNTER — TRANSFERRED RECORDS (OUTPATIENT)
Dept: HEALTH INFORMATION MANAGEMENT | Facility: CLINIC | Age: 73
End: 2018-07-03

## 2018-07-30 ENCOUNTER — TELEPHONE (OUTPATIENT)
Dept: FAMILY MEDICINE | Facility: CLINIC | Age: 73
End: 2018-07-30

## 2018-07-30 DIAGNOSIS — I10 BENIGN ESSENTIAL HYPERTENSION: Primary | Chronic | ICD-10-CM

## 2018-07-30 NOTE — TELEPHONE ENCOUNTER
Patient is currently taking Valsartan 320mg daily - This medication was recently recalled by the  and is currently unavailable.  I am wondering if you would be ok with switching patient to Irbesartan (Avapro) which is available and is also cheaper through her insurance.  And equivilent dose would be Irbesartan 300mg daily.  Please review and send to [facility].    Thank you,  Ori Bird PharmD - [facility] Pharmacist, on behalf of [facility]

## 2018-07-31 RX ORDER — IRBESARTAN 300 MG/1
300 TABLET ORAL DAILY
Qty: 90 TABLET | Refills: 3 | Status: SHIPPED | OUTPATIENT
Start: 2018-07-31 | End: 2019-01-23

## 2018-07-31 NOTE — TELEPHONE ENCOUNTER
That's great. Thanks. I placed the new order. Please dc Valsartan. Thanks. Keturah Chaney, LUIS M

## 2018-10-24 DIAGNOSIS — I10 BENIGN ESSENTIAL HYPERTENSION: ICD-10-CM

## 2018-10-24 RX ORDER — HYDRALAZINE HYDROCHLORIDE 50 MG/1
TABLET, FILM COATED ORAL
Qty: 90 TABLET | Refills: 0 | Status: SHIPPED | OUTPATIENT
Start: 2018-10-24 | End: 2019-01-23

## 2018-10-24 NOTE — TELEPHONE ENCOUNTER
"Requested Prescriptions   Pending Prescriptions Disp Refills     hydrALAZINE (APRESOLINE) 50 MG tablet [Pharmacy Med Name: HYDRALAZINE HCL 50 MG TABLET] 90 tablet      Sig: TAKE 1 TABLET BY MOUTH ONCE DAILY IN THE MORNING    Vasodilators Passed    10/24/2018 10:15 AM       Passed - Most recent BP less than 140/90 on record    BP Readings from Last 3 Encounters:   06/28/18 124/66   01/31/18 138/85   01/17/18 163/84                Passed - Most recent encounter is not a hospital encounter. Patient has recent (12 mos) or future (1 mos) visit with authorizing provider's specialty    Patient's most recent encounter is NOT a hospital encounter and has had an office visit in the last 12 months or has a visit in the next 30 days with authorizing provider or within the authorizing provider's specialty.      See \"Patient Info\" tab in inbasket, or \"Choose Columns\" in Meds & Orders section of the refill encounter.      If most recent encounter is a hospital encounter AND the patient does NOT have an appointment scheduled with the authorizing provider or authorizing provider's specialty within the next 30 days, forward refill to authorizing provider for medication review.         Passed - Patient is of age 18 years or older       Passed - Patient is not pregnant       Passed - Patient has not had a positive pregnancy test within the past 12 months          "

## 2018-12-27 DIAGNOSIS — I10 BENIGN ESSENTIAL HYPERTENSION: ICD-10-CM

## 2018-12-28 NOTE — TELEPHONE ENCOUNTER
"Requested Prescriptions   Pending Prescriptions Disp Refills     hydrALAZINE (APRESOLINE) 100 MG tablet [Pharmacy Med Name: hydrALAZINE * 100MG * TAB] 60 tablet 5     Sig: TAKE ONE TABLET BY MOUTH TWICE A DAY - IN THE MORNING AND AT DINNER    Vasodilators Passed - 12/27/2018  4:26 PM       Passed - Most recent BP less than 140/90 on record    BP Readings from Last 3 Encounters:   06/28/18 124/66   01/31/18 138/85   01/17/18 163/84                Passed - Most recent encounter is not a hospital encounter. Patient has recent (12 mos) or future (1 mos) visit with authorizing provider's specialty    Patient's most recent encounter is NOT a hospital encounter and has had an office visit in the last 12 months or has a visit in the next 30 days with authorizing provider or within the authorizing provider's specialty.      See \"Patient Info\" tab in inbasket, or \"Choose Columns\" in Meds & Orders section of the refill encounter.      If most recent encounter is a hospital encounter AND the patient does NOT have an appointment scheduled with the authorizing provider or authorizing provider's specialty within the next 30 days, forward refill to authorizing provider for medication review.         Passed - Patient is of age 18 years or older       Passed - Patient is not pregnant       Passed - Patient has not had a positive pregnancy test within the past 12 months      hydrALAZINE (APRESOLINE) 100 MG TABS tablet  Last Written Prescription Date:  06/28/2018  Last Fill Quantity: 60 tablet,  # refills: 5   Last office visit: 3/2/2018 with prescribing provider:  06/28/2018 DAMIEN Chaney   Future Office Visit:      Maile EWING (R) (M)      "

## 2018-12-28 NOTE — TELEPHONE ENCOUNTER
Routing refill request to provider for review/approval because:  Need to clarify dose. Currently on med list:    hydrALAZINE (APRESOLINE) 100 MG TABS tablet 60 tablet 5 6/28/2018  No   Sig - Route: Take 1 tablet (100 mg) by mouth 2 times daily In morning and dinner - Oral   Sent to pharmacy as: hydrALAZINE (APRESOLINE) 100 MG TABS tablet   Class: E-Prescribe   Order: [id number]   E-Prescribing Status: Receipt confirmed by pharmacy (6/28/2018  9:29 AM CDT)      Disp Refills Start End LOULOU   hydrALAZINE (APRESOLINE) 100 MG TABS tablet 60 tablet 5 11/9/2017  No   Sig - Route: Take 100 mg by mouth daily Take 1 tablet  by mouth  Daily (take with 50 mg) total = 150 mg - Oral   Class: Historical   Order: [id number]     hydrALAZINE (APRESOLINE) 50 MG tablet 90 tablet 0 10/24/2018  No   Sig: TAKE 1 TABLET BY MOUTH ONCE DAILY IN THE MORNING   Sent to pharmacy as: hydrALAZINE (APRESOLINE) 50 MG tablet   Class: E-Prescribe   Order: [id number]   E-Prescribing Status: Receipt confirmed by pharmacy (10/24/2018 10:33 AM CDT)

## 2019-01-03 RX ORDER — HYDRALAZINE HYDROCHLORIDE 100 MG/1
TABLET, FILM COATED ORAL
Qty: 60 TABLET | Refills: 5 | Status: SHIPPED | OUTPATIENT
Start: 2019-01-03 | End: 2020-05-05

## 2019-01-03 NOTE — TELEPHONE ENCOUNTER
Lucia called to check on the status on this refill.  She states she takes 1 tab 100 mg twice a day.  She would like to get this tomorrow.   Phylicia Whitehead  [facility]

## 2019-01-03 NOTE — ORAL ONC MGMT
Pt calling Pharmacy on the status of the refill for Hydralazine 100mg. Original request sent 12/27/18.  Thank you    JOANNE MCKINNEY [facility]

## 2019-01-22 ENCOUNTER — TELEPHONE (OUTPATIENT)
Dept: FAMILY MEDICINE | Facility: CLINIC | Age: 74
End: 2019-01-22

## 2019-01-22 DIAGNOSIS — I10 BENIGN ESSENTIAL HYPERTENSION: Primary | Chronic | ICD-10-CM

## 2019-01-22 NOTE — TELEPHONE ENCOUNTER
Lucia has an RX for Irbesartan 300mg tablets, this is now on the Patient Level Recall. Could you please send a new RX.    JOANNE MCKINNEY [facility]

## 2019-01-23 DIAGNOSIS — I10 BENIGN ESSENTIAL HYPERTENSION: ICD-10-CM

## 2019-01-23 RX ORDER — LOSARTAN POTASSIUM 50 MG/1
50 TABLET ORAL DAILY
Qty: 30 TABLET | Refills: 0 | Status: SHIPPED | OUTPATIENT
Start: 2019-01-23 | End: 2019-02-27

## 2019-01-23 RX ORDER — HYDRALAZINE HYDROCHLORIDE 50 MG/1
TABLET, FILM COATED ORAL
Qty: 90 TABLET | Refills: 1 | Status: SHIPPED | OUTPATIENT
Start: 2019-01-23 | End: 2019-03-27

## 2019-01-23 NOTE — TELEPHONE ENCOUNTER
"Requested Prescriptions   Pending Prescriptions Disp Refills     hydrALAZINE (APRESOLINE) 50 MG tablet [Pharmacy Med Name: HYDRALAZINE HCL 50 MG TABLET] 90 tablet 0     Sig: TAKE 1 TABLET BY MOUTH ONCE DAILY IN THE MORNING    Vasodilators Passed - 1/23/2019  1:52 PM       Passed - Most recent BP less than 140/90 on record    BP Readings from Last 3 Encounters:   06/28/18 124/66   01/31/18 138/85   01/17/18 163/84                Passed - Most recent encounter is not a hospital encounter. Patient has recent (12 mos) or future (1 mos) visit with authorizing provider's specialty    Patient's most recent encounter is NOT a hospital encounter and has had an office visit in the last 12 months or has a visit in the next 30 days with authorizing provider or within the authorizing provider's specialty.      See \"Patient Info\" tab in inbasket, or \"Choose Columns\" in Meds & Orders section of the refill encounter.      If most recent encounter is a hospital encounter AND the patient does NOT have an appointment scheduled with the authorizing provider or authorizing provider's specialty within the next 30 days, forward refill to authorizing provider for medication review.         Passed - Medication is active on med list       Passed - Patient is of age 18 years or older       Passed - Patient is not pregnant       Passed - Patient has not had a positive pregnancy test within the past 12 months        Look like need to change pharmacy or a dup.  "

## 2019-01-23 NOTE — TELEPHONE ENCOUNTER
Stop Avapro. Switch to Losartan 50 mg daily. Check BP daily. Schedule nurse only office visit in 1 week to re-assess BP. You are due for a diabetes check with me. Please schedule and come in fasting. Bring your glucometer with you.   LUIS M Bean

## 2019-01-31 DIAGNOSIS — E11.9 TYPE 2 DIABETES MELLITUS WITHOUT COMPLICATIONS (H): ICD-10-CM

## 2019-01-31 DIAGNOSIS — E11.65 TYPE 2 DIABETES MELLITUS WITH HYPERGLYCEMIA, WITH LONG-TERM CURRENT USE OF INSULIN (H): ICD-10-CM

## 2019-01-31 DIAGNOSIS — Z79.4 TYPE 2 DIABETES MELLITUS WITH HYPERGLYCEMIA, WITH LONG-TERM CURRENT USE OF INSULIN (H): ICD-10-CM

## 2019-01-31 RX ORDER — PEN NEEDLE, DIABETIC 32GX 5/32"
NEEDLE, DISPOSABLE MISCELLANEOUS
Qty: 100 EACH | Refills: 1 | Status: SHIPPED | OUTPATIENT
Start: 2019-01-31 | End: 2019-03-27

## 2019-01-31 NOTE — TELEPHONE ENCOUNTER
"Requested Prescriptions   Pending Prescriptions Disp Refills     BD SMILEY U/F 32G X 4 MM insulin pen needle [Pharmacy Med Name: BD PEN NEEDLE SMILEY  32G X 4 MM MISC] 100 each 1     Sig: USE ONE PEN NEEDLE UNDER THE SKIN DAILY AS DIRECTED    Diabetic Supplies Protocol Failed - 1/31/2019 12:54 PM       Failed - Recent (6 mo) or future (30 days) visit within the authorizing provider's specialty    Patient had office visit in the last 6 months or has a visit in the next 30 days with authorizing provider.  See \"Patient Info\" tab in inbasket, or \"Choose Columns\" in Meds & Orders section of the refill encounter.           Passed - Medication is active on med list       Passed - Patient is 18 years of age or older        metFORMIN (GLUCOPHAGE) 1000 MG tablet [Pharmacy Med Name: METFORMIN HCL 1000MG TABS] 75 tablet 6     Sig: TAKE ONE TABLET (1000MG) BY MOUTH AT BREAKFAST AND ONE AND ONE-HALF TABLETS (1500MG) AT DINNER    Biguanide Agents Failed - 1/31/2019 12:54 PM       Failed - Blood pressure less than 140/90 in past 6 months    BP Readings from Last 3 Encounters:   06/28/18 124/66   01/31/18 138/85   01/17/18 163/84                Failed - Patient has documented A1c within the specified period of time.    If HgbA1C is 8 or greater, it needs to be on file within the past 3 months.  If less than 8, must be on file within the past 6 months.     Recent Labs   Lab Test 06/28/18  0935   A1C 6.6*            Failed - Recent (6 mo) or future (30 days) visit within the authorizing provider's specialty    Patient had office visit in the last 6 months or has a visit in the next 30 days with authorizing provider or within the authorizing provider's specialty.  See \"Patient Info\" tab in inbasket, or \"Choose Columns\" in Meds & Orders section of the refill encounter.           Passed - Patient has documented LDL within the past 12 mos.    Recent Labs   Lab Test 02/01/18  0830   LDL 64            Passed - Patient has had a Microalbumin in " the past 15 mos.    Recent Labs   Lab Test 02/26/18  1130   MICROL 8   UMALCR 14.86            Passed - Patient is age 10 or older       Passed - Patient's CR is NOT>1.4 OR Patient's EGFR is NOT<45 within past 12 mos.    Recent Labs   Lab Test 01/08/18  1110   GFRESTIMATED 79   GFRESTBLACK >90       Recent Labs   Lab Test 01/08/18  1110   CR 0.73            Passed - Patient does NOT have a diagnosis of CHF.       Passed - Medication is active on med list       Passed - Patient is not pregnant       Passed - Patient has not had a positive pregnancy test within the past 12 mos.       BD SMILEY U/F 32G X 4 MM insulin pen needle  Last Written Prescription Date:  06/29/2018  Last Fill Quantity: 100 each,  # refills: 1   Last office visit: 6/28/2018 with prescribing provider:  DAMIEN Chaney   Future Office Visit:      metFORMIN (GLUCOPHAGE) 1000 MG tablet  Last Written Prescription Date:  06/28/2018  Last Fill Quantity: 75 tablet,  # refills: 5   Last office visit: 6/28/2018 with prescribing provider:  DAMIEN Chaney   Future Office Visit:      Maile EWING (R) (M)

## 2019-02-27 ENCOUNTER — ALLIED HEALTH/NURSE VISIT (OUTPATIENT)
Dept: FAMILY MEDICINE | Facility: CLINIC | Age: 74
End: 2019-02-27
Payer: COMMERCIAL

## 2019-02-27 VITALS — SYSTOLIC BLOOD PRESSURE: 146 MMHG | DIASTOLIC BLOOD PRESSURE: 76 MMHG | HEART RATE: 84 BPM

## 2019-02-27 DIAGNOSIS — I10 BENIGN ESSENTIAL HYPERTENSION: Chronic | ICD-10-CM

## 2019-02-27 PROCEDURE — 99207 ZZC NO CHARGE NURSE ONLY: CPT

## 2019-02-27 RX ORDER — LOSARTAN POTASSIUM 50 MG/1
50 TABLET ORAL DAILY
Qty: 30 TABLET | Refills: 0 | Status: SHIPPED | OUTPATIENT
Start: 2019-02-27 | End: 2019-03-12

## 2019-02-27 NOTE — NURSING NOTE
Lucia Bobo is a 73 year old year old patient who comes in today for a Blood Pressure check because of medication change and ongoing blood pressure monitoring.  Vital Signs as repeated by RN   Patient is taking medication as prescribed  Patient is tolerating medications well.  Patient is not monitoring Blood Pressure at home.    Current complaints: none  Disposition:  patient to continue with the same medication, scheduled appointment to see her PCP

## 2019-03-12 ENCOUNTER — TELEPHONE (OUTPATIENT)
Dept: FAMILY MEDICINE | Facility: CLINIC | Age: 74
End: 2019-03-12

## 2019-03-12 DIAGNOSIS — I10 BENIGN ESSENTIAL HYPERTENSION: Chronic | ICD-10-CM

## 2019-03-12 RX ORDER — LOSARTAN POTASSIUM 50 MG/1
75 TABLET ORAL DAILY
Qty: 30 TABLET | Refills: 0 | COMMUNITY
Start: 2019-03-12 | End: 2019-03-15

## 2019-03-12 NOTE — TELEPHONE ENCOUNTER
2/27/2019  Lucia Bobo is a 73 year old year old patient who comes in today for a Blood Pressure check because of medication change and ongoing blood pressure monitoring.  Vital Signs as repeated by RN   Patient is taking medication as prescribed  Patient is tolerating medications well.  Patient is not monitoring Blood Pressure at home.    Current complaints: none  Disposition:  patient to continue with the same medication, scheduled appointment to see her PCP  BP Readings from Last 6 Encounters:   02/27/19 146/76   06/28/18 124/66   01/31/18 138/85   01/17/18 163/84   01/10/18 136/66   01/08/18 141/58       Uncontrolled HTN  I'd like Lucia to increase her Losartan from 50 mg to 75 mg daily (take 1.5 tablets daily)  She can recheck BP with nurse only visit 1 week after this change.   LUIS M Bean

## 2019-03-12 NOTE — TELEPHONE ENCOUNTER
Attempt to call pt,  box not set up- unable to LM.  Will try calling pt again.  JEFFREY Arias RN

## 2019-03-15 ENCOUNTER — TELEPHONE (OUTPATIENT)
Dept: FAMILY MEDICINE | Facility: CLINIC | Age: 74
End: 2019-03-15

## 2019-03-15 DIAGNOSIS — I10 BENIGN ESSENTIAL HYPERTENSION: Chronic | ICD-10-CM

## 2019-03-15 RX ORDER — LOSARTAN POTASSIUM 50 MG/1
75 TABLET ORAL DAILY
Qty: 30 TABLET | Refills: 0 | Status: SHIPPED | OUTPATIENT
Start: 2019-03-15 | End: 2019-03-27

## 2019-03-15 NOTE — TELEPHONE ENCOUNTER
Patient notified, she will use up the Losartan 50 mg she already has and take 1.5 tablets to equal 75 mg. Patient has appointment on 3-27-19 for diabetic check, this will be a little over a week for the recheck, told the patient that would be fine to recheck B/P at this appointment.    BRITNEY Mcfarlane

## 2019-03-21 NOTE — PROGRESS NOTES
SUBJECTIVE:   Lucia Bobo is a 73 year old female who presents to clinic today for the following health issues:    Due: Colonoscopy, mammogram, eye exam  ACT    Diabetes Follow-up      Patient is checking blood sugars: not at all    Diabetic concerns: None     Symptoms of hypoglycemia (low blood sugar): none     Paresthesias (numbness or burning in feet) or sores: No     Date of last diabetic eye exam: July 2018    Diabetes Management Resources    Hyperlipidemia Follow-Up      Rate your low fat/cholesterol diet?: fair    Taking statin?  Yes, no muscle aches from statin    Other lipid medications/supplements?:  none    Hypertension Follow-up      Outpatient blood pressures are not being checked.    Low Salt Diet: low salt    BP Readings from Last 2 Encounters:   03/27/19 138/68   02/27/19 146/76     Hemoglobin A1C (%)   Date Value   03/27/2019 7.0 (H)   06/28/2018 6.6 (H)     LDL Cholesterol Calculated (mg/dL)   Date Value   02/01/2018 64   01/18/2017 55     Asthma Follow-Up    Was ACT completed today?    Yes    ACT Total Scores 6/28/2018   ACT TOTAL SCORE -   ASTHMA ER VISITS -   ASTHMA HOSPITALIZATIONS -   ACT TOTAL SCORE (Goal Greater than or Equal to 20) 25   In the past 12 months, how many times did you visit the emergency room for your asthma without being admitted to the hospital? 0   In the past 12 months, how many times were you hospitalized overnight because of your asthma? 0       Recent asthma triggers that patient is dealing with: None      Amount of exercise or physical activity: 2-3 days/week     Problems taking medications regularly: No    Medication side effects: none    Diet: regular (no restrictions)        PCP   Keturah Chaney -[phone]    Health Maintenance        Health Maintenance Due   Topic Date Due     MEDICARE ANNUAL WELLNESS VISIT  07/29/2011     DEXA SCAN SCREENING (SYSTEM ASSIGNED)  07/29/2011     MAMMO SCREEN Q2 YR (SYSTEM ASSIGNED)  08/23/2014     ZOSTER  IMMUNIZATION (2 of 3) 02/25/2015     EYE EXAM Q1 YEAR  08/29/2018     ASTHMA ACTION PLAN Q1 YR  11/13/2018     ASTHMA CONTROL TEST Q6 MOS  12/28/2018     A1C Q6 MO  12/28/2018     CREATININE Q1 YEAR  01/08/2019     COLONOSCOPY Q1 YR  01/17/2019     FALL RISK ASSESSMENT  01/31/2019     LIPID MONITORING Q1 YEAR  02/01/2019     MICROALBUMIN Q1 YEAR  02/26/2019       HPI        Patient Active Problem List   Diagnosis     Type 2 diabetes mellitus with hyperglycemia, with long-term current use of insulin (H) HgbA1c goal <7     Advanced directives, counseling/discussion     Osteoarthritis of both knees     Morbid obesity with BMI of 45.0-49.9, adult (H)     Hypertriglyceridemia, LDL goal <100     Benign essential hypertension, BP goal <140/90     Mild persistent asthma, uncomplicated     Colon polyps     Slow transit constipation     Current Outpatient Medications   Medication     albuterol (2.5 MG/3ML) 0.083% nebulizer solution     albuterol (VENTOLIN HFA) 108 (90 BASE) MCG/ACT Inhaler     aspirin 81 MG tablet     atorvastatin (LIPITOR) 20 MG tablet     BD SMILEY U/F 32G X 4 MM insulin pen needle     blood glucose monitoring (NO BRAND SPECIFIED) meter device kit     fluticasone-salmeterol (ADVAIR DISKUS) 250-50 MCG/DOSE diskus inhaler     furosemide (LASIX) 20 MG tablet     hydrALAZINE (APRESOLINE) 100 MG tablet     hydrALAZINE (APRESOLINE) 50 MG tablet     ipratropium - albuterol 0.5 mg/2.5 mg/3 mL (DUONEB) 0.5-2.5 (3) MG/3ML nebulization     liraglutide (VICTOZA PEN) 18 MG/3ML soln     losartan (COZAAR) 50 MG tablet     metFORMIN (GLUCOPHAGE) 1000 MG tablet     ONETOUCH ULTRA test strip     order for DME     PHARMACIST CHOICE LANCETS MISC     polyethylene glycol (MIRALAX) powder     verapamil (CALAN-SR) 240 MG CR tablet     No current facility-administered medications for this visit.        Reviewed and updated:  Tobacco  Allergies  Meds  Med Hx  Surg Hx  Fam Hx  Soc Hx     ROS:  Constitutional, neuro, ENT,  endocrine, pulmonary, cardiac, gastrointestinal, genitourinary, musculoskeletal, integument and psychiatric systems are negative, except as otherwise noted.    PHYSICAL EXAM   /68 (BP Location: Right arm, Patient Position: Sitting, Cuff Size: Adult Large)   Pulse 87   Temp 98.4  F (36.9  C) (Tympanic)   Resp 18   Wt 119.8 kg (264 lb 3.2 oz)   SpO2 96%   BMI 45.35 kg/m    Body mass index is 45.35 kg/m .  GENERAL APPEARANCE: healthy, alert, no distress and over weight  HENT: ear canals and TM's normal and nose and mouth without ulcers or lesions  NECK: no adenopathy, no asymmetry, masses, or scars and thyroid normal to palpation  RESP: lungs clear to auscultation - no rales, rhonchi or wheezes  CV: regular rates and rhythm, normal S1 S2, no S3 or S4 and no murmur, click or rub  ABDOMEN: soft, nontender, without hepatosplenomegaly or masses and bowel sounds normal  MS: extremities normal- no gross deformities noted  SKIN: no suspicious lesions or rashes  NEURO: Normal strength and tone, mentation intact and speech normal  DIABETIC FOOT EXAM: normal DP and PT pulses, no trophic changes or ulcerative lesions, normal sensory exam and normal monofilament exam  PSYCH: mentation appears normal and affect normal/bright    ASSESSMENT & PLAN     1. Type 2 diabetes mellitus with hyperglycemia, with long-term current use of insulin (H) HgbA1c goal <7  Chronic, uncontrolled  - Albumin Random Urine Quantitative with Creat Ratio  - Comprehensive metabolic panel  - C FOOT EXAM  NO CHARGE  - Hemoglobin A1c  - Vitamin B12  - TSH with free T4 reflex  - atorvastatin (LIPITOR) 20 MG tablet; Take 1 tablet (20 mg) by mouth At Bedtime  Dispense: 90 tablet; Refill: 3  - blood glucose monitoring (NO BRAND SPECIFIED) meter device kit; Use to test blood sugar 2 times daily or as directed. Needs lancets and strips per insurance coverage.  Dispense: 1 kit; Refill: 0  - liraglutide (VICTOZA PEN) 18 MG/3ML solution; Inject 1.8 mg  Subcutaneous daily  Dispense: 27 mL; Refill: 3  - metFORMIN (GLUCOPHAGE) 1000 MG tablet; TAKE ONE TABLET (1000MG) BY MOUTH AT BREAKFAST AND ONE AND ONE-HALF TABLETS (1500MG) AT DINNER  Dispense: 75 tablet; Refill: 0    2. Benign essential hypertension, BP goal <140/90  Chronic, stable  - furosemide (LASIX) 20 MG tablet; Take 1 tablet (20 mg) by mouth daily  Dispense: 90 tablet; Refill: 3  - hydrALAZINE (APRESOLINE) 100 MG tablet; Take 1 tablet (100 mg) by mouth daily Take 1 tablet  by mouth  Daily (take with 50 mg) total = 150 mg  Dispense: 60 tablet; Refill: 5  - losartan (COZAAR) 50 MG tablet; Take 1.5 tablets (75 mg) by mouth daily Recheck BP in 1 week with nurse only appointment  Dispense: 30 tablet; Refill: 0  - verapamil ER (CALAN-SR) 240 MG CR tablet; Take 1 tablet (240 mg) by mouth daily  Dispense: 90 tablet; Refill: 3  - hydrALAZINE (APRESOLINE) 50 MG tablet  Dispense: 90 tablet; Refill: 1    3. Hypertriglyceridemia, LDL goal <100  Chronic, stable  - Lipid panel reflex to direct LDL Fasting    4. Mild persistent asthma, uncomplicated  Chronic, stable  - albuterol (VENTOLIN HFA) 108 (90 Base) MCG/ACT inhaler; Inhale 1-2 puffs into the lungs every 4 hours as needed  - fluticasone-salmeterol (ADVAIR DISKUS) 250-50 MCG/DOSE inhaler; Inhale 1 puff into the lungs 2 times daily  Dispense: 1 Inhaler; Refill: 11  - ipratropium - albuterol 0.5 mg/2.5 mg/3 mL (DUONEB) 0.5-2.5 (3) MG/3ML neb solution; Inhale 1 vial (3 mLs) into the lungs every 6 hours as needed  Dispense: 1 Box; Refill: 3  Recheck A1c in 3 months with lab only appointment  AAP given    5. Morbid obesity with BMI of 45.0-49.9, adult (H)  Chronic, stable  Work on moving more now that Spring is here- goal daily activity  Great work on Weight Watchers  Look into the free videos of [employer]- they even have walking in place videos  Wt Readings from Last 3 Encounters:   03/27/19 119.8 kg (264 lb 3.2 oz)   06/28/18 120.7 kg (266 lb)   01/31/18 119.7 kg (264 lb)        6. Slow transit constipation  Chronic, stable  - polyethylene glycol (MIRALAX) powder; Take 1 capful every other day  Dispense: 510 g; Refill: 1      Risks, benefits, side effects and rationale for treatment plan fully discussed with the patient and understanding expressed.    Keturah Chaney, LUIS M-[facility]

## 2019-03-27 ENCOUNTER — OFFICE VISIT (OUTPATIENT)
Dept: FAMILY MEDICINE | Facility: CLINIC | Age: 74
End: 2019-03-27
Payer: COMMERCIAL

## 2019-03-27 VITALS
TEMPERATURE: 98.4 F | RESPIRATION RATE: 18 BRPM | HEART RATE: 87 BPM | WEIGHT: 264.2 LBS | BODY MASS INDEX: 45.35 KG/M2 | SYSTOLIC BLOOD PRESSURE: 138 MMHG | DIASTOLIC BLOOD PRESSURE: 68 MMHG | OXYGEN SATURATION: 96 %

## 2019-03-27 DIAGNOSIS — K59.01 SLOW TRANSIT CONSTIPATION: ICD-10-CM

## 2019-03-27 DIAGNOSIS — Z79.4 TYPE 2 DIABETES MELLITUS WITH HYPERGLYCEMIA, WITH LONG-TERM CURRENT USE OF INSULIN (H): Primary | ICD-10-CM

## 2019-03-27 DIAGNOSIS — E78.1 HYPERTRIGLYCERIDEMIA: ICD-10-CM

## 2019-03-27 DIAGNOSIS — E66.01 MORBID OBESITY WITH BMI OF 45.0-49.9, ADULT (H): Chronic | ICD-10-CM

## 2019-03-27 DIAGNOSIS — J45.30 MILD PERSISTENT ASTHMA, UNCOMPLICATED: Chronic | ICD-10-CM

## 2019-03-27 DIAGNOSIS — E11.65 TYPE 2 DIABETES MELLITUS WITH HYPERGLYCEMIA, WITH LONG-TERM CURRENT USE OF INSULIN (H): Primary | ICD-10-CM

## 2019-03-27 DIAGNOSIS — I10 BENIGN ESSENTIAL HYPERTENSION: Chronic | ICD-10-CM

## 2019-03-27 LAB
ALBUMIN SERPL-MCNC: 3.8 G/DL (ref 3.4–5)
ALP SERPL-CCNC: 115 U/L (ref 40–150)
ALT SERPL W P-5'-P-CCNC: 27 U/L (ref 0–50)
ANION GAP SERPL CALCULATED.3IONS-SCNC: 8 MMOL/L (ref 3–14)
AST SERPL W P-5'-P-CCNC: 19 U/L (ref 0–45)
BILIRUB SERPL-MCNC: 0.5 MG/DL (ref 0.2–1.3)
BUN SERPL-MCNC: 23 MG/DL (ref 7–30)
CALCIUM SERPL-MCNC: 9.4 MG/DL (ref 8.5–10.1)
CHLORIDE SERPL-SCNC: 104 MMOL/L (ref 94–109)
CHOLEST SERPL-MCNC: 121 MG/DL
CO2 SERPL-SCNC: 24 MMOL/L (ref 20–32)
CREAT SERPL-MCNC: 0.85 MG/DL (ref 0.52–1.04)
CREAT UR-MCNC: 30 MG/DL
GFR SERPL CREATININE-BSD FRML MDRD: 68 ML/MIN/{1.73_M2}
GLUCOSE SERPL-MCNC: 143 MG/DL (ref 70–99)
HBA1C MFR BLD: 7 % (ref 0–5.6)
HDLC SERPL-MCNC: 70 MG/DL
LDLC SERPL CALC-MCNC: 21 MG/DL
MICROALBUMIN UR-MCNC: 8 MG/L
MICROALBUMIN/CREAT UR: 25.54 MG/G CR (ref 0–25)
NONHDLC SERPL-MCNC: 51 MG/DL
POTASSIUM SERPL-SCNC: 4.3 MMOL/L (ref 3.4–5.3)
PROT SERPL-MCNC: 8 G/DL (ref 6.8–8.8)
SODIUM SERPL-SCNC: 136 MMOL/L (ref 133–144)
TRIGL SERPL-MCNC: 149 MG/DL
TSH SERPL DL<=0.005 MIU/L-ACNC: 3.2 MU/L (ref 0.4–4)
VIT B12 SERPL-MCNC: 298 PG/ML (ref 193–986)

## 2019-03-27 PROCEDURE — 99207 C FOOT EXAM  NO CHARGE: CPT | Performed by: NURSE PRACTITIONER

## 2019-03-27 PROCEDURE — 36415 COLL VENOUS BLD VENIPUNCTURE: CPT | Performed by: NURSE PRACTITIONER

## 2019-03-27 PROCEDURE — 99214 OFFICE O/P EST MOD 30 MIN: CPT | Performed by: NURSE PRACTITIONER

## 2019-03-27 PROCEDURE — 82043 UR ALBUMIN QUANTITATIVE: CPT | Performed by: NURSE PRACTITIONER

## 2019-03-27 PROCEDURE — 80061 LIPID PANEL: CPT | Performed by: NURSE PRACTITIONER

## 2019-03-27 PROCEDURE — 80053 COMPREHEN METABOLIC PANEL: CPT | Performed by: NURSE PRACTITIONER

## 2019-03-27 PROCEDURE — 84443 ASSAY THYROID STIM HORMONE: CPT | Performed by: NURSE PRACTITIONER

## 2019-03-27 PROCEDURE — 83036 HEMOGLOBIN GLYCOSYLATED A1C: CPT | Performed by: NURSE PRACTITIONER

## 2019-03-27 PROCEDURE — 82607 VITAMIN B-12: CPT | Performed by: NURSE PRACTITIONER

## 2019-03-27 RX ORDER — LIRAGLUTIDE 6 MG/ML
1.8 INJECTION SUBCUTANEOUS DAILY
Qty: 27 ML | Refills: 3 | Status: SHIPPED | OUTPATIENT
Start: 2019-03-27 | End: 2020-07-13

## 2019-03-27 RX ORDER — IPRATROPIUM BROMIDE AND ALBUTEROL SULFATE 2.5; .5 MG/3ML; MG/3ML
1 SOLUTION RESPIRATORY (INHALATION) EVERY 6 HOURS PRN
Qty: 1 BOX | Refills: 3 | Status: SHIPPED | OUTPATIENT
Start: 2019-03-27 | End: 2020-04-29

## 2019-03-27 RX ORDER — LOSARTAN POTASSIUM 50 MG/1
75 TABLET ORAL DAILY
Qty: 30 TABLET | Refills: 0 | Status: SHIPPED | OUTPATIENT
Start: 2019-03-27 | End: 2020-01-29

## 2019-03-27 RX ORDER — LANCING DEVICE
EACH MISCELLANEOUS
Qty: 100 EACH | Refills: 3 | Status: CANCELLED | OUTPATIENT
Start: 2019-03-27

## 2019-03-27 RX ORDER — ALBUTEROL SULFATE 0.83 MG/ML
SOLUTION RESPIRATORY (INHALATION)
Qty: 1 BOX | Refills: 1 | Status: CANCELLED | OUTPATIENT
Start: 2019-03-27

## 2019-03-27 RX ORDER — ATORVASTATIN CALCIUM 20 MG/1
20 TABLET, FILM COATED ORAL AT BEDTIME
Qty: 90 TABLET | Refills: 3 | Status: SHIPPED | OUTPATIENT
Start: 2019-03-27 | End: 2020-09-28

## 2019-03-27 RX ORDER — HYDRALAZINE HYDROCHLORIDE 50 MG/1
50 TABLET, FILM COATED ORAL DAILY
Qty: 90 TABLET | Refills: 3 | Status: SHIPPED | OUTPATIENT
Start: 2019-03-27 | End: 2020-03-31

## 2019-03-27 RX ORDER — ALBUTEROL SULFATE 90 UG/1
1-2 AEROSOL, METERED RESPIRATORY (INHALATION) EVERY 4 HOURS PRN
Qty: 6.7 G | Refills: 6 | Status: SHIPPED | OUTPATIENT
Start: 2019-03-27 | End: 2020-08-05

## 2019-03-27 RX ORDER — HYDRALAZINE HYDROCHLORIDE 100 MG/1
100 TABLET, FILM COATED ORAL DAILY
Qty: 90 TABLET | Refills: 3 | Status: SHIPPED | OUTPATIENT
Start: 2019-03-27 | End: 2019-09-09

## 2019-03-27 RX ORDER — POLYETHYLENE GLYCOL 3350 17 G/17G
POWDER, FOR SOLUTION ORAL
Qty: 510 G | Refills: 1 | Status: SHIPPED | OUTPATIENT
Start: 2019-03-27 | End: 2021-12-07

## 2019-03-27 RX ORDER — FUROSEMIDE 20 MG
20 TABLET ORAL DAILY
Qty: 90 TABLET | Refills: 3 | Status: SHIPPED | OUTPATIENT
Start: 2019-03-27 | End: 2020-06-30

## 2019-03-27 RX ORDER — VERAPAMIL HYDROCHLORIDE 240 MG/1
240 TABLET, FILM COATED, EXTENDED RELEASE ORAL DAILY
Qty: 90 TABLET | Refills: 3 | Status: SHIPPED | OUTPATIENT
Start: 2019-03-27 | End: 2020-06-15

## 2019-03-27 NOTE — NURSING NOTE
"Chief Complaint   Patient presents with     Diabetes       Initial /68 (BP Location: Right arm, Patient Position: Sitting, Cuff Size: Adult Large)   Pulse 87   Temp 98.4  F (36.9  C) (Tympanic)   Resp 18   Wt 119.8 kg (264 lb 3.2 oz)   SpO2 96%   BMI 45.35 kg/m   Estimated body mass index is 45.35 kg/m  as calculated from the following:    Height as of 6/28/18: 1.626 m (5' 4\").    Weight as of this encounter: 119.8 kg (264 lb 3.2 oz).    Patient presents to the clinic using No DME    Health Maintenance that is potentially due pending provider review:  NONE    n/a    Is there anyone who you would like to be able to receive your results? No  If yes have patient fill out JAZMYN    Lisa Crawford CMA    "

## 2019-03-27 NOTE — LETTER
[facility]  [address] [facility] [address]  [phone]          March 28, 2019    Lucia oBbo                                                                                                                     [id number] [facility] [address]            Dear Lucia,    Your results are listed below.  B12- normal. Repeat regularly   Microalbumin- normal. Repeat yearly   TSH- normal. Repeat yearly   CMP- normal. Repeat yearly   Lipids- normal. Repeat yearly   HgbA1c- has gone up. Repeat in 3 months. Increase physical activity. Watch carbohydrates. Start testing your blood sugar again.         Sincerely,         Keturah Chaney CNP

## 2019-03-27 NOTE — RESULT ENCOUNTER NOTE
B12- normal. Repeat regularly  Microalbumin- normal. Repeat yearly  TSH- normal. Repeat yearly  CMP- normal. Repeat yearly  Lipids- normal. Repeat yearly  HgbA1c- has gone up. Repeat in 3 months. Increase physical activity. Watch carbohydrates. Start testing your blood sugar again.  Please call her with these results. Send a hard copy for their records.   Thanks. LUIS M Bean

## 2019-03-27 NOTE — PATIENT INSTRUCTIONS
1. Type 2 diabetes mellitus with hyperglycemia, with long-term current use of insulin (H) HgbA1c goal <7  Chronic, uncontrolled  - Albumin Random Urine Quantitative with Creat Ratio  - Comprehensive metabolic panel  - C FOOT EXAM  NO CHARGE  - Hemoglobin A1c  - Vitamin B12  - TSH with free T4 reflex  - atorvastatin (LIPITOR) 20 MG tablet; Take 1 tablet (20 mg) by mouth At Bedtime  Dispense: 90 tablet; Refill: 3  - blood glucose monitoring (NO BRAND SPECIFIED) meter device kit; Use to test blood sugar 2 times daily or as directed. Needs lancets and strips per insurance coverage.  Dispense: 1 kit; Refill: 0  - liraglutide (VICTOZA PEN) 18 MG/3ML solution; Inject 1.8 mg Subcutaneous daily  Dispense: 27 mL; Refill: 3  - metFORMIN (GLUCOPHAGE) 1000 MG tablet; TAKE ONE TABLET (1000MG) BY MOUTH AT BREAKFAST AND ONE AND ONE-HALF TABLETS (1500MG) AT DINNER  Dispense: 75 tablet; Refill: 0    2. Benign essential hypertension, BP goal <140/90  Chronic, stable  - furosemide (LASIX) 20 MG tablet; Take 1 tablet (20 mg) by mouth daily  Dispense: 90 tablet; Refill: 3  - hydrALAZINE (APRESOLINE) 100 MG tablet; Take 1 tablet (100 mg) by mouth daily Take 1 tablet  by mouth  Daily (take with 50 mg) total = 150 mg  Dispense: 60 tablet; Refill: 5  - losartan (COZAAR) 50 MG tablet; Take 1.5 tablets (75 mg) by mouth daily Recheck BP in 1 week with nurse only appointment  Dispense: 30 tablet; Refill: 0  - verapamil ER (CALAN-SR) 240 MG CR tablet; Take 1 tablet (240 mg) by mouth daily  Dispense: 90 tablet; Refill: 3  - hydrALAZINE (APRESOLINE) 50 MG tablet  Dispense: 90 tablet; Refill: 1    3. Hypertriglyceridemia, LDL goal <100  Chronic, stable  - Lipid panel reflex to direct LDL Fasting    4. Mild persistent asthma, uncomplicated  Chronic, stable  - albuterol (VENTOLIN HFA) 108 (90 Base) MCG/ACT inhaler; Inhale 1-2 puffs into the lungs every 4 hours as needed  - fluticasone-salmeterol (ADVAIR DISKUS) 250-50 MCG/DOSE inhaler; Inhale 1 puff  into the lungs 2 times daily  Dispense: 1 Inhaler; Refill: 11  - ipratropium - albuterol 0.5 mg/2.5 mg/3 mL (DUONEB) 0.5-2.5 (3) MG/3ML neb solution; Inhale 1 vial (3 mLs) into the lungs every 6 hours as needed  Dispense: 1 Box; Refill: 3  Recheck A1c in 3 months with lab only appointment    5. Morbid obesity with BMI of 45.0-49.9, adult (H)  Chronic, stable  Work on moving more now that Spring is here- goal daily activity  Great work on Weight Watchers  Look into the free videos of [employer]- they even have walking in place videos  Wt Readings from Last 3 Encounters:   03/27/19 119.8 kg (264 lb 3.2 oz)   06/28/18 120.7 kg (266 lb)   01/31/18 119.7 kg (264 lb)       6. Slow transit constipation  Chronic, stable  - polyethylene glycol (MIRALAX) powder; Take 1 capful every other day  Dispense: 510 g; Refill: 1

## 2019-03-27 NOTE — LETTER
My Asthma Action Plan  Name: Lucia Bobo   YOB: 1945  Date: 3/27/2019   My doctor: Keturah Chaney CNP   My clinic: [facility]        My Control Medicine: Fluticasone + salmeterol (Advair) -  Diskus 250/50 mcg 1 puff twice daily  My Rescue Medicine:   Albuterol/ipratroprium  (Duoneb) nebulizer solution 1 treatment every 6 hours as needed  Albuterol/ipratropium (Combivent  Respimat)inhaler 1-2 puffs every 4 hours as needed.   My Asthma Severity: mild persistent  Avoid your asthma triggers              GREEN ZONE   Good Control    I feel good    No cough or wheeze    Can work, sleep and play without asthma symptoms       Take your asthma control medicine every day.     1. If exercise triggers your asthma, take your rescue medication    15 minutes before exercise or sports, and    During exercise if you have asthma symptoms  2. Spacer to use with inhaler: If you have a spacer, make sure to use it with your inhaler             YELLOW ZONE Getting Worse  I have ANY of these:    I do not feel good    Cough or wheeze    Chest feels tight    Wake up at night   1. Keep taking your Green Zone medications  2. Start taking your rescue medicine:    every 20 minutes for up to 1 hour. Then every 4 hours for 24-48 hours.  3. If you stay in the Yellow Zone for more than 12-24 hours, contact your doctor.  4. If you do not return to the Green Zone in 12-24 hours or you get worse, start taking your oral steroid medicine if prescribed by your provider.           RED ZONE Medical Alert - Get Help  I have ANY of these:    I feel awful    Medicine is not helping    Breathing getting harder    Trouble walking or talking    Nose opens wide to breathe       1. Take your rescue medicine NOW  2. If your provider has prescribed an oral steroid medicine, start taking it NOW  3. Call your doctor NOW  4. If you are still in the Red Zone after 20 minutes and you have not reached your doctor:    Take your  rescue medicine again and    Call 911 or go to the emergency room right away    See your regular doctor within 2 weeks of an Emergency Room or Urgent Care visit for follow-up treatment.          Annual Reminders:  Meet with Asthma Educator,  Flu Shot in the Fall, consider Pneumonia Vaccination for patients with asthma (aged 19 and older).    Pharmacy:    [facility] [place] - [address] [facility] - [place], [place] - [address] [facility]                      Asthma Triggers  How To Control Things That Make Your Asthma Worse    Triggers are things that make your asthma worse.  Look at the list below to help you find your triggers and what you can do about them.  You can help prevent asthma flare-ups by staying away from your triggers.      Trigger                                                          What you can do   Cigarette Smoke  Tobacco smoke can make asthma worse. Do not allow smoking in your home, car or around you.  Be sure no one smokes at a child s day care or school.  If you smoke, ask your health care provider for ways to help you quit.  Ask family members to quit too.  Ask your health care provider for a referral to Quit Plan to help you quit smoking, or call 8-730-269-PLAN.     Colds, Flu, Bronchitis  These are common triggers of asthma. Wash your hands often.  Don t touch your eyes, nose or mouth.  Get a flu shot every year.     Dust Mites  These are tiny bugs that live in cloth or carpet. They are too small to see. Wash sheets and blankets in hot water every week.   Encase pillows and mattress in dust mite proof covers.  Avoid having carpet if you can. If you have carpet, vacuum weekly.   Use a dust mask and HEPA vacuum.   Pollen and Outdoor Mold  Some people are allergic to trees, grass, or weed pollen, or molds. Try to keep your windows closed.  Limit time out doors when pollen count is high.   Ask you health care provider about taking medicine  during allergy season.     Animal Dander  Some people are allergic to skin flakes, urine or saliva from pets with fur or feathers. Keep pets with fur or feathers out of your home.    If you can t keep the pet outdoors, then keep the pet out of your bedroom.  Keep the bedroom door closed.  Keep pets off cloth furniture and away from stuffed toys.     Mice, Rats, and Cockroaches  Some people are allergic to the waste from these pests.   Cover food and garbage.  Clean up spills and food crumbs.  Store grease in the refrigerator.   Keep food out of the bedroom.   Indoor Mold  This can be a trigger if your home has high moisture. Fix leaking faucets, pipes, or other sources of water.   Clean moldy surfaces.  Dehumidify basement if it is damp and smelly.   Smoke, Strong Odors, and Sprays  These can reduce air quality. Stay away from strong odors and sprays, such as perfume, powder, hair spray, paints, smoke incense, paint, cleaning products, candles and new carpet.   Exercise or Sports  Some people with asthma have this trigger. Be active!  Ask your doctor about taking medicine before sports or exercise to prevent symptoms.    Warm up for 5-10 minutes before and after sports or exercise.     Other Triggers of Asthma  Cold air:  Cover your nose and mouth with a scarf.  Sometimes laughing or crying can be a trigger.  Some medicines and food can trigger asthma.

## 2019-05-17 DIAGNOSIS — I10 BENIGN ESSENTIAL HYPERTENSION: Chronic | ICD-10-CM

## 2019-05-17 NOTE — TELEPHONE ENCOUNTER
"Requested Prescriptions   Pending Prescriptions Disp Refills     losartan (COZAAR) 50 MG tablet [Pharmacy Med Name: LOSARTAN POTASSIUM 50 MG TABLET] 30 tablet 0     Sig: Take 1.5 tablets (75 mg) by mouth daily Recheck BP in 1 week with nurse only appointment       Angiotensin-II Receptors Passed - 5/17/2019 10:47 AM        Passed - Blood pressure under 140/90 in past 12 months     BP Readings from Last 3 Encounters:   03/27/19 138/68   02/27/19 146/76   06/28/18 124/66                 Passed - Recent (12 mo) or future (30 days) visit within the authorizing provider's specialty     Patient had office visit in the last 12 months or has a visit in the next 30 days with authorizing provider or within the authorizing provider's specialty.  See \"Patient Info\" tab in inbasket, or \"Choose Columns\" in Meds & Orders section of the refill encounter.              Passed - Medication is active on med list        Passed - Patient is age 18 or older        Passed - No active pregnancy on record        Passed - Normal serum creatinine on file in past 12 months     Recent Labs   Lab Test 03/27/19  0855   CR 0.85             Passed - Normal serum potassium on file in past 12 months     Recent Labs   Lab Test 03/27/19  0855   POTASSIUM 4.3                    Passed - No positive pregnancy test in past 12 months      losartan (COZAAR) 50 MG tablet  Last Written Prescription Date:  03/27/2019  Last Fill Quantity: 30 tablet,  # refills: 0   Last office visit: 3/27/2019 with prescribing provider:  DAMIEN Chaney   Future Office Visit:      Maile EWING (R) (M)      "

## 2019-05-20 RX ORDER — LOSARTAN POTASSIUM 50 MG/1
75 TABLET ORAL DAILY
Qty: 135 TABLET | Refills: 0 | Status: SHIPPED | OUTPATIENT
Start: 2019-05-20 | End: 2019-06-27

## 2019-05-23 ENCOUNTER — MEDICAL CORRESPONDENCE (OUTPATIENT)
Dept: HEALTH INFORMATION MANAGEMENT | Facility: CLINIC | Age: 74
End: 2019-05-23

## 2019-05-28 ENCOUNTER — TELEPHONE (OUTPATIENT)
Dept: FAMILY MEDICINE | Facility: CLINIC | Age: 74
End: 2019-05-28

## 2019-05-28 NOTE — TELEPHONE ENCOUNTER
[facility], footwear, dated 05/23/2019, form signed, faxed and sent to scanning.    Maureen Luz  [facility]

## 2019-07-03 NOTE — PROGRESS NOTES
"    SUBJECTIVE   Lucia Bobo is a  female who presents to clinic today for the following health issue(s):       Diabetes Follow-up    How often are you checking your blood sugar? A few times a week    What time of day are you checking your blood sugars (select all that apply)?  Before meals    Have you had any blood sugars above 200?  No    Have you had any blood sugars below 70?  No    What symptoms do you notice when your blood sugar is low?  Shaky    What concerns do you have today about your diabetes? None and Other: thinks she has neuropathy in left leg     Do you have any of these symptoms? (Select all that apply)  No numbness or tingling in feet.  No redness, sores or blisters on feet.  No complaints of excessive thirst.  No reports of blurry vision.  No significant changes to weight.     Have you had a diabetic eye exam in the last 12 months? Yes- Date of last eye exam: 8/29/2018    BP Readings from Last 2 Encounters:   07/05/19 124/74   03/27/19 138/68     Hemoglobin A1C (%)   Date Value   03/27/2019 7.0 (H)   06/28/2018 6.6 (H)     LDL Cholesterol Calculated (mg/dL)   Date Value   03/27/2019 21   02/01/2018 64       Diabetes Management Resources    Amount of exercise or physical activity: 2-3 days/week for an average of 15-30 minutes    Problems taking medications regularly: No    Medication side effects: none    Diet: regular (no restrictions)    Annual Wellness Visit  Are you in the first 12 months of your Medicare Part B coverage?  No    Physical Health:    In general, how would you rate your overall physical health? good    If you drink alcohol do you typically have >3 drinks per day or >7 drinks per week? No    Do you usually eat at least 4 servings of fruit and vegetables a day, include whole grains & fiber and avoid regularly eating high fat or \"junk\" foods? Yes    Needs assistance for the following daily activities: none    Which of the following safety concerns are present in your home?  none " identified     Hearing impairment: No    In the past 6 months, have you been bothered by leaking of urine? no    Mental Health:    In general, how would you rate your overall mental or emotional health? good  PHQ-2 Score:      Do you feel safe in your environment? Yes    Do you have a Health Care Directive? No: Advance care planning was reviewed with patient; patient declined at this time.    Fall risk:  Fallen 2 or more times in the past year?: No  Any fall with injury in the past year?: No    Cognitive Screenin) Repeat 3 items (Leader, Season, Table)    2) Clock draw: NORMAL  3) 3 item recall: Recalls 2 objects   Results: NORMAL clock, 1-2 items recalled: COGNITIVE IMPAIRMENT LESS LIKELY    Mini-CogTM Copyright S Chris. Licensed by the author for use in [employer]; reprinted with permission (adriana@[facility]). All rights reserved.      Current providers sharing in care for this patient include:   Patient Care Team:  Keturah Chaney CNP as PCP - General (Family Practice)  Keturah Chaney CNP as Assigned PCP        ADDRESS ALL HEALTH MAINTENANCE NEEDS- Place orders as needed  Health Maintenance Due   Topic Date Due     DEXA  1945     MEDICARE ANNUAL WELLNESS VISIT  2011     MAMMO SCREENING  2014     ZOSTER IMMUNIZATION (2 of 3) 2015     EYE EXAM  2018     ASTHMA CONTROL TEST  2018     COLONOSCOPY  2019     FALL RISK ASSESSMENT  2019       PCP   Keturah Chaney [phone]    PROBLEM LIST        Patient Active Problem List   Diagnosis     Type 2 diabetes mellitus with hyperglycemia, with long-term current use of insulin (H) HgbA1c goal <7     Advanced directives, counseling/discussion     Osteoarthritis of both knees     Morbid obesity with BMI of 45.0-49.9, adult (H)     Hypertriglyceridemia, LDL goal <100     Benign essential hypertension, BP goal <140/90     Mild persistent asthma, uncomplicated     Colon polyps     Slow  transit constipation       MEDICATIONS        Current Outpatient Medications   Medication     albuterol (2.5 MG/3ML) 0.083% nebulizer solution     albuterol (VENTOLIN HFA) 108 (90 Base) MCG/ACT inhaler     aspirin 81 MG tablet     atorvastatin (LIPITOR) 20 MG tablet     blood glucose monitoring (NO BRAND SPECIFIED) meter device kit     fluticasone-salmeterol (ADVAIR DISKUS) 250-50 MCG/DOSE inhaler     furosemide (LASIX) 20 MG tablet     hydrALAZINE (APRESOLINE) 100 MG tablet     hydrALAZINE (APRESOLINE) 100 MG tablet     hydrALAZINE (APRESOLINE) 50 MG tablet     insulin pen needle (BD SMILEY U/F) 32G X 4 MM miscellaneous     ipratropium - albuterol 0.5 mg/2.5 mg/3 mL (DUONEB) 0.5-2.5 (3) MG/3ML neb solution     liraglutide (VICTOZA PEN) 18 MG/3ML solution     losartan (COZAAR) 50 MG tablet     losartan (COZAAR) 50 MG tablet     metFORMIN (GLUCOPHAGE) 1000 MG tablet     ONETOUCH ULTRA test strip     order for DME     PHARMACIST CHOICE LANCETS MISC     polyethylene glycol (MIRALAX) powder     verapamil ER (CALAN-SR) 240 MG CR tablet     No current facility-administered medications for this visit.        Reviewed and updated as needed this visit by Provider:  Tobacco  Allergies  Meds  Med Hx  Surg Hx  Fam Hx  Soc Hx     ROS      Constitutional, HEENT, cardiovascular, pulmonary, gi and gu systems are negative, except as otherwise noted.    PHYSICAL EXAM   /74 (BP Location: Right arm, Patient Position: Sitting, Cuff Size: Adult Large)   Pulse 85   Temp 96.9  F (36.1  C) (Tympanic)   Resp 19   Wt 120.3 kg (265 lb 3.2 oz)   SpO2 98%   Breastfeeding? No   BMI 45.52 kg/m    Body mass index is 45.52 kg/m .  GENERAL APPEARANCE: healthy, alert, no distress and over weight  RESP: lungs clear to auscultation - no rales, rhonchi or wheezes  CV: regular rates and rhythm, normal S1 S2, no S3 or S4 and no murmur, click or rub  ABDOMEN: soft, nontender, without hepatosplenomegaly or masses and bowel sounds normal  MS:  extremities normal- no gross deformities noted and cramping to left anterior thigh  SKIN: no suspicious lesions or rashes  PSYCH: mentation appears normal and affect normal/bright    ASSESSMENT & PLAN   1. Medicare annual wellness visit, subsequent  Screening    2. Type 2 diabetes mellitus with hyperglycemia, with long-term current use of insulin (H) HgbA1c goal <7  Chronic, stable  - Hemoglobin A1c  - metFORMIN (GLUCOPHAGE) 1000 MG tablet; Take 1 tablet (1000mg) by mouth at breakfast and 1.5 tablet (1,500mg) at dinner. Due for A1c before refill  Dispense: 225 tablet; Refill: 0    3. Benign essential hypertension, BP goal <140/90  Chronic, stable    4. Morbid obesity with BMI of 45.0-49.9, adult (H)  Chronic, unstable  Wt Readings from Last 3 Encounters:   07/05/19 120.3 kg (265 lb 3.2 oz)   03/27/19 119.8 kg (264 lb 3.2 oz)   06/28/18 120.7 kg (266 lb)       5. Special screening for malignant neoplasms, colon  Screening  - GASTROENTEROLOGY ADULT REF PROCEDURE ONLY [facility] [phone]; No Provider Preference    6. Muscle cramp  Chronic, unstable  - Magnesium  - START magnesium oxide (MAG-OX) 400 (241.3 Mg) MG tablet; Take 1 tablet (400 mg) by mouth daily  - START Calcium Carb-Cholecalciferol (CALCIUM 600+D) 600-800 MG-UNIT TABS; Take 1 tablet by mouth 2 times daily  Increase exercise capacity  Stay hydrated  We could also consider a Gabapentin trial in the future    7. Mammogram declined    Next HgbA1c due in September  Schedule eye exam in August 2019- fax us results  FIT TEST or colonoscopy  Mammogram    Risks, benefits, side effects and rationale for treatment plan fully discussed with the patient and understanding expressed.    Keturah Chaney, LUIS M[facility]

## 2019-07-05 ENCOUNTER — OFFICE VISIT (OUTPATIENT)
Dept: FAMILY MEDICINE | Facility: CLINIC | Age: 74
End: 2019-07-05
Payer: COMMERCIAL

## 2019-07-05 VITALS
HEART RATE: 85 BPM | SYSTOLIC BLOOD PRESSURE: 124 MMHG | TEMPERATURE: 96.9 F | DIASTOLIC BLOOD PRESSURE: 74 MMHG | OXYGEN SATURATION: 98 % | BODY MASS INDEX: 45.52 KG/M2 | RESPIRATION RATE: 19 BRPM | WEIGHT: 265.2 LBS

## 2019-07-05 DIAGNOSIS — R25.2 MUSCLE CRAMP: ICD-10-CM

## 2019-07-05 DIAGNOSIS — Z12.11 SPECIAL SCREENING FOR MALIGNANT NEOPLASMS, COLON: ICD-10-CM

## 2019-07-05 DIAGNOSIS — E66.01 MORBID OBESITY WITH BMI OF 45.0-49.9, ADULT (H): Chronic | ICD-10-CM

## 2019-07-05 DIAGNOSIS — Z79.4 TYPE 2 DIABETES MELLITUS WITH HYPERGLYCEMIA, WITH LONG-TERM CURRENT USE OF INSULIN (H): Chronic | ICD-10-CM

## 2019-07-05 DIAGNOSIS — Z53.20 MAMMOGRAM DECLINED: ICD-10-CM

## 2019-07-05 DIAGNOSIS — Z00.00 MEDICARE ANNUAL WELLNESS VISIT, SUBSEQUENT: Primary | ICD-10-CM

## 2019-07-05 DIAGNOSIS — E11.65 TYPE 2 DIABETES MELLITUS WITH HYPERGLYCEMIA, WITH LONG-TERM CURRENT USE OF INSULIN (H): Chronic | ICD-10-CM

## 2019-07-05 DIAGNOSIS — I10 BENIGN ESSENTIAL HYPERTENSION: Chronic | ICD-10-CM

## 2019-07-05 LAB
HBA1C MFR BLD: 6.9 % (ref 0–5.6)
MAGNESIUM SERPL-MCNC: 1.4 MG/DL (ref 1.6–2.3)

## 2019-07-05 PROCEDURE — 99214 OFFICE O/P EST MOD 30 MIN: CPT | Mod: 25 | Performed by: NURSE PRACTITIONER

## 2019-07-05 PROCEDURE — 36415 COLL VENOUS BLD VENIPUNCTURE: CPT | Performed by: NURSE PRACTITIONER

## 2019-07-05 PROCEDURE — 83735 ASSAY OF MAGNESIUM: CPT | Performed by: NURSE PRACTITIONER

## 2019-07-05 PROCEDURE — 83036 HEMOGLOBIN GLYCOSYLATED A1C: CPT | Performed by: NURSE PRACTITIONER

## 2019-07-05 PROCEDURE — 99397 PER PM REEVAL EST PAT 65+ YR: CPT | Performed by: NURSE PRACTITIONER

## 2019-07-05 NOTE — PATIENT INSTRUCTIONS
1. Medicare annual wellness visit, subsequent  Screening    2. Type 2 diabetes mellitus with hyperglycemia, with long-term current use of insulin (H) HgbA1c goal <7  Chronic, stable  - Hemoglobin A1c  - metFORMIN (GLUCOPHAGE) 1000 MG tablet; Take 1 tablet (1000mg) by mouth at breakfast and 1.5 tablet (1,500mg) at dinner. Due for A1c before refill  Dispense: 225 tablet; Refill: 0    3. Benign essential hypertension, BP goal <140/90  Chronic, stable    4. Morbid obesity with BMI of 45.0-49.9, adult (H)  Chronic, stable  Wt Readings from Last 3 Encounters:   07/05/19 120.3 kg (265 lb 3.2 oz)   03/27/19 119.8 kg (264 lb 3.2 oz)   06/28/18 120.7 kg (266 lb)       5. Special screening for malignant neoplasms, colon  Screening  - GASTROENTEROLOGY ADULT REF PROCEDURE ONLY [facility] [phone]; No Provider Preference    6. Muscle cramp  Chronic, stable  - Magnesium  - START magnesium oxide (MAG-OX) 400 (241.3 Mg) MG tablet; Take 1 tablet (400 mg) by mouth daily  - START Calcium Carb-Cholecalciferol (CALCIUM 600+D) 600-800 MG-UNIT TABS; Take 1 tablet by mouth 2 times daily    7. Mammogram declined    Next HgbA1c due in September  Schedule eye exam in August 2019- fax us results  FIT TEST or colonoscopy  Mammogram

## 2019-07-05 NOTE — LETTER
7/10/2019     Lucia Bobo  [address] [facility] [address]      Dear Lucia:    Thank you for allowing me to participate in your care. Your recent test results were reviewed and listed below.      Your results are provided below for your review  Results for orders placed or performed in visit on 07/05/19   Hemoglobin A1c   Result Value Ref Range    Hemoglobin A1C 6.9 (H) 0 - 5.6 %   Magnesium   Result Value Ref Range    Magnesium 1.4 (L) 1.6 - 2.3 mg/dL                 Thank you for choosing [facility]. As a result, please continue with the treatment plan discussed in the office. Return as discussed or sooner if symptoms worsens or fail to improve. If you have any further questions or concerns, please do not hesitate to contact us.      Sincerely,    LUIS M King  [facility]  [address] [facility] [address]  Phone: [phone]  Fax: [phone]

## 2019-07-09 NOTE — RESULT ENCOUNTER NOTE
Magnesium is low- start Magnesium supplement as we discussed  Hemoglobin A1c is under 7 and controlled. Repeat in 6 months. Continue healthy diet, and increase exercise.   Please call her with these results. Send a hard copy for their records.   Thanks. LUIS M Bean

## 2019-07-31 DIAGNOSIS — J45.30 MILD PERSISTENT ASTHMA, UNCOMPLICATED: Chronic | ICD-10-CM

## 2019-07-31 NOTE — TELEPHONE ENCOUNTER
"Requested Prescriptions   Pending Prescriptions Disp Refills     ADVAIR DISKUS 250-50 MCG/DOSE inhaler [Pharmacy Med Name: ADVAIR DISKUS 250-50MCG/DOSE AEPB]  11     Sig: INHALE ONE PUFF INTO THE LUNGS TWO TIMES A DAY       Inhaled Steroids Protocol Failed - 7/31/2019  2:04 PM        Failed - Asthma control assessment score within normal limits in last 6 months     Please review ACT score.     ACT Total Scores 8/2/2017 8/29/2017 6/28/2018   ACT TOTAL SCORE - - -   ASTHMA ER VISITS - - -   ASTHMA HOSPITALIZATIONS - - -   ACT TOTAL SCORE (Goal Greater than or Equal to 20) 24 - 25   In the past 12 months, how many times did you visit the emergency room for your asthma without being admitted to the hospital? 0 0 0   In the past 12 months, how many times were you hospitalized overnight because of your asthma? 0 0 0               Passed - Patient is age 12 or older        Passed - Medication is active on med list        Passed - Recent (6 mo) or future (30 days) visit within the authorizing provider's specialty     Patient had office visit in the last 6 months or has a visit in the next 30 days with authorizing provider or within the authorizing provider's specialty.  See \"Patient Info\" tab in inbasket, or \"Choose Columns\" in Meds & Orders section of the refill encounter.            Last Written Prescription Date:  3/27/19  Last Fill Quantity: 1,  # refills: 11   Last office visit: 7/5/2019 with prescribing provider:     Future Office Visit:      "

## 2019-08-29 DIAGNOSIS — J45.30 MILD PERSISTENT ASTHMA, UNCOMPLICATED: Chronic | ICD-10-CM

## 2019-08-29 NOTE — TELEPHONE ENCOUNTER
"Requested Prescriptions   Pending Prescriptions Disp Refills     ADVAIR DISKUS 250-50 MCG/DOSE inhaler [Pharmacy Med Name: ADVAIR DISKUS 250-50MCG/DOSE AEPB]  0     Sig: INHALE ONE PUFF BY MOUTH TWICE A DAY       Inhaled Steroids Protocol Failed - 8/29/2019 10:02 AM        Failed - Asthma control assessment score within normal limits in last 6 months     Please review ACT score.           Passed - Patient is age 12 or older        Passed - Medication is active on med list        Passed - Recent (6 mo) or future (30 days) visit within the authorizing provider's specialty     Patient had office visit in the last 6 months or has a visit in the next 30 days with authorizing provider or within the authorizing provider's specialty.  See \"Patient Info\" tab in inbasket, or \"Choose Columns\" in Meds & Orders section of the refill encounter.            ADVAIR DISKUS 250-50 MCG/DOSE inhaler  Last Written Prescription Date:  07/31/2019  Last Fill Quantity: 1 inhaler,  # refills: 0   Last office visit: 7/5/2019 with prescribing provider:  DAMIEN Chaney   Future Office Visit:      ACT Total Scores 8/2/2017 8/29/2017 6/28/2018   ACT TOTAL SCORE - - -   ASTHMA ER VISITS - - -   ASTHMA HOSPITALIZATIONS - - -   ACT TOTAL SCORE (Goal Greater than or Equal to 20) 24 - 25   In the past 12 months, how many times did you visit the emergency room for your asthma without being admitted to the hospital? 0 0 0   In the past 12 months, how many times were you hospitalized overnight because of your asthma? 0 0 0     Maile De La Rosa RT (R) (M)    "

## 2019-09-11 ENCOUNTER — ANESTHESIA EVENT (OUTPATIENT)
Dept: GASTROENTEROLOGY | Facility: CLINIC | Age: 74
End: 2019-09-11
Payer: MEDICARE

## 2019-09-11 NOTE — ANESTHESIA PREPROCEDURE EVALUATION
Anesthesia Pre-Procedure Evaluation    Patient: Lucia Bobo   MRN: [id number] : 1945          Preoperative Diagnosis: screening    Procedure(s):  COLONOSCOPY    Past Medical History:   Diagnosis Date     Asthma      HTN      Type II or unspecified type diabetes mellitus without mention of complication, not stated as uncontrolled 04     Past Surgical History:   Procedure Laterality Date     APPENDECTOMY       CHOLECYSTECTOMY, OPEN       COLONOSCOPY       COLONOSCOPY N/A 2017    Procedure: COLONOSCOPY;;  Surgeon: Aurelio Alanis MD;  Location: [facility]     COLONOSCOPY N/A 2018    Procedure: COMBINED COLONOSCOPY, SINGLE OR MULTIPLE BIOPSY/POLYPECTOMY BY BIOPSY;  Colonoscopy with polypectomy by snare and cold biopsy;  Surgeon: Aurelio Alanis MD;  Location: [facility]     ESOPHAGOSCOPY, GASTROSCOPY, DUODENOSCOPY (EGD), COMBINED N/A 2015    Procedure: COMBINED ESOPHAGOSCOPY, GASTROSCOPY, DUODENOSCOPY (EGD);  Surgeon: Anamika Boyer MD;  Location: [facility]     ESOPHAGOSCOPY, GASTROSCOPY, DUODENOSCOPY (EGD), COMBINED N/A 2017    Procedure: COMBINED ESOPHAGOSCOPY, GASTROSCOPY, DUODENOSCOPY (EGD);  Gastroscopy & Colonoscopy  ;  Surgeon: Aurelio Alanis MD;  Location: [facility]       Anesthesia Evaluation     . Pt has had prior anesthetic. Type: General           ROS/MED HX    ENT/Pulmonary:     (+)asthma , . .    Neurologic:  - neg neurologic ROS     Cardiovascular:     (+) Dyslipidemia, hypertension----. : . . . :. . Previous cardiac testing date:results:date: results:ECG reviewed date:3-21-13 results:NSR date: results:          METS/Exercise Tolerance:  >4 METS   Hematologic:  - neg hematologic  ROS       Musculoskeletal: Comment: Muscle cramps  (+) arthritis,  -       GI/Hepatic: Comment: Colon polyps  constipation       (-) liver disease   Renal/Genitourinary:  - ROS Renal section negative       Endo:     (+) type II DM Obesity, .      Psychiatric:  - neg  "psychiatric ROS       Infectious Disease:  - neg infectious disease ROS       Malignancy:      - no malignancy   Other:    - neg other ROS                      Physical Exam  Normal systems: cardiovascular, pulmonary and dental    Airway   Mallampati: II    Dental     Cardiovascular       Pulmonary             Lab Results   Component Value Date    WBC 10.4 01/08/2018    HGB 12.5 01/08/2018    HCT 37.8 01/08/2018     01/08/2018     03/27/2019    POTASSIUM 4.3 03/27/2019    CHLORIDE 104 03/27/2019    CO2 24 03/27/2019    BUN 23 03/27/2019    CR 0.85 03/27/2019     (H) 03/27/2019    RAQUEL 9.4 03/27/2019    MAG 1.4 (L) 07/05/2019    ALBUMIN 3.8 03/27/2019    PROTTOTAL 8.0 03/27/2019    ALT 27 03/27/2019    AST 19 03/27/2019    ALKPHOS 115 03/27/2019    BILITOTAL 0.5 03/27/2019    LIPASE 365 01/08/2018    AMYLASE 75 10/16/2017    PTT 29 03/21/2013    INR 1.04 03/21/2013    TSH 3.20 03/27/2019    T4 1.12 01/18/2017       Preop Vitals  BP Readings from Last 3 Encounters:   07/05/19 124/74   03/27/19 138/68   02/27/19 146/76    Pulse Readings from Last 3 Encounters:   07/05/19 85   03/27/19 87   02/27/19 84      Resp Readings from Last 3 Encounters:   07/05/19 19   03/27/19 18   06/28/18 20    SpO2 Readings from Last 3 Encounters:   07/05/19 98%   03/27/19 96%   01/31/18 97%      Temp Readings from Last 1 Encounters:   07/05/19 36.1  C (96.9  F) (Tympanic)    Ht Readings from Last 1 Encounters:   06/28/18 1.626 m (5' 4\")      Wt Readings from Last 1 Encounters:   07/05/19 120.3 kg (265 lb 3.2 oz)    Estimated body mass index is 45.52 kg/m  as calculated from the following:    Height as of 6/28/18: 1.626 m (5' 4\").    Weight as of 7/5/19: 120.3 kg (265 lb 3.2 oz).       Anesthesia Plan      History & Physical Review  History and physical reviewed and following examination; no interval change.    ASA Status:  3 .    NPO Status:  > 6 hours    Plan for MAC Reason for MAC:  Deep or markedly invasive procedure " (G8)    Aware of aspiration risk.      Postoperative Care      Consents  Anesthetic plan, risks, benefits and alternatives discussed with:  Patient..                 MERRY Wright CRNA

## 2019-09-12 ENCOUNTER — ANESTHESIA (OUTPATIENT)
Dept: GASTROENTEROLOGY | Facility: CLINIC | Age: 74
End: 2019-09-12
Payer: MEDICARE

## 2019-09-12 ENCOUNTER — HOSPITAL ENCOUNTER (OUTPATIENT)
Facility: CLINIC | Age: 74
Discharge: HOME | End: 2019-09-12
Attending: SURGERY | Admitting: SURGERY
Payer: MEDICARE

## 2019-09-12 VITALS
TEMPERATURE: 98.1 F | HEIGHT: 65 IN | DIASTOLIC BLOOD PRESSURE: 91 MMHG | WEIGHT: 265 LBS | BODY MASS INDEX: 44.15 KG/M2 | OXYGEN SATURATION: 93 % | SYSTOLIC BLOOD PRESSURE: 153 MMHG | RESPIRATION RATE: 16 BRPM | HEART RATE: 91 BPM

## 2019-09-12 LAB
COLONOSCOPY: NORMAL
GLUCOSE BLDC GLUCOMTR-MCNC: 175 MG/DL (ref 70–99)

## 2019-09-12 PROCEDURE — 25000125 ZZHC RX 250: Performed by: NURSE ANESTHETIST, CERTIFIED REGISTERED

## 2019-09-12 PROCEDURE — G0121 COLON CA SCRN NOT HI RSK IND: HCPCS | Performed by: SURGERY

## 2019-09-12 PROCEDURE — 37000008 ZZH ANESTHESIA TECHNICAL FEE, 1ST 30 MIN: Performed by: SURGERY

## 2019-09-12 PROCEDURE — 82962 GLUCOSE BLOOD TEST: CPT

## 2019-09-12 PROCEDURE — 45378 DIAGNOSTIC COLONOSCOPY: CPT | Performed by: SURGERY

## 2019-09-12 PROCEDURE — 25000128 H RX IP 250 OP 636: Performed by: NURSE ANESTHETIST, CERTIFIED REGISTERED

## 2019-09-12 PROCEDURE — 25800030 ZZH RX IP 258 OP 636: Performed by: NURSE ANESTHETIST, CERTIFIED REGISTERED

## 2019-09-12 RX ORDER — PROPOFOL 10 MG/ML
INJECTION, EMULSION INTRAVENOUS PRN
Status: DISCONTINUED | OUTPATIENT
Start: 2019-09-12 | End: 2019-09-12

## 2019-09-12 RX ORDER — SODIUM CHLORIDE, SODIUM LACTATE, POTASSIUM CHLORIDE, CALCIUM CHLORIDE 600; 310; 30; 20 MG/100ML; MG/100ML; MG/100ML; MG/100ML
INJECTION, SOLUTION INTRAVENOUS CONTINUOUS
Status: DISCONTINUED | OUTPATIENT
Start: 2019-09-12 | End: 2019-09-12 | Stop reason: HOSPADM

## 2019-09-12 RX ORDER — LIDOCAINE 40 MG/G
CREAM TOPICAL
Status: DISCONTINUED | OUTPATIENT
Start: 2019-09-12 | End: 2019-09-12 | Stop reason: HOSPADM

## 2019-09-12 RX ORDER — GLYCOPYRROLATE 0.2 MG/ML
INJECTION, SOLUTION INTRAMUSCULAR; INTRAVENOUS PRN
Status: DISCONTINUED | OUTPATIENT
Start: 2019-09-12 | End: 2019-09-12

## 2019-09-12 RX ORDER — PROPOFOL 10 MG/ML
INJECTION, EMULSION INTRAVENOUS CONTINUOUS PRN
Status: DISCONTINUED | OUTPATIENT
Start: 2019-09-12 | End: 2019-09-12

## 2019-09-12 RX ORDER — ONDANSETRON 2 MG/ML
4 INJECTION INTRAMUSCULAR; INTRAVENOUS ONCE
Status: COMPLETED | OUTPATIENT
Start: 2019-09-12 | End: 2019-09-12

## 2019-09-12 RX ORDER — ONDANSETRON 2 MG/ML
4 INJECTION INTRAMUSCULAR; INTRAVENOUS
Status: DISCONTINUED | OUTPATIENT
Start: 2019-09-12 | End: 2019-09-12 | Stop reason: HOSPADM

## 2019-09-12 RX ADMIN — PROPOFOL 200 MCG/KG/MIN: 10 INJECTION, EMULSION INTRAVENOUS at 09:11

## 2019-09-12 RX ADMIN — ONDANSETRON 4 MG: 2 INJECTION INTRAMUSCULAR; INTRAVENOUS at 08:31

## 2019-09-12 RX ADMIN — PROPOFOL 40 MG: 10 INJECTION, EMULSION INTRAVENOUS at 09:11

## 2019-09-12 RX ADMIN — PROPOFOL 30 MG: 10 INJECTION, EMULSION INTRAVENOUS at 09:10

## 2019-09-12 RX ADMIN — SODIUM CHLORIDE, POTASSIUM CHLORIDE, SODIUM LACTATE AND CALCIUM CHLORIDE 1000 ML: 600; 310; 30; 20 INJECTION, SOLUTION INTRAVENOUS at 08:31

## 2019-09-12 RX ADMIN — GLYCOPYRROLATE 0.2 MG: 0.2 INJECTION, SOLUTION INTRAMUSCULAR; INTRAVENOUS at 09:10

## 2019-09-12 RX ADMIN — PROPOFOL 30 MG: 10 INJECTION, EMULSION INTRAVENOUS at 09:12

## 2019-09-12 RX ADMIN — LIDOCAINE HYDROCHLORIDE 1 ML: 10 INJECTION, SOLUTION EPIDURAL; INFILTRATION; INTRACAUDAL; PERINEURAL at 08:31

## 2019-09-12 NOTE — H&P
74 year old year old female here for colonoscopy for screening.    Patient Active Problem List   Diagnosis     Type 2 diabetes mellitus with hyperglycemia, with long-term current use of insulin (H) HgbA1c goal <7     Advanced directives, counseling/discussion     Osteoarthritis of both knees     Morbid obesity with BMI of 45.0-49.9, adult (H)     Hypertriglyceridemia, LDL goal <100     Benign essential hypertension, BP goal <140/90     Mild persistent asthma, uncomplicated     Colon polyps     Slow transit constipation     Muscle cramp     Mammogram declined       Past Medical History:   Diagnosis Date     Asthma      HTN      Type II or unspecified type diabetes mellitus without mention of complication, not stated as uncontrolled 4/2/04       Past Surgical History:   Procedure Laterality Date     APPENDECTOMY       CHOLECYSTECTOMY, OPEN       COLONOSCOPY  2007     COLONOSCOPY N/A 12/22/2017    Procedure: COLONOSCOPY;;  Surgeon: Aurelio Alanis MD;  Location: [facility]     COLONOSCOPY N/A 1/17/2018    Procedure: COMBINED COLONOSCOPY, SINGLE OR MULTIPLE BIOPSY/POLYPECTOMY BY BIOPSY;  Colonoscopy with polypectomy by snare and cold biopsy;  Surgeon: Aurelio Alanis MD;  Location: [facility]     ESOPHAGOSCOPY, GASTROSCOPY, DUODENOSCOPY (EGD), COMBINED N/A 11/20/2015    Procedure: COMBINED ESOPHAGOSCOPY, GASTROSCOPY, DUODENOSCOPY (EGD);  Surgeon: Anamika Boyer MD;  Location: [facility]     ESOPHAGOSCOPY, GASTROSCOPY, DUODENOSCOPY (EGD), COMBINED N/A 12/22/2017    Procedure: COMBINED ESOPHAGOSCOPY, GASTROSCOPY, DUODENOSCOPY (EGD);  Gastroscopy & Colonoscopy  ;  Surgeon: Aurelio Alanis MD;  Location: [facility]       @[facility]@    No current outpatient medications on file.       Allergies   Allergen Reactions     Amoxicillin Hives and Itching     Lisinopril Cough     Penicillins Other (See Comments)     Has only had reaction to Amoxicillin so does not take any PCN       Pt reports that she has never smoked.  "She has never used smokeless tobacco. She reports that she does not drink alcohol or use drugs.    Exam:  BP (!) 204/108   Temp 98.1  F (36.7  C) (Oral)   Resp 20   Ht 1.651 m (5' 5\")   Wt 120.2 kg (265 lb)   SpO2 97%   BMI 44.10 kg/m      Awake, Alert OX3  Lungs - CTA bilaterally  CV - RRR, no murmurs, distal pulses intact  Abd - soft, non-distended, non-tender, +BS  Extr - No cyanosis or edema    A/P 74 year old year old female in need of colonoscopy for screening. Risks, benefits, alternatives, and complications were discussed including the possibility of perforation and the patient agreed to proceed    Danielito Reeves MD     "

## 2019-09-12 NOTE — ANESTHESIA CARE TRANSFER NOTE
Patient: Lucia Bobo    Procedure(s):  COLONOSCOPY    Diagnosis: screening  Diagnosis Additional Information: No value filed.    Anesthesia Type:   MAC     Note:  Airway :Room Air  Patient transferred to:Phase II  Handoff Report: Identifed the Patient, Identified the Reponsible Provider, Reviewed the pertinent medical history, Discussed the surgical course, Reviewed Intra-OP anesthesia mangement and issues during anesthesia, Set expectations for post-procedure period and Allowed opportunity for questions and acknowledgement of understanding      Vitals: (Last set prior to Anesthesia Care Transfer)    CRNA VITALS  9/12/2019 0853 - 9/12/2019 0925      9/12/2019             Pulse:  89    Ht Rate:  87    SpO2:  99 %                Electronically Signed By: Henry Duvall CRNA, APRN CRNA  September 12, 2019  9:25 AM

## 2019-09-12 NOTE — ANESTHESIA POSTPROCEDURE EVALUATION
Patient: Lucia Bobo    Procedure(s):  COLONOSCOPY    Diagnosis:screening  Diagnosis Additional Information: No value filed.    Anesthesia Type:  MAC    Note:  Anesthesia Post Evaluation    Patient location during evaluation: Phase 2 and Bedside  Patient participation: Able to fully participate in evaluation  Level of consciousness: awake  Pain management: adequate  Airway patency: patent  Cardiovascular status: acceptable  Respiratory status: acceptable  Hydration status: acceptable  PONV: none     Anesthetic complications: None          Last vitals:  Vitals:    09/12/19 0811   BP: (!) 204/108   Resp: 20   Temp: 36.7  C (98.1  F)   SpO2: 97%         Electronically Signed By: Henry Duvall CRNA, MERRY MORA  September 12, 2019  9:26 AM

## 2019-09-16 ENCOUNTER — ANCILLARY PROCEDURE (OUTPATIENT)
Dept: GENERAL RADIOLOGY | Facility: CLINIC | Age: 74
End: 2019-09-16
Attending: NURSE PRACTITIONER
Payer: COMMERCIAL

## 2019-09-16 ENCOUNTER — OFFICE VISIT (OUTPATIENT)
Dept: FAMILY MEDICINE | Facility: CLINIC | Age: 74
End: 2019-09-16
Payer: COMMERCIAL

## 2019-09-16 VITALS
RESPIRATION RATE: 20 BRPM | TEMPERATURE: 98.5 F | SYSTOLIC BLOOD PRESSURE: 132 MMHG | OXYGEN SATURATION: 97 % | HEART RATE: 88 BPM | DIASTOLIC BLOOD PRESSURE: 64 MMHG

## 2019-09-16 DIAGNOSIS — M25.551 BILATERAL HIP PAIN: ICD-10-CM

## 2019-09-16 DIAGNOSIS — M54.50 ACUTE LEFT-SIDED LOW BACK PAIN WITHOUT SCIATICA: Primary | ICD-10-CM

## 2019-09-16 DIAGNOSIS — M54.50 ACUTE LEFT-SIDED LOW BACK PAIN WITHOUT SCIATICA: ICD-10-CM

## 2019-09-16 DIAGNOSIS — Z78.0 POST-MENOPAUSE: ICD-10-CM

## 2019-09-16 DIAGNOSIS — E66.01 MORBID OBESITY WITH BMI OF 45.0-49.9, ADULT (H): Chronic | ICD-10-CM

## 2019-09-16 DIAGNOSIS — M25.552 BILATERAL HIP PAIN: ICD-10-CM

## 2019-09-16 LAB
ALBUMIN UR-MCNC: NEGATIVE MG/DL
APPEARANCE UR: CLEAR
BACTERIA #/AREA URNS HPF: ABNORMAL /HPF
BILIRUB UR QL STRIP: NEGATIVE
COLOR UR AUTO: YELLOW
GLUCOSE UR STRIP-MCNC: NEGATIVE MG/DL
HGB UR QL STRIP: NEGATIVE
KETONES UR STRIP-MCNC: NEGATIVE MG/DL
LEUKOCYTE ESTERASE UR QL STRIP: ABNORMAL
NITRATE UR QL: NEGATIVE
NON-SQ EPI CELLS #/AREA URNS LPF: ABNORMAL /LPF
PH UR STRIP: 5 PH (ref 5–7)
RBC #/AREA URNS AUTO: ABNORMAL /HPF
SOURCE: ABNORMAL
SP GR UR STRIP: 1.01 (ref 1–1.03)
UROBILINOGEN UR STRIP-ACNC: 0.2 EU/DL (ref 0.2–1)
WBC #/AREA URNS AUTO: ABNORMAL /HPF

## 2019-09-16 PROCEDURE — 99214 OFFICE O/P EST MOD 30 MIN: CPT | Performed by: NURSE PRACTITIONER

## 2019-09-16 PROCEDURE — 73523 X-RAY EXAM HIPS BI 5/> VIEWS: CPT | Mod: FY

## 2019-09-16 PROCEDURE — 72100 X-RAY EXAM L-S SPINE 2/3 VWS: CPT | Mod: FY

## 2019-09-16 PROCEDURE — 81001 URINALYSIS AUTO W/SCOPE: CPT | Performed by: NURSE PRACTITIONER

## 2019-09-16 RX ORDER — DULOXETIN HYDROCHLORIDE 30 MG/1
CAPSULE, DELAYED RELEASE ORAL
Qty: 60 CAPSULE | Refills: 0 | Status: SHIPPED | OUTPATIENT
Start: 2019-09-16 | End: 2019-09-24

## 2019-09-16 RX ORDER — PREDNISONE 20 MG/1
40 TABLET ORAL DAILY
Qty: 10 TABLET | Refills: 0 | Status: SHIPPED | OUTPATIENT
Start: 2019-09-16 | End: 2019-09-24

## 2019-09-16 NOTE — NURSING NOTE
"Chief Complaint   Patient presents with     Back Pain       Initial /64 (BP Location: Right arm, Patient Position: Sitting, Cuff Size: Adult Large)   Pulse 88   Temp 98.5  F (36.9  C) (Tympanic)   Resp 20   SpO2 97%  Estimated body mass index is 44.1 kg/m  as calculated from the following:    Height as of 9/12/19: 1.651 m (5' 5\").    Weight as of 9/12/19: 120.2 kg (265 lb).    Patient presents to the clinic using No DME    Health Maintenance that is potentially due pending provider review:  Mammogram    Pt declines to have mammogram.    Is there anyone who you would like to be able to receive your results? No  If yes have patient fill out JAZMYN    "

## 2019-09-16 NOTE — PROGRESS NOTES
SUBJECTIVE   Lucia Bobo is a  female who presents to clinic today for the following health issue(s):       Joint Pain: Low back and hip pain    Onset: 2 week     Description:   Location: skin of left hip and low back  Character: Sharp and Stabbing    Intensity: moderate, severe    Progression of Symptoms: intermittent    Accompanying Signs & Symptoms:  Other symptoms: numbness    History:   Previous similar pain: no       Precipitating factors:   Trauma or overuse: no     Alleviating factors:  Improved by: ice sometimes    Therapies Tried and outcome: ice, Tylenol extra strength, and chiropractor with no relief- twice weekly         ADDRESS ALL HEALTH MAINTENANCE NEEDS- Place orders as needed  Health Maintenance Due   Topic Date Due     DEXA  1945     MEDICARE ANNUAL WELLNESS VISIT  07/29/2011     PNEUMOCOCCAL IMMUNIZATION 65+ LOW/MEDIUM RISK (2 of 2 - PCV13) 03/22/2014     MAMMO SCREENING  08/23/2014     ZOSTER IMMUNIZATION (2 of 3) 02/25/2015     EYE EXAM  08/29/2018     ASTHMA CONTROL TEST  12/28/2018     INFLUENZA VACCINE (1) 09/01/2019       PCP   Keturah Chaney, [facility] [phone]    PROBLEM LIST        Patient Active Problem List   Diagnosis     Type 2 diabetes mellitus with hyperglycemia, with long-term current use of insulin (H) HgbA1c goal <7     Advanced directives, counseling/discussion     Osteoarthritis of both knees     Morbid obesity with BMI of 45.0-49.9, adult (H)     Hypertriglyceridemia, LDL goal <100     Benign essential hypertension, BP goal <140/90     Mild persistent asthma, uncomplicated     Colon polyps     Slow transit constipation     Muscle cramp     Mammogram declined     Post-menopause     Bilateral hip pain     Acute left-sided low back pain without sciatica       MEDICATIONS        Current Outpatient Medications   Medication     ADVAIR DISKUS 250-50 MCG/DOSE inhaler     albuterol (2.5 MG/3ML) 0.083% nebulizer solution     albuterol (VENTOLIN HFA) 108 (90 Base)  MCG/ACT inhaler     aspirin 81 MG tablet     atorvastatin (LIPITOR) 20 MG tablet     blood glucose monitoring (NO BRAND SPECIFIED) meter device kit     Calcium Carb-Cholecalciferol (CALCIUM 600+D) 600-800 MG-UNIT TABS     DULoxetine (CYMBALTA) 30 MG capsule     furosemide (LASIX) 20 MG tablet     hydrALAZINE (APRESOLINE) 100 MG tablet     hydrALAZINE (APRESOLINE) 50 MG tablet     insulin pen needle (BD SMILEY U/F) 32G X 4 MM miscellaneous     ipratropium - albuterol 0.5 mg/2.5 mg/3 mL (DUONEB) 0.5-2.5 (3) MG/3ML neb solution     liraglutide (VICTOZA PEN) 18 MG/3ML solution     losartan (COZAAR) 50 MG tablet     magnesium oxide (MAG-OX) 400 (241.3 Mg) MG tablet     metFORMIN (GLUCOPHAGE) 1000 MG tablet     ONETOUCH ULTRA test strip     order for DME     PHARMACIST CHOICE LANCETS MISC     polyethylene glycol (MIRALAX) powder     predniSONE (DELTASONE) 20 MG tablet     verapamil ER (CALAN-SR) 240 MG CR tablet     No current facility-administered medications for this visit.        Reviewed and updated as needed this visit by Provider:  Tobacco  Allergies  Meds  Med Hx  Surg Hx  Fam Hx  Soc Hx     ROS      Constitutional, neuro, ENT, endocrine, pulmonary, cardiac, gastrointestinal, genitourinary, musculoskeletal, integument and psychiatric systems are negative, except as otherwise noted.    PHYSICAL EXAM   /64 (BP Location: Right arm, Patient Position: Sitting, Cuff Size: Adult Large)   Pulse 88   Temp 98.5  F (36.9  C) (Tympanic)   Resp 20   SpO2 97%   There is no height or weight on file to calculate BMI.  GENERAL APPEARANCE: healthy, alert, no distress and over weight  RESP: lungs clear to auscultation - no rales, rhonchi or wheezes  CV: regular rates and rhythm, normal S1 S2, no S3 or S4 and no murmur, click or rub  ABDOMEN: soft, nontender, without hepatosplenomegaly or masses and bowel sounds normal  MS: extremities normal- no gross deformities noted  ORTHO:   Low back exam:  Inspection:      no  visible deformity in the low back       normal skin       normal vascular       normal lymphatic       no asymmetry  Posture:      normal  Tender :     paraspinal muscles  ROM:      limited flexion due to pain       limited extension due to pain  Strength:     hip flexion 5/5 bilateral       knee extension 5/5 bilateral       ankle dorsiflexion 5/5 bilateral       ankle plantarflexion 5/5 bilateral       dorsiflexion of the great toe 5/5 bilateral  Reflexes:      patellar (L3, L4) symmetric normal  Sensation:     grossly intact throughout lower extremities  Special tests:      straight leg raise left neg        straight leg raise right neg    SKIN: no suspicious lesions or rashes  NEURO: Normal strength and tone, mentation intact, speech normal, DTR symmetrically normal in lower extremities and normal strength throughout  PSYCH: mentation appears normal and affect normal/bright    Results for orders placed or performed in visit on 09/16/19   UA reflex to Microscopic and Culture   Result Value Ref Range    Color Urine Yellow     Appearance Urine Clear     Glucose Urine Negative NEG^Negative mg/dL    Bilirubin Urine Negative NEG^Negative    Ketones Urine Negative NEG^Negative mg/dL    Specific Gravity Urine 1.010 1.003 - 1.035    Blood Urine Negative NEG^Negative    pH Urine 5.0 5.0 - 7.0 pH    Protein Albumin Urine Negative NEG^Negative mg/dL    Urobilinogen Urine 0.2 0.2 - 1.0 EU/dL    Nitrite Urine Negative NEG^Negative    Leukocyte Esterase Urine Trace (A) NEG^Negative    Source Midstream Urine    Urine Microscopic   Result Value Ref Range    WBC Urine 0 - 5 OTO5^0 - 5 /HPF    RBC Urine O - 2 OTO2^O - 2 /HPF    Squamous Epithelial /LPF Urine Few FEW^Few /LPF    Bacteria Urine Few (A) NEG^Negative /HPF     Xrays reviewed, vertebrae appear in-line. Hard to read hip x-rays.  ?DDD. Will await for Radiologist's read    ASSESSMENT & PLAN   1. Acute left-sided low back pain without sciatica  Acute, stable  - UA reflex to  Microscopic and Culture  - XR Lumbar Spine 2/3 Views; Future  - XR Pelvis and Hip Bilateral 2 Views; Future  - Urine Microscopic  - DULoxetine (CYMBALTA) 30 MG capsule; Take 1 capsule daily for a week, then increase to 2 capsules daily thereafter  Dispense: 60 capsule; Refill: 0  - predniSONE (DELTASONE) 20 MG tablet; Take 2 tablets (40 mg) by mouth daily for 5 days  Dispense: 10 tablet; Refill: 0  Recheck next week    2. Bilateral hip pain  Acute, stable  - UA reflex to Microscopic and Culture  - XR Lumbar Spine 2/3 Views; Future  - XR Pelvis and Hip Bilateral 2 Views; Future  - DULoxetine (CYMBALTA) 30 MG capsule; Take 1 capsule daily for a week, then increase to 2 capsules daily thereafter  Dispense: 60 capsule; Refill: 0  - predniSONE (DELTASONE) 20 MG tablet; Take 2 tablets (40 mg) by mouth daily for 5 days  Dispense: 10 tablet; Refill: 0    3. Morbid obesity with BMI of 45.0-49.9, adult (H)  Chronic, stable  - DX Hip/Pelvis/Spine; Future    4. Post-menopause  Chronic, stable  - DX Hip/Pelvis/Spine; Future    Results for orders placed or performed in visit on 09/16/19   UA reflex to Microscopic and Culture   Result Value Ref Range    Color Urine Yellow     Appearance Urine Clear     Glucose Urine Negative NEG^Negative mg/dL    Bilirubin Urine Negative NEG^Negative    Ketones Urine Negative NEG^Negative mg/dL    Specific Gravity Urine 1.010 1.003 - 1.035    Blood Urine Negative NEG^Negative    pH Urine 5.0 5.0 - 7.0 pH    Protein Albumin Urine Negative NEG^Negative mg/dL    Urobilinogen Urine 0.2 0.2 - 1.0 EU/dL    Nitrite Urine Negative NEG^Negative    Leukocyte Esterase Urine Trace (A) NEG^Negative    Source Midstream Urine    Urine Microscopic   Result Value Ref Range    WBC Urine 0 - 5 OTO5^0 - 5 /HPF    RBC Urine O - 2 OTO2^O - 2 /HPF    Squamous Epithelial /LPF Urine Few FEW^Few /LPF    Bacteria Urine Few (A) NEG^Negative /HPF       Patient Education     Back Pain (Acute or Chronic)    Back pain is one of  the most common problems. The good news is that most people feel better in 1 to 2 weeks, and most of the rest in 1 to 2 months. Most people can remain active.  People experience and describe pain differently; not everyone is the same.    The pain can be sharp, stabbing, shooting, aching, cramping or burning.    Movement, standing, bending, lifting, sitting, or walking may worsen pain.    It can be localized to one spot or area, or it can be more generalized.    It can spread or radiate upwards, to the front, or go down your arms or legs (sciatica).    It can cause muscle spasm.  Most of the time, mechanical problems with the muscles or spine cause the pain. Mechanical problems are usually caused by an injury to the muscles or ligaments. While illness can cause back pain, it is usually not caused by a serious illness. Mechanical problems include:     Physical activity such as sports, exercise, work, or normal activity    Overexertion, lifting, pushing, pulling incorrectly or too aggressively    Sudden twisting, bending, or stretching from an accident, or accidental movement    Poor posture    Stretching or moving wrong, without noticing pain at the time    Poor coordination, lack of regular exercise (check with your doctor about this)    Spinal disc disease or arthritis    Stress  Pain can also be related to pregnancy, or illness like appendicitis, bladder or kidney infections, pelvic infections, and many other things.  Acute back pain usually gets better in 1 to 2 weeks. Back pain related to disk disease, arthritis in the spinal joints or spinal stenosis (narrowing of the spinal canal) can become chronic and last for months or years.  Unless you had a physical injury (for example, a car accident or fall) X-rays are usually not needed for the initial evaluation of back pain. If pain continues and does not respond to medical treatment, X-rays and other tests may be needed.  Home care  Try these home care  recommendations:    When in bed, try to find a position of comfort. A firm mattress is best. Try lying flat on your back with pillows under your knees. You can also try lying on your side with your knees bent up towards your chest and a pillow between your knees.    At first, do not try to stretch out the sore spots. If there is a strain, it is not like the good soreness you get after exercising without an injury. In this case, stretching may make it worse.    Avoid prolong sitting, long car rides, or travel. This puts more stress on the lower back than standing or walking.    During the first 24 to 72 hours after an acute injury or flare up of chronic back pain, apply an ice pack to the painful area for 20 minutes and then remove it for 20 minutes. Do this over a period of 60 to 90 minutes or several times a day. This will reduce swelling and pain. Wrap the ice pack in a thin towel or plastic to protect your skin.    You can start with ice, then switch to heat. Heat (hot shower, hot bath, or heating pad) reduces pain and works well for muscle spasms. Heat can be applied to the painful area for 20 minutes then remove it for 20 minutes. Do this over a period of 60 to 90 minutes or several times a day. Do not sleep on a heating pad. It can lead to skin burns or tissue damage.    You can alternate ice and heat therapy. Talk with your doctor about the best treatment for your back pain.    Therapeutic massage can help relax the back muscles without stretching them.    Be aware of safe lifting methods and do not lift anything without stretching first.  Medicines  Talk to your doctor before using medicine, especially if you have other medical problems or are taking other medicines.    You may use over-the-counter medicine as directed on the bottle to control pain, unless another pain medicine was prescribed. If you have chronic conditions like diabetes, liver or kidney disease, stomach ulcers, or gastrointestinal bleeding,  or are taking blood thinners, talk to your doctor before taking any medicine.    Be careful if you are given a prescription medicines, narcotics, or medicine for muscle spasms. They can cause drowsiness, affect your coordination, reflexes, and judgement. Do not drive or operate heavy machinery.  Follow-up care  Follow up with your healthcare provider, or as advised.   A radiologist will review any X-rays that were taken. Your provide will notify you of any new findings that may affect your care.  Call 911  Call emergency services if any of the following occur:    Trouble breathing    Confusion    Very drowsy or trouble awakening    Fainting or loss of consciousness    Rapid or very slow heart rate    Loss of bowel or bladder control  When to seek medical advice  Call your healthcare provider right away if any of these occur:     Pain becomes worse or spreads to your legs    Weakness or numbness in one or both legs    Numbness in the groin or genital area  Date Last Reviewed: 7/1/2016 2000-2017 [employer]. [address] [place] [address]. All rights reserved. This information is not intended as a substitute for professional medical care. Always follow your healthcare professional's instructions.             Risks, benefits, side effects and rationale for treatment plan fully discussed with the patient and understanding expressed.    LUIS M King-BC  [facility]

## 2019-09-16 NOTE — PATIENT INSTRUCTIONS
1. Acute left-sided low back pain without sciatica  Acute, stable  - UA reflex to Microscopic and Culture  - XR Lumbar Spine 2/3 Views; Future  - XR Pelvis and Hip Bilateral 2 Views; Future  - Urine Microscopic  - DULoxetine (CYMBALTA) 30 MG capsule; Take 1 capsule daily for a week, then increase to 2 capsules daily thereafter  Dispense: 60 capsule; Refill: 0  - predniSONE (DELTASONE) 20 MG tablet; Take 2 tablets (40 mg) by mouth daily for 5 days  Dispense: 10 tablet; Refill: 0  Recheck next week    2. Bilateral hip pain  Acute, stable  - UA reflex to Microscopic and Culture  - XR Lumbar Spine 2/3 Views; Future  - XR Pelvis and Hip Bilateral 2 Views; Future  - DULoxetine (CYMBALTA) 30 MG capsule; Take 1 capsule daily for a week, then increase to 2 capsules daily thereafter  Dispense: 60 capsule; Refill: 0  - predniSONE (DELTASONE) 20 MG tablet; Take 2 tablets (40 mg) by mouth daily for 5 days  Dispense: 10 tablet; Refill: 0    3. Morbid obesity with BMI of 45.0-49.9, adult (H)  Chronic, stable  - DX Hip/Pelvis/Spine; Future    4. Post-menopause  Chronic, stable  - DX Hip/Pelvis/Spine; Future    Results for orders placed or performed in visit on 09/16/19   UA reflex to Microscopic and Culture   Result Value Ref Range    Color Urine Yellow     Appearance Urine Clear     Glucose Urine Negative NEG^Negative mg/dL    Bilirubin Urine Negative NEG^Negative    Ketones Urine Negative NEG^Negative mg/dL    Specific Gravity Urine 1.010 1.003 - 1.035    Blood Urine Negative NEG^Negative    pH Urine 5.0 5.0 - 7.0 pH    Protein Albumin Urine Negative NEG^Negative mg/dL    Urobilinogen Urine 0.2 0.2 - 1.0 EU/dL    Nitrite Urine Negative NEG^Negative    Leukocyte Esterase Urine Trace (A) NEG^Negative    Source Midstream Urine    Urine Microscopic   Result Value Ref Range    WBC Urine 0 - 5 OTO5^0 - 5 /HPF    RBC Urine O - 2 OTO2^O - 2 /HPF    Squamous Epithelial /LPF Urine Few FEW^Few /LPF    Bacteria Urine Few (A) NEG^Negative  /HPF       Patient Education     Back Pain (Acute or Chronic)    Back pain is one of the most common problems. The good news is that most people feel better in 1 to 2 weeks, and most of the rest in 1 to 2 months. Most people can remain active.  People experience and describe pain differently; not everyone is the same.    The pain can be sharp, stabbing, shooting, aching, cramping or burning.    Movement, standing, bending, lifting, sitting, or walking may worsen pain.    It can be localized to one spot or area, or it can be more generalized.    It can spread or radiate upwards, to the front, or go down your arms or legs (sciatica).    It can cause muscle spasm.  Most of the time, mechanical problems with the muscles or spine cause the pain. Mechanical problems are usually caused by an injury to the muscles or ligaments. While illness can cause back pain, it is usually not caused by a serious illness. Mechanical problems include:     Physical activity such as sports, exercise, work, or normal activity    Overexertion, lifting, pushing, pulling incorrectly or too aggressively    Sudden twisting, bending, or stretching from an accident, or accidental movement    Poor posture    Stretching or moving wrong, without noticing pain at the time    Poor coordination, lack of regular exercise (check with your doctor about this)    Spinal disc disease or arthritis    Stress  Pain can also be related to pregnancy, or illness like appendicitis, bladder or kidney infections, pelvic infections, and many other things.  Acute back pain usually gets better in 1 to 2 weeks. Back pain related to disk disease, arthritis in the spinal joints or spinal stenosis (narrowing of the spinal canal) can become chronic and last for months or years.  Unless you had a physical injury (for example, a car accident or fall) X-rays are usually not needed for the initial evaluation of back pain. If pain continues and does not respond to medical  treatment, X-rays and other tests may be needed.  Home care  Try these home care recommendations:    When in bed, try to find a position of comfort. A firm mattress is best. Try lying flat on your back with pillows under your knees. You can also try lying on your side with your knees bent up towards your chest and a pillow between your knees.    At first, do not try to stretch out the sore spots. If there is a strain, it is not like the good soreness you get after exercising without an injury. In this case, stretching may make it worse.    Avoid prolong sitting, long car rides, or travel. This puts more stress on the lower back than standing or walking.    During the first 24 to 72 hours after an acute injury or flare up of chronic back pain, apply an ice pack to the painful area for 20 minutes and then remove it for 20 minutes. Do this over a period of 60 to 90 minutes or several times a day. This will reduce swelling and pain. Wrap the ice pack in a thin towel or plastic to protect your skin.    You can start with ice, then switch to heat. Heat (hot shower, hot bath, or heating pad) reduces pain and works well for muscle spasms. Heat can be applied to the painful area for 20 minutes then remove it for 20 minutes. Do this over a period of 60 to 90 minutes or several times a day. Do not sleep on a heating pad. It can lead to skin burns or tissue damage.    You can alternate ice and heat therapy. Talk with your doctor about the best treatment for your back pain.    Therapeutic massage can help relax the back muscles without stretching them.    Be aware of safe lifting methods and do not lift anything without stretching first.  Medicines  Talk to your doctor before using medicine, especially if you have other medical problems or are taking other medicines.    You may use over-the-counter medicine as directed on the bottle to control pain, unless another pain medicine was prescribed. If you have chronic conditions like  diabetes, liver or kidney disease, stomach ulcers, or gastrointestinal bleeding, or are taking blood thinners, talk to your doctor before taking any medicine.    Be careful if you are given a prescription medicines, narcotics, or medicine for muscle spasms. They can cause drowsiness, affect your coordination, reflexes, and judgement. Do not drive or operate heavy machinery.  Follow-up care  Follow up with your healthcare provider, or as advised.   A radiologist will review any X-rays that were taken. Your provide will notify you of any new findings that may affect your care.  Call 911  Call emergency services if any of the following occur:    Trouble breathing    Confusion    Very drowsy or trouble awakening    Fainting or loss of consciousness    Rapid or very slow heart rate    Loss of bowel or bladder control  When to seek medical advice  Call your healthcare provider right away if any of these occur:     Pain becomes worse or spreads to your legs    Weakness or numbness in one or both legs    Numbness in the groin or genital area  Date Last Reviewed: 7/1/2016 2000-2017 [employer]. [address] [place] [address]. All rights reserved. This information is not intended as a substitute for professional medical care. Always follow your healthcare professional's instructions.

## 2019-09-16 NOTE — LETTER
September 18, 2019      Lucia Bobo  [address] [facility] [id number]        Dear ,    We are writing to inform you of your test results.    Normal hips     Resulted Orders   XR Pelvis and Hip Bilateral 2 Views    Narrative    PELVIS AND HIP BILATERAL TWO VIEWS   9/16/2019 11:32 AM     HISTORY:  Acute left-sided low back pain without sciatica. Bilateral  hip pain.     FINDINGS: Degenerative change of the lower lumbar spine and symphysis  pubis.      Impression    IMPRESSION: Unremarkable hips.    BLANCHE CHRISTIAN MD       If you have any questions or concerns, please call the clinic at the number listed above.       Sincerely,        Keturah Chaney, CNP/CB

## 2019-09-16 NOTE — LETTER
September 18, 2019      Lucia Bobo  [address] [facility] [id number]        Dear ,    We are writing to inform you of your test results.    Mild anterolisthesis at L4/L5 due to aging   Moderate degenerative endplate changes and loss of intervertebral disc space height throughout lumbar spine are   All chronic aging changes   Daily physical activity with intentional weight loss- I would actually recommend getting into a gym and starting a workout routine to help you with exercise   You can continue with weight watchers.   Another possibility is a referral to our weight management clinic to help with weight loss   Continue with Duloxetine     Resulted Orders   XR Lumbar Spine 2/3 Views    Narrative    LUMBAR SPINE TWO TO THREE VIEWS  9/16/2019 11:29 AM     HISTORY: Left-sided low back pain with some numbness to skin.    COMPARISON: None.       Impression    IMPRESSION: Mild grade 1 anterolisthesis of L4 on L5 likely due to  degenerative facet arthropathy. Mild age-indeterminate anterior  wedging of the L3 vertebral body. No other significant loss of  vertebral body height. Moderate degenerative endplate changes and loss  of intervertebral disc space throughout  the lumbar spine most  pronounced at L2-L3, L3-L4, L4-L5, and L5-S1.    Calcification over the left upper quadrant presumably represents  splenic artery calcification.    JS TOLEDO MD       If you have any questions or concerns, please call the clinic at the number listed above.       Sincerely,        Keturah Chaney, OPAL/CB

## 2019-09-17 NOTE — RESULT ENCOUNTER NOTE
Mild anterolisthesis at L4/L5 due to aging  Moderate degenerative endplate changes and loss of intervertebral disc space height throughout lumbar spine are  All chronic aging changes  Daily physical activity with intentional weight loss- I would actually recommend getting into a gym and starting a workout routine to help you with exercise  You can continue with weight watchers.   Another possibility is a referral to our weight management clinic to help with weight loss  Continue with Duloxetine  Please call her with these results- see hip xrays. Send a hard copy for their records.   Thanks. LUIS M Bean

## 2019-09-17 NOTE — RESULT ENCOUNTER NOTE
Normal hips  Please call her with these results. Send a hard copy for their records.   Thanks. LUIS M Bean

## 2019-09-24 ENCOUNTER — OFFICE VISIT (OUTPATIENT)
Dept: FAMILY MEDICINE | Facility: CLINIC | Age: 74
End: 2019-09-24
Payer: COMMERCIAL

## 2019-09-24 VITALS
HEART RATE: 88 BPM | WEIGHT: 259 LBS | DIASTOLIC BLOOD PRESSURE: 72 MMHG | OXYGEN SATURATION: 98 % | SYSTOLIC BLOOD PRESSURE: 124 MMHG | RESPIRATION RATE: 20 BRPM | BODY MASS INDEX: 43.15 KG/M2 | HEIGHT: 65 IN | TEMPERATURE: 99.5 F

## 2019-09-24 DIAGNOSIS — M25.552 BILATERAL HIP PAIN: ICD-10-CM

## 2019-09-24 DIAGNOSIS — J45.30 MILD PERSISTENT ASTHMA, UNCOMPLICATED: Chronic | ICD-10-CM

## 2019-09-24 DIAGNOSIS — Z00.00 MEDICARE ANNUAL WELLNESS VISIT, SUBSEQUENT: Primary | ICD-10-CM

## 2019-09-24 DIAGNOSIS — M25.551 BILATERAL HIP PAIN: ICD-10-CM

## 2019-09-24 DIAGNOSIS — M54.50 ACUTE LEFT-SIDED LOW BACK PAIN WITHOUT SCIATICA: ICD-10-CM

## 2019-09-24 PROCEDURE — 99214 OFFICE O/P EST MOD 30 MIN: CPT | Mod: 25 | Performed by: NURSE PRACTITIONER

## 2019-09-24 PROCEDURE — G0438 PPPS, INITIAL VISIT: HCPCS | Performed by: NURSE PRACTITIONER

## 2019-09-24 RX ORDER — DULOXETIN HYDROCHLORIDE 60 MG/1
CAPSULE, DELAYED RELEASE ORAL
Qty: 90 CAPSULE | Refills: 3 | Status: SHIPPED | OUTPATIENT
Start: 2019-09-24 | End: 2020-10-27

## 2019-09-24 ASSESSMENT — MIFFLIN-ST. JEOR: SCORE: 1680.7

## 2019-09-24 NOTE — PATIENT INSTRUCTIONS
1. Medicare annual wellness visit, subsequent  Declines mammogram  Flu shot in October  Schedule eye exam and have them fax me the results at [facility]    2. BMI 40.0-44.9, adult (H)  Chronic, improved  GREAT WORK!!!  - PHYSICAL THERAPY REFERRAL; Future    3. Bilateral hip pain  Chronic, unimproved  - PHYSICAL THERAPY REFERRAL; Future  - DULoxetine (CYMBALTA) 60 MG capsule; Take 1 capsule daily  Dispense: 90 capsule; Refill: 3    4. Acute left-sided low back pain without sciatica  Chronic, unimproved  - PHYSICAL THERAPY REFERRAL; Future  - DULoxetine (CYMBALTA) 60 MG capsule; Take 1 capsule daily  Dispense: 90 capsule; Refill: 3    5. Mild persistent asthma, uncomplicated  Chronic, stable    Preventive Health Recommendations    See your health care provider every year to    Review health changes.     Discuss preventive care.      Review your medicines if your doctor has prescribed any.    You no longer need a yearly Pap test unless you've had an abnormal Pap test in the past 10 years. If you have vaginal symptoms, such as bleeding or discharge, be sure to talk with your provider about a Pap test.    Every 1 to 2 years, have a mammogram.  If you are over 69, talk with your health care provider about whether or not you want to continue having screening mammograms.    Every 10 years, have a colonoscopy. Or, have a yearly FIT test (stool test). These exams will check for colon cancer.     Have a cholesterol test every 5 years, or more often if your doctor advises it.     Have a diabetes test (fasting glucose) every three years. If you are at risk for diabetes, you should have this test more often.     At age 65, have a bone density scan (DEXA) to check for osteoporosis (brittle bone disease).    Shots:    Get a flu shot each year.    Get a tetanus shot every 10 years.    Talk to your doctor about your pneumonia vaccines. There are now two you should receive - Pneumovax (PPSV 23) and Prevnar (PCV 13).    Talk to your  pharmacist about the shingles vaccine.    Talk to your doctor about the hepatitis B vaccine.    Nutrition:     Eat at least 5 servings of fruits and vegetables each day.    Eat whole-grain bread, whole-wheat pasta and brown rice instead of white grains and rice.    Get adequate Calcium and Vitamin D.     Lifestyle    Exercise at least 150 minutes a week (30 minutes a day, 5 days a week). This will help you control your weight and prevent disease.    Limit alcohol to one drink per day.    No smoking.     Wear sunscreen to prevent skin cancer.     See your dentist twice a year for an exam and cleaning.    See your eye doctor every 1 to 2 years to screen for conditions such as glaucoma, macular degeneration and cataracts.    Personalized Prevention Plan  You are due for the preventive services outlined below.  Your care team is available to assist you in scheduling these services.  If you have already completed any of these items, please share that information with your care team to update in your medical record.  Health Maintenance Due   Topic Date Due     Osteoporosis Screening  1945     Annual Wellness Visit  07/29/2011     Pneumococcal Vaccine (2 of 2 - PCV13) 03/22/2014     Mammogram  08/23/2014     Zoster (Shingles) Vaccine (2 of 3) 02/25/2015     Eye Exam  08/29/2018     Asthma Control Test  12/28/2018     Flu Vaccine (1) 09/01/2019

## 2019-09-24 NOTE — NURSING NOTE
"Chief Complaint   Patient presents with     Back Pain       Initial /72 (BP Location: Right arm, Patient Position: Sitting, Cuff Size: Adult Large)   Pulse 88   Temp 99.5  F (37.5  C) (Tympanic)   Resp 20   Ht 1.651 m (5' 5\")   Wt 117.5 kg (259 lb)   SpO2 98%   BMI 43.10 kg/m   Estimated body mass index is 43.1 kg/m  as calculated from the following:    Height as of this encounter: 1.651 m (5' 5\").    Weight as of this encounter: 117.5 kg (259 lb).    Patient presents to the clinic using No DME    Health Maintenance that is potentially due pending provider review:  Mammogram and ACT    Pt declines to have mammogram. ACT done today    Is there anyone who you would like to be able to receive your results? No  If yes have patient fill out JAZMYN    "

## 2019-09-24 NOTE — PROGRESS NOTES
"    SUBJECTIVE   Lucia Bobo is a 74 year old female who presents to clinic today for the following health issues:    Mild persistent asthma uncomplicated  Controlled on her Advair and Albuterol inhaler  ACT Total Scores 9/24/2019   ACT TOTAL SCORE -   ASTHMA ER VISITS -   ASTHMA HOSPITALIZATIONS -   ACT TOTAL SCORE (Goal Greater than or Equal to 20) 24   In the past 12 months, how many times did you visit the emergency room for your asthma without being admitted to the hospital? 0   In the past 12 months, how many times were you hospitalized overnight because of your asthma? 0       Back Pain     Duration:  2 week recheck         Specific cause: none    Description:   Location of pain: low back both and hip both  Character of pain: dull ache  Pain radiation:radiates into the left buttocks  New numbness or weakness in legs, not attributed to pain:  YES- almost resolved    Intensity: mild    History:   Pain interferes with job: Not applicable  History of back problems: recurrent self limited episodes of low back pain in the past  Any previous MRI or X-rays: Yes- at [place].  Date 09/16/2019  Sees a specialist for back pain:  No    Alleviating factors:   Improved by: Prednisone       Precipitating factors:  Worsened by: Nothing    Obesity  Lost 14 lbs  Watching foods- weight watcher meals    Annual Wellness Visit  Are you in the first 12 months of your Medicare Part B coverage?  No    Physical Health:    In general, how would you rate your overall physical health? good    If you drink alcohol do you typically have >3 drinks per day or >7 drinks per week? No    Do you usually eat at least 4 servings of fruit and vegetables a day, include whole grains & fiber and avoid regularly eating high fat or \"junk\" foods? Yes    Needs assistance for the following daily activities: no assistance needed    Which of the following safety concerns are present in your home?  lack of grab bars in the bathroom - has suction cup " grabbar    Hearing impairment: No    In the past 6 months, have you been bothered by leaking of urine? Yes little bit    Mental Health:    In general, how would you rate your overall mental or emotional health? good  PHQ-2 Score:      Do you feel safe in your environment? Yes    Do you have a Health Care Directive? Yes: Patient states has Advance Directive and will bring in a copy to clinic.    Fall risk:  Fallen 2 or more times in the past year?: No  Any fall with injury in the past year?: No    Cognitive Screenin) Repeat 3 items (Leader, Season, Table)    2) Clock draw: NORMAL  3) 3 item recall: Recalls 2 objects   Results: NORMAL clock, 1-2 items recalled: COGNITIVE IMPAIRMENT LESS LIKELY    Mini-CogTM Copyright S Chris. Licensed by the author for use in [employer]; reprinted with permission (adriana@[facility]). All rights reserved.      Current providers sharing in care for this patient include:   Patient Care Team:  Keturah Chaney CNP as PCP - General (Family Practice)  Keturah Chaney CNP as Assigned PCP    Do you have sleep apnea, excessive snoring or daytime drowsiness?: yes snore, takes naps during the day      ADDRESS ALL HEALTH MAINTENANCE NEEDS- Place orders as needed  Health Maintenance Due   Topic Date Due     DEXA  1945     MEDICARE ANNUAL WELLNESS VISIT  2011     PNEUMOCOCCAL IMMUNIZATION 65+ LOW/MEDIUM RISK (2 of 2 - PCV13) 2014     MAMMO SCREENING  2014     ZOSTER IMMUNIZATION (2 of 3) 2015     EYE EXAM  2018     ASTHMA CONTROL TEST  2018     INFLUENZA VACCINE (1) 2019       PCP   Keturah Chaney -[phone]    PROBLEM LIST        Patient Active Problem List   Diagnosis     Type 2 diabetes mellitus with hyperglycemia, with long-term current use of insulin (H) HgbA1c goal <7     Advanced directives, counseling/discussion     Osteoarthritis of both knees     Morbid obesity with BMI of 45.0-49.9, adult (H)      Hypertriglyceridemia, LDL goal <100     Benign essential hypertension, BP goal <140/90     Mild persistent asthma, uncomplicated     Colon polyps     Slow transit constipation     Muscle cramp     Mammogram declined     Post-menopause     Bilateral hip pain     Acute left-sided low back pain without sciatica       MEDICATIONS        Current Outpatient Medications   Medication     ADVAIR DISKUS 250-50 MCG/DOSE inhaler     albuterol (2.5 MG/3ML) 0.083% nebulizer solution     albuterol (VENTOLIN HFA) 108 (90 Base) MCG/ACT inhaler     aspirin 81 MG tablet     atorvastatin (LIPITOR) 20 MG tablet     blood glucose monitoring (NO BRAND SPECIFIED) meter device kit     Calcium Carb-Cholecalciferol (CALCIUM 600+D) 600-800 MG-UNIT TABS     DULoxetine (CYMBALTA) 30 MG capsule     furosemide (LASIX) 20 MG tablet     hydrALAZINE (APRESOLINE) 100 MG tablet     hydrALAZINE (APRESOLINE) 50 MG tablet     insulin pen needle (BD SMILEY U/F) 32G X 4 MM miscellaneous     ipratropium - albuterol 0.5 mg/2.5 mg/3 mL (DUONEB) 0.5-2.5 (3) MG/3ML neb solution     liraglutide (VICTOZA PEN) 18 MG/3ML solution     losartan (COZAAR) 50 MG tablet     magnesium oxide (MAG-OX) 400 (241.3 Mg) MG tablet     metFORMIN (GLUCOPHAGE) 1000 MG tablet     ONETOUCH ULTRA test strip     PHARMACIST CHOICE LANCETS MISC     polyethylene glycol (MIRALAX) powder     verapamil ER (CALAN-SR) 240 MG CR tablet     order for DME     No current facility-administered medications for this visit.        Reviewed and updated as needed this visit by Provider:  Tobacco  Allergies  Meds  Med Hx  Surg Hx  Fam Hx  Soc Hx   BP Readings from Last 6 Encounters:   09/24/19 124/72   09/16/19 132/64   09/12/19 (!) 153/91   07/05/19 124/74   03/27/19 138/68   02/27/19 146/76     Wt Readings from Last 10 Encounters:   09/24/19 117.5 kg (259 lb)   09/12/19 120.2 kg (265 lb)   07/05/19 120.3 kg (265 lb 3.2 oz)   03/27/19 119.8 kg (264 lb 3.2 oz)   06/28/18 120.7 kg (266 lb)   01/31/18  "119.7 kg (264 lb)   01/17/18 119.7 kg (264 lb)   01/10/18 119.7 kg (264 lb)   01/08/18 118 kg (260 lb 2.3 oz)   12/22/17 117.9 kg (260 lb)       ROS      Constitutional, neuro, ENT, endocrine, pulmonary, cardiac, gastrointestinal, genitourinary, musculoskeletal, integument and psychiatric systems are negative, except as otherwise noted.    PHYSICAL EXAM   /72 (BP Location: Right arm, Patient Position: Sitting, Cuff Size: Adult Large)   Pulse 88   Temp 99.5  F (37.5  C) (Tympanic)   Resp 20   Ht 1.651 m (5' 5\")   Wt 117.5 kg (259 lb)   SpO2 98%   BMI 43.10 kg/m    Body mass index is 43.1 kg/m .  GENERAL APPEARANCE: healthy, alert, no distress and over weight  RESP: lungs clear to auscultation - no rales, rhonchi or wheezes  CV: regular rates and rhythm, normal S1 S2, no S3 or S4 and no murmur, click or rub  MS: extremities normal- no gross deformities noted  PSYCH: mentation appears normal and affect normal/bright    ASSESSMENT & PLAN     1. Medicare annual wellness visit, subsequent  Declines mammogram  Flu shot in October  Schedule eye exam and have them fax me the results at [facility]    2. BMI 40.0-44.9, adult (H)  Chronic, improved  GREAT WORK!!!  - PHYSICAL THERAPY REFERRAL; Future    3. Bilateral hip pain  Chronic, unimproved  - PHYSICAL THERAPY REFERRAL; Future  - DULoxetine (CYMBALTA) 60 MG capsule; Take 1 capsule daily  Dispense: 90 capsule; Refill: 3    4. Acute left-sided low back pain without sciatica  Chronic, unimproved  - PHYSICAL THERAPY REFERRAL; Future  - DULoxetine (CYMBALTA) 60 MG capsule; Take 1 capsule daily  Dispense: 90 capsule; Refill: 3    5. Mild persistent asthma, uncomplicated  Chronic, stable    Preventive Health Recommendations    See your health care provider every year to    Review health changes.     Discuss preventive care.      Review your medicines if your doctor has prescribed any.    You no longer need a yearly Pap test unless you've had an abnormal Pap test in the " past 10 years. If you have vaginal symptoms, such as bleeding or discharge, be sure to talk with your provider about a Pap test.    Every 1 to 2 years, have a mammogram.  If you are over 69, talk with your health care provider about whether or not you want to continue having screening mammograms.    Every 10 years, have a colonoscopy. Or, have a yearly FIT test (stool test). These exams will check for colon cancer.     Have a cholesterol test every 5 years, or more often if your doctor advises it.     Have a diabetes test (fasting glucose) every three years. If you are at risk for diabetes, you should have this test more often.     At age 65, have a bone density scan (DEXA) to check for osteoporosis (brittle bone disease).    Shots:    Get a flu shot each year.    Get a tetanus shot every 10 years.    Talk to your doctor about your pneumonia vaccines. There are now two you should receive - Pneumovax (PPSV 23) and Prevnar (PCV 13).    Talk to your pharmacist about the shingles vaccine.    Talk to your doctor about the hepatitis B vaccine.    Nutrition:     Eat at least 5 servings of fruits and vegetables each day.    Eat whole-grain bread, whole-wheat pasta and brown rice instead of white grains and rice.    Get adequate Calcium and Vitamin D.     Lifestyle    Exercise at least 150 minutes a week (30 minutes a day, 5 days a week). This will help you control your weight and prevent disease.    Limit alcohol to one drink per day.    No smoking.     Wear sunscreen to prevent skin cancer.     See your dentist twice a year for an exam and cleaning.    See your eye doctor every 1 to 2 years to screen for conditions such as glaucoma, macular degeneration and cataracts.    Personalized Prevention Plan  You are due for the preventive services outlined below.  Your care team is available to assist you in scheduling these services.  If you have already completed any of these items, please share that information with your care  team to update in your medical record.  Health Maintenance Due   Topic Date Due     Osteoporosis Screening  1945     Annual Wellness Visit  07/29/2011     Pneumococcal Vaccine (2 of 2 - PCV13) 03/22/2014     Mammogram  08/23/2014     Zoster (Shingles) Vaccine (2 of 3) 02/25/2015     Eye Exam  08/29/2018     Asthma Control Test  12/28/2018     Flu Vaccine (1) 09/01/2019         Risks, benefits, side effects and rationale for treatment plan fully discussed with the patient and understanding expressed.    [facility]

## 2019-09-25 ENCOUNTER — HOSPITAL ENCOUNTER (OUTPATIENT)
Dept: PHYSICAL THERAPY | Facility: CLINIC | Age: 74
Setting detail: THERAPIES SERIES
End: 2019-09-25
Attending: NURSE PRACTITIONER
Payer: MEDICARE

## 2019-09-25 DIAGNOSIS — M25.551 BILATERAL HIP PAIN: ICD-10-CM

## 2019-09-25 DIAGNOSIS — M54.50 ACUTE LEFT-SIDED LOW BACK PAIN WITHOUT SCIATICA: ICD-10-CM

## 2019-09-25 DIAGNOSIS — M25.552 BILATERAL HIP PAIN: ICD-10-CM

## 2019-09-25 PROCEDURE — 97162 PT EVAL MOD COMPLEX 30 MIN: CPT | Mod: GP | Performed by: PHYSICAL MEDICINE & REHABILITATION

## 2019-09-25 PROCEDURE — 97110 THERAPEUTIC EXERCISES: CPT | Mod: GP | Performed by: PHYSICAL MEDICINE & REHABILITATION

## 2019-09-25 ASSESSMENT — ASTHMA QUESTIONNAIRES: ACT_TOTALSCORE: 24

## 2019-09-25 NOTE — PROGRESS NOTES
[facility]          OUTPATIENT PHYSICAL THERAPY ORTHOPEDIC EVALUATION  PLAN OF TREATMENT FOR OUTPATIENT REHABILITATION  (COMPLETE FOR INITIAL CLAIMS ONLY)  Patient's Last Name, First Name, M.I.  YOB: 1945  Lucia Bobo    Provider s Name:  [facility]   Medical Record No.  [id number]   Start of Care Date:  09/25/19   Onset Date:  08/25/19(notes flare up of B hips a month ago)   Type:     _X__PT   ___OT   ___SLP Medical Diagnosis:  BMI 40.0-44.9, adult (H) ; Bilateral hip pain ; Acute left-sided low back pain without sciatica      PT Diagnosis:  chronic LBP; B hip pain   Visits from SOC:  1      _________________________________________________________________________________  Plan of Treatment/Functional Goals:  ADL retraining, IADL retraining, balance training, bed mobility training, gait training, joint mobilization, manual therapy, motor coordination training, neuromuscular re-education, orthotic fitting/training, ROM, strengthening, stretching, transfer training     Biofeedback, Contrast bath immersion, Cryotherapy, Electrical stimulation, Hot packs, Iontophoresis, Low level laser therapy, Shortwave diathermy, TENS, Traction, Ultrasound  only as needed  Goals  Goal Identifier: 1  Goal Description: Pt will be able to amb >2 blocks without increase in sx in order to decrease difficulty with community amb.   Target Date: 10/23/19    Goal Identifier: 2  Goal Description: Pt will be able to perform floor to chair transfers without increase in sx in order to decrease difficulty with ADLs.   Target Date: 11/06/19    Goal Identifier: 3  Goal Description: Pt will be independent with HEP in order to self manage symptoms.   Target Date: 11/22/19      Therapy Frequency:  1 time/week  Predicted Duration of Therapy Intervention:  8 weeks    Dayna Hutchins PT                 I CERTIFY THE NEED FOR THESE SERVICES FURNISHED UNDER        THIS PLAN OF TREATMENT AND  WHILE UNDER MY CARE     (Physician co-signature of this document indicates review and certification of the therapy plan).                       Certification Date From:  09/25/19   Certification Date To:  11/22/19    Referring Provider:  Keturah Chaney CNP    Initial Assessment        See Epic Evaluation Start of Care Date: 09/25/19

## 2019-09-25 NOTE — PROGRESS NOTES
09/25/19 1300   General Information   Type of Visit Initial OP Ortho PT Evaluation   Start of Care Date 09/25/19   Referring Physician Keturah Chaney CNP   Patient/Family Goals Statement improve tolerance to walking (distance, longer 2-3 blocks), improve transfers from floor   Orders Evaluate and Treat   Date of Order 09/24/19   Certification Required? Yes  (Medicare/[employer])   Medical Diagnosis BMI 40.0-44.9, adult (H) ; Bilateral hip pain ; Acute left-sided low back pain without sciatica    Surgical/Medical history reviewed Yes   Precautions/Limitations no known precautions/limitations   General Information Comments PMHx per pt report: asthma, diabetes, high BP, broken arm   Body Part(s)   Body Part(s) Lumbar Spine/SI   Presentation and Etiology   Pertinent history of current problem (include personal factors and/or comorbidities that impact the POC) Pt arrived to PT today for LBP and B hip pain. Notes broke vertebra in back over 20 years and shared doctor told pt back will get worse with age. Notes B hip pain started a couple weeks ago. Reports new medication from PCP was very helpful, pain seems to be improving. Pt also shared B knee pain and attributes pain to arthritis. Tingling noted into B LE but seems to have improved with new medication from PCP.    Impairments A. Pain;E. Decreased flexibility;L. Tingling   Functional Limitations perform activities of daily living;perform desired leisure / sports activities   Symptom Location LBP and B hip pain   How/Where did it occur With repetition/overuse;From insidious onset;From Degenerative Joint Disease   Onset date of current episode/exacerbation 08/25/19  (notes flare up of B hips a month ago)   Chronicity Chronic   Pain rating (0-10 point scale) Best (/10);Worst (/10)   Best (/10) 2   Worst (/10) 8   Pain quality A. Sharp;C. Aching   Frequency of pain/symptoms B. Intermittent   Pain/symptoms are: The same all the time   Pain/symptoms exacerbated by B.  "Walking;G. Certain positions;K. Home tasks   Pain/symptoms eased by C. Rest;H. Cold   Progression of symptoms since onset: Improved   Current / Previous Interventions   Diagnostic Tests: X-ray   X-ray Results Results   X-ray results Mild grade 1 anterolisthesis of L4 on L5 likely due to degenerative facet arthropathy. Mild age-indeterminate anterior wedging of the L3 vertebral body. No other significant loss of vertebral body height. Moderate degenerative endplate changes and loss of intervertebral disc space throughout  the lumbar spine most pronounced at L2-L3, L3-L4, L4-L5, and L5-S1. Unremarkable hips.   Prior Level of Function   Prior Level of Function-Mobility pt reports she has cane at home that she uses rarely in winter when slippery out   Prior Level of Function-ADLs independent   Current Level of Function   Current Community Support Family/friend caregiver   Patient role/employment history F. Retired   Living environment House[facility]   Home/community accessibility pt lives in [facility], notes railings by stairs   Current equipment-Gait/Locomotion None   Fall Risk Screen   Fall screen completed by PT   Have you fallen 2 or more times in the past year? No   Have you fallen and had an injury in the past year? No   Is patient a fall risk? No   Abuse Screen (yes response referral indicated)   Feels Unsafe at Home or Work/School no   Feels Threatened by Someone no   Does Anyone Try to Keep You From Having Contact with Others or Doing Things Outside Your Home? no   Physical Signs of Abuse Present no   Functional Scales   Functional Scales Other   Other Scales  LEFS: 54/80   Lumbar Spine/SI Objective Findings   Posture forward head and rounded shoulders B   Gait/Locomotion short stride length, decresaed philip, toe-out gait pattern. ascends stairs leading with R LE, descends stairs with L LE   Flexion ROM 4\" from floor   Extension ROM 75% limited   Right Side Bending ROM 20\" from floor   Left Side " "Bending ROM 18\" from floor   Repeated Extension-Standing ROM neg   Repeated Flexion-Standing ROM neg   Pelvic Screen level innominates   Hip Screen neg SCOUR B, pos MARY JANE B   Hip Flexion (L2) Strength 4/5 B   Hip Abduction Strength seated: 5/5 B   Hip Adduction Strength seated: 5/5 B   Knee Flexion Strength 4+/5 B   Knee Extension (L3) Strength 5/5 B   Ankle Dorsiflexion (L4) Strength 5/5 B   Great Toe Extension (L5) Strength 5/5 B   Ankle Plantar Flexion (S1) Strength 5/5 B   Lumbar/Hip/Knee/Foot Strength Comments Hip IR and ER B: 4-/5 B each   Hamstring Flexibility limited B   Hip Flexor Flexibility limited B   Quadricep Flexibility limited B   Piriformis Flexibility limited B   Jeffry Test limited hip flexors   Prone Instability Test unable to lay prone due to pain   Repeated Extension Prone unable to lay prone due to pain   Slump Test neg B   Segmental Mobility hypomobility throughout lumbar spine   Sensation Testing normal dull touch sensation amongst B LE in dermatomal pattern    Palpation no increase in sx with mod-max palpation of lumbar and posterior pelvic soft tissues and bony prominances   Planned Therapy Interventions   Planned Therapy Interventions ADL retraining;IADL retraining;balance training;bed mobility training;gait training;joint mobilization;manual therapy;motor coordination training;neuromuscular re-education;orthotic fitting/training;ROM;strengthening;stretching;transfer training   Planned Modality Interventions   Planned Modality Interventions Biofeedback;Contrast bath immersion;Cryotherapy;Electrical stimulation;Hot packs;Iontophoresis;Low level laser therapy;Shortwave diathermy;TENS;Traction;Ultrasound   Planned Modality Interventions Comments only as needed   Clinical Impression   Criteria for Skilled Therapeutic Interventions Met yes, treatment indicated   PT Diagnosis chronic LBP; B hip pain   Influenced by the following impairments decreased ROM, decreased strength, impaired gait, " radicular sx, pain   Functional limitations due to impairments transfers, walking, stairs   Clinical Presentation Evolving/Changing   Clinical Presentation Rationale comorbidities, BMI, sedentary lifestyle, ROM, strength, chroncity of sx, severity of sx, multiple jt involvement, chronicity of sx, radicular sx, clinical judgement   Clinical Decision Making (Complexity) Moderate complexity   Therapy Frequency 1 time/week   Predicted Duration of Therapy Intervention (days/wks) 8 weeks   Risk & Benefits of therapy have been explained Yes   Patient, Family & other staff in agreement with plan of care Yes   Clinical Impression Comments Pt is a 74 y.o. female who presented to the PT clinic today with a rehab diagnosis of chronic LBP and B hip pain as evidenced by decreased ROM, decreased strength, impaired gait, radicular sx, and pain. Pt is appropriate for skilled PT to address previously listed impairments in order to decrease difficulty with transfers and ambulation.    Education Assessment   Preferred Learning Style Listening;Reading;Demonstration;Pictures/video   Barriers to Learning No barriers   ORTHO GOALS   PT Ortho Eval Goals 1;2;3   Ortho Goal 1   Goal Identifier 1   Goal Description Pt will be able to amb >2 blocks without increase in sx in order to decrease difficulty with community amb.    Target Date 10/23/19   Ortho Goal 2   Goal Identifier 2   Goal Description Pt will be able to perform floor to chair transfers without increase in sx in order to decrease difficulty with ADLs.    Target Date 11/06/19   Ortho Goal 3   Goal Identifier 3   Goal Description Pt will be independent with HEP in order to self manage symptoms.    Target Date 11/22/19   Total Evaluation Time   PT Margret, Moderate Complexity Minutes (54852) 30   Therapy Certification   Certification date from 09/25/19   Certification date to 11/22/19   Medical Diagnosis BMI 40.0-44.9, adult (H) ; Bilateral hip pain ; Acute left-sided low back pain  without sciatica        Please contact me with any questions or concerns.    Thank you for your referral,     Dayna Hutchins, PT, DPT  Physical Therapist  [facility] - [address] [address] [phone]

## 2019-10-01 DIAGNOSIS — J45.30 MILD PERSISTENT ASTHMA, UNCOMPLICATED: Chronic | ICD-10-CM

## 2019-10-01 NOTE — TELEPHONE ENCOUNTER
Routing refill request to provider for review/approval because:  Drug not active on med list. 9/24/19 OV notes reads: Mild persistent asthma uncomplicatedControlled on her Advair and Albuterol inhaler    ADVAIR DISKUS 250-50 MCG/DOSE inhaler (Discontinued)  This Order Has Been Discontinued     Order Status Reason By On   Discontinued Therapy completed Keturah Chaney, CNP 9/24/19 3334     Please clarify.  Marliin SCHWARTZ RN

## 2019-10-01 NOTE — TELEPHONE ENCOUNTER
"Requested Prescriptions   Pending Prescriptions Disp Refills     ADVAIR DISKUS 250-50 MCG/DOSE inhaler [Pharmacy Med Name: ADVAIR DISKUS 250-50MCG/DOSE AEPB]  0     Sig: INHALE ONE PUFF BY MOUTH TWICE A DAY       Inhaled Steroids Protocol Failed - 10/1/2019 11:22 AM        Failed - Medication is active on med list        Passed - Patient is age 12 or older        Passed - Asthma control assessment score within normal limits in last 6 months     Please review ACT score.           Passed - Recent (6 mo) or future (30 days) visit within the authorizing provider's specialty     Patient had office visit in the last 6 months or has a visit in the next 30 days with authorizing provider or within the authorizing provider's specialty.  See \"Patient Info\" tab in inbasket, or \"Choose Columns\" in Meds & Orders section of the refill encounter.            Last Written Prescription Date:  7/31/19  Last Fill Quantity: 1,  # refills: 0   Last office visit: 9/24/2019 with prescribing provider:      Future Office Visit:      "

## 2019-10-03 ENCOUNTER — HOSPITAL ENCOUNTER (OUTPATIENT)
Dept: PHYSICAL THERAPY | Facility: CLINIC | Age: 74
Setting detail: THERAPIES SERIES
End: 2019-10-03
Attending: NURSE PRACTITIONER
Payer: MEDICARE

## 2019-10-03 PROCEDURE — 97110 THERAPEUTIC EXERCISES: CPT | Mod: GP | Performed by: PHYSICAL MEDICINE & REHABILITATION

## 2019-11-07 NOTE — PROGRESS NOTES
Outpatient Physical Therapy Discharge Note     Patient: Lucia Bobo  : 1945    Beginning/End Dates of Reporting Period:  19 to 10/03/19    Referring Provider: OPAL King Diagnosis: chronic LBP; B hip pain    Patient did not return for follow up treatments as directed.  Goal status and current objective information is therefore unknown.  Discharge from PT services at this time for this episode of treatment. Please see attached documentation under this episode of care for further information including dates of service, start of care date, referring physician, Dx, treatment plan, treatments, etc.    Please contact me with any questions or concerns.    Thank you for your referral.    Dayna Hutchins, PT, DPT  Physical Therapist   [address] [address] [phone]

## 2019-11-26 DIAGNOSIS — E11.65 TYPE 2 DIABETES MELLITUS WITH HYPERGLYCEMIA, WITH LONG-TERM CURRENT USE OF INSULIN (H): Chronic | ICD-10-CM

## 2019-11-26 DIAGNOSIS — Z79.4 TYPE 2 DIABETES MELLITUS WITH HYPERGLYCEMIA, WITH LONG-TERM CURRENT USE OF INSULIN (H): Chronic | ICD-10-CM

## 2019-11-26 NOTE — TELEPHONE ENCOUNTER
"Requested Prescriptions   Pending Prescriptions Disp Refills     metFORMIN (GLUCOPHAGE) 1000 MG tablet [Pharmacy Med Name: METFORMIN HCL 1000MG TABS] 225 tablet 0     Sig: TAKE ONE TABLET (1000MG) BY MOUTH AT BREAKFAST AND ONE AND ONE-HALF TABLETS (1500MG) AT DINNER.  ** DUE FOR A1C FOR FURTHER REFILLS **       Biguanide Agents Passed - 11/26/2019  2:56 PM        Passed - Blood pressure less than 140/90 in past 6 months     BP Readings from Last 3 Encounters:   09/24/19 124/72   09/16/19 132/64   09/12/19 (!) 153/91                 Passed - Patient has documented LDL within the past 12 mos.     Recent Labs   Lab Test 03/27/19  0855   LDL 21             Passed - Patient has had a Microalbumin in the past 15 mos.     Recent Labs   Lab Test 03/27/19  0855   MICROL 8   UMALCR 25.54*             Passed - Patient is age 10 or older        Passed - Patient has documented A1c within the specified period of time.     If HgbA1C is 8 or greater, it needs to be on file within the past 3 months.  If less than 8, must be on file within the past 6 months.     Recent Labs   Lab Test 07/05/19  1010   A1C 6.9*             Passed - Patient's CR is NOT>1.4 OR Patient's EGFR is NOT<45 within past 12 mos.     Recent Labs   Lab Test 03/27/19  0855   GFRESTIMATED 68   GFRESTBLACK 79       Recent Labs   Lab Test 03/27/19  0855   CR 0.85             Passed - Patient does NOT have a diagnosis of CHF.        Passed - Medication is active on med list        Passed - Patient is not pregnant        Passed - Patient has not had a positive pregnancy test within the past 12 mos.         Passed - Recent (6 mo) or future (30 days) visit within the authorizing provider's specialty     Patient had office visit in the last 6 months or has a visit in the next 30 days with authorizing provider or within the authorizing provider's specialty.  See \"Patient Info\" tab in inbasket, or \"Choose Columns\" in Meds & Orders section of the refill encounter.        "     Last Written Prescription Date:  7/5/19  Last Fill Quantity: 225,  # refills: 0   Last office visit: 9/24/2019 with prescribing provider:     Future Office Visit:

## 2020-01-29 DIAGNOSIS — I10 BENIGN ESSENTIAL HYPERTENSION: Chronic | ICD-10-CM

## 2020-01-29 RX ORDER — LOSARTAN POTASSIUM 50 MG/1
TABLET ORAL
Qty: 135 TABLET | Refills: 0 | Status: SHIPPED | OUTPATIENT
Start: 2020-01-29 | End: 2020-06-30

## 2020-01-29 NOTE — TELEPHONE ENCOUNTER
"Requested Prescriptions   Pending Prescriptions Disp Refills     losartan (COZAAR) 50 MG tablet [Pharmacy Med Name: LOSARTAN POTASSIUM 50MG TABS] 135 tablet 0     Sig: TAKE ONE AND ONE-HALF TABLETS (75MG) BY MOUTH ONCE DAILY       Angiotensin-II Receptors Passed - 1/29/2020  9:56 AM        Passed - Last blood pressure under 140/90 in past 12 months     BP Readings from Last 3 Encounters:   09/24/19 124/72   09/16/19 132/64   09/12/19 (!) 153/91                 Passed - Recent (12 mo) or future (30 days) visit within the authorizing provider's specialty     Patient has had an office visit with the authorizing provider or a provider within the authorizing providers department within the previous 12 mos or has a future within next 30 days. See \"Patient Info\" tab in inbasket, or \"Choose Columns\" in Meds & Orders section of the refill encounter.              Passed - Medication is active on med list        Passed - Patient is age 18 or older        Passed - No active pregnancy on record        Passed - Normal serum creatinine on file in past 12 months     Recent Labs   Lab Test 03/27/19  0855   CR 0.85             Passed - Normal serum potassium on file in past 12 months     Recent Labs   Lab Test 03/27/19  0855   POTASSIUM 4.3                    Passed - No positive pregnancy test in past 12 months        Last Written Prescription Date:  3/27/19  Last Fill Quantity: 30,  # refills: 0   Last office visit: 9/24/2019 with prescribing provider:     Future Office Visit:      "

## 2020-02-05 ENCOUNTER — TRANSFERRED RECORDS (OUTPATIENT)
Dept: HEALTH INFORMATION MANAGEMENT | Facility: CLINIC | Age: 75
End: 2020-02-05

## 2020-02-05 LAB — RETINOPATHY: NEGATIVE

## 2020-03-18 NOTE — PROGRESS NOTES
SUBJECTIVE   Lucia Bobo is a  female who presents to clinic today for the following health issue(s):       Diabetes Follow-up    How often are you checking your blood sugar? Not at all- just broke Needs a home monitor     What concerns do you have today about your diabetes? None     Do you have any of these symptoms? (Select all that apply)  No numbness or tingling in feet.  No redness, sores or blisters on feet.  No complaints of excessive thirst.  No reports of blurry vision.  No significant changes to weight.    Hyperlipidemia Follow-Up    Are you regularly taking any medication or supplement to lower your cholesterol?   Yes- Atorvastatin     Are you having muscle aches or other side effects that you think could be caused by your cholesterol lowering medication?  No    Hypertension Follow-up    Do you check your blood pressure regularly outside of the clinic? No     Are you following a low salt diet? Yes no added salt    Are your blood pressures ever more than 140 on the top number (systolic) OR more   than 90 on the bottom number (diastolic), for example 140/90? NA    BP Readings from Last 2 Encounters:   03/19/20 128/74   09/24/19 124/72     Hemoglobin A1C (%)   Date Value   03/19/2020 7.7 (H)   07/05/2019 6.9 (H)     LDL Cholesterol Calculated (mg/dL)   Date Value   03/27/2019 21   02/01/2018 64       Asthma Follow-Up  Was ACT completed today?    Yes    ACT Total Scores 3/19/2020   ACT TOTAL SCORE -   ASTHMA ER VISITS -   ASTHMA HOSPITALIZATIONS -   ACT TOTAL SCORE (Goal Greater than or Equal to 20) 25   In the past 12 months, how many times did you visit the emergency room for your asthma without being admitted to the hospital? 0   In the past 12 months, how many times were you hospitalized overnight because of your asthma? 0       How many days per week do you miss taking your asthma controller medication?  0    Please describe any recent triggers for your asthma: upper respiratory  "infections    Have you had any Emergency Room Visits, Urgent Care Visits, or Hospital Admissions since your last office visit?  No      How many servings of fruits and vegetables do you eat daily?  2-3    On average, how many sweetened beverages do you drink each day (Examples: soda, juice, sweet tea, etc.  Do NOT count diet or artificially sweetened beverages)?   0    How many days per week do you exercise enough to make your heart beat faster? No    How many minutes a day do you exercise enough to make your heart beat faster? No    How many days per week do you miss taking your medication? 0    Musculoskeletal problem/pain (Leg pain)    Duration: 02/2020    Description  Location: Lt lower leg     Intensity:  Moderate tosevere    Accompanying signs and symptoms: ankle swelling and lower leg numbness     History  Previous similar problem: no   Previous evaluation:  none    Precipitating or alleviating factors:  Trauma or overuse: YES- Fell 3 times since 01/01/2020 on ICE, she doesn't feel week, she sometimes uses a cane  Aggravating factors include: Worse at night     Therapies tried and outcome: Lidocaine cream with minimal results   She does have RLS- she is using an OTC product  She does have varicose veins- feels they are \"popping out more\"    Health Maintenance        Health Maintenance Due   Topic Date Due     DEXA  1945     PNEUMOCOCCAL IMMUNIZATION 65+ LOW/MEDIUM RISK (2 of 2 - PCV13) 03/22/2014     MAMMO SCREENING  08/23/2014     PHQ-2  01/01/2020     A1C  01/05/2020     ZOSTER IMMUNIZATION (3 of 3) 02/11/2020     ASTHMA CONTROL TEST  03/24/2020     BMP  03/27/2020     LIPID  03/27/2020     MICROALBUMIN  03/27/2020     DIABETIC FOOT EXAM  03/27/2020     ASTHMA ACTION PLAN  03/27/2020       PCP   Keturah Chaney, [facility] [phone]    PROBLEM LIST        Patient Active Problem List   Diagnosis     Type 2 diabetes mellitus with hyperglycemia, with long-term current use of insulin (H) HgbA1c goal <7 "     Advanced directives, counseling/discussion     Osteoarthritis of both knees     BMI 40.0-44.9, adult (H)     Hypertriglyceridemia, LDL goal <100     Benign essential hypertension, BP goal <140/90     Mild persistent asthma, uncomplicated     Colon polyps     Slow transit constipation     Muscle cramp     Mammogram declined     Post-menopause     Bilateral hip pain     Acute left-sided low back pain without sciatica     Varicose veins of left lower extremity with pain     Restless legs syndrome       MEDICATIONS        Current Outpatient Medications   Medication     ADVAIR DISKUS 250-50 MCG/DOSE inhaler     albuterol (VENTOLIN HFA) 108 (90 Base) MCG/ACT inhaler     aspirin 81 MG tablet     atorvastatin (LIPITOR) 20 MG tablet     blood glucose (NO BRAND SPECIFIED) test strip     blood glucose monitoring (NO BRAND SPECIFIED) meter device kit     Calcium Carb-Cholecalciferol (CALCIUM 600+D) 600-800 MG-UNIT TABS     DULoxetine (CYMBALTA) 60 MG capsule     furosemide (LASIX) 20 MG tablet     hydrALAZINE (APRESOLINE) 100 MG tablet     hydrALAZINE (APRESOLINE) 50 MG tablet     insulin pen needle (BD SMILEY U/F) 32G X 4 MM miscellaneous     ipratropium - albuterol 0.5 mg/2.5 mg/3 mL (DUONEB) 0.5-2.5 (3) MG/3ML neb solution     liraglutide (VICTOZA PEN) 18 MG/3ML solution     losartan (COZAAR) 50 MG tablet     magnesium oxide (MAG-OX) 400 (241.3 Mg) MG tablet     metFORMIN (GLUCOPHAGE) 1000 MG tablet     methocarbamol (ROBAXIN) 500 MG tablet     ONETOUCH ULTRA test strip     order for DME     order for DME     polyethylene glycol (MIRALAX) powder     rOPINIRole (REQUIP) 0.5 MG tablet     verapamil ER (CALAN-SR) 240 MG CR tablet     albuterol (2.5 MG/3ML) 0.083% nebulizer solution     order for DME     PHARMACIST CHOICE LANCETS MIS     No current facility-administered medications for this visit.        Reviewed and updated as needed this visit by Provider:  Tobacco  Allergies  Meds  Med Hx  Surg Hx  Fam Hx  Soc Hx      ROS      Constitutional, HEENT, cardiovascular, pulmonary, gi and gu systems are negative, except as otherwise noted.    PHYSICAL EXAM   /74   Pulse 110   Temp 99.3  F (37.4  C) (Tympanic)   Resp 20   SpO2 96%   There is no height or weight on file to calculate BMI.  GENERAL APPEARANCE: healthy, alert, no distress and over weight  RESP: lungs clear to auscultation - no rales, rhonchi or wheezes  CV: regular rates and rhythm, normal S1 S2, no S3 or S4 and no murmur, click or rub  MS: extremities normal- no gross deformities noted, varicose veins bilaterally (Left worse than Right) and peripheral pulses normal  Lower leg (left)  Inspection  No obvious deformity noted in foot and ankle  No Ecchymosis   No swelling  Normal DP and PT artery pulse  Varicose veins ankle to prepatella region  No pain with gastroc squeeze test, and normal pull of Achilles tendon    Ankle (left)   Inspection  No obvious deformity noted in foot and ankle  No Ecchymosis   No swelling  Normal DP and PT artery pulse     Tenderness  Tenderness over shin area    Range of motion  Full range of motion with dorsiflexion, plantarflexion, inversion and eversion.  No instability noted on exam with anterior drawer, talar tilt or external rotation testing.  Gait normal.   Normal proprioception with single leg stance.      SKIN: no suspicious lesions or rashes  NEURO: Normal strength and tone, mentation intact, speech normal and DTR symmetrically normal in lower extremities  DIABETIC FOOT EXAM: normal DP and PT pulses, no trophic changes or ulcerative lesions, normal sensory exam and normal monofilament exam  PSYCH: mentation appears normal and affect normal/bright    I independently reviewed the X-rays: Agree with the Radiologist's interpretation.    ASSESSMENT & PLAN     1. Type 2 diabetes mellitus with hyperglycemia, with long-term current use of insulin (H) HgbA1c goal <8  Chronic, stable  - C FOOT EXAM  NO CHARGE  - Hemoglobin A1c  - TSH  with free T4 reflex  - Vitamin B12  - blood glucose (NO BRAND SPECIFIED) test strip; Use to test blood sugar 1 times daily or as directed.  Dispense: 100 each; Refill: 3  - order for DME; Equipment being ordered: glucometer with test strips  Dispense: 1 Units; Refill: 0  Your A1c has gone up, as you were thinking. Work on diet and daily exercise. Repeat A1c in 3 months with a lab only appointment- bring in your glucometer to this lab appointment so we can download readings.   At this point you should be checking once daily    Lab Results   Component Value Date    A1C 7.7 03/19/2020    A1C 6.9 07/05/2019    A1C 7.0 03/27/2019    A1C 6.6 06/28/2018    A1C 7.3 10/24/2017       2. BMI 40.0-44.9, adult (H)  Chronic, stable    3. Benign essential hypertension, BP goal <140/90  Chronic, stable  - Comprehensive metabolic panel    4. Hypertriglyceridemia, LDL goal <100  Chronic, stable  - Lipid panel reflex to direct LDL Fasting    5. Mild persistent asthma, uncomplicated  Chronic, stable    6. Personal history of fall  Historical  - XR Tibia & Fibula Left 2 Views; Future  - XR Ankle Left G/E 3 Views; Future    7. Restless legs syndrome  Chronic, uncontrolled  - rOPINIRole (REQUIP) 0.5 MG tablet; Start 1/2 tablet (0.25 mg) at bedtime X 2 days, then  1 tablet (0.5 mg) at bedtime.  Dispense: 60 tablet; Refill: 1  Call me in a month to see how you are doing with this    8. Varicose veins of left lower extremity with pain  Chronic, worsened  Start wearing CARINA hose daily  Elevated feet when you can  Exercise daily  Call me in a month to see how you are doing with the CARINA hose  If still problematic, we'll get ultrasound/exercise testing done to assess your veins further  - order for DME; Equipment being ordered: 2 pairs of CARINA hose 10 mmHg Calf width 51 cm, Lower leg length 39 cm  Dispense: 2 Units; Refill: 0    9. Pain of left lower leg  Acute, stable  - XR Tibia & Fibula Left 2 Views; Future  - XR Ankle Left G/E 3 Views;  Future  - methocarbamol (ROBAXIN) 500 MG tablet; Take 1 tablet at bedtime for pain  Dispense: 14 tablet; Refill: 0  [Use for a very short time frame]  Ice or heat for 20 minutes at a time  If no improvement, I will recommend Physical Therapy referral    Results for orders placed or performed in visit on 03/19/20   XR Ankle Left G/E 3 Views     Status: None (Preliminary result)    Narrative    LEFT ANKLE THREE OR MORE VIEWS;   LEFT TIBIA AND FIBULA TWO VIEWS   3/19/2020 9:53 AM     HISTORY:  Anterior leg pain, ankle pain to lateral/medial malleolus,  three falls recently, no overt trauma, able to ambulate. Personal  history of fall. Pain of left lower leg.    FINDINGS: Calcaneal spurring. Mild patellar and lateral compartment  osteophytosis. There is a small chronic-appearing bony fragment  inferior to the medial malleolus.      Impression    IMPRESSION: No acute fracture identified.   Results for orders placed or performed in visit on 03/19/20   XR Tibia & Fibula Left 2 Views     Status: None (Preliminary result)    Narrative    LEFT ANKLE THREE OR MORE VIEWS;   LEFT TIBIA AND FIBULA TWO VIEWS   3/19/2020 9:53 AM     HISTORY:  Anterior leg pain, ankle pain to lateral/medial malleolus,  three falls recently, no overt trauma, able to ambulate. Personal  history of fall. Pain of left lower leg.    FINDINGS: Calcaneal spurring. Mild patellar and lateral compartment  osteophytosis. There is a small chronic-appearing bony fragment  inferior to the medial malleolus.      Impression    IMPRESSION: No acute fracture identified.   Results for orders placed or performed in visit on 03/19/20   Hemoglobin A1c     Status: Abnormal   Result Value Ref Range    Hemoglobin A1C 7.7 (H) 0 - 5.6 %         Patient Education     Restless Legs Syndrome: What You Can Do    Symptoms of restless leg syndrome (RLS) can be treated. Together, you and your healthcare provider can work on your treatment plan. If needed, medicines may be  prescribed. Also learn what you can do to ease your discomfort. Good sleep habits and a healthy lifestyle will help you rest better at night and have more energy during the day.  Working with your healthcare provider  RLS may occur on its own and may be passed on in families. It is sometimes linked to other medical problems. Low iron may cause some RLS symptoms. Your healthcare provider may order a lab test to check your iron level. Other medical problems associated with RLS are kidney disease, diabetes, Parkinson disease, and multiple sclerosis. Your doctor may prescribe medicines to reduce your symptoms and help you sleep better.  Tips for temporary relief  To reduce your discomfort, try the following:    Walking or stretching    Rubbing your legs    Having a massage    Taking a hot or cold bath    Doing activities that make muscles in your hands or legs work    Relaxing with yoga or meditation  Good sleep habits  Even though you have RLS, you can still have restful sleep. Try these good sleeping habits:    Keep a regular sleep schedule. Go to bed and get up at the same time each day.    Avoid or limit naps.    Make sure the bedroom is quiet, dark, and not too hot or too cold.    Use your bed only for sleep and sex.  Healthy lifestyle  Your lifestyle affects your health and your sleep. Here are some healthy habits:    Eat a balanced diet. To get enough vitamins and minerals, you may also need to take supplements.    Manage stress and learn ways to relax. Deep breathing techniques and visualization can help to relax your muscles and calm your mind.    Exercise regularly. It can help reduce stress. Also, you will have more energy during the day and be more tired at bedtime. Afternoon exercise is best. Nighttime exercise may affect how well you sleep.  Date Last Reviewed: 9/1/2017 2000-2019 [employer]. [address] [place] [address], [place] [address]. All rights reserved. This information is not intended  as a substitute for professional medical care. Always follow your healthcare professional's instructions.           Patient Education     Ropinirole tablets  Brand Name: Requip  What is this medicine?  ROPINIROLE (edelmira panda) is used to treat the symptoms of Parkinson's disease. It helps to improve muscle control and movement difficulties. It is also used for the treatment of Restless Legs Syndrome.  How should I use this medicine?  Take this medicine by mouth with a glass of water. Follow the directions on the prescription label. You can take it with or without food. If it upsets your stomach, take it with food. Take your doses at regular intervals. Do not take your medicine more often than directed. Do not stop taking this medicine except on your doctor's advice. Stopping this medicine too quickly may cause serious side effects.  Talk to your pediatrician regarding the use of this medicine in children. Special care may be needed.  What side effects may I notice from receiving this medicine?  Side effects that you should report to your doctor or health care professional as soon as possible:    allergic reactions like skin rash, itching or hives, swelling of the face, lips, or tongue    changes in vision    chest pain    confusion    falling asleep during normal activities like driving    fast, irregular heartbeat    feeling faint or lightheaded, falls    hallucination, loss of contact with reality    joint or muscle pain    loss of bladder control    loss of memory    new or increased gambling urges, sexual urges, uncontrolled spending, binge or compulsive eating, or other urges    pain, tingling, numbness in the hands or feet    shortness of breath, troubled breathing, tightness in chest, or wheezing    signs and symptoms of low blood pressure like dizziness; feeling faint or lightheaded, falls; unusually weak or tired    swelling of the ankles, feet, hands    uncontrollable head, mouth, neck, arm, or leg  movements    vomiting  Side effects that usually do not require medical attention (report to your doctor or health care professional if they continue or are bothersome):    dizziness    drowsiness    headache    increased sweating    nausea    tremors  What may interact with this medicine?    ciprofloxacin    female hormones, like estrogens and birth control pills    medicines for depression, anxiety, or psychotic disturbances    metoclopramide    mexiletine    norfloxacin    omeprazole    What if I miss a dose?  If you miss a dose, take it as soon as you can. If it is almost time for your next dose, take only that dose. Do not take double or extra doses.  Where should I keep my medicine?  Keep out of the reach of children.  Store at room temperature between 20 and 25 degrees C (68 and 77 degrees F). Protect from light and moisture. Keep container tightly closed. Throw away any unused medicine after the expiration date.  What should I tell my health care provider before I take this medicine?  They need to know if you have any of these conditions:    dizzy or fainting spells    heart disease    high blood pressure    kidney disease    liver disease    low blood pressure    sleeping problems    an unusual or allergic reaction to ropinirole, other medicines, foods, dyes, or preservatives    pregnant or trying to get pregnant    breast-feeding  What should I watch for while using this medicine?  Visit your doctor or health care professional for regular checks on your progress. It may be several weeks or months before you feel the full effect of this medicine.  You may get drowsy or dizzy. Do not drive, use machinery, or do anything that needs mental alertness until you know how this drug affects you. Do not stand or sit up quickly, especially if you are an older patient. This reduces the risk of dizzy or fainting spells. Alcohol can increase possible dizziness. Avoid alcoholic drinks. If you find that you have sudden  feelings of wanting to sleep during normal activities, like cooking, watching television, or while driving or riding in a car, you should contact your health care professional.  Your mouth may get dry. Chewing sugarless gum or sucking hard candy, and drinking plenty of water may help. Contact your doctor if the problem does not go away or is severe.  There have been reports of increased sexual urges or other strong urges such as gambling while taking some medicines for Parkinson's disease. If you experience any of these urges while taking this medicine, you should report it to your health care provider as soon as possible.  You should check your skin often for changes to moles and new growths while taking this medicine. Call your doctor if you notice any of these changes.  NOTE:This sheet is a summary. It may not cover all possible information. If you have questions about this medicine, talk to your doctor, pharmacist, or health care provider. Copyright  2019 [employer]           Patient Education     Varicose Veins  Varicose veins are swollen, enlarged veins most often found in the legs. They are usually blue or purple in color and may bulge, twist, and stand out under the skin.  Normally, veins return blood from the body to the heart. The leg veins have one-way valves that prevent blood from flowing backward in the vein. When the valves are weak or damaged, blood backs up in the veins. This may cause some of the veins to swell and bulge and become varicose veins.  Symptoms  Varicose veins may or may not cause symptoms. If symptoms do occur, they can include:    Legs that feel tired, achy, heavy, or itchy    Leg muscle cramps    Skin changes, such as discoloration, dryness, redness, or rash (in more severe cases, you may also have sores on the skin called venous leg ulcers)  Risk factors  There are a number of factors that increase the risk for varicose veins. These can include:    Being a woman    Being  older    Sitting or standing for long periods    Being overweight    Being pregnant    Having a family history of varicose veins  Treatment starts with simple self-help measures (see below). If these don t help, there are many procedures that can be done to shrink or remove varicose veins. Your healthcare provider can tell you more about these options, if needed.  Home care    Support or compression stockings will likely be prescribed. If so, be sure to wear them as directed. They may help improve blood flow.    Exercising helps strengthen your leg muscles and improve blood flow. To get the most benefit, choose exercises such as walking, swimming, or cycling. Also try to exercise for at least 30 minutes on most days.    Raising (elevating) your legs lets gravity help blood flow back to the heart. Sit or lie with your feet above heart level a few times throughout the day, or as directed.    Don't sit or stand for long periods. Change positions often. Also, move your ankles, toes and knees often. This may also help improve blood flow.    If you are overweight, talk with your healthcare provider about setting up a weight-loss plan. Maintaining a healthy weight can help reduce the strain on your veins. It may also improve symptoms, such as swelling and aching.    If you have dryness and itching, ask your provider about special lotions that can be applied to the skin to help improve symptoms.    Follow-up care  Follow up with your healthcare provider, or as directed. If imaging tests were done, you ll be told the results and if there are any new findings that affect your care.  When to seek medical advice  Call your healthcare provider right away if any of these occur:    Sudden, severe leg swelling, pain, or redness    Symptoms worsen, or they don t improve with self-care    Bleeding from any affected veins    Ulcers form on the legs, ankles, or feet    Fever of 100.4 F (38 C) or higher, or as advised by your  provider  Date Last Reviewed: 4/1/2018 2000-2019 [employer]. [address] [place] [address], [place] [address]. All rights reserved. This information is not intended as a substitute for professional medical care. Always follow your healthcare professional's instructions.             Risks, benefits, side effects and rationale for treatment plan fully discussed with the patient and understanding expressed.  LUIS M King-BC  [facility]

## 2020-03-19 ENCOUNTER — OFFICE VISIT (OUTPATIENT)
Dept: FAMILY MEDICINE | Facility: CLINIC | Age: 75
End: 2020-03-19
Payer: COMMERCIAL

## 2020-03-19 ENCOUNTER — ANCILLARY PROCEDURE (OUTPATIENT)
Dept: GENERAL RADIOLOGY | Facility: CLINIC | Age: 75
End: 2020-03-19
Attending: NURSE PRACTITIONER
Payer: COMMERCIAL

## 2020-03-19 VITALS
DIASTOLIC BLOOD PRESSURE: 74 MMHG | RESPIRATION RATE: 20 BRPM | OXYGEN SATURATION: 96 % | HEART RATE: 110 BPM | TEMPERATURE: 99.3 F | SYSTOLIC BLOOD PRESSURE: 128 MMHG

## 2020-03-19 DIAGNOSIS — Z91.81 PERSONAL HISTORY OF FALL: ICD-10-CM

## 2020-03-19 DIAGNOSIS — E11.65 TYPE 2 DIABETES MELLITUS WITH HYPERGLYCEMIA, WITH LONG-TERM CURRENT USE OF INSULIN (H): Primary | Chronic | ICD-10-CM

## 2020-03-19 DIAGNOSIS — I10 BENIGN ESSENTIAL HYPERTENSION: Chronic | ICD-10-CM

## 2020-03-19 DIAGNOSIS — I83.812 VARICOSE VEINS OF LEFT LOWER EXTREMITY WITH PAIN: ICD-10-CM

## 2020-03-19 DIAGNOSIS — E78.1 HYPERTRIGLYCERIDEMIA: Chronic | ICD-10-CM

## 2020-03-19 DIAGNOSIS — M79.662 PAIN OF LEFT LOWER LEG: ICD-10-CM

## 2020-03-19 DIAGNOSIS — J45.30 MILD PERSISTENT ASTHMA, UNCOMPLICATED: Chronic | ICD-10-CM

## 2020-03-19 DIAGNOSIS — E11.65 TYPE 2 DIABETES MELLITUS WITH HYPERGLYCEMIA, WITH LONG-TERM CURRENT USE OF INSULIN (H): Chronic | ICD-10-CM

## 2020-03-19 DIAGNOSIS — G25.81 RESTLESS LEGS SYNDROME: ICD-10-CM

## 2020-03-19 DIAGNOSIS — Z79.4 TYPE 2 DIABETES MELLITUS WITH HYPERGLYCEMIA, WITH LONG-TERM CURRENT USE OF INSULIN (H): Chronic | ICD-10-CM

## 2020-03-19 DIAGNOSIS — R79.89 ELEVATED TSH: Primary | ICD-10-CM

## 2020-03-19 DIAGNOSIS — Z79.4 TYPE 2 DIABETES MELLITUS WITH HYPERGLYCEMIA, WITH LONG-TERM CURRENT USE OF INSULIN (H): Primary | Chronic | ICD-10-CM

## 2020-03-19 LAB
ALBUMIN SERPL-MCNC: 4 G/DL (ref 3.4–5)
ALP SERPL-CCNC: 95 U/L (ref 40–150)
ALT SERPL W P-5'-P-CCNC: 27 U/L (ref 0–50)
ANION GAP SERPL CALCULATED.3IONS-SCNC: 8 MMOL/L (ref 3–14)
AST SERPL W P-5'-P-CCNC: 21 U/L (ref 0–45)
BILIRUB SERPL-MCNC: 0.8 MG/DL (ref 0.2–1.3)
BUN SERPL-MCNC: 26 MG/DL (ref 7–30)
CALCIUM SERPL-MCNC: 10 MG/DL (ref 8.5–10.1)
CHLORIDE SERPL-SCNC: 104 MMOL/L (ref 94–109)
CHOLEST SERPL-MCNC: 130 MG/DL
CO2 SERPL-SCNC: 24 MMOL/L (ref 20–32)
CREAT SERPL-MCNC: 1.03 MG/DL (ref 0.52–1.04)
GFR SERPL CREATININE-BSD FRML MDRD: 54 ML/MIN/{1.73_M2}
GLUCOSE SERPL-MCNC: 195 MG/DL (ref 70–99)
HBA1C MFR BLD: 7.7 % (ref 0–5.6)
HDLC SERPL-MCNC: 79 MG/DL
LDLC SERPL CALC-MCNC: 6 MG/DL
NONHDLC SERPL-MCNC: 51 MG/DL
POTASSIUM SERPL-SCNC: 4.6 MMOL/L (ref 3.4–5.3)
PROT SERPL-MCNC: 7.9 G/DL (ref 6.8–8.8)
SODIUM SERPL-SCNC: 136 MMOL/L (ref 133–144)
T4 FREE SERPL-MCNC: 1.27 NG/DL (ref 0.76–1.46)
TRIGL SERPL-MCNC: 225 MG/DL
TSH SERPL DL<=0.005 MIU/L-ACNC: 4.52 MU/L (ref 0.4–4)
VIT B12 SERPL-MCNC: 270 PG/ML (ref 193–986)

## 2020-03-19 PROCEDURE — 99207 C FOOT EXAM  NO CHARGE: CPT | Performed by: NURSE PRACTITIONER

## 2020-03-19 PROCEDURE — 36415 COLL VENOUS BLD VENIPUNCTURE: CPT | Performed by: NURSE PRACTITIONER

## 2020-03-19 PROCEDURE — 82607 VITAMIN B-12: CPT | Performed by: NURSE PRACTITIONER

## 2020-03-19 PROCEDURE — 80061 LIPID PANEL: CPT | Performed by: NURSE PRACTITIONER

## 2020-03-19 PROCEDURE — 84439 ASSAY OF FREE THYROXINE: CPT | Performed by: NURSE PRACTITIONER

## 2020-03-19 PROCEDURE — 84443 ASSAY THYROID STIM HORMONE: CPT | Performed by: NURSE PRACTITIONER

## 2020-03-19 PROCEDURE — 73590 X-RAY EXAM OF LOWER LEG: CPT | Mod: LT

## 2020-03-19 PROCEDURE — 83036 HEMOGLOBIN GLYCOSYLATED A1C: CPT | Performed by: NURSE PRACTITIONER

## 2020-03-19 PROCEDURE — 80053 COMPREHEN METABOLIC PANEL: CPT | Performed by: NURSE PRACTITIONER

## 2020-03-19 PROCEDURE — 99214 OFFICE O/P EST MOD 30 MIN: CPT | Performed by: NURSE PRACTITIONER

## 2020-03-19 PROCEDURE — 73610 X-RAY EXAM OF ANKLE: CPT | Mod: LT

## 2020-03-19 RX ORDER — ROPINIROLE 0.5 MG/1
TABLET, FILM COATED ORAL
Qty: 60 TABLET | Refills: 1 | Status: SHIPPED | OUTPATIENT
Start: 2020-03-19 | End: 2020-09-04

## 2020-03-19 RX ORDER — METHOCARBAMOL 500 MG/1
TABLET, FILM COATED ORAL
Qty: 14 TABLET | Refills: 0 | Status: SHIPPED | OUTPATIENT
Start: 2020-03-19 | End: 2020-04-29

## 2020-03-19 NOTE — PATIENT INSTRUCTIONS
1. Type 2 diabetes mellitus with hyperglycemia, with long-term current use of insulin (H) HgbA1c goal <8  Chronic, stable  - C FOOT EXAM  NO CHARGE  - Hemoglobin A1c  - TSH with free T4 reflex  - Vitamin B12  - blood glucose (NO BRAND SPECIFIED) test strip; Use to test blood sugar 1 times daily or as directed.  Dispense: 100 each; Refill: 3  - order for DME; Equipment being ordered: glucometer with test strips  Dispense: 1 Units; Refill: 0  Your A1c has gone up, as you were thinking. Work on diet and daily exercise. Repeat A1c in 3 months with a lab only appointment- bring in your glucometer to this lab appointment so we can download readings.   At this point you should be checking once daily    Lab Results   Component Value Date    A1C 7.7 03/19/2020    A1C 6.9 07/05/2019    A1C 7.0 03/27/2019    A1C 6.6 06/28/2018    A1C 7.3 10/24/2017       2. BMI 40.0-44.9, adult (H)  Chronic, stable    3. Benign essential hypertension, BP goal <140/90  Chronic, stable  - Comprehensive metabolic panel    4. Hypertriglyceridemia, LDL goal <100  Chronic, stable  - Lipid panel reflex to direct LDL Fasting    5. Mild persistent asthma, uncomplicated  Chronic, stable    6. Personal history of fall  Historical  - XR Tibia & Fibula Left 2 Views; Future  - XR Ankle Left G/E 3 Views; Future    7. Restless legs syndrome  Chronic, uncontrolled  - rOPINIRole (REQUIP) 0.5 MG tablet; Start 1/2 tablet (0.25 mg) at bedtime X 2 days, then  1 tablet (0.5 mg) at bedtime.  Dispense: 60 tablet; Refill: 1  Call me in a month to see how you are doing with this    8. Varicose veins of left lower extremity with pain  Chronic, worsened  Start wearing CARINA hose daily  Elevated feet when you can  Exercise daily  Call me in a month to see how you are doing with the CARINA hose  If still problematic, we'll get ultrasound/exercise testing done to assess your veins further  - order for DME; Equipment being ordered: 2 pairs of CARINA hose 10 mmHg Calf width 51 cm,  Lower leg length 39 cm  Dispense: 2 Units; Refill: 0    9. Pain of left lower leg  Acute, stable  - XR Tibia & Fibula Left 2 Views; Future  - XR Ankle Left G/E 3 Views; Future  - methocarbamol (ROBAXIN) 500 MG tablet; Take 1 tablet at bedtime for pain  Dispense: 14 tablet; Refill: 0  [Use for a very short time frame]  Ice or heat for 20 minutes at a time  If no improvement, I will recommend Physical Therapy referral    Results for orders placed or performed in visit on 03/19/20   XR Ankle Left G/E 3 Views     Status: None (Preliminary result)    Narrative    LEFT ANKLE THREE OR MORE VIEWS;   LEFT TIBIA AND FIBULA TWO VIEWS   3/19/2020 9:53 AM     HISTORY:  Anterior leg pain, ankle pain to lateral/medial malleolus,  three falls recently, no overt trauma, able to ambulate. Personal  history of fall. Pain of left lower leg.    FINDINGS: Calcaneal spurring. Mild patellar and lateral compartment  osteophytosis. There is a small chronic-appearing bony fragment  inferior to the medial malleolus.      Impression    IMPRESSION: No acute fracture identified.   Results for orders placed or performed in visit on 03/19/20   XR Tibia & Fibula Left 2 Views     Status: None (Preliminary result)    Narrative    LEFT ANKLE THREE OR MORE VIEWS;   LEFT TIBIA AND FIBULA TWO VIEWS   3/19/2020 9:53 AM     HISTORY:  Anterior leg pain, ankle pain to lateral/medial malleolus,  three falls recently, no overt trauma, able to ambulate. Personal  history of fall. Pain of left lower leg.    FINDINGS: Calcaneal spurring. Mild patellar and lateral compartment  osteophytosis. There is a small chronic-appearing bony fragment  inferior to the medial malleolus.      Impression    IMPRESSION: No acute fracture identified.   Results for orders placed or performed in visit on 03/19/20   Hemoglobin A1c     Status: Abnormal   Result Value Ref Range    Hemoglobin A1C 7.7 (H) 0 - 5.6 %         Patient Education     Restless Legs Syndrome: What You Can  Do    Symptoms of restless leg syndrome (RLS) can be treated. Together, you and your healthcare provider can work on your treatment plan. If needed, medicines may be prescribed. Also learn what you can do to ease your discomfort. Good sleep habits and a healthy lifestyle will help you rest better at night and have more energy during the day.  Working with your healthcare provider  RLS may occur on its own and may be passed on in families. It is sometimes linked to other medical problems. Low iron may cause some RLS symptoms. Your healthcare provider may order a lab test to check your iron level. Other medical problems associated with RLS are kidney disease, diabetes, Parkinson disease, and multiple sclerosis. Your doctor may prescribe medicines to reduce your symptoms and help you sleep better.  Tips for temporary relief  To reduce your discomfort, try the following:    Walking or stretching    Rubbing your legs    Having a massage    Taking a hot or cold bath    Doing activities that make muscles in your hands or legs work    Relaxing with yoga or meditation  Good sleep habits  Even though you have RLS, you can still have restful sleep. Try these good sleeping habits:    Keep a regular sleep schedule. Go to bed and get up at the same time each day.    Avoid or limit naps.    Make sure the bedroom is quiet, dark, and not too hot or too cold.    Use your bed only for sleep and sex.  Healthy lifestyle  Your lifestyle affects your health and your sleep. Here are some healthy habits:    Eat a balanced diet. To get enough vitamins and minerals, you may also need to take supplements.    Manage stress and learn ways to relax. Deep breathing techniques and visualization can help to relax your muscles and calm your mind.    Exercise regularly. It can help reduce stress. Also, you will have more energy during the day and be more tired at bedtime. Afternoon exercise is best. Nighttime exercise may affect how well you  sleep.  Date Last Reviewed: 9/1/2017 2000-2019 [employer]. [address] [place] [address], [place] [address]. All rights reserved. This information is not intended as a substitute for professional medical care. Always follow your healthcare professional's instructions.           Patient Education     Ropinirole tablets  Brand Name: Requip  What is this medicine?  ROPINIROLE (edelmira PIN i role) is used to treat the symptoms of Parkinson's disease. It helps to improve muscle control and movement difficulties. It is also used for the treatment of Restless Legs Syndrome.  How should I use this medicine?  Take this medicine by mouth with a glass of water. Follow the directions on the prescription label. You can take it with or without food. If it upsets your stomach, take it with food. Take your doses at regular intervals. Do not take your medicine more often than directed. Do not stop taking this medicine except on your doctor's advice. Stopping this medicine too quickly may cause serious side effects.  Talk to your pediatrician regarding the use of this medicine in children. Special care may be needed.  What side effects may I notice from receiving this medicine?  Side effects that you should report to your doctor or health care professional as soon as possible:    allergic reactions like skin rash, itching or hives, swelling of the face, lips, or tongue    changes in vision    chest pain    confusion    falling asleep during normal activities like driving    fast, irregular heartbeat    feeling faint or lightheaded, falls    hallucination, loss of contact with reality    joint or muscle pain    loss of bladder control    loss of memory    new or increased gambling urges, sexual urges, uncontrolled spending, binge or compulsive eating, or other urges    pain, tingling, numbness in the hands or feet    shortness of breath, troubled breathing, tightness in chest, or wheezing    signs and symptoms of low blood  pressure like dizziness; feeling faint or lightheaded, falls; unusually weak or tired    swelling of the ankles, feet, hands    uncontrollable head, mouth, neck, arm, or leg movements    vomiting  Side effects that usually do not require medical attention (report to your doctor or health care professional if they continue or are bothersome):    dizziness    drowsiness    headache    increased sweating    nausea    tremors  What may interact with this medicine?    ciprofloxacin    female hormones, like estrogens and birth control pills    medicines for depression, anxiety, or psychotic disturbances    metoclopramide    mexiletine    norfloxacin    omeprazole    What if I miss a dose?  If you miss a dose, take it as soon as you can. If it is almost time for your next dose, take only that dose. Do not take double or extra doses.  Where should I keep my medicine?  Keep out of the reach of children.  Store at room temperature between 20 and 25 degrees C (68 and 77 degrees F). Protect from light and moisture. Keep container tightly closed. Throw away any unused medicine after the expiration date.  What should I tell my health care provider before I take this medicine?  They need to know if you have any of these conditions:    dizzy or fainting spells    heart disease    high blood pressure    kidney disease    liver disease    low blood pressure    sleeping problems    an unusual or allergic reaction to ropinirole, other medicines, foods, dyes, or preservatives    pregnant or trying to get pregnant    breast-feeding  What should I watch for while using this medicine?  Visit your doctor or health care professional for regular checks on your progress. It may be several weeks or months before you feel the full effect of this medicine.  You may get drowsy or dizzy. Do not drive, use machinery, or do anything that needs mental alertness until you know how this drug affects you. Do not stand or sit up quickly, especially if you  are an older patient. This reduces the risk of dizzy or fainting spells. Alcohol can increase possible dizziness. Avoid alcoholic drinks. If you find that you have sudden feelings of wanting to sleep during normal activities, like cooking, watching television, or while driving or riding in a car, you should contact your health care professional.  Your mouth may get dry. Chewing sugarless gum or sucking hard candy, and drinking plenty of water may help. Contact your doctor if the problem does not go away or is severe.  There have been reports of increased sexual urges or other strong urges such as gambling while taking some medicines for Parkinson's disease. If you experience any of these urges while taking this medicine, you should report it to your health care provider as soon as possible.  You should check your skin often for changes to moles and new growths while taking this medicine. Call your doctor if you notice any of these changes.  NOTE:This sheet is a summary. It may not cover all possible information. If you have questions about this medicine, talk to your doctor, pharmacist, or health care provider. Copyright  2019 [employer]           Patient Education     Varicose Veins  Varicose veins are swollen, enlarged veins most often found in the legs. They are usually blue or purple in color and may bulge, twist, and stand out under the skin.  Normally, veins return blood from the body to the heart. The leg veins have one-way valves that prevent blood from flowing backward in the vein. When the valves are weak or damaged, blood backs up in the veins. This may cause some of the veins to swell and bulge and become varicose veins.  Symptoms  Varicose veins may or may not cause symptoms. If symptoms do occur, they can include:    Legs that feel tired, achy, heavy, or itchy    Leg muscle cramps    Skin changes, such as discoloration, dryness, redness, or rash (in more severe cases, you may also have sores on the skin  called venous leg ulcers)  Risk factors  There are a number of factors that increase the risk for varicose veins. These can include:    Being a woman    Being older    Sitting or standing for long periods    Being overweight    Being pregnant    Having a family history of varicose veins  Treatment starts with simple self-help measures (see below). If these don t help, there are many procedures that can be done to shrink or remove varicose veins. Your healthcare provider can tell you more about these options, if needed.  Home care    Support or compression stockings will likely be prescribed. If so, be sure to wear them as directed. They may help improve blood flow.    Exercising helps strengthen your leg muscles and improve blood flow. To get the most benefit, choose exercises such as walking, swimming, or cycling. Also try to exercise for at least 30 minutes on most days.    Raising (elevating) your legs lets gravity help blood flow back to the heart. Sit or lie with your feet above heart level a few times throughout the day, or as directed.    Don't sit or stand for long periods. Change positions often. Also, move your ankles, toes and knees often. This may also help improve blood flow.    If you are overweight, talk with your healthcare provider about setting up a weight-loss plan. Maintaining a healthy weight can help reduce the strain on your veins. It may also improve symptoms, such as swelling and aching.    If you have dryness and itching, ask your provider about special lotions that can be applied to the skin to help improve symptoms.    Follow-up care  Follow up with your healthcare provider, or as directed. If imaging tests were done, you ll be told the results and if there are any new findings that affect your care.  When to seek medical advice  Call your healthcare provider right away if any of these occur:    Sudden, severe leg swelling, pain, or redness    Symptoms worsen, or they don t improve with  self-care    Bleeding from any affected veins    Ulcers form on the legs, ankles, or feet    Fever of 100.4 F (38 C) or higher, or as advised by your provider  Date Last Reviewed: 4/1/2018 2000-2019 [employer]. [address] [place] [id number]. All rights reserved. This information is not intended as a substitute for professional medical care. Always follow your healthcare professional's instructions.

## 2020-03-19 NOTE — RESULT ENCOUNTER NOTE
Dear Lucia,  Reviewed with patient. We'll recheck TSH in 3 months with her HgbA1c  Please contact our clinic via phone or My Chart if you have any questions or concerns.  Thanks,  LUIS M Bean

## 2020-03-20 ASSESSMENT — ASTHMA QUESTIONNAIRES: ACT_TOTALSCORE: 25

## 2020-03-31 DIAGNOSIS — I10 BENIGN ESSENTIAL HYPERTENSION: Chronic | ICD-10-CM

## 2020-03-31 RX ORDER — HYDRALAZINE HYDROCHLORIDE 50 MG/1
TABLET, FILM COATED ORAL
Qty: 90 TABLET | Refills: 1 | Status: SHIPPED | OUTPATIENT
Start: 2020-03-31 | End: 2020-06-04

## 2020-03-31 NOTE — TELEPHONE ENCOUNTER
"Requested Prescriptions   Pending Prescriptions Disp Refills     hydrALAZINE (APRESOLINE) 50 MG tablet [Pharmacy Med Name: hydrALAZINE 50MG TAB] 90 tablet 2     Sig: TAKE ONE TABLET (50MG) BY MOUTH ONCE DAILY WITH 100MG TABLET TO EQUAL 150MG DAILY       Vasodilators Passed - 3/31/2020  2:10 PM        Passed - Most recent BP less than 140/90 on record     BP Readings from Last 3 Encounters:   03/19/20 128/74   09/24/19 124/72   09/16/19 132/64                 Passed - Most recent encounter is not a hospital encounter. Patient has recent (12 mos) or future (1 mos) visit with authorizing provider's specialty     Patient's most recent encounter is NOT a hospital encounter and has had an office visit in the last 12 months or has a visit in the next 30 days with authorizing provider or within the authorizing provider's specialty.      See \"Patient Info\" tab in inbasket, or \"Choose Columns\" in Meds & Orders section of the refill encounter.      If most recent encounter is a hospital encounter AND the patient does NOT have an appointment scheduled with the authorizing provider or authorizing provider's specialty within the next 30 days, forward refill to authorizing provider for medication review.          Passed - Medication is active on med list        Passed - Patient is of age 18 years or older        Passed - Patient is not pregnant        Passed - Patient has not had a positive pregnancy test within the past 12 months           Last Written Prescription Date:  03/27/19  Last Fill Quantity: 90,  # refills: 3   Last office visit: 3/19/2020 with prescribing provider:  03/19/20   Future Office Visit:      "

## 2020-04-29 ENCOUNTER — VIRTUAL VISIT (OUTPATIENT)
Dept: GERIATRICS | Facility: CLINIC | Age: 75
End: 2020-04-29
Payer: COMMERCIAL

## 2020-04-29 VITALS
SYSTOLIC BLOOD PRESSURE: 158 MMHG | BODY MASS INDEX: 40.77 KG/M2 | WEIGHT: 245 LBS | TEMPERATURE: 98.6 F | OXYGEN SATURATION: 93 % | RESPIRATION RATE: 18 BRPM | HEART RATE: 92 BPM | DIASTOLIC BLOOD PRESSURE: 62 MMHG

## 2020-04-29 DIAGNOSIS — S82.842S BIMALLEOLAR ANKLE FRACTURE, LEFT, SEQUELA: Primary | ICD-10-CM

## 2020-04-29 DIAGNOSIS — J45.30 MILD PERSISTENT ASTHMA, UNCOMPLICATED: Chronic | ICD-10-CM

## 2020-04-29 DIAGNOSIS — Z79.4 TYPE 2 DIABETES MELLITUS WITH HYPERGLYCEMIA, WITH LONG-TERM CURRENT USE OF INSULIN (H): Chronic | ICD-10-CM

## 2020-04-29 DIAGNOSIS — E11.65 TYPE 2 DIABETES MELLITUS WITH HYPERGLYCEMIA, WITH LONG-TERM CURRENT USE OF INSULIN (H): Chronic | ICD-10-CM

## 2020-04-29 DIAGNOSIS — I10 BENIGN ESSENTIAL HYPERTENSION: Chronic | ICD-10-CM

## 2020-04-29 PROCEDURE — 99309 SBSQ NF CARE MODERATE MDM 30: CPT | Mod: 95 | Performed by: NURSE PRACTITIONER

## 2020-04-29 RX ORDER — SODIUM PHOSPHATE,MONO-DIBASIC 19G-7G/118
2 ENEMA (ML) RECTAL 2 TIMES DAILY
COMMUNITY
Start: 2021-12-07

## 2020-04-29 RX ORDER — VITS A,C,E/LUTEIN/MINERALS 300MCG-200
1 TABLET ORAL DAILY
COMMUNITY
Start: 2021-12-07

## 2020-04-29 RX ORDER — HYDROCODONE BITARTRATE AND ACETAMINOPHEN 5; 325 MG/1; MG/1
1 TABLET ORAL SEE ADMIN INSTRUCTIONS
COMMUNITY
End: 2020-04-29

## 2020-04-29 RX ORDER — ACETAMINOPHEN 500 MG
1000 TABLET ORAL EVERY 6 HOURS PRN
COMMUNITY
Start: 2021-12-07

## 2020-04-29 RX ORDER — ACETAMINOPHEN 500 MG
500 TABLET ORAL 3 TIMES DAILY
COMMUNITY
End: 2020-04-29

## 2020-04-29 RX ORDER — HYDROCODONE BITARTRATE AND ACETAMINOPHEN 5; 325 MG/1; MG/1
TABLET ORAL
Qty: 28 TABLET | Refills: 0 | Status: SHIPPED | OUTPATIENT
Start: 2020-04-29 | End: 2020-05-05

## 2020-04-29 NOTE — PROGRESS NOTES
" [facility]  Lucia Bobo is being evaluated via a billable video visit due to the restrictions of the Covid-19 pandemic.   The patient has been notified of following:  \"This video visit will be conducted via a call between you and your provider. We have found that certain health care needs can be provided without the need for an in-person physical exam.  This service lets us provide the care you need with a video conversation. If during the course of the call the provider feels a video visit is not appropriate, you will not be charged for this service.\"   The provider has received verbal consent for a Video Visit from the patient and or first contact? Yes  Patient/facility staff would like the video invitation sent by: N/A   Video Start Time: 10:30  Which Facility the Patient is at during the time of visit: [facility]  Received verbal consent to use Care Everywhere in order to access labs, documents, histories, and all other needed information to provide care at current facility.    PRIMARY CARE PROVIDER AND CLINIC:  Keturah Chaney, [facility], [address] [place], [place] [id number]  Chief Complaint   Patient presents with     Hospital F/U     [facility] Medical Record Number:  [id number]  Lucia Bobo  is a 74 year old  (1945), admitted to the above facility from  [facility] ([facility] Hospital stay 4/25/20 through 4/27/20..  Admitted to this facility for  rehab, medical management and nursing care.  HPI:    HPI information obtained from: facility chart records, facility staff, patient report and [facility] chart review.   Brief Summary of Hospital Course:  PMH: type II diabetes, hypertension, hyperlipidemia, asthma, and depression who presented to the ED on day of admission after twisting her left ankle, causing her to fall to the ground while attempting to get into her vehicle.  Xray showed a dislocated left bimalleolar ankle fracture which was reduced in the " ED. She underwent a ORIF left medial malleolus, intramedullary nailing of left distal fibula with syndesmosis fixation on 4/25/2020 without complications. Her post-operative course uneventful.last HgA1c of 7.7 on 3/19/2020. She has progressed to a stand-pivot transfer TTWB and LLE in splint.    home aspirin 81mg daily dose indefinitely for anticoagulation therapy. She will continue to control her pain with oral Vicodin. She will keep the splint on until she follows up with the orthopedic surgeon on 5/11/2020 with strict instruction to not get the splint wet.    Updates on Status Since Skilled nursing Admission: Lucia is doing well with pain control - discussed scheduling vs keeping PRN.   States it's very difficult to keep TTWB and will likely need to be at sNF until she can bear more wt on foot.     CODE STATUS/ADVANCE DIRECTIVES DISCUSSION:   Full code for now but will also talk to   Patient's living condition: lives with spouse  ALLERGIES: Amoxicillin; Lisinopril; and Penicillins  PAST MEDICAL HISTORY:  has a past medical history of Asthma, HTN, and Type II or unspecified type diabetes mellitus without mention of complication, not stated as uncontrolled (4/2/04).  PAST SURGICAL HISTORY:   has a past surgical history that includes colonoscopy (2007); cholecystectomy, open; appendectomy; Esophagoscopy, gastroscopy, duodenoscopy (EGD), combined (N/A, 11/20/2015); Esophagoscopy, gastroscopy, duodenoscopy (EGD), combined (N/A, 12/22/2017); Colonoscopy (N/A, 12/22/2017); Colonoscopy (N/A, 1/17/2018); and Colonoscopy (N/A, 9/12/2019).  FAMILY HISTORY: family history includes Cancer in her mother; Hypertension in her father.  SOCIAL HISTORY:   reports that she has never smoked. She has never used smokeless tobacco. She reports that she does not drink alcohol or use drugs.  Current Outpatient Medications   Medication Sig Dispense Refill     acetaminophen (TYLENOL) 325 MG tablet Take 650 mg by mouth See Admin  Instructions 325 mg po TID and 1 tab po q 4 hrs PRN.        ADVAIR DISKUS 250-50 MCG/DOSE inhaler INHALE ONE PUFF BY MOUTH TWICE A DAY 1 Inhaler 11     albuterol (2.5 MG/3ML) 0.083% nebulizer solution Take 3 mLs by nebulization every 4 hours as needed for shortness of breath / dyspnea. 1 Box 1     albuterol (VENTOLIN HFA) 108 (90 Base) MCG/ACT inhaler Inhale 1-2 puffs into the lungs every 4 hours as needed 6.7 g 6     aspirin 81 MG tablet Take 1 tablet (81 mg) by mouth daily 90 tablet 3     atorvastatin (LIPITOR) 20 MG tablet Take 1 tablet (20 mg) by mouth At Bedtime 90 tablet 3     blood glucose (NO BRAND SPECIFIED) test strip Use to test blood sugar 1 times daily or as directed. 100 each 3     blood glucose monitoring (NO BRAND SPECIFIED) meter device kit Use to test blood sugar 2 times daily or as directed. Needs lancets and strips per insurance coverage. 1 kit 0     Calcium Carb-Cholecalciferol (CALCIUM 600+D) 600-800 MG-UNIT TABS Take 1 tablet by mouth 2 times daily       DULoxetine (CYMBALTA) 60 MG capsule Take 1 capsule daily 90 capsule 3     furosemide (LASIX) 20 MG tablet Take 1 tablet (20 mg) by mouth daily 90 tablet 3     glucosamine-chondroitin 500-400 MG CAPS per capsule Take 1 capsule by mouth daily       hydrALAZINE (APRESOLINE) 100 MG tablet TAKE ONE TABLET BY MOUTH TWICE A DAY - IN THE MORNING AND AT DINNER 60 tablet 5     hydrALAZINE (APRESOLINE) 50 MG tablet TAKE ONE TABLET (50MG) BY MOUTH ONCE DAILY WITH 100MG TABLET TO EQUAL 150MG DAILY 90 tablet 1     HYDROcodone-acetaminophen (NORCO) 5-325 MG tablet 1 tab po TID and 1 tab po QID PRN 28 tablet 0     insulin pen needle (BD SMILEY U/F) 32G X 4 MM miscellaneous Use 1 pen needles daily or as directed. 100 each 3     losartan (COZAAR) 50 MG tablet TAKE ONE AND ONE-HALF TABLETS (75MG) BY MOUTH ONCE DAILY 135 tablet 0     magnesium oxide (MAG-OX) 400 (241.3 Mg) MG tablet Take 1 tablet (400 mg) by mouth daily       metFORMIN (GLUCOPHAGE) 1000 MG tablet  TAKE ONE TABLET (1000MG) BY MOUTH AT BREAKFAST AND ONE AND ONE-HALF TABLETS (1500MG) AT DINNER. 225 tablet 1     multivitamin  with lutein (OCUVITE WITH LTEIN) CAPS per capsule Take 1 capsule by mouth daily       ONETOUCH ULTRA test strip USE TO TEST BLOOD SUGAR TWO TIMES A DAY OR AS DIRECTED 100 each 1     order for DME Equipment being ordered: 2 pairs of CARINA hose 10 mmHg Calf width 51 cm, Lower leg length 39 cm 2 Units 0     order for DME Equipment being ordered: glucometer with test strips 1 Units 0     order for DME Equipment being ordered: blood pressure monitor 1 Device 0     PHARMACIST CHOICE LANCETS MISC Check blood sugars twice daily 100 each 3     polyethylene glycol (MIRALAX) powder Take 1 capful every other day 510 g 1     rOPINIRole (REQUIP) 0.5 MG tablet Start 1/2 tablet (0.25 mg) at bedtime X 2 days, then  1 tablet (0.5 mg) at bedtime. 60 tablet 1     verapamil ER (CALAN-SR) 240 MG CR tablet Take 1 tablet (240 mg) by mouth daily 90 tablet 3     liraglutide (VICTOZA PEN) 18 MG/3ML solution Inject 1.8 mg Subcutaneous daily 27 mL 3      ROS: 4 point ROS including Respiratory, CV, GI and , other than that noted in the HPI,  is negative  Vitals:BP (!) 158/62   Pulse 92   Temp 98.6  F (37  C)   Resp 18   Wt 111.1 kg (245 lb)   SpO2 93%   BMI 40.77 kg/m      Limited Visit Exam done given COVID-19 precautions:  GENERAL APPEARANCE: Alert, in no distress, cooperative.  ENT: Mouth and posterior oropharynx normal, moist mucous membranes, hearing acuity at baseline functional  RESP: non labored breathing  CV: lower extremity edema trace on R lower leg - baseline per pt - none seen on L lower - splint in place with no swelling in her toes  SKIN: Inspection of skin and subcutaneous tissue baseline w/ fragility.  NEURO: 2-12 appear at baseline  PSYCH: Insight and judgement, memory baseline intact, affect and mood upbeat and happy/joking    Lab/Diagnostic data:  From [facility]:        From [facility]:Most Recent 3  CBC's:  Recent Labs   Lab Test 18  1110 10/16/17  1155 11/18/15  0940   WBC 10.4 12.0* 11.9*   HGB 12.5 13.3 13.5   MCV 98 98 99    439 473*     Most Recent 3 BMP's:  Recent Labs   Lab Test 20  0933 19  0855 18  1110    136 140   POTASSIUM 4.6 4.3 4.5   CHLORIDE 104 104 109   CO2 24 24 23   BUN 26 23 21   CR 1.03 0.85 0.73   ANIONGAP 8 8 8   RAQUEL 10.0 9.4 9.0   * 143* 161*     Most Recent Hemoglobin A1c:  Recent Labs   Lab Test 20  0933   A1C 7.7*       ASSESSMENT/PLAN:  Bimalleolar ankle fracture, left, sequela  Ortho following  Discussed pain control with her and agreed to trial schedule vicodin  And tylenol and keep PRN for both  Anti coag with ASA per Ortho -   Therapy.     Type 2 diabetes mellitus with hyperglycemia, with long-term current use of insulin (H) HgbA1c goal <7  Cont POC with current PTA regimen and AM checks.   Discussed with pt -her goal is to lower A1C. I encouraged her to cont plan.     Benign essential hypertension, BP goal <140/90  Noted elevated in PCC_ will f/u and monitor - discussed goal under 140, she notes most are in 120's    Mild persistent asthma, uncomplicated  PTA POC - no change   No symptoms.      Orders sent to Ns. Change Vicodin to 5/325 1 tab po TID scheduled (am, early afternoon and HS) and 1 tab po QID PRN moderate to severe pain - can be given with scheduled if needed  2. Start/Change current Tylenol to 325 mg po TID - give Vicodin (even if taking 1 PRN) and 1 tab po q 4 hrs prn mild pain.     Electronically signed by:  MERRY Crabtree CNP   Script for Vicodone 28 tabs sent to pharm.   Video-Visit Details  Type of service:  Video Visit  Video End Time (time video stopped): 10:45  Distant Location (provider location):  [facility]

## 2020-04-29 NOTE — LETTER
"    4/29/2020        RE: Lucia Bobo  [id number] [facility] [id number]         [facility]  Lucia Bobo is being evaluated via a billable video visit due to the restrictions of the Covid-19 pandemic.   The patient has been notified of following:  \"This video visit will be conducted via a call between you and your provider. We have found that certain health care needs can be provided without the need for an in-person physical exam.  This service lets us provide the care you need with a video conversation. If during the course of the call the provider feels a video visit is not appropriate, you will not be charged for this service.\"   The provider has received verbal consent for a Video Visit from the patient and or first contact? Yes  Patient/facility staff would like the video invitation sent by: N/A   Video Start Time: 10:30  Which Facility the Patient is at during the time of visit: [facility]  Received verbal consent to use Care Everywhere in order to access labs, documents, histories, and all other needed information to provide care at current facility.    PRIMARY CARE PROVIDER AND CLINIC:  Keturah Chaney, [facility], [address] [facility] [address]  Chief Complaint   Patient presents with     Hospital F/U     [facility] Medical Record Number:  [id number]  Lucia Bobo  is a 74 year old  (1945), admitted to the above facility from  [facility]). Hospital stay 4/25/20 through 4/27/20..  Admitted to this facility for  rehab, medical management and nursing care.  HPI:    HPI information obtained from: facility chart records, facility staff, patient report and [facility] chart review.   Brief Summary of Hospital Course:  PMH: type II diabetes, hypertension, hyperlipidemia, asthma, and depression who presented to the ED on day of admission after twisting her left ankle, causing her to fall to the ground while attempting to get into her " vehicle.  Xray showed a dislocated left bimalleolar ankle fracture which was reduced in the ED. She underwent a ORIF left medial malleolus, intramedullary nailing of left distal fibula with syndesmosis fixation on 4/25/2020 without complications. Her post-operative course uneventful.last HgA1c of 7.7 on 3/19/2020. She has progressed to a stand-pivot transfer TTWB and LLE in splint.    home aspirin 81mg daily dose indefinitely for anticoagulation therapy. She will continue to control her pain with oral Vicodin. She will keep the splint on until she follows up with the orthopedic surgeon on 5/11/2020 with strict instruction to not get the splint wet.    Updates on Status Since Skilled nursing Admission: Lucia is doing well with pain control - discussed scheduling vs keeping PRN.   States it's very difficult to keep TTWB and will likely need to be at sNF until she can bear more wt on foot.     CODE STATUS/ADVANCE DIRECTIVES DISCUSSION:   Full code for now but will also talk to   Patient's living condition: lives with spouse  ALLERGIES: Amoxicillin; Lisinopril; and Penicillins  PAST MEDICAL HISTORY:  has a past medical history of Asthma, HTN, and Type II or unspecified type diabetes mellitus without mention of complication, not stated as uncontrolled (4/2/04).  PAST SURGICAL HISTORY:   has a past surgical history that includes colonoscopy (2007); cholecystectomy, open; appendectomy; Esophagoscopy, gastroscopy, duodenoscopy (EGD), combined (N/A, 11/20/2015); Esophagoscopy, gastroscopy, duodenoscopy (EGD), combined (N/A, 12/22/2017); Colonoscopy (N/A, 12/22/2017); Colonoscopy (N/A, 1/17/2018); and Colonoscopy (N/A, 9/12/2019).  FAMILY HISTORY: family history includes Cancer in her mother; Hypertension in her father.  SOCIAL HISTORY:   reports that she has never smoked. She has never used smokeless tobacco. She reports that she does not drink alcohol or use drugs.  Current Outpatient Medications   Medication Sig  Dispense Refill     acetaminophen (TYLENOL) 325 MG tablet Take 650 mg by mouth every 4 hours as needed for mild pain       ADVAIR DISKUS 250-50 MCG/DOSE inhaler INHALE ONE PUFF BY MOUTH TWICE A DAY 1 Inhaler 11     albuterol (2.5 MG/3ML) 0.083% nebulizer solution Take 3 mLs by nebulization every 4 hours as needed for shortness of breath / dyspnea. 1 Box 1     albuterol (VENTOLIN HFA) 108 (90 Base) MCG/ACT inhaler Inhale 1-2 puffs into the lungs every 4 hours as needed 6.7 g 6     aspirin 81 MG tablet Take 1 tablet (81 mg) by mouth daily 90 tablet 3     atorvastatin (LIPITOR) 20 MG tablet Take 1 tablet (20 mg) by mouth At Bedtime 90 tablet 3     blood glucose (NO BRAND SPECIFIED) test strip Use to test blood sugar 1 times daily or as directed. 100 each 3     blood glucose monitoring (NO BRAND SPECIFIED) meter device kit Use to test blood sugar 2 times daily or as directed. Needs lancets and strips per insurance coverage. 1 kit 0     Calcium Carb-Cholecalciferol (CALCIUM 600+D) 600-800 MG-UNIT TABS Take 1 tablet by mouth 2 times daily       DULoxetine (CYMBALTA) 60 MG capsule Take 1 capsule daily 90 capsule 3     furosemide (LASIX) 20 MG tablet Take 1 tablet (20 mg) by mouth daily 90 tablet 3     glucosamine-chondroitin 500-400 MG CAPS per capsule Take 1 capsule by mouth daily       hydrALAZINE (APRESOLINE) 100 MG tablet TAKE ONE TABLET BY MOUTH TWICE A DAY - IN THE MORNING AND AT DINNER 60 tablet 5     hydrALAZINE (APRESOLINE) 50 MG tablet TAKE ONE TABLET (50MG) BY MOUTH ONCE DAILY WITH 100MG TABLET TO EQUAL 150MG DAILY 90 tablet 1     insulin pen needle (BD SMILEY U/F) 32G X 4 MM miscellaneous Use 1 pen needles daily or as directed. 100 each 3     losartan (COZAAR) 50 MG tablet TAKE ONE AND ONE-HALF TABLETS (75MG) BY MOUTH ONCE DAILY 135 tablet 0     magnesium oxide (MAG-OX) 400 (241.3 Mg) MG tablet Take 1 tablet (400 mg) by mouth daily       metFORMIN (GLUCOPHAGE) 1000 MG tablet TAKE ONE TABLET (1000MG) BY MOUTH AT  BREAKFAST AND ONE AND ONE-HALF TABLETS (1500MG) AT DINNER. 225 tablet 1     multivitamin  with lutein (OCUVITE WITH LTEIN) CAPS per capsule Take 1 capsule by mouth daily       ONETOUCH ULTRA test strip USE TO TEST BLOOD SUGAR TWO TIMES A DAY OR AS DIRECTED 100 each 1     order for DME Equipment being ordered: 2 pairs of CARINA hose 10 mmHg Calf width 51 cm, Lower leg length 39 cm 2 Units 0     order for DME Equipment being ordered: glucometer with test strips 1 Units 0     order for DME Equipment being ordered: blood pressure monitor 1 Device 0     PHARMACIST CHOICE LANCETS [facility] Check blood sugars twice daily 100 each 3     polyethylene glycol (MIRALAX) powder Take 1 capful every other day 510 g 1     rOPINIRole (REQUIP) 0.5 MG tablet Start 1/2 tablet (0.25 mg) at bedtime X 2 days, then  1 tablet (0.5 mg) at bedtime. 60 tablet 1     verapamil ER (CALAN-SR) 240 MG CR tablet Take 1 tablet (240 mg) by mouth daily 90 tablet 3     ipratropium - albuterol 0.5 mg/2.5 mg/3 mL (DUONEB) 0.5-2.5 (3) MG/3ML neb solution Inhale 1 vial (3 mLs) into the lungs every 6 hours as needed 1 Box 3     liraglutide (VICTOZA PEN) 18 MG/3ML solution Inject 1.8 mg Subcutaneous daily 27 mL 3     methocarbamol (ROBAXIN) 500 MG tablet Take 1 tablet at bedtime for pain 14 tablet 0      ROS: 4 point ROS including Respiratory, CV, GI and , other than that noted in the HPI,  is negative  Vitals:BP (!) 158/62   Pulse 92   Temp 98.6  F (37  C)   Resp 18   Wt 111.1 kg (245 lb)   SpO2 93%   BMI 40.77 kg/m      Limited Visit Exam done given COVID-19 precautions:  GENERAL APPEARANCE: Alert, in no distress, cooperative.  ENT: Mouth and posterior oropharynx normal, moist mucous membranes, hearing acuity at baseline functional  RESP: non labored breathing  CV: lower extremity edema trace on R lower leg - baseline per pt - none seen on L lower - splint in place with no swelling in her toes  SKIN: Inspection of skin and subcutaneous tissue baseline w/  fragility.  NEURO: 2-12 appear at baseline  PSYCH: Insight and judgement, memory baseline intact, affect and mood upbeat and happy/joking    Lab/Diagnostic data:  From [facility]:        From [facility]:Most Recent 3 CBC's:  Recent Labs   Lab Test 18  1110 10/16/17  1155 11/18/15  0940   WBC 10.4 12.0* 11.9*   HGB 12.5 13.3 13.5   MCV 98 98 99    439 473*     Most Recent 3 BMP's:  Recent Labs   Lab Test 20  0933 19  0855 18  1110    136 140   POTASSIUM 4.6 4.3 4.5   CHLORIDE 104 104 109   CO2 24 24 23   BUN 26 23 21   CR 1.03 0.85 0.73   ANIONGAP 8 8 8   RAQUEL 10.0 9.4 9.0   * 143* 161*     Most Recent Hemoglobin A1c:  Recent Labs   Lab Test 20  0933   A1C 7.7*       ASSESSMENT/PLAN:  Bimalleolar ankle fracture, left, sequela  Ortho following  Discussed pain control with her and agreed to trial schedule vicodin  And tylenol and keep PRN for both  Anti coag with ASA per Ortho -   Therapy.     Type 2 diabetes mellitus with hyperglycemia, with long-term current use of insulin (H) HgbA1c goal <7  Cont POC with current PTA regimen and AM checks.   Discussed with pt -her goal is to lower A1C. I encouraged her to cont plan.     Benign essential hypertension, BP goal <140/90  Noted elevated in PCC_ will f/u and monitor - discussed goal under 140, she notes most are in 120's    Mild persistent asthma, uncomplicated  PTA POC - no change   No symptoms.      Orders sent to Ns. Change Vicodin to 5/325 1 tab po TID scheduled (am, early afternoon and HS) and 1 tab po QID PRN moderate to severe pain - can be given with scheduled if needed  2. Start/Change current Tylenol to 325 mg po TID - give Vicodin (even if taking 1 PRN) and 1 tab po q 4 hrs prn mild pain.     Electronically signed by:  MERRY Crabtree CNP     Video-Visit Details  Type of service:  Video Visit  Video End Time (time video stopped): 10:45  Distant Location (provider location):  [facility]                  Sincerely,        MERRY Crabtree CNP

## 2020-05-05 ENCOUNTER — VIRTUAL VISIT (OUTPATIENT)
Dept: FAMILY MEDICINE | Facility: CLINIC | Age: 75
End: 2020-05-05
Payer: COMMERCIAL

## 2020-05-05 VITALS
DIASTOLIC BLOOD PRESSURE: 74 MMHG | BODY MASS INDEX: 43.4 KG/M2 | OXYGEN SATURATION: 95 % | HEART RATE: 82 BPM | TEMPERATURE: 97 F | SYSTOLIC BLOOD PRESSURE: 144 MMHG | RESPIRATION RATE: 19 BRPM | WEIGHT: 260.8 LBS

## 2020-05-05 DIAGNOSIS — I10 BENIGN ESSENTIAL HYPERTENSION: Primary | ICD-10-CM

## 2020-05-05 DIAGNOSIS — J45.30 MILD PERSISTENT ASTHMA, UNCOMPLICATED: ICD-10-CM

## 2020-05-05 DIAGNOSIS — E11.65 TYPE 2 DIABETES MELLITUS WITH HYPERGLYCEMIA, WITH LONG-TERM CURRENT USE OF INSULIN (H): ICD-10-CM

## 2020-05-05 DIAGNOSIS — S82.842S BIMALLEOLAR ANKLE FRACTURE, LEFT, SEQUELA: ICD-10-CM

## 2020-05-05 DIAGNOSIS — Z79.4 TYPE 2 DIABETES MELLITUS WITH HYPERGLYCEMIA, WITH LONG-TERM CURRENT USE OF INSULIN (H): ICD-10-CM

## 2020-05-05 PROCEDURE — 99309 SBSQ NF CARE MODERATE MDM 30: CPT | Mod: 95 | Performed by: NURSE PRACTITIONER

## 2020-05-05 RX ORDER — HYDRALAZINE HYDROCHLORIDE 100 MG/1
100 TABLET, FILM COATED ORAL EVERY MORNING
COMMUNITY
End: 2020-06-30

## 2020-05-05 RX ORDER — HYDROCODONE BITARTRATE AND ACETAMINOPHEN 5; 325 MG/1; MG/1
TABLET ORAL
Qty: 28 TABLET | Refills: 0 | Status: SHIPPED | OUTPATIENT
Start: 2020-05-05 | End: 2020-05-19

## 2020-05-05 NOTE — LETTER
"    5/5/2020        RE: Lucia Bobo  [id number] [facility] [id number]        [place] [facility]   Lucia Bobo is being evaluated via a billable video visit due to the restrictions of the Covid-19 pandemic.   The patient has been notified of following:  \"This video visit will be conducted via a call between you and your provider. We have found that certain health care needs can be provided without the need for an in-person physical exam.  This service lets us provide the care you need with a video conversation. If during the course of the call the provider feels a video visit is not appropriate, you will not be charged for this service.\"   The provider has received verbal consent for a Video Visit from the patient or first contact? Yes  Patient  or facility staff would like the video invitation sent by: N/A   Video Start Time: 0902    [place] Medical Record Number:  [id number]  Place of Location at the time of visit: [facility]  Chief Complaint   Patient presents with     NURSING HOME REQUEST     HPI:  Lucia Bobo  is a 74 year old (1945), who is being seen today for a visit.  HPI information obtained from: facility chart records, facility staff and patient report. Today's concern is:      Patient denies any concerns   Reports pain is better managed with scheduled norco using additional as needed at bedtime to help with rest   Denies any bowel or bladder concerns  Sleeping well   Appetite is good, likes the smaller portion size and meal program- hoping to lose weight   Will be seeing ortho on Monday- hoping to get different splint as feels current is loose     Therapy is going well [facility]         Reviewed BG readings 130-200  Changed to daily checks   Lab Results   Component Value Date    A1C 7.7 03/19/2020    A1C 6.9 07/05/2019    A1C 7.0 03/27/2019    A1C 6.6 06/28/2018    A1C 7.3 10/24/2017       Past Medical and Surgical History reviewed in Epic " today.  MEDICATIONS:    Current Outpatient Medications   Medication Sig Dispense Refill     acetaminophen (TYLENOL) 325 MG tablet Take 650 mg by mouth See Admin Instructions 325 mg po TID and 1 tab po q 4 hrs PRN.        ADVAIR DISKUS 250-50 MCG/DOSE inhaler INHALE ONE PUFF BY MOUTH TWICE A DAY 1 Inhaler 11     albuterol (VENTOLIN HFA) 108 (90 Base) MCG/ACT inhaler Inhale 1-2 puffs into the lungs every 4 hours as needed 6.7 g 6     aspirin 81 MG tablet Take 1 tablet (81 mg) by mouth daily 90 tablet 3     atorvastatin (LIPITOR) 20 MG tablet Take 1 tablet (20 mg) by mouth At Bedtime 90 tablet 3     blood glucose (NO BRAND SPECIFIED) test strip Use to test blood sugar 1 times daily or as directed. 100 each 3     blood glucose monitoring (NO BRAND SPECIFIED) meter device kit Use to test blood sugar 2 times daily or as directed. Needs lancets and strips per insurance coverage. 1 kit 0     Calcium Carb-Cholecalciferol (CALCIUM 600+D) 600-800 MG-UNIT TABS Take 1 tablet by mouth 2 times daily       DULoxetine (CYMBALTA) 60 MG capsule Take 1 capsule daily 90 capsule 3     furosemide (LASIX) 20 MG tablet Take 1 tablet (20 mg) by mouth daily 90 tablet 3     glucosamine-chondroitin 500-400 MG CAPS per capsule Take 1 capsule by mouth daily       hydrALAZINE (APRESOLINE) 100 MG tablet Take 100 mg by mouth every morning HTN       hydrALAZINE (APRESOLINE) 50 MG tablet TAKE ONE TABLET (50MG) BY MOUTH ONCE DAILY WITH 100MG TABLET TO EQUAL 150MG DAILY 90 tablet 1     HYDROcodone-acetaminophen (NORCO) 5-325 MG tablet 1 tab po TID and 1 tab po QID PRN 28 tablet 0     insulin pen needle (BD SMILEY U/F) 32G X 4 MM miscellaneous Use 1 pen needles daily or as directed. 100 each 3     liraglutide (VICTOZA PEN) 18 MG/3ML solution Inject 1.8 mg Subcutaneous daily 27 mL 3     losartan (COZAAR) 50 MG tablet TAKE ONE AND ONE-HALF TABLETS (75MG) BY MOUTH ONCE DAILY 135 tablet 0     magnesium oxide (MAG-OX) 400 (241.3 Mg) MG tablet Take 1 tablet (400  mg) by mouth daily       metFORMIN (GLUCOPHAGE) 1000 MG tablet TAKE ONE TABLET (1000MG) BY MOUTH AT BREAKFAST AND ONE AND ONE-HALF TABLETS (1500MG) AT DINNER. 225 tablet 1     multivitamin  with lutein (OCUVITE WITH LTEIN) CAPS per capsule Take 1 capsule by mouth daily       ONETOUCH ULTRA test strip USE TO TEST BLOOD SUGAR TWO TIMES A DAY OR AS DIRECTED 100 each 1     order for DME Equipment being ordered: 2 pairs of CARINA hose 10 mmHg Calf width 51 cm, Lower leg length 39 cm 2 Units 0     order for DME Equipment being ordered: glucometer with test strips 1 Units 0     order for DME Equipment being ordered: blood pressure monitor 1 Device 0     PHARMACIST CHOICE LANCETS MISC Check blood sugars twice daily 100 each 3     polyethylene glycol (MIRALAX) powder Take 1 capful every other day 510 g 1     rOPINIRole (REQUIP) 0.5 MG tablet Start 1/2 tablet (0.25 mg) at bedtime X 2 days, then  1 tablet (0.5 mg) at bedtime. (Patient taking differently: 1 tablet (0.5 mg) at bedtime.) 60 tablet 1     verapamil ER (CALAN-SR) 240 MG CR tablet Take 1 tablet (240 mg) by mouth daily 90 tablet 3     REVIEW OF SYSTEMS: 4 point ROS including Respiratory, CV, GI and , other than that noted in the HPI,  is negative  Objective: BP (!) 144/74   Pulse 82   Temp 97  F (36.1  C)   Resp 19   Wt 118.3 kg (260 lb 12.8 oz)   SpO2 95%   BMI 43.40 kg/m    Limited visit exam done given COVID-19 precautions.   GENERAL APPEARANCE:  Alert, in no distress  RESP:  respiratory effort  normal  SKIN:  Inspection of skin and subcutaneous tissue baseline  PSYCH:  oriented X 3, affect and mood normal  Labs:   Labs done in SNF are in [place] EPIC. Please refer to them using EPIC/Care Everywhere.    ASSESSMENT/PLAN:  Bimalleolar ankle fracture, left, sequela  Refilled norco today   - HYDROcodone-acetaminophen (NORCO) 5-325 MG tablet; 1 tab po TID and 1 tab po QID as needed  Follow up with ortho next week   ASA for anticoagulation   Therapy involved in  care    Benign essential hypertension, BP goal <140/90  Slightly elevated, labile   Will  Monitor    Type 2 diabetes mellitus with hyperglycemia, with long-term current use of insulin (H) HgbA1c goal <7  Stable   Continue to monitor daily BG checks     BMI 40.0-44.9, adult (H)  Hoping to have weight loss while in TCU   Will continue to encourage portion control especially in light of decreased activity     Mild persistent asthma, uncomplicated  Stable   Denies any cough or shortness of breath       Orders written by provider at facility    Electronically signed by:  Mary Hutchins NP     Video-Visit Details  Type of service:  Video Visit  Video End Time (time video stopped): 0908 am  Distant Location (provider location):  [place] [facility]             Sincerely,        Mary Hutchins NP

## 2020-05-05 NOTE — PROGRESS NOTES
"[place] [facility]   Lucia Bobo is being evaluated via a billable video visit due to the restrictions of the Covid-19 pandemic.   The patient has been notified of following:  \"This video visit will be conducted via a call between you and your provider. We have found that certain health care needs can be provided without the need for an in-person physical exam.  This service lets us provide the care you need with a video conversation. If during the course of the call the provider feels a video visit is not appropriate, you will not be charged for this service.\"   The provider has received verbal consent for a Video Visit from the patient or first contact? Yes  Patient  or facility staff would like the video invitation sent by: N/A   Video Start Time: 0902    [place] Medical Record Number:  [id number]  Place of Location at the time of visit: [facility]  Chief Complaint   Patient presents with     NURSING HOME REQUEST     HPI:  Lucia Bobo  is a 74 year old (1945), who is being seen today for a visit.  HPI information obtained from: facility chart records, facility staff and patient report. Today's concern is:      Patient denies any concerns   Reports pain is better managed with scheduled norco using additional as needed at bedtime to help with rest   Denies any bowel or bladder concerns  Sleeping well   Appetite is good, likes the smaller portion size and meal program- hoping to lose weight   Will be seeing ortho on Monday- hoping to get different splint as feels current is loose     Therapy is going well [facility]         Reviewed BG readings 130-200  Changed to daily checks   Lab Results   Component Value Date    A1C 7.7 03/19/2020    A1C 6.9 07/05/2019    A1C 7.0 03/27/2019    A1C 6.6 06/28/2018    A1C 7.3 10/24/2017       Past Medical and Surgical History reviewed in Epic today.  MEDICATIONS:    Current Outpatient Medications   Medication Sig Dispense Refill     acetaminophen " (TYLENOL) 325 MG tablet Take 650 mg by mouth See Admin Instructions 325 mg po TID and 1 tab po q 4 hrs PRN.        ADVAIR DISKUS 250-50 MCG/DOSE inhaler INHALE ONE PUFF BY MOUTH TWICE A DAY 1 Inhaler 11     albuterol (VENTOLIN HFA) 108 (90 Base) MCG/ACT inhaler Inhale 1-2 puffs into the lungs every 4 hours as needed 6.7 g 6     aspirin 81 MG tablet Take 1 tablet (81 mg) by mouth daily 90 tablet 3     atorvastatin (LIPITOR) 20 MG tablet Take 1 tablet (20 mg) by mouth At Bedtime 90 tablet 3     blood glucose (NO BRAND SPECIFIED) test strip Use to test blood sugar 1 times daily or as directed. 100 each 3     blood glucose monitoring (NO BRAND SPECIFIED) meter device kit Use to test blood sugar 2 times daily or as directed. Needs lancets and strips per insurance coverage. 1 kit 0     Calcium Carb-Cholecalciferol (CALCIUM 600+D) 600-800 MG-UNIT TABS Take 1 tablet by mouth 2 times daily       DULoxetine (CYMBALTA) 60 MG capsule Take 1 capsule daily 90 capsule 3     furosemide (LASIX) 20 MG tablet Take 1 tablet (20 mg) by mouth daily 90 tablet 3     glucosamine-chondroitin 500-400 MG CAPS per capsule Take 1 capsule by mouth daily       hydrALAZINE (APRESOLINE) 100 MG tablet Take 100 mg by mouth every morning HTN       hydrALAZINE (APRESOLINE) 50 MG tablet TAKE ONE TABLET (50MG) BY MOUTH ONCE DAILY WITH 100MG TABLET TO EQUAL 150MG DAILY 90 tablet 1     HYDROcodone-acetaminophen (NORCO) 5-325 MG tablet 1 tab po TID and 1 tab po QID PRN 28 tablet 0     insulin pen needle (BD SMILEY U/F) 32G X 4 MM miscellaneous Use 1 pen needles daily or as directed. 100 each 3     liraglutide (VICTOZA PEN) 18 MG/3ML solution Inject 1.8 mg Subcutaneous daily 27 mL 3     losartan (COZAAR) 50 MG tablet TAKE ONE AND ONE-HALF TABLETS (75MG) BY MOUTH ONCE DAILY 135 tablet 0     magnesium oxide (MAG-OX) 400 (241.3 Mg) MG tablet Take 1 tablet (400 mg) by mouth daily       metFORMIN (GLUCOPHAGE) 1000 MG tablet TAKE ONE TABLET (1000MG) BY MOUTH AT  BREAKFAST AND ONE AND ONE-HALF TABLETS (1500MG) AT DINNER. 225 tablet 1     multivitamin  with lutein (OCUVITE WITH LTEIN) CAPS per capsule Take 1 capsule by mouth daily       ONETOUCH ULTRA test strip USE TO TEST BLOOD SUGAR TWO TIMES A DAY OR AS DIRECTED 100 each 1     order for DME Equipment being ordered: 2 pairs of CARINA hose 10 mmHg Calf width 51 cm, Lower leg length 39 cm 2 Units 0     order for DME Equipment being ordered: glucometer with test strips 1 Units 0     order for DME Equipment being ordered: blood pressure monitor 1 Device 0     PHARMACIST CHOICE LANCETS [facility] Check blood sugars twice daily 100 each 3     polyethylene glycol (MIRALAX) powder Take 1 capful every other day 510 g 1     rOPINIRole (REQUIP) 0.5 MG tablet Start 1/2 tablet (0.25 mg) at bedtime X 2 days, then  1 tablet (0.5 mg) at bedtime. (Patient taking differently: 1 tablet (0.5 mg) at bedtime.) 60 tablet 1     verapamil ER (CALAN-SR) 240 MG CR tablet Take 1 tablet (240 mg) by mouth daily 90 tablet 3     REVIEW OF SYSTEMS: 4 point ROS including Respiratory, CV, GI and , other than that noted in the HPI,  is negative  Objective: BP (!) 144/74   Pulse 82   Temp 97  F (36.1  C)   Resp 19   Wt 118.3 kg (260 lb 12.8 oz)   SpO2 95%   BMI 43.40 kg/m    Limited visit exam done given COVID-19 precautions.   GENERAL APPEARANCE:  Alert, in no distress  RESP:  respiratory effort  normal  SKIN:  Inspection of skin and subcutaneous tissue baseline  PSYCH:  oriented X 3, affect and mood normal  Labs:   Labs done in SNF are in [place] EPIC. Please refer to them using EPIC/Care Everywhere.    ASSESSMENT/PLAN:  Bimalleolar ankle fracture, left, sequela  Refilled norco today   - HYDROcodone-acetaminophen (NORCO) 5-325 MG tablet; 1 tab po TID and 1 tab po QID as needed  Follow up with ortho next week   ASA for anticoagulation   Therapy involved in care    Benign essential hypertension, BP goal <140/90  Slightly elevated, labile   Will   Monitor    Type 2 diabetes mellitus with hyperglycemia, with long-term current use of insulin (H) HgbA1c goal <7  Stable   Continue to monitor daily BG checks     BMI 40.0-44.9, adult (H)  Hoping to have weight loss while in TCU   Will continue to encourage portion control especially in light of decreased activity     Mild persistent asthma, uncomplicated  Stable   Denies any cough or shortness of breath       Orders written by provider at facility    Electronically signed by:  Mary Hutchins NP     Video-Visit Details  Type of service:  Video Visit  Video End Time (time video stopped): 0908 am  Distant Location (provider location):  [place] [facility]

## 2020-05-11 ENCOUNTER — DOCUMENTATION ONLY (OUTPATIENT)
Dept: OTHER | Facility: CLINIC | Age: 75
End: 2020-05-11

## 2020-05-19 ENCOUNTER — VIRTUAL VISIT (OUTPATIENT)
Dept: FAMILY MEDICINE | Facility: CLINIC | Age: 75
End: 2020-05-19
Payer: COMMERCIAL

## 2020-05-19 VITALS
DIASTOLIC BLOOD PRESSURE: 54 MMHG | OXYGEN SATURATION: 95 % | RESPIRATION RATE: 18 BRPM | WEIGHT: 267.4 LBS | BODY MASS INDEX: 44.5 KG/M2 | HEART RATE: 78 BPM | SYSTOLIC BLOOD PRESSURE: 132 MMHG | TEMPERATURE: 97 F

## 2020-05-19 VITALS
HEART RATE: 78 BPM | SYSTOLIC BLOOD PRESSURE: 132 MMHG | OXYGEN SATURATION: 95 % | RESPIRATION RATE: 18 BRPM | TEMPERATURE: 97 F | DIASTOLIC BLOOD PRESSURE: 54 MMHG

## 2020-05-19 DIAGNOSIS — Z79.4 TYPE 2 DIABETES MELLITUS WITH HYPERGLYCEMIA, WITH LONG-TERM CURRENT USE OF INSULIN (H): ICD-10-CM

## 2020-05-19 DIAGNOSIS — I10 BENIGN ESSENTIAL HYPERTENSION: ICD-10-CM

## 2020-05-19 DIAGNOSIS — S82.842S BIMALLEOLAR ANKLE FRACTURE, LEFT, SEQUELA: Primary | ICD-10-CM

## 2020-05-19 DIAGNOSIS — E11.65 TYPE 2 DIABETES MELLITUS WITH HYPERGLYCEMIA, WITH LONG-TERM CURRENT USE OF INSULIN (H): ICD-10-CM

## 2020-05-19 DIAGNOSIS — J45.30 MILD PERSISTENT ASTHMA, UNCOMPLICATED: ICD-10-CM

## 2020-05-19 PROCEDURE — 99309 SBSQ NF CARE MODERATE MDM 30: CPT | Mod: GT | Performed by: NURSE PRACTITIONER

## 2020-05-19 RX ORDER — HYDROCODONE BITARTRATE AND ACETAMINOPHEN 5; 325 MG/1; MG/1
TABLET ORAL
Qty: 30 TABLET | Refills: 0 | Status: SHIPPED | OUTPATIENT
Start: 2020-05-19 | End: 2020-06-09

## 2020-05-19 NOTE — PROGRESS NOTES
"[place] [facility]   Lucia Bobo is being evaluated via a billable video visit due to the restrictions of the Covid-19 pandemic.   The patient has been notified of following:  \"This video visit will be conducted via a call between you and your provider. We have found that certain health care needs can be provided without the need for an in-person physical exam.  This service lets us provide the care you need with a video conversation. If during the course of the call the provider feels a video visit is not appropriate, you will not be charged for this service.\"   The provider has received verbal consent for a Video Visit from the patient or first contact? Yes  Patient  or facility staff would like the video invitation sent by: N/A      Video Start Time: 0800 am     [place] Medical Record Number:  [id number]  Place of Location at the time of visit: [facility]  No chief complaint on file.    HPI:  Lucia Bobo  is a 74 year old (1945), who is being seen today for a visit.  HPI information obtained from: facility chart records, facility staff and patient report. Today's concern is:       Left trimalleolar ankle fracture dislocation  ORIF L trimal w/ syndesmosis fixation 4/25/20  She had follow up with Dr. Hubbard- bart at [facility] on 5/11/20   Plan: Continue toe-touch weightbearing. She was placed in a cam boot today. Follow-up in 3 weeks. Start ROM      She is working with therapies   Pain- has not used as needed norco for 1 week   She is still getting scheduled three times a day   Reports that     She denies and shortness of breath, chest pain ect   Denies bowel and bladder concerns     AM BG readings           Past Medical and Surgical History reviewed in Epic today.  MEDICATIONS:    Current Outpatient Medications   Medication Sig Dispense Refill     acetaminophen (TYLENOL) 325 MG tablet Take 650 mg by mouth See Admin Instructions 325 mg po TID and 1 tab po q 4 hrs PRN.    "     ADVAIR DISKUS 250-50 MCG/DOSE inhaler INHALE ONE PUFF BY MOUTH TWICE A DAY 1 Inhaler 11     albuterol (VENTOLIN HFA) 108 (90 Base) MCG/ACT inhaler Inhale 1-2 puffs into the lungs every 4 hours as needed 6.7 g 6     aspirin 81 MG tablet Take 1 tablet (81 mg) by mouth daily 90 tablet 3     atorvastatin (LIPITOR) 20 MG tablet Take 1 tablet (20 mg) by mouth At Bedtime 90 tablet 3     blood glucose (NO BRAND SPECIFIED) test strip Use to test blood sugar 1 times daily or as directed. 100 each 3     blood glucose monitoring (NO BRAND SPECIFIED) meter device kit Use to test blood sugar 2 times daily or as directed. Needs lancets and strips per insurance coverage. 1 kit 0     Calcium Carb-Cholecalciferol (CALCIUM 600+D) 600-800 MG-UNIT TABS Take 1 tablet by mouth 2 times daily       DULoxetine (CYMBALTA) 60 MG capsule Take 1 capsule daily 90 capsule 3     furosemide (LASIX) 20 MG tablet Take 1 tablet (20 mg) by mouth daily 90 tablet 3     glucosamine-chondroitin 500-400 MG CAPS per capsule Take 1 capsule by mouth daily       hydrALAZINE (APRESOLINE) 100 MG tablet Take 100 mg by mouth every morning HTN       hydrALAZINE (APRESOLINE) 50 MG tablet TAKE ONE TABLET (50MG) BY MOUTH ONCE DAILY WITH 100MG TABLET TO EQUAL 150MG DAILY 90 tablet 1     HYDROcodone-acetaminophen (NORCO) 5-325 MG tablet 1 tab po TID and 1 tab po QID PRN 28 tablet 0     insulin pen needle (BD SMILEY U/F) 32G X 4 MM miscellaneous Use 1 pen needles daily or as directed. 100 each 3     liraglutide (VICTOZA PEN) 18 MG/3ML solution Inject 1.8 mg Subcutaneous daily 27 mL 3     losartan (COZAAR) 50 MG tablet TAKE ONE AND ONE-HALF TABLETS (75MG) BY MOUTH ONCE DAILY 135 tablet 0     magnesium oxide (MAG-OX) 400 (241.3 Mg) MG tablet Take 1 tablet (400 mg) by mouth daily       metFORMIN (GLUCOPHAGE) 1000 MG tablet TAKE ONE TABLET (1000MG) BY MOUTH AT BREAKFAST AND ONE AND ONE-HALF TABLETS (1500MG) AT DINNER. 225 tablet 1     multivitamin  with lutein (OCUVITE WITH  LTEIN) CAPS per capsule Take 1 capsule by mouth daily       ONETOUCH ULTRA test strip USE TO TEST BLOOD SUGAR TWO TIMES A DAY OR AS DIRECTED 100 each 1     order for DME Equipment being ordered: 2 pairs of CARINA hose 10 mmHg Calf width 51 cm, Lower leg length 39 cm 2 Units 0     order for DME Equipment being ordered: glucometer with test strips 1 Units 0     order for DME Equipment being ordered: blood pressure monitor 1 Device 0     PHARMACIST CHOICE LANCETS MISC Check blood sugars twice daily 100 each 3     polyethylene glycol (MIRALAX) powder Take 1 capful every other day 510 g 1     rOPINIRole (REQUIP) 0.5 MG tablet Start 1/2 tablet (0.25 mg) at bedtime X 2 days, then  1 tablet (0.5 mg) at bedtime. (Patient taking differently: 1 tablet (0.5 mg) at bedtime.) 60 tablet 1     verapamil ER (CALAN-SR) 240 MG CR tablet Take 1 tablet (240 mg) by mouth daily 90 tablet 3     REVIEW OF SYSTEMS: 10 point ROS of systems including Constitutional, Eyes, Respiratory, Cardiovascular, Gastroenterology, Genitourinary, Integumentary, Musculoskeletal, Psychiatric were all negative except for pertinent positives noted in my HPI.  Objective: There were no vitals taken for this visit.  Limited visit exam done given COVID-19 precautions.   GENERAL APPEARANCE:  Alert, in no distress  RESP:  no respiratory distress  Labs:   Labs done in SNF are in [place] EPIC. Please refer to them using EPIC/Care Everywhere.    ASSESSMENT/PLAN:  Bimalleolar ankle fracture, left, sequela  Has follow up again with prtho in 2 weeks   Toe touch weight bear  Tolerating therapies   Will start weaning down on norco   norco twice a day for 1 week and then daily at HS for 1 week then stop, continue as needed without change     Benign essential hypertension, BP goal <140/90  Stable     Type 2 diabetes mellitus with hyperglycemia, with long-term current use of insulin (H) HgbA1c goal <7  Stable     BMI 40.0-44.9, adult (H)  She is working on reduced portion size  for weight loss- no change in weight   Wt Readings from Last 10 Encounters:   05/19/20 121.3 kg (267 lb 6.4 oz)   05/05/20 118.3 kg (260 lb 12.8 oz)   04/29/20 111.1 kg (245 lb)   09/24/19 117.5 kg (259 lb)   09/12/19 120.2 kg (265 lb)   07/05/19 120.3 kg (265 lb 3.2 oz)   03/27/19 119.8 kg (264 lb 3.2 oz)   06/28/18 120.7 kg (266 lb)   01/31/18 119.7 kg (264 lb)   01/17/18 119.7 kg (264 lb)       Mild persistent asthma, uncomplicated  Stable       Orders written by provider at facility    Electronically signed by:  Mary Hutchins NP     Video-Visit Details  Type of service:  Video Visit  Video End Time (time video stopped): 0815  Distant Location (provider location):  [facility]

## 2020-05-19 NOTE — PROGRESS NOTES
"[place] [facility]  PRIMARY CARE PROVIDER AND CLINIC:  Keturah Chaney, CNP, [address] [facility] [address]    Lucia Bobo is being evaluated via a billable video visit due to the restrictions of the Covid-19 pandemic.   The patient has been notified of following:  \"This video visit will be conducted via a call between you and your provider. We have found that certain health care needs can be provided without the need for an in-person physical exam.  This service lets us provide the care you need with a video conversation. If during the course of the call the provider feels a video visit is not appropriate, you will not be charged for this service.\"   The provider has received verbal consent for a Video Visit from the patient or first contact? Yes  Patient  or facility staff would like the video invitation sent by: N/A   Video Start Time: 13:51  [facility] Medical Record Number:  [id number]  Place of Location at the time of visit: [facility]    Chief Complaint   Patient presents with     Nursing Home Acute     MD Initial    [facility] Medical Record Number:  [id number]  Lucia Bobo  is a 74 year old  (1945), admitted to the above facility from  [facility]). Hospital stay 4/25/20 through 4/27/20..  Admitted to this facility for  rehab, medical management and nursing care.    HPI:    HPI information obtained from: facility staff, patient report and [facility] chart review.   Brief Summary of Hospital Course: Pt had a fall that resulted in left  bimalleolar fx.     Updates on Status Since Skilled nursing Admission:   - Started on OT/PT, reports making a progress  - Reports pain is aching in nature, 2/10, aggravated with movement, better with rest and meds.   - Pt reports hx of right shoulder fx (4 years ago), working with the OT on improving the strength, and is making a progress.       CODE STATUS/ADVANCE DIRECTIVES DISCUSSION:   CPR/Full code "   Patient's living condition: lives with spouse  ALLERGIES: Amoxicillin; Lisinopril; and Penicillins  PAST MEDICAL HISTORY:  has a past medical history of Asthma, HTN, and Type II or unspecified type diabetes mellitus without mention of complication, not stated as uncontrolled (4/2/04).  PAST SURGICAL HISTORY:   has a past surgical history that includes colonoscopy (2007); cholecystectomy, open; appendectomy; Esophagoscopy, gastroscopy, duodenoscopy (EGD), combined (N/A, 11/20/2015); Esophagoscopy, gastroscopy, duodenoscopy (EGD), combined (N/A, 12/22/2017); Colonoscopy (N/A, 12/22/2017); Colonoscopy (N/A, 1/17/2018); and Colonoscopy (N/A, 9/12/2019).  FAMILY HISTORY: family history includes Cancer in her mother; Hypertension in her father.  SOCIAL HISTORY:   reports that she has never smoked. She has never used smokeless tobacco. She reports that she does not drink alcohol or use drugs.    Post Discharge Medication Reconciliation Status: discharge medications reconciled and changed, per note/orders (see AVS)  Current Outpatient Medications   Medication Sig Dispense Refill     acetaminophen (TYLENOL) 325 MG tablet Take 650 mg by mouth See Admin Instructions 325 mg po TID and 1 tab po q 4 hrs PRN.        ADVAIR DISKUS 250-50 MCG/DOSE inhaler INHALE ONE PUFF BY MOUTH TWICE A DAY 1 Inhaler 11     albuterol (VENTOLIN HFA) 108 (90 Base) MCG/ACT inhaler Inhale 1-2 puffs into the lungs every 4 hours as needed 6.7 g 6     aspirin 81 MG tablet Take 1 tablet (81 mg) by mouth daily 90 tablet 3     atorvastatin (LIPITOR) 20 MG tablet Take 1 tablet (20 mg) by mouth At Bedtime 90 tablet 3     blood glucose (NO BRAND SPECIFIED) test strip Use to test blood sugar 1 times daily or as directed. 100 each 3     blood glucose monitoring (NO BRAND SPECIFIED) meter device kit Use to test blood sugar 2 times daily or as directed. Needs lancets and strips per insurance coverage. 1 kit 0     Calcium Carb-Cholecalciferol (CALCIUM 600+D)  600-800 MG-UNIT TABS Take 1 tablet by mouth 2 times daily       DULoxetine (CYMBALTA) 60 MG capsule Take 1 capsule daily 90 capsule 3     furosemide (LASIX) 20 MG tablet Take 1 tablet (20 mg) by mouth daily 90 tablet 3     glucosamine-chondroitin 500-400 MG CAPS per capsule Take 1 capsule by mouth daily       hydrALAZINE (APRESOLINE) 100 MG tablet Take 100 mg by mouth every morning HTN       hydrALAZINE (APRESOLINE) 50 MG tablet TAKE ONE TABLET (50MG) BY MOUTH ONCE DAILY WITH 100MG TABLET TO EQUAL 150MG DAILY 90 tablet 1     HYDROcodone-acetaminophen (NORCO) 5-325 MG tablet Take 1 tablet by mouth 2 times daily for 7 days, THEN 1 tablet At Bedtime for 7 days. Taper scheduled norco as above and continue 1 tab four times a day as needed 30 tablet 0     insulin pen needle (BD SMILEY U/F) 32G X 4 MM miscellaneous Use 1 pen needles daily or as directed. 100 each 3     liraglutide (VICTOZA PEN) 18 MG/3ML solution Inject 1.8 mg Subcutaneous daily 27 mL 3     losartan (COZAAR) 50 MG tablet TAKE ONE AND ONE-HALF TABLETS (75MG) BY MOUTH ONCE DAILY 135 tablet 0     magnesium oxide (MAG-OX) 400 (241.3 Mg) MG tablet Take 1 tablet (400 mg) by mouth daily       metFORMIN (GLUCOPHAGE) 1000 MG tablet TAKE ONE TABLET (1000MG) BY MOUTH AT BREAKFAST AND ONE AND ONE-HALF TABLETS (1500MG) AT DINNER. 225 tablet 1     multivitamin  with lutein (OCUVITE WITH LTEIN) CAPS per capsule Take 1 capsule by mouth daily       ONETOUCH ULTRA test strip USE TO TEST BLOOD SUGAR TWO TIMES A DAY OR AS DIRECTED 100 each 1     order for DME Equipment being ordered: 2 pairs of CARINA hose 10 mmHg Calf width 51 cm, Lower leg length 39 cm 2 Units 0     order for DME Equipment being ordered: glucometer with test strips 1 Units 0     order for DME Equipment being ordered: blood pressure monitor 1 Device 0     PHARMACIST CHOICE LANCETS [facility] Check blood sugars twice daily 100 each 3     polyethylene glycol (MIRALAX) powder Take 1 capful every other day 510 g 1      rOPINIRole (REQUIP) 0.5 MG tablet Start 1/2 tablet (0.25 mg) at bedtime X 2 days, then  1 tablet (0.5 mg) at bedtime. (Patient taking differently: 1 tablet (0.5 mg) at bedtime.) 60 tablet 1     verapamil ER (CALAN-SR) 240 MG CR tablet Take 1 tablet (240 mg) by mouth daily 90 tablet 3     ROS: 10 point ROS of systems including Constitutional, Eyes, Respiratory, Cardiovascular, Gastroenterology, Genitourinary, Integumentary, Musculoskeletal, Psychiatric were all negative except for pertinent positives noted in my HPI.    Vitals:  /54   Pulse 78   Temp 97  F (36.1  C)   Resp 18   SpO2 95%   Exam:  GENERAL APPEARANCE:  in no distress  RESP:  unlabored breathing  M/S:   Brace left ankle, wrapped left leg. Edema left foot.   SKIN:  no rash noted  NEURO:   no purposeful movement in upper and lower extremities  PSYCH:  affect and mood normal    Lab/Diagnostic data: Reviewed in the chart and EHR.        ASSESSMENT/PLAN:  Bimalleolar ankle fracture, left, sequela  Physical deconditioning  Chronic Right shoulder pain  - Physical function improving with OT/PT, continue.  - Analgesia optimal  - Continue DVT Prophylaxis according to Orthopedist's recommendations  - Follow on the surgeon's recommendations      Type 2 diabetes mellitus with hyperglycemia, with long-term current use of insulin (H) Hgb A1c goal <7   A1C 7.7 03/19/2020    A1C 6.9 07/05/2019   - controlled.       Benign essential hypertension, BP goal <140/90: controlled.     Mild persistent asthma, uncomplicated: stable. No concern    Order:See above, otherwise, continue the rest of the current POC.       Electronically signed by:  Dawson Maravilla MD      Video-Visit Details  Type of service:  Video Visit  Video End Time (time video stopped): 13:57  LOCATIONS: [facility]

## 2020-05-19 NOTE — LETTER
"    5/19/2020        RE: Lucia Bobo  [address] [facility] [address]        [place] [facility]   Lucia Bobo is being evaluated via a billable video visit due to the restrictions of the Covid-19 pandemic.   The patient has been notified of following:  \"This video visit will be conducted via a call between you and your provider. We have found that certain health care needs can be provided without the need for an in-person physical exam.  This service lets us provide the care you need with a video conversation. If during the course of the call the provider feels a video visit is not appropriate, you will not be charged for this service.\"   The provider has received verbal consent for a Video Visit from the patient or first contact? Yes  Patient  or facility staff would like the video invitation sent by: N/A      Video Start Time: 0800 am     [facility] Medical Record Number:  [id number]  Place of Location at the time of visit: [facility]  No chief complaint on file.    HPI:  Lucia Bobo  is a 74 year old (1945), who is being seen today for a visit.  HPI information obtained from: facility chart records, facility staff and patient report. Today's concern is:       Left trimalleolar ankle fracture dislocation  ORIF L trimal w/ syndesmosis fixation 4/25/20  She had follow up with Dr. Hubbard- ortho at [facility] on 5/11/20   Plan: Continue toe-touch weightbearing. She was placed in a cam boot today. Follow-up in 3 weeks. Start ROM      She is working with therapies   Pain- has not used as needed norco for 1 week   She is still getting scheduled three times a day   Reports that     She denies and shortness of breath, chest pain ect   Denies bowel and bladder concerns     AM BG readings           Past Medical and Surgical History reviewed in Epic today.  MEDICATIONS:    Current Outpatient Medications   Medication Sig Dispense Refill     acetaminophen (TYLENOL) 325 MG " tablet Take 650 mg by mouth See Admin Instructions 325 mg po TID and 1 tab po q 4 hrs PRN.        ADVAIR DISKUS 250-50 MCG/DOSE inhaler INHALE ONE PUFF BY MOUTH TWICE A DAY 1 Inhaler 11     albuterol (VENTOLIN HFA) 108 (90 Base) MCG/ACT inhaler Inhale 1-2 puffs into the lungs every 4 hours as needed 6.7 g 6     aspirin 81 MG tablet Take 1 tablet (81 mg) by mouth daily 90 tablet 3     atorvastatin (LIPITOR) 20 MG tablet Take 1 tablet (20 mg) by mouth At Bedtime 90 tablet 3     blood glucose (NO BRAND SPECIFIED) test strip Use to test blood sugar 1 times daily or as directed. 100 each 3     blood glucose monitoring (NO BRAND SPECIFIED) meter device kit Use to test blood sugar 2 times daily or as directed. Needs lancets and strips per insurance coverage. 1 kit 0     Calcium Carb-Cholecalciferol (CALCIUM 600+D) 600-800 MG-UNIT TABS Take 1 tablet by mouth 2 times daily       DULoxetine (CYMBALTA) 60 MG capsule Take 1 capsule daily 90 capsule 3     furosemide (LASIX) 20 MG tablet Take 1 tablet (20 mg) by mouth daily 90 tablet 3     glucosamine-chondroitin 500-400 MG CAPS per capsule Take 1 capsule by mouth daily       hydrALAZINE (APRESOLINE) 100 MG tablet Take 100 mg by mouth every morning HTN       hydrALAZINE (APRESOLINE) 50 MG tablet TAKE ONE TABLET (50MG) BY MOUTH ONCE DAILY WITH 100MG TABLET TO EQUAL 150MG DAILY 90 tablet 1     HYDROcodone-acetaminophen (NORCO) 5-325 MG tablet 1 tab po TID and 1 tab po QID PRN 28 tablet 0     insulin pen needle (BD SMILEY U/F) 32G X 4 MM miscellaneous Use 1 pen needles daily or as directed. 100 each 3     liraglutide (VICTOZA PEN) 18 MG/3ML solution Inject 1.8 mg Subcutaneous daily 27 mL 3     losartan (COZAAR) 50 MG tablet TAKE ONE AND ONE-HALF TABLETS (75MG) BY MOUTH ONCE DAILY 135 tablet 0     magnesium oxide (MAG-OX) 400 (241.3 Mg) MG tablet Take 1 tablet (400 mg) by mouth daily       metFORMIN (GLUCOPHAGE) 1000 MG tablet TAKE ONE TABLET (1000MG) BY MOUTH AT BREAKFAST AND ONE AND  ONE-HALF TABLETS (1500MG) AT DINNER. 225 tablet 1     multivitamin  with lutein (OCUVITE WITH LTEIN) CAPS per capsule Take 1 capsule by mouth daily       ONETOUCH ULTRA test strip USE TO TEST BLOOD SUGAR TWO TIMES A DAY OR AS DIRECTED 100 each 1     order for DME Equipment being ordered: 2 pairs of CARINA hose 10 mmHg Calf width 51 cm, Lower leg length 39 cm 2 Units 0     order for DME Equipment being ordered: glucometer with test strips 1 Units 0     order for DME Equipment being ordered: blood pressure monitor 1 Device 0     PHARMACIST CHOICE LANCETS [facility] Check blood sugars twice daily 100 each 3     polyethylene glycol (MIRALAX) powder Take 1 capful every other day 510 g 1     rOPINIRole (REQUIP) 0.5 MG tablet Start 1/2 tablet (0.25 mg) at bedtime X 2 days, then  1 tablet (0.5 mg) at bedtime. (Patient taking differently: 1 tablet (0.5 mg) at bedtime.) 60 tablet 1     verapamil ER (CALAN-SR) 240 MG CR tablet Take 1 tablet (240 mg) by mouth daily 90 tablet 3     REVIEW OF SYSTEMS: 10 point ROS of systems including Constitutional, Eyes, Respiratory, Cardiovascular, Gastroenterology, Genitourinary, Integumentary, Musculoskeletal, Psychiatric were all negative except for pertinent positives noted in my HPI.  Objective: There were no vitals taken for this visit.  Limited visit exam done given COVID-19 precautions.   GENERAL APPEARANCE:  Alert, in no distress  RESP:  no respiratory distress  Labs:   Labs done in SNF are in [place] EPIC. Please refer to them using EPIC/Care Everywhere.    ASSESSMENT/PLAN:  Bimalleolar ankle fracture, left, sequela  Has follow up again with prtho in 2 weeks   Toe touch weight bear  Tolerating therapies   Will start weaning down on norco   norco twice a day for 1 week and then daily at HS for 1 week then stop, continue as needed without change     Benign essential hypertension, BP goal <140/90  Stable     Type 2 diabetes mellitus with hyperglycemia, with long-term current use of insulin (H)  HgbA1c goal <7  Stable     BMI 40.0-44.9, adult (H)  She is working on reduced portion size for weight loss- no change in weight   Wt Readings from Last 10 Encounters:   05/19/20 121.3 kg (267 lb 6.4 oz)   05/05/20 118.3 kg (260 lb 12.8 oz)   04/29/20 111.1 kg (245 lb)   09/24/19 117.5 kg (259 lb)   09/12/19 120.2 kg (265 lb)   07/05/19 120.3 kg (265 lb 3.2 oz)   03/27/19 119.8 kg (264 lb 3.2 oz)   06/28/18 120.7 kg (266 lb)   01/31/18 119.7 kg (264 lb)   01/17/18 119.7 kg (264 lb)       Mild persistent asthma, uncomplicated  Stable       Orders written by provider at facility    Electronically signed by:  Mary Hutchins NP     Video-Visit Details  Type of service:  Video Visit  Video End Time (time video stopped): 0815  [place] Location (provider location):  [place] [facility]             Sincerely,        aMry Hutchins NP

## 2020-05-20 ENCOUNTER — NURSING HOME VISIT (OUTPATIENT)
Dept: GERIATRICS | Facility: CLINIC | Age: 75
End: 2020-05-20
Payer: COMMERCIAL

## 2020-05-20 DIAGNOSIS — E11.65 TYPE 2 DIABETES MELLITUS WITH HYPERGLYCEMIA, WITH LONG-TERM CURRENT USE OF INSULIN (H): ICD-10-CM

## 2020-05-20 DIAGNOSIS — R53.81 PHYSICAL DECONDITIONING: ICD-10-CM

## 2020-05-20 DIAGNOSIS — J45.30 MILD PERSISTENT ASTHMA, UNCOMPLICATED: ICD-10-CM

## 2020-05-20 DIAGNOSIS — I10 BENIGN ESSENTIAL HYPERTENSION: ICD-10-CM

## 2020-05-20 DIAGNOSIS — S82.842S BIMALLEOLAR ANKLE FRACTURE, LEFT, SEQUELA: Primary | ICD-10-CM

## 2020-05-20 DIAGNOSIS — Z79.4 TYPE 2 DIABETES MELLITUS WITH HYPERGLYCEMIA, WITH LONG-TERM CURRENT USE OF INSULIN (H): ICD-10-CM

## 2020-05-20 PROCEDURE — 99305 1ST NF CARE MODERATE MDM 35: CPT | Performed by: FAMILY MEDICINE

## 2020-05-20 NOTE — LETTER
"    5/20/2020        RE: Lucia Bobo  [address] [facility] [address]        [place] [facility]  PRIMARY CARE PROVIDER AND CLINIC:  Keturah Chaney, CNP, [address] [address]    Lucia Bobo is being evaluated via a billable video visit due to the restrictions of the Covid-19 pandemic.   The patient has been notified of following:  \"This video visit will be conducted via a call between you and your provider. We have found that certain health care needs can be provided without the need for an in-person physical exam.  This service lets us provide the care you need with a video conversation. If during the course of the call the provider feels a video visit is not appropriate, you will not be charged for this service.\"   The provider has received verbal consent for a Video Visit from the patient or first contact? Yes  Patient  or facility staff would like the video invitation sent by: N/A   Video Start Time: 13:51  [facility] Medical Record Number:  [id number]  Place of Location at the time of visit: [facility]    Chief Complaint   Patient presents with     Nursing Home Acute     MD Initial    [facility] Medical Record Number:  [id number]  Lucia Bobo  is a 74 year old  (1945), admitted to the above facility from  [facility]). Hospital stay 4/25/20 through 4/27/20..  Admitted to this facility for  rehab, medical management and nursing care.    HPI:    HPI information obtained from: facility staff, patient report and [facility] chart review.   Brief Summary of Hospital Course: Pt had a fall that resulted in left  bimalleolar fx.     Updates on Status Since Skilled nursing Admission:   - Started on OT/PT, reports making a progress  - Reports pain is aching in nature, 2/10, aggravated with movement, better with rest and meds.   - Pt reports hx of right shoulder fx (4 years ago), working with the OT on improving the strength, and is " making a progress.       CODE STATUS/ADVANCE DIRECTIVES DISCUSSION:   CPR/Full code   Patient's living condition: lives with spouse  ALLERGIES: Amoxicillin; Lisinopril; and Penicillins  PAST MEDICAL HISTORY:  has a past medical history of Asthma, HTN, and Type II or unspecified type diabetes mellitus without mention of complication, not stated as uncontrolled (4/2/04).  PAST SURGICAL HISTORY:   has a past surgical history that includes colonoscopy (2007); cholecystectomy, open; appendectomy; Esophagoscopy, gastroscopy, duodenoscopy (EGD), combined (N/A, 11/20/2015); Esophagoscopy, gastroscopy, duodenoscopy (EGD), combined (N/A, 12/22/2017); Colonoscopy (N/A, 12/22/2017); Colonoscopy (N/A, 1/17/2018); and Colonoscopy (N/A, 9/12/2019).  FAMILY HISTORY: family history includes Cancer in her mother; Hypertension in her father.  SOCIAL HISTORY:   reports that she has never smoked. She has never used smokeless tobacco. She reports that she does not drink alcohol or use drugs.    Post Discharge Medication Reconciliation Status: discharge medications reconciled and changed, per note/orders (see AVS)  Current Outpatient Medications   Medication Sig Dispense Refill     acetaminophen (TYLENOL) 325 MG tablet Take 650 mg by mouth See Admin Instructions 325 mg po TID and 1 tab po q 4 hrs PRN.        ADVAIR DISKUS 250-50 MCG/DOSE inhaler INHALE ONE PUFF BY MOUTH TWICE A DAY 1 Inhaler 11     albuterol (VENTOLIN HFA) 108 (90 Base) MCG/ACT inhaler Inhale 1-2 puffs into the lungs every 4 hours as needed 6.7 g 6     aspirin 81 MG tablet Take 1 tablet (81 mg) by mouth daily 90 tablet 3     atorvastatin (LIPITOR) 20 MG tablet Take 1 tablet (20 mg) by mouth At Bedtime 90 tablet 3     blood glucose (NO BRAND SPECIFIED) test strip Use to test blood sugar 1 times daily or as directed. 100 each 3     blood glucose monitoring (NO BRAND SPECIFIED) meter device kit Use to test blood sugar 2 times daily or as directed. Needs lancets and strips  per insurance coverage. 1 kit 0     Calcium Carb-Cholecalciferol (CALCIUM 600+D) 600-800 MG-UNIT TABS Take 1 tablet by mouth 2 times daily       DULoxetine (CYMBALTA) 60 MG capsule Take 1 capsule daily 90 capsule 3     furosemide (LASIX) 20 MG tablet Take 1 tablet (20 mg) by mouth daily 90 tablet 3     glucosamine-chondroitin 500-400 MG CAPS per capsule Take 1 capsule by mouth daily       hydrALAZINE (APRESOLINE) 100 MG tablet Take 100 mg by mouth every morning HTN       hydrALAZINE (APRESOLINE) 50 MG tablet TAKE ONE TABLET (50MG) BY MOUTH ONCE DAILY WITH 100MG TABLET TO EQUAL 150MG DAILY 90 tablet 1     HYDROcodone-acetaminophen (NORCO) 5-325 MG tablet Take 1 tablet by mouth 2 times daily for 7 days, THEN 1 tablet At Bedtime for 7 days. Taper scheduled norco as above and continue 1 tab four times a day as needed 30 tablet 0     insulin pen needle (BD SMILEY U/F) 32G X 4 MM miscellaneous Use 1 pen needles daily or as directed. 100 each 3     liraglutide (VICTOZA PEN) 18 MG/3ML solution Inject 1.8 mg Subcutaneous daily 27 mL 3     losartan (COZAAR) 50 MG tablet TAKE ONE AND ONE-HALF TABLETS (75MG) BY MOUTH ONCE DAILY 135 tablet 0     magnesium oxide (MAG-OX) 400 (241.3 Mg) MG tablet Take 1 tablet (400 mg) by mouth daily       metFORMIN (GLUCOPHAGE) 1000 MG tablet TAKE ONE TABLET (1000MG) BY MOUTH AT BREAKFAST AND ONE AND ONE-HALF TABLETS (1500MG) AT DINNER. 225 tablet 1     multivitamin  with lutein (OCUVITE WITH LTEIN) CAPS per capsule Take 1 capsule by mouth daily       ONETOUCH ULTRA test strip USE TO TEST BLOOD SUGAR TWO TIMES A DAY OR AS DIRECTED 100 each 1     order for DME Equipment being ordered: 2 pairs of CARINA hose 10 mmHg Calf width 51 cm, Lower leg length 39 cm 2 Units 0     order for DME Equipment being ordered: glucometer with test strips 1 Units 0     order for DME Equipment being ordered: blood pressure monitor 1 Device 0     PHARMACIST CHOICE LANCETS [facility] Check blood sugars twice daily 100 each 3      polyethylene glycol (MIRALAX) powder Take 1 capful every other day 510 g 1     rOPINIRole (REQUIP) 0.5 MG tablet Start 1/2 tablet (0.25 mg) at bedtime X 2 days, then  1 tablet (0.5 mg) at bedtime. (Patient taking differently: 1 tablet (0.5 mg) at bedtime.) 60 tablet 1     verapamil ER (CALAN-SR) 240 MG CR tablet Take 1 tablet (240 mg) by mouth daily 90 tablet 3     ROS: 10 point ROS of systems including Constitutional, Eyes, Respiratory, Cardiovascular, Gastroenterology, Genitourinary, Integumentary, Musculoskeletal, Psychiatric were all negative except for pertinent positives noted in my HPI.    Vitals:  /54   Pulse 78   Temp 97  F (36.1  C)   Resp 18   SpO2 95%   Exam:  GENERAL APPEARANCE:  in no distress  RESP:  unlabored breathing  M/S:   Brace left ankle, wrapped left leg. Edema left foot.   SKIN:  no rash noted  NEURO:   no purposeful movement in upper and lower extremities  PSYCH:  affect and mood normal    Lab/Diagnostic data: Reviewed in the chart and EHR.        ASSESSMENT/PLAN:  Bimalleolar ankle fracture, left, sequela  Physical deconditioning  Chronic Right shoulder pain  - Physical function improving with OT/PT, continue.  - Analgesia optimal  - Continue DVT Prophylaxis according to Orthopedist's recommendations  - Follow on the surgeon's recommendations      Type 2 diabetes mellitus with hyperglycemia, with long-term current use of insulin (H) Hgb A1c goal <7   A1C 7.7 03/19/2020    A1C 6.9 07/05/2019   - controlled.       Benign essential hypertension, BP goal <140/90: controlled.     Mild persistent asthma, uncomplicated: stable. No concern    Order:See above, otherwise, continue the rest of the current POC.       Electronically signed by:  Dawson Maravilla MD      Video-Visit Details  Type of service:  Video Visit  Video End Time (time video stopped): 13:57                    Sincerely,        Dawson Maravilla MD

## 2020-05-28 ENCOUNTER — VIRTUAL VISIT (OUTPATIENT)
Dept: FAMILY MEDICINE | Facility: CLINIC | Age: 75
End: 2020-05-28
Payer: COMMERCIAL

## 2020-05-28 VITALS
RESPIRATION RATE: 18 BRPM | BODY MASS INDEX: 43.83 KG/M2 | OXYGEN SATURATION: 96 % | TEMPERATURE: 96.8 F | SYSTOLIC BLOOD PRESSURE: 128 MMHG | DIASTOLIC BLOOD PRESSURE: 69 MMHG | HEART RATE: 83 BPM | WEIGHT: 263.4 LBS

## 2020-05-28 DIAGNOSIS — S82.842S BIMALLEOLAR ANKLE FRACTURE, LEFT, SEQUELA: Primary | ICD-10-CM

## 2020-05-28 DIAGNOSIS — I10 BENIGN ESSENTIAL HYPERTENSION: ICD-10-CM

## 2020-05-28 PROCEDURE — 99308 SBSQ NF CARE LOW MDM 20: CPT | Mod: GT | Performed by: NURSE PRACTITIONER

## 2020-05-28 NOTE — PROGRESS NOTES
"[place] [facility] [facility]   Lucia Bobo is being evaluated via a billable video visit due to the restrictions of the Covid-19 pandemic.   The patient has been notified of following:  \"This video visit will be conducted via a call between you and your provider. We have found that certain health care needs can be provided without the need for an in-person physical exam.  This service lets us provide the care you need with a video conversation. If during the course of the call the provider feels a video visit is not appropriate, you will not be charged for this service.\"   The provider has received verbal consent for a Video Visit from the patient or first contact? Yes  Patient  or facility staff would like the video invitation sent by: N/A   Video Start Time: 0840 AM    [place] Medical Record Number:  [id number]  Place of Location at the time of visit:  SNF  Chief Complaint   Patient presents with     Nursing Home Acute     HPI:  Lucia Bobo  is a 74 year old (1945), who is being seen today for a visit.  HPI information obtained from: facility chart records, facility staff and patient report. Today's concern is:    Follow up patient Norco tapered to once daily- scheduled to be discontinued on Monday   She has order for as needed Norco she has not used   She has follow up with ortho on Monday   She denies any pain   Therapy is going well   Denies any concerns       Past Medical and Surgical History reviewed in Epic today.  MEDICATIONS:    Current Outpatient Medications   Medication Sig Dispense Refill     acetaminophen (TYLENOL) 325 MG tablet Take 650 mg by mouth See Admin Instructions 325 mg po TID and 1 tab po q 4 hrs PRN.        ADVAIR DISKUS 250-50 MCG/DOSE inhaler INHALE ONE PUFF BY MOUTH TWICE A DAY 1 Inhaler 11     albuterol (VENTOLIN HFA) 108 (90 Base) MCG/ACT inhaler Inhale 1-2 puffs into the lungs every 4 hours as needed 6.7 g 6     aspirin 81 MG tablet Take 1 tablet (81 " mg) by mouth daily 90 tablet 3     atorvastatin (LIPITOR) 20 MG tablet Take 1 tablet (20 mg) by mouth At Bedtime 90 tablet 3     Calcium Carb-Cholecalciferol (CALCIUM 600+D) 600-800 MG-UNIT TABS Take 1 tablet by mouth 2 times daily       DULoxetine (CYMBALTA) 60 MG capsule Take 1 capsule daily 90 capsule 3     furosemide (LASIX) 20 MG tablet Take 1 tablet (20 mg) by mouth daily 90 tablet 3     glucosamine-chondroitin 500-400 MG CAPS per capsule Take 1 capsule by mouth daily       hydrALAZINE (APRESOLINE) 100 MG tablet Take 100 mg by mouth every morning HTN       hydrALAZINE (APRESOLINE) 50 MG tablet TAKE ONE TABLET (50MG) BY MOUTH ONCE DAILY WITH 100MG TABLET TO EQUAL 150MG DAILY 90 tablet 1     HYDROcodone-acetaminophen (NORCO) 5-325 MG tablet Take 1 tablet by mouth 2 times daily for 7 days, THEN 1 tablet At Bedtime for 7 days. Taper scheduled norco as above and continue 1 tab four times a day as needed 30 tablet 0     liraglutide (VICTOZA PEN) 18 MG/3ML solution Inject 1.8 mg Subcutaneous daily 27 mL 3     losartan (COZAAR) 50 MG tablet TAKE ONE AND ONE-HALF TABLETS (75MG) BY MOUTH ONCE DAILY 135 tablet 0     magnesium oxide (MAG-OX) 400 (241.3 Mg) MG tablet Take 1 tablet (400 mg) by mouth daily       metFORMIN (GLUCOPHAGE) 1000 MG tablet TAKE ONE TABLET (1000MG) BY MOUTH AT BREAKFAST AND ONE AND ONE-HALF TABLETS (1500MG) AT DINNER. 225 tablet 1     multivitamin  with lutein (OCUVITE WITH LTEIN) CAPS per capsule Take 1 capsule by mouth daily       polyethylene glycol (MIRALAX) powder Take 1 capful every other day 510 g 1     rOPINIRole (REQUIP) 0.5 MG tablet Start 1/2 tablet (0.25 mg) at bedtime X 2 days, then  1 tablet (0.5 mg) at bedtime. (Patient taking differently: 1 tablet (0.5 mg) at bedtime.) 60 tablet 1     verapamil ER (CALAN-SR) 240 MG CR tablet Take 1 tablet (240 mg) by mouth daily 90 tablet 3     blood glucose (NO BRAND SPECIFIED) test strip Use to test blood sugar 1 times daily or as directed. 100  each 3     blood glucose monitoring (NO BRAND SPECIFIED) meter device kit Use to test blood sugar 2 times daily or as directed. Needs lancets and strips per insurance coverage. 1 kit 0     insulin pen needle (BD SMILEY U/F) 32G X 4 MM miscellaneous Use 1 pen needles daily or as directed. 100 each 3     ONETOUCH ULTRA test strip USE TO TEST BLOOD SUGAR TWO TIMES A DAY OR AS DIRECTED 100 each 1     order for DME Equipment being ordered: 2 pairs of CARINA hose 10 mmHg Calf width 51 cm, Lower leg length 39 cm 2 Units 0     order for DME Equipment being ordered: glucometer with test strips 1 Units 0     order for DME Equipment being ordered: blood pressure monitor 1 Device 0     PHARMACIST CHOICE LANCETS MISC Check blood sugars twice daily 100 each 3     REVIEW OF SYSTEMS: 4 point ROS including Respiratory, CV, GI and , other than that noted in the HPI,  is negative  Objective: /69   Pulse 83   Temp 96.8  F (36  C)   Resp 18   Wt 119.5 kg (263 lb 6.4 oz)   SpO2 96%   BMI 43.83 kg/m       Limited visit exam done given COVID-19 precautions.   GENERAL APPEARANCE:  Alert, in no distress  RESP:  no respiratory distress  SKIN:  Inspection of skin and subcutaneous tissue baseline  PSYCH:  oriented X 3, affect and mood normal  Labs:   Labs done in SNF are in [place] EPIC. Please refer to them using [employer]Care Everywhere.    ASSESSMENT/PLAN:  Bimalleolar ankle fracture, left, sequela  Doing well   Will continue plan for scheduled norco to be discontinued on Monday continue as needed   She has follow up with ortho next week   Will follow up in 2 weeks sooner if needed     Benign essential hypertension, BP goal <140/90  Stable       Orders written by provider at facility    Electronically signed by:  Mary Hutchins NP     Video-Visit Details  Type of service:  Video Visit  Video End Time (time video stopped): 8:45 am  Distant Location (provider location):  [place] [facility]

## 2020-05-28 NOTE — LETTER
"    5/28/2020        RE: Lucia Bobo  [address] [address]        [place] [facility]   Lucia Bobo is being evaluated via a billable video visit due to the restrictions of the Covid-19 pandemic.   The patient has been notified of following:  \"This video visit will be conducted via a call between you and your provider. We have found that certain health care needs can be provided without the need for an in-person physical exam.  This service lets us provide the care you need with a video conversation. If during the course of the call the provider feels a video visit is not appropriate, you will not be charged for this service.\"   The provider has received verbal consent for a Video Visit from the patient or first contact? Yes  Patient  or facility staff would like the video invitation sent by: N/A   Video Start Time: 0840 AM    [facility] Medical Record Number:  [id number]  Place of Location at the time of visit: [facility]  Chief Complaint   Patient presents with     Nursing Home Acute     HPI:  Lucia Bobo  is a 74 year old (1945), who is being seen today for a visit.  HPI information obtained from: facility chart records, facility staff and patient report. Today's concern is:    Follow up patient Norco tapered to once daily- scheduled to be discontinued on Monday   She has order for as needed Norco she has not used   She has follow up with ortho on Monday   She denies any pain   Therapy is going well   Denies any concerns       Past Medical and Surgical History reviewed in Epic today.  MEDICATIONS:    Current Outpatient Medications   Medication Sig Dispense Refill     acetaminophen (TYLENOL) 325 MG tablet Take 650 mg by mouth See Admin Instructions 325 mg po TID and 1 tab po q 4 hrs PRN.        ADVAIR DISKUS 250-50 MCG/DOSE inhaler INHALE ONE PUFF BY MOUTH TWICE A DAY 1 Inhaler 11     albuterol (VENTOLIN HFA) 108 (90 Base) MCG/ACT inhaler Inhale 1-2 " puffs into the lungs every 4 hours as needed 6.7 g 6     aspirin 81 MG tablet Take 1 tablet (81 mg) by mouth daily 90 tablet 3     atorvastatin (LIPITOR) 20 MG tablet Take 1 tablet (20 mg) by mouth At Bedtime 90 tablet 3     Calcium Carb-Cholecalciferol (CALCIUM 600+D) 600-800 MG-UNIT TABS Take 1 tablet by mouth 2 times daily       DULoxetine (CYMBALTA) 60 MG capsule Take 1 capsule daily 90 capsule 3     furosemide (LASIX) 20 MG tablet Take 1 tablet (20 mg) by mouth daily 90 tablet 3     glucosamine-chondroitin 500-400 MG CAPS per capsule Take 1 capsule by mouth daily       hydrALAZINE (APRESOLINE) 100 MG tablet Take 100 mg by mouth every morning HTN       hydrALAZINE (APRESOLINE) 50 MG tablet TAKE ONE TABLET (50MG) BY MOUTH ONCE DAILY WITH 100MG TABLET TO EQUAL 150MG DAILY 90 tablet 1     HYDROcodone-acetaminophen (NORCO) 5-325 MG tablet Take 1 tablet by mouth 2 times daily for 7 days, THEN 1 tablet At Bedtime for 7 days. Taper scheduled norco as above and continue 1 tab four times a day as needed 30 tablet 0     liraglutide (VICTOZA PEN) 18 MG/3ML solution Inject 1.8 mg Subcutaneous daily 27 mL 3     losartan (COZAAR) 50 MG tablet TAKE ONE AND ONE-HALF TABLETS (75MG) BY MOUTH ONCE DAILY 135 tablet 0     magnesium oxide (MAG-OX) 400 (241.3 Mg) MG tablet Take 1 tablet (400 mg) by mouth daily       metFORMIN (GLUCOPHAGE) 1000 MG tablet TAKE ONE TABLET (1000MG) BY MOUTH AT BREAKFAST AND ONE AND ONE-HALF TABLETS (1500MG) AT DINNER. 225 tablet 1     multivitamin  with lutein (OCUVITE WITH LTEIN) CAPS per capsule Take 1 capsule by mouth daily       polyethylene glycol (MIRALAX) powder Take 1 capful every other day 510 g 1     rOPINIRole (REQUIP) 0.5 MG tablet Start 1/2 tablet (0.25 mg) at bedtime X 2 days, then  1 tablet (0.5 mg) at bedtime. (Patient taking differently: 1 tablet (0.5 mg) at bedtime.) 60 tablet 1     verapamil ER (CALAN-SR) 240 MG CR tablet Take 1 tablet (240 mg) by mouth daily 90 tablet 3     blood  glucose (NO BRAND SPECIFIED) test strip Use to test blood sugar 1 times daily or as directed. 100 each 3     blood glucose monitoring (NO BRAND SPECIFIED) meter device kit Use to test blood sugar 2 times daily or as directed. Needs lancets and strips per insurance coverage. 1 kit 0     insulin pen needle (BD SMILEY U/F) 32G X 4 MM miscellaneous Use 1 pen needles daily or as directed. 100 each 3     ONETOUCH ULTRA test strip USE TO TEST BLOOD SUGAR TWO TIMES A DAY OR AS DIRECTED 100 each 1     order for DME Equipment being ordered: 2 pairs of CARINA hose 10 mmHg Calf width 51 cm, Lower leg length 39 cm 2 Units 0     order for DME Equipment being ordered: glucometer with test strips 1 Units 0     order for DME Equipment being ordered: blood pressure monitor 1 Device 0     PHARMACIST CHOICE LANCETS MISC Check blood sugars twice daily 100 each 3     REVIEW OF SYSTEMS: 4 point ROS including Respiratory, CV, GI and , other than that noted in the HPI,  is negative  Objective: /69   Pulse 83   Temp 96.8  F (36  C)   Resp 18   Wt 119.5 kg (263 lb 6.4 oz)   SpO2 96%   BMI 43.83 kg/m       Limited visit exam done given COVID-19 precautions.   GENERAL APPEARANCE:  Alert, in no distress  RESP:  no respiratory distress  SKIN:  Inspection of skin and subcutaneous tissue baseline  PSYCH:  oriented X 3, affect and mood normal  Labs:   Labs done in SNF are in [place] EPIC. Please refer to them using [employer]IC/Care Everywhere.    ASSESSMENT/PLAN:  Bimalleolar ankle fracture, left, sequela  Doing well   Will continue plan for scheduled norco to be discontinued on Monday continue as needed   She has follow up with ortho next week   Will follow up in 2 weeks sooner if needed     Benign essential hypertension, BP goal <140/90  Stable       Orders written by provider at facility    Electronically signed by:  Mary Hutchins NP     Video-Visit Details  Type of service:  Video Visit  Video End Time (time video stopped): 8:45  am  Distant Location (provider location):  [place] [facility] [facility]                   Sincerely,        Mary Hutcihns NP

## 2020-06-04 ENCOUNTER — NURSING HOME VISIT (OUTPATIENT)
Dept: FAMILY MEDICINE | Facility: CLINIC | Age: 75
End: 2020-06-04
Payer: COMMERCIAL

## 2020-06-04 DIAGNOSIS — S82.842S BIMALLEOLAR ANKLE FRACTURE, LEFT, SEQUELA: Primary | ICD-10-CM

## 2020-06-04 PROCEDURE — 99309 SBSQ NF CARE MODERATE MDM 30: CPT | Performed by: NURSE PRACTITIONER

## 2020-06-04 RX ORDER — HYDRALAZINE HYDROCHLORIDE 50 MG/1
50 TABLET, FILM COATED ORAL DAILY
COMMUNITY
Start: 2021-12-07

## 2020-06-04 RX ORDER — HYDROCODONE BITARTRATE AND ACETAMINOPHEN 5; 325 MG/1; MG/1
1 TABLET ORAL 4 TIMES DAILY PRN
COMMUNITY
End: 2020-06-09

## 2020-06-04 NOTE — LETTER
6/4/2020        RE: Lucia Bobo  [address] [address] [address]        [place] [facility]  [employer] Medical Record Number:  [id number]  Place of Service where encounter took place:  [facility] ([facility]) [[id number]]  Chief Complaint   Patient presents with     Nursing Home Acute       HPI:    Lucia Bobo  is a 74 year old (1945), who is being seen today for an episodic care visit.  HPI information obtained from: facility chart records, facility staff and patient report. Today's concern is:  Bimalleolar ankle fracture, left, sequela    Patient was seen by ortho on Monday   She is weight bearing on left ankle at 50%   Next week she will be able to full weight bear   She is hoping to discharge home with outpatient physical therapy end of next week     She denies any concerns   Using only tylenol for pain     Incision is well healed       Past Medical and Surgical History reviewed in Epic today.    MEDICATIONS:    Current Outpatient Medications   Medication Sig Dispense Refill     acetaminophen (TYLENOL) 325 MG tablet Take 650 mg by mouth See Admin Instructions 325 mg po TID and 1 tab po q 4 hrs PRN.        ADVAIR DISKUS 250-50 MCG/DOSE inhaler INHALE ONE PUFF BY MOUTH TWICE A DAY 1 Inhaler 11     albuterol (VENTOLIN HFA) 108 (90 Base) MCG/ACT inhaler Inhale 1-2 puffs into the lungs every 4 hours as needed 6.7 g 6     aspirin 81 MG tablet Take 1 tablet (81 mg) by mouth daily 90 tablet 3     atorvastatin (LIPITOR) 20 MG tablet Take 1 tablet (20 mg) by mouth At Bedtime 90 tablet 3     blood glucose (NO BRAND SPECIFIED) test strip Use to test blood sugar 1 times daily or as directed. 100 each 3     blood glucose monitoring (NO BRAND SPECIFIED) meter device kit Use to test blood sugar 2 times daily or as directed. Needs lancets and strips per insurance coverage. 1 kit 0     Calcium Carb-Cholecalciferol (CALCIUM 600+D) 600-800 MG-UNIT TABS Take 1 tablet by mouth 2 times daily        DULoxetine (CYMBALTA) 60 MG capsule Take 1 capsule daily 90 capsule 3     furosemide (LASIX) 20 MG tablet Take 1 tablet (20 mg) by mouth daily 90 tablet 3     glucosamine-chondroitin 500-400 MG CAPS per capsule Take 1 capsule by mouth daily       hydrALAZINE (APRESOLINE) 100 MG tablet Take 100 mg by mouth every morning HTN       hydrALAZINE (APRESOLINE) 50 MG tablet Take 50 mg by mouth daily In the evening       HYDROcodone-acetaminophen (NORCO) 5-325 MG tablet Take 1 tablet by mouth 4 times daily as needed for severe pain       insulin pen needle (BD SMILEY U/F) 32G X 4 MM miscellaneous Use 1 pen needles daily or as directed. 100 each 3     liraglutide (VICTOZA PEN) 18 MG/3ML solution Inject 1.8 mg Subcutaneous daily 27 mL 3     losartan (COZAAR) 50 MG tablet TAKE ONE AND ONE-HALF TABLETS (75MG) BY MOUTH ONCE DAILY 135 tablet 0     magnesium oxide (MAG-OX) 400 (241.3 Mg) MG tablet Take 1 tablet (400 mg) by mouth daily       metFORMIN (GLUCOPHAGE) 1000 MG tablet TAKE ONE TABLET (1000MG) BY MOUTH AT BREAKFAST AND ONE AND ONE-HALF TABLETS (1500MG) AT DINNER. 225 tablet 1     multivitamin  with lutein (OCUVITE WITH LTEIN) CAPS per capsule Take 1 capsule by mouth daily       ONETOUCH ULTRA test strip USE TO TEST BLOOD SUGAR TWO TIMES A DAY OR AS DIRECTED 100 each 1     order for DME Equipment being ordered: 2 pairs of CARINA hose 10 mmHg Calf width 51 cm, Lower leg length 39 cm 2 Units 0     order for DME Equipment being ordered: glucometer with test strips 1 Units 0     order for DME Equipment being ordered: blood pressure monitor 1 Device 0     PHARMACIST CHOICE LANCETS [facility] Check blood sugars twice daily 100 each 3     polyethylene glycol (MIRALAX) powder Take 1 capful every other day 510 g 1     rOPINIRole (REQUIP) 0.5 MG tablet Start 1/2 tablet (0.25 mg) at bedtime X 2 days, then  1 tablet (0.5 mg) at bedtime. (Patient taking differently: 1 tablet (0.5 mg) at bedtime.) 60 tablet 1     verapamil ER (CALAN-SR) 240 MG CR  tablet Take 1 tablet (240 mg) by mouth daily 90 tablet 3         REVIEW OF SYSTEMS:  4 point ROS including Respiratory, CV, GI and , other than that noted in the HPI,  is negative    Objective:  There were no vitals taken for this visit.  Exam:  GENERAL APPEARANCE:  Alert, in no distress  RESP:  respiratory effort and palpation of chest normal, lungs clear to auscultation , no respiratory distress  CV:  Palpation and auscultation of heart done , regular rate and rhythm, no murmur, rub, or gallop  ABDOMEN:  normal bowel sounds, soft, nontender, no hepatosplenomegaly or other masses  SKIN:  Inspection of skin and subcutaneous tissue baseline, Palpation of skin and subcutaneous tissue baseline, incision well healed     Labs:   Labs done in SNF are in [place] EPIC. Please refer to them using [employer] Everywhere.    ASSESSMENT/PLAN:  Bimalleolar ankle fracture, left, sequela  Will start full weight bearing next week  Anticipate discharge anatoly of next week   Will discharge noco    Orders written by provider at facility      Total time spent with patient visit at the skilled nursing facility was 25 min including patient visit. Greater than 50% of total time spent with counseling and coordinating care due to TCU follow up.    Electronically signed by:  Mary Hutchins NP               Sincerely,        Mary Hutchins NP

## 2020-06-04 NOTE — PROGRESS NOTES
[facility]  [place] Medical Record Number:  [id number]  Place of Service where encounter took place:  [facility] ([facility]) [[id number]]  Chief Complaint   Patient presents with     Nursing Home Acute       HPI:    Lucia Bobo  is a 74 year old (1945), who is being seen today for an episodic care visit.  HPI information obtained from: {[facility] HPI:110196}. Today's concern is:  {[facility] DX:257530}    Past Medical and Surgical History reviewed in Epic today.    MEDICATIONS:  {Providers Please double check the med list (in the plan section >> meds & orders tab) and Discontinue any of the meds flagged by the TC to be discontinued}  Current Outpatient Medications   Medication Sig Dispense Refill     acetaminophen (TYLENOL) 325 MG tablet Take 650 mg by mouth See Admin Instructions 325 mg po TID and 1 tab po q 4 hrs PRN.        ADVAIR DISKUS 250-50 MCG/DOSE inhaler INHALE ONE PUFF BY MOUTH TWICE A DAY 1 Inhaler 11     albuterol (VENTOLIN HFA) 108 (90 Base) MCG/ACT inhaler Inhale 1-2 puffs into the lungs every 4 hours as needed 6.7 g 6     aspirin 81 MG tablet Take 1 tablet (81 mg) by mouth daily 90 tablet 3     atorvastatin (LIPITOR) 20 MG tablet Take 1 tablet (20 mg) by mouth At Bedtime 90 tablet 3     blood glucose (NO BRAND SPECIFIED) test strip Use to test blood sugar 1 times daily or as directed. 100 each 3     blood glucose monitoring (NO BRAND SPECIFIED) meter device kit Use to test blood sugar 2 times daily or as directed. Needs lancets and strips per insurance coverage. 1 kit 0     Calcium Carb-Cholecalciferol (CALCIUM 600+D) 600-800 MG-UNIT TABS Take 1 tablet by mouth 2 times daily       DULoxetine (CYMBALTA) 60 MG capsule Take 1 capsule daily 90 capsule 3     furosemide (LASIX) 20 MG tablet Take 1 tablet (20 mg) by mouth daily 90 tablet 3     glucosamine-chondroitin 500-400 MG CAPS per capsule Take 1 capsule by mouth daily       hydrALAZINE (APRESOLINE) 100 MG tablet Take 100 mg by mouth  every morning HTN       hydrALAZINE (APRESOLINE) 50 MG tablet Take 50 mg by mouth daily In the evening       HYDROcodone-acetaminophen (NORCO) 5-325 MG tablet Take 1 tablet by mouth 4 times daily as needed for severe pain       insulin pen needle (BD SMILEY U/F) 32G X 4 MM miscellaneous Use 1 pen needles daily or as directed. 100 each 3     liraglutide (VICTOZA PEN) 18 MG/3ML solution Inject 1.8 mg Subcutaneous daily 27 mL 3     losartan (COZAAR) 50 MG tablet TAKE ONE AND ONE-HALF TABLETS (75MG) BY MOUTH ONCE DAILY 135 tablet 0     magnesium oxide (MAG-OX) 400 (241.3 Mg) MG tablet Take 1 tablet (400 mg) by mouth daily       metFORMIN (GLUCOPHAGE) 1000 MG tablet TAKE ONE TABLET (1000MG) BY MOUTH AT BREAKFAST AND ONE AND ONE-HALF TABLETS (1500MG) AT DINNER. 225 tablet 1     multivitamin  with lutein (OCUVITE WITH LTEIN) CAPS per capsule Take 1 capsule by mouth daily       ONETOUCH ULTRA test strip USE TO TEST BLOOD SUGAR TWO TIMES A DAY OR AS DIRECTED 100 each 1     order for DME Equipment being ordered: 2 pairs of CARINA hose 10 mmHg Calf width 51 cm, Lower leg length 39 cm 2 Units 0     order for DME Equipment being ordered: glucometer with test strips 1 Units 0     order for DME Equipment being ordered: blood pressure monitor 1 Device 0     PHARMACIST CHOICE LANCETS [facility] Check blood sugars twice daily 100 each 3     polyethylene glycol (MIRALAX) powder Take 1 capful every other day 510 g 1     rOPINIRole (REQUIP) 0.5 MG tablet Start 1/2 tablet (0.25 mg) at bedtime X 2 days, then  1 tablet (0.5 mg) at bedtime. (Patient taking differently: 1 tablet (0.5 mg) at bedtime.) 60 tablet 1     verapamil ER (CALAN-SR) 240 MG CR tablet Take 1 tablet (240 mg) by mouth daily 90 tablet 3     ***    REVIEW OF SYSTEMS:  {ROS [facility]:591828}    Objective:  There were no vitals taken for this visit.  Exam:  {snf physical exam :758079}    Labs:   {fgslab:166067}    ASSESSMENT/PLAN:  {[facility]  DX:192287}    {fgsorders:565786}  ***    {fgstime1:029031}  Electronically signed by:  Sarah Cassidy CMA ***  {Providers Please double check the med list (in the plan section >> meds & orders tab) and Discontinue any of the meds flagged by the TC to be discontinued}

## 2020-06-04 NOTE — PROGRESS NOTES
[place]  [place] [facility] Record Number:  [id number]  Place of Service where encounter took place:  [facility]) [[id number]]  Chief Complaint   Patient presents with     Nursing Home Acute       HPI:    Lucia Bobo  is a 74 year old (1945), who is being seen today for an episodic care visit.  HPI information obtained from: facility chart records, facility staff and patient report. Today's concern is:  Bimalleolar ankle fracture, left, sequela    Patient was seen by ortho on Monday   She is weight bearing on left ankle at 50%   Next week she will be able to full weight bear   She is hoping to discharge home with outpatient physical therapy end of next week     She denies any concerns   Using only tylenol for pain     Incision is well healed       Past Medical and Surgical History reviewed in Epic today.    MEDICATIONS:    Current Outpatient Medications   Medication Sig Dispense Refill     acetaminophen (TYLENOL) 325 MG tablet Take 650 mg by mouth See Admin Instructions 325 mg po TID and 1 tab po q 4 hrs PRN.        ADVAIR DISKUS 250-50 MCG/DOSE inhaler INHALE ONE PUFF BY MOUTH TWICE A DAY 1 Inhaler 11     albuterol (VENTOLIN HFA) 108 (90 Base) MCG/ACT inhaler Inhale 1-2 puffs into the lungs every 4 hours as needed 6.7 g 6     aspirin 81 MG tablet Take 1 tablet (81 mg) by mouth daily 90 tablet 3     atorvastatin (LIPITOR) 20 MG tablet Take 1 tablet (20 mg) by mouth At Bedtime 90 tablet 3     blood glucose (NO BRAND SPECIFIED) test strip Use to test blood sugar 1 times daily or as directed. 100 each 3     blood glucose monitoring (NO BRAND SPECIFIED) meter device kit Use to test blood sugar 2 times daily or as directed. Needs lancets and strips per insurance coverage. 1 kit 0     Calcium Carb-Cholecalciferol (CALCIUM 600+D) 600-800 MG-UNIT TABS Take 1 tablet by mouth 2 times daily       DULoxetine (CYMBALTA) 60 MG capsule Take 1 capsule daily 90 capsule 3     furosemide  (LASIX) 20 MG tablet Take 1 tablet (20 mg) by mouth daily 90 tablet 3     glucosamine-chondroitin 500-400 MG CAPS per capsule Take 1 capsule by mouth daily       hydrALAZINE (APRESOLINE) 100 MG tablet Take 100 mg by mouth every morning HTN       hydrALAZINE (APRESOLINE) 50 MG tablet Take 50 mg by mouth daily In the evening       HYDROcodone-acetaminophen (NORCO) 5-325 MG tablet Take 1 tablet by mouth 4 times daily as needed for severe pain       insulin pen needle (BD SMILEY U/F) 32G X 4 MM miscellaneous Use 1 pen needles daily or as directed. 100 each 3     liraglutide (VICTOZA PEN) 18 MG/3ML solution Inject 1.8 mg Subcutaneous daily 27 mL 3     losartan (COZAAR) 50 MG tablet TAKE ONE AND ONE-HALF TABLETS (75MG) BY MOUTH ONCE DAILY 135 tablet 0     magnesium oxide (MAG-OX) 400 (241.3 Mg) MG tablet Take 1 tablet (400 mg) by mouth daily       metFORMIN (GLUCOPHAGE) 1000 MG tablet TAKE ONE TABLET (1000MG) BY MOUTH AT BREAKFAST AND ONE AND ONE-HALF TABLETS (1500MG) AT DINNER. 225 tablet 1     multivitamin  with lutein (OCUVITE WITH LTEIN) CAPS per capsule Take 1 capsule by mouth daily       ONETOUCH ULTRA test strip USE TO TEST BLOOD SUGAR TWO TIMES A DAY OR AS DIRECTED 100 each 1     order for DME Equipment being ordered: 2 pairs of CARINA hose 10 mmHg Calf width 51 cm, Lower leg length 39 cm 2 Units 0     order for DME Equipment being ordered: glucometer with test strips 1 Units 0     order for DME Equipment being ordered: blood pressure monitor 1 Device 0     PHARMACIST CHOICE LANCETS [facility] Check blood sugars twice daily 100 each 3     polyethylene glycol (MIRALAX) powder Take 1 capful every other day 510 g 1     rOPINIRole (REQUIP) 0.5 MG tablet Start 1/2 tablet (0.25 mg) at bedtime X 2 days, then  1 tablet (0.5 mg) at bedtime. (Patient taking differently: 1 tablet (0.5 mg) at bedtime.) 60 tablet 1     verapamil ER (CALAN-SR) 240 MG CR tablet Take 1 tablet (240 mg) by mouth daily 90 tablet 3         REVIEW OF SYSTEMS:  4  point ROS including Respiratory, CV, GI and , other than that noted in the HPI,  is negative    Objective:  There were no vitals taken for this visit.  Exam:  GENERAL APPEARANCE:  Alert, in no distress  RESP:  respiratory effort and palpation of chest normal, lungs clear to auscultation , no respiratory distress  CV:  Palpation and auscultation of heart done , regular rate and rhythm, no murmur, rub, or gallop  ABDOMEN:  normal bowel sounds, soft, nontender, no hepatosplenomegaly or other masses  SKIN:  Inspection of skin and subcutaneous tissue baseline, Palpation of skin and subcutaneous tissue baseline, incision well healed     Labs:   Labs done in SNF are in [place] EPIC. Please refer to them using EPIC/Care Everywhere.    ASSESSMENT/PLAN:  Bimalleolar ankle fracture, left, sequela  Will start full weight bearing next week  Anticipate discharge anatoly of next week   Will discharge noco    Orders written by provider at facility      Total time spent with patient visit at the skilled nursing [facility] and the [facility] was 25 min including patient visit. Greater than 50% of total time spent with counseling and coordinating care due to TCU follow up.    Electronically signed by:  Mary Hutchins NP

## 2020-06-09 NOTE — PROGRESS NOTES
[facility] DISCHARGE SUMMARY    PATIENT'S NAME: Lucia Bobo  YOB: 1945    PRIMARY CARE PROVIDER AND CLINIC RESPONSIBLE AFTER TRANSFER: Keturah Chaney [address] [place] [id number]     TRANSFERRING PROVIDERS: Mary Hutchins NP, MD Taiwo  DATE OF SNF ADMISSION:  April / 27 / 2020  DATE OF SNF (anticipated) DISCHARGE: June / 12 / 2020  DISCHARGE DISPOSITION: [facility] Provider  Nursing Facility: [facility]   Hospital  Arlene Duran stay 4/25/20 to 4/27/20.     DISCHARGE DIAGNOSIS:   1. Bimalleolar ankle fracture, left, sequela    2. Personal history of fall    3. Benign essential hypertension, BP goal <140/90    4. Type 2 diabetes mellitus with hyperglycemia, with long-term current use of insulin (H) HgbA1c goal <7    5. BMI 40.0-44.9, adult (H)    6. Mild persistent asthma, uncomplicated    7. Elevated TSH    8. Hypertriglyceridemia, LDL goal <100    9. Restless legs syndrome        COGNITIVE TESTING DONE: ACL score: 4.6/6.0 indicates safe to live alone and requires daily checks to assist with problem solving changes to the environment.    CODE STATUS/ADVANCE DIRECTIVES DISCUSSION: CPR/Full code   ALLERGIES: Amoxicillin; Lisinopril; and Penicillins  PAST MEDICAL HISTORY:  has a past medical history of Asthma, HTN, and Type II or unspecified type diabetes mellitus without mention of complication, not stated as uncontrolled (4/2/04).    DISCHARGE MEDICATIONS:  Current Outpatient Medications   Medication Sig Dispense Refill     acetaminophen (TYLENOL) 325 MG tablet Take 650 mg by mouth See Admin Instructions 325 mg po BID and 1 tab po q 4 hrs PRN.        ADVAIR DISKUS 250-50 MCG/DOSE inhaler INHALE ONE PUFF BY MOUTH TWICE A DAY 1 Inhaler 11     albuterol (VENTOLIN HFA) 108 (90 Base) MCG/ACT inhaler Inhale 1-2 puffs into the lungs every 4 hours as needed 6.7 g 6     aspirin 81 MG tablet Take 1 tablet (81 mg) by mouth daily 90 tablet 3      atorvastatin (LIPITOR) 20 MG tablet Take 1 tablet (20 mg) by mouth At Bedtime 90 tablet 3     blood glucose (NO BRAND SPECIFIED) test strip Use to test blood sugar 1 times daily or as directed. 100 each 3     blood glucose monitoring (NO BRAND SPECIFIED) meter device kit Use to test blood sugar 2 times daily or as directed. Needs lancets and strips per insurance coverage. 1 kit 0     Calcium Carb-Cholecalciferol (CALCIUM 600+D) 600-800 MG-UNIT TABS Take 1 tablet by mouth 2 times daily       DULoxetine (CYMBALTA) 60 MG capsule Take 1 capsule daily 90 capsule 3     furosemide (LASIX) 20 MG tablet Take 1 tablet (20 mg) by mouth daily 90 tablet 3     glucosamine-chondroitin 500-400 MG CAPS per capsule Take 1 capsule by mouth daily       hydrALAZINE (APRESOLINE) 100 MG tablet Take 100 mg by mouth every morning HTN       hydrALAZINE (APRESOLINE) 50 MG tablet Take 50 mg by mouth daily In the evening       insulin pen needle (BD SMILEY U/F) 32G X 4 MM miscellaneous Use 1 pen needles daily or as directed. 100 each 3     liraglutide (VICTOZA PEN) 18 MG/3ML solution Inject 1.8 mg Subcutaneous daily 27 mL 3     losartan (COZAAR) 50 MG tablet TAKE ONE AND ONE-HALF TABLETS (75MG) BY MOUTH ONCE DAILY 135 tablet 0     magnesium oxide (MAG-OX) 400 (241.3 Mg) MG tablet Take 1 tablet (400 mg) by mouth daily       metFORMIN (GLUCOPHAGE) 1000 MG tablet TAKE ONE TABLET (1000MG) BY MOUTH AT BREAKFAST AND ONE AND ONE-HALF TABLETS (1500MG) AT DINNER. 225 tablet 1     multivitamin  with lutein (OCUVITE WITH LTEIN) CAPS per capsule Take 1 capsule by mouth daily       ONETOUCH ULTRA test strip USE TO TEST BLOOD SUGAR TWO TIMES A DAY OR AS DIRECTED 100 each 1     order for DME Equipment being ordered: 2 pairs of CARINA hose 10 mmHg Calf width 51 cm, Lower leg length 39 cm 2 Units 0     order for DME Equipment being ordered: glucometer with test strips 1 Units 0     order for DME Equipment being ordered: blood pressure monitor 1 Device 0      PHARMACIST CHOICE LANCETS [facility] Check blood sugars twice daily 100 each 3     polyethylene glycol (MIRALAX) powder Take 1 capful every other day 510 g 1     rOPINIRole (REQUIP) 0.5 MG tablet Start 1/2 tablet (0.25 mg) at bedtime X 2 days, then  1 tablet (0.5 mg) at bedtime. (Patient taking differently: 1 tablet (0.5 mg) at bedtime.) 60 tablet 1     verapamil ER (CALAN-SR) 240 MG CR tablet Take 1 tablet (240 mg) by mouth daily 90 tablet 3       MEDICATION CHANGES/RATIONALE:   Discontinued NORCO as not needed     Nursing Facility Course:  Patient fell getting into vehicle and presented to the EMERGENCY ROOM with a  dislocated left bimalleolar ankle fracture which was reduced in the ED. She underwent a ORIF left medial malleolus, intramedullary nailing of left distal fibula with syndesmosis fixation on 4/25/2020 without complications. Her post-operative course uneventful. She has had follow up appointments with ortho Dr. Hubbard and progressed to full weightbearing this week. She denies pain and is ambulating well.     Controlled medications sent with patient:   not applicable/none     ROS: 4 point ROS including Respiratory, CV, GI and , other than that noted in the HPI,  is negative    Physical Exam:  BP (!) 149/68   Pulse 70   Temp 97.7  F (36.5  C)   Resp 20   Wt 117.5 kg (259 lb 1.6 oz)   SpO2 95%   BMI 43.12 kg/m    GENERAL APPEARANCE:  Alert, in no distress  RESP:  respiratory effort and palpation of chest normal, auscultation of lungs clear , no respiratory distress  CV:  Palpation and auscultation of heart done , rate and rhythm WNL, no murmur, trace non pitting peripheral edema  ABDOMEN:  normal bowel sounds, soft, nontender, no hepatosplenomegaly or other masses  M/S:   Gait and station full WB, Digits and nails intact  SKIN:  Inspection and Palpation of skin and subcutaneous tissue, incision well healed   NEURO: 2-12 in normal limits and at patient's baseline  PSYCH:  insight and judgement, memory  intact , affect and mood Pleasant      DISCHARGE PLAN: outpatient  Physical Therapy Follow-up with ortho as planned on 6/24   PCP in In Aug/Sept for diabetes check     Current [place] scheduled appointments:    Pending labs: none   SNF labs none  Discharge Treatments:none    TOTAL DISCHARGE TIME:   Greater than 30 minutes  Electronically signed by:  Mary Hutchins NP

## 2020-06-11 ENCOUNTER — NURSING HOME VISIT (OUTPATIENT)
Dept: FAMILY MEDICINE | Facility: CLINIC | Age: 75
End: 2020-06-11
Payer: COMMERCIAL

## 2020-06-11 VITALS
SYSTOLIC BLOOD PRESSURE: 149 MMHG | RESPIRATION RATE: 20 BRPM | HEART RATE: 70 BPM | TEMPERATURE: 97.7 F | OXYGEN SATURATION: 95 % | WEIGHT: 259.1 LBS | DIASTOLIC BLOOD PRESSURE: 68 MMHG | BODY MASS INDEX: 43.12 KG/M2

## 2020-06-11 DIAGNOSIS — E11.65 TYPE 2 DIABETES MELLITUS WITH HYPERGLYCEMIA, WITH LONG-TERM CURRENT USE OF INSULIN (H): ICD-10-CM

## 2020-06-11 DIAGNOSIS — I10 BENIGN ESSENTIAL HYPERTENSION: ICD-10-CM

## 2020-06-11 DIAGNOSIS — E78.1 HYPERTRIGLYCERIDEMIA: ICD-10-CM

## 2020-06-11 DIAGNOSIS — Z79.4 TYPE 2 DIABETES MELLITUS WITH HYPERGLYCEMIA, WITH LONG-TERM CURRENT USE OF INSULIN (H): ICD-10-CM

## 2020-06-11 DIAGNOSIS — S82.842S BIMALLEOLAR ANKLE FRACTURE, LEFT, SEQUELA: Primary | ICD-10-CM

## 2020-06-11 DIAGNOSIS — J45.30 MILD PERSISTENT ASTHMA, UNCOMPLICATED: ICD-10-CM

## 2020-06-11 DIAGNOSIS — Z91.81 PERSONAL HISTORY OF FALL: ICD-10-CM

## 2020-06-11 DIAGNOSIS — G25.81 RESTLESS LEGS SYNDROME: ICD-10-CM

## 2020-06-11 DIAGNOSIS — R79.89 ELEVATED TSH: ICD-10-CM

## 2020-06-11 PROCEDURE — 99316 NF DSCHRG MGMT 30 MIN+: CPT | Performed by: NURSE PRACTITIONER

## 2020-06-11 NOTE — LETTER
6/11/2020        RE: Lucia Bobo  [address]  [place] [address]            [facility] DISCHARGE SUMMARY    PATIENT'S NAME: Lucia Bobo  YOB: 1945    PRIMARY CARE PROVIDER AND CLINIC RESPONSIBLE AFTER TRANSFER: Keturah Chaney [address] [facility] / [place] [address]     TRANSFERRING PROVIDERS: Mary Hutchins NP, MD Taiwo  DATE OF SNF ADMISSION:  April / 27 / 2020  DATE OF SNF (anticipated) DISCHARGE: June / 12 / 2020  DISCHARGE DISPOSITION: [facility] Provider  Nursing Facility: [facility]   Hospital  Arlene Duran stay 4/25/20 to 4/27/20.     DISCHARGE DIAGNOSIS:   1. Bimalleolar ankle fracture, left, sequela    2. Personal history of fall    3. Benign essential hypertension, BP goal <140/90    4. Type 2 diabetes mellitus with hyperglycemia, with long-term current use of insulin (H) HgbA1c goal <7    5. BMI 40.0-44.9, adult (H)    6. Mild persistent asthma, uncomplicated    7. Elevated TSH    8. Hypertriglyceridemia, LDL goal <100    9. Restless legs syndrome        COGNITIVE TESTING DONE: ACL score: 4.6/6.0 indicates safe to live alone and requires daily checks to assist with problem solving changes to the environment.    CODE STATUS/ADVANCE DIRECTIVES DISCUSSION: CPR/Full code   ALLERGIES: Amoxicillin; Lisinopril; and Penicillins  PAST MEDICAL HISTORY:  has a past medical history of Asthma, HTN, and Type II or unspecified type diabetes mellitus without mention of complication, not stated as uncontrolled (4/2/04).    DISCHARGE MEDICATIONS:  Current Outpatient Medications   Medication Sig Dispense Refill     acetaminophen (TYLENOL) 325 MG tablet Take 650 mg by mouth See Admin Instructions 325 mg po BID and 1 tab po q 4 hrs PRN.        ADVAIR DISKUS 250-50 MCG/DOSE inhaler INHALE ONE PUFF BY MOUTH TWICE A DAY 1 Inhaler 11     albuterol (VENTOLIN HFA) 108 (90 Base) MCG/ACT inhaler Inhale 1-2 puffs into the lungs every 4 hours as needed 6.7  g 6     aspirin 81 MG tablet Take 1 tablet (81 mg) by mouth daily 90 tablet 3     atorvastatin (LIPITOR) 20 MG tablet Take 1 tablet (20 mg) by mouth At Bedtime 90 tablet 3     blood glucose (NO BRAND SPECIFIED) test strip Use to test blood sugar 1 times daily or as directed. 100 each 3     blood glucose monitoring (NO BRAND SPECIFIED) meter device kit Use to test blood sugar 2 times daily or as directed. Needs lancets and strips per insurance coverage. 1 kit 0     Calcium Carb-Cholecalciferol (CALCIUM 600+D) 600-800 MG-UNIT TABS Take 1 tablet by mouth 2 times daily       DULoxetine (CYMBALTA) 60 MG capsule Take 1 capsule daily 90 capsule 3     furosemide (LASIX) 20 MG tablet Take 1 tablet (20 mg) by mouth daily 90 tablet 3     glucosamine-chondroitin 500-400 MG CAPS per capsule Take 1 capsule by mouth daily       hydrALAZINE (APRESOLINE) 100 MG tablet Take 100 mg by mouth every morning HTN       hydrALAZINE (APRESOLINE) 50 MG tablet Take 50 mg by mouth daily In the evening       insulin pen needle (BD SMILEY U/F) 32G X 4 MM miscellaneous Use 1 pen needles daily or as directed. 100 each 3     liraglutide (VICTOZA PEN) 18 MG/3ML solution Inject 1.8 mg Subcutaneous daily 27 mL 3     losartan (COZAAR) 50 MG tablet TAKE ONE AND ONE-HALF TABLETS (75MG) BY MOUTH ONCE DAILY 135 tablet 0     magnesium oxide (MAG-OX) 400 (241.3 Mg) MG tablet Take 1 tablet (400 mg) by mouth daily       metFORMIN (GLUCOPHAGE) 1000 MG tablet TAKE ONE TABLET (1000MG) BY MOUTH AT BREAKFAST AND ONE AND ONE-HALF TABLETS (1500MG) AT DINNER. 225 tablet 1     multivitamin  with lutein (OCUVITE WITH LTEIN) CAPS per capsule Take 1 capsule by mouth daily       ONETOUCH ULTRA test strip USE TO TEST BLOOD SUGAR TWO TIMES A DAY OR AS DIRECTED 100 each 1     order for DME Equipment being ordered: 2 pairs of CARINA hose 10 mmHg Calf width 51 cm, Lower leg length 39 cm 2 Units 0     order for DME Equipment being ordered: glucometer with test strips 1 Units 0      order for DME Equipment being ordered: blood pressure monitor 1 Device 0     PHARMACIST CHOICE LANCETS MISC Check blood sugars twice daily 100 each 3     polyethylene glycol (MIRALAX) powder Take 1 capful every other day 510 g 1     rOPINIRole (REQUIP) 0.5 MG tablet Start 1/2 tablet (0.25 mg) at bedtime X 2 days, then  1 tablet (0.5 mg) at bedtime. (Patient taking differently: 1 tablet (0.5 mg) at bedtime.) 60 tablet 1     verapamil ER (CALAN-SR) 240 MG CR tablet Take 1 tablet (240 mg) by mouth daily 90 tablet 3       MEDICATION CHANGES/RATIONALE:   Discontinued NORCO as not needed     Nursing Facility Course:  Patient fell getting into vehicle and presented to the EMERGENCY ROOM with a  dislocated left bimalleolar ankle fracture which was reduced in the ED. She underwent a ORIF left medial malleolus, intramedullary nailing of left distal fibula with syndesmosis fixation on 4/25/2020 without complications. Her post-operative course uneventful. She has had follow up appointments with ortho Dr. Hubbard and progressed to full weightbearing this week. She denies pain and is ambulating well.     Controlled medications sent with patient:   not applicable/none     ROS: 4 point ROS including Respiratory, CV, GI and , other than that noted in the HPI,  is negative    Physical Exam:  BP (!) 149/68   Pulse 70   Temp 97.7  F (36.5  C)   Resp 20   Wt 117.5 kg (259 lb 1.6 oz)   SpO2 95%   BMI 43.12 kg/m    GENERAL APPEARANCE:  Alert, in no distress  RESP:  respiratory effort and palpation of chest normal, auscultation of lungs clear , no respiratory distress  CV:  Palpation and auscultation of heart done , rate and rhythm WNL, no murmur, trace non pitting peripheral edema  ABDOMEN:  normal bowel sounds, soft, nontender, no hepatosplenomegaly or other masses  M/S:   Gait and station full WB, Digits and nails intact  SKIN:  Inspection and Palpation of skin and subcutaneous tissue, incision well healed   NEURO: 2-12 in normal  limits and at patient's baseline  PSYCH:  insight and judgement, memory intact , affect and mood Pleasant      DISCHARGE PLAN: outpatient  Physical Therapy Follow-up with ortho as planned on 6/24   PCP in In Aug/Sept for diabetes check     Current [place] scheduled appointments:    Pending labs: none   SNF labs none  Discharge Treatments:none    TOTAL DISCHARGE TIME:   Greater than 30 minutes  Electronically signed by:  Mary Hutchins NP        Sincerely,        Mary Hutchins NP

## 2020-06-15 ENCOUNTER — TELEPHONE (OUTPATIENT)
Dept: FAMILY MEDICINE | Facility: CLINIC | Age: 75
End: 2020-06-15

## 2020-06-15 DIAGNOSIS — I10 BENIGN ESSENTIAL HYPERTENSION: Chronic | ICD-10-CM

## 2020-06-15 RX ORDER — VERAPAMIL HYDROCHLORIDE 240 MG/1
TABLET, FILM COATED, EXTENDED RELEASE ORAL
Qty: 90 TABLET | Refills: 0 | Status: SHIPPED | OUTPATIENT
Start: 2020-06-15 | End: 2020-09-08

## 2020-06-15 NOTE — TELEPHONE ENCOUNTER
"Hospital/TCU/ED for chronic condition Discharge Protocol    \"Hi, my name is Marilynn Arias RN, a registered nurse, and I am calling from [facility].  I am calling to follow up and see how things are going for you after your recent emergency visit/hospital/TCU stay.\"    Tell me how you are doing now that you are home?\" Started full wt bearing, using walker for support/ balance. Pain tolerable, increased swelling lt foot/ ankle when up, reduces with elevation. PT scheduled tomorrow, ortho F/U 06-24-20. BG WNL while in TCU, also had wt loss.         Discharge Instructions    \"Let's review your discharge instructions.  What is/are the follow-up recommendations?  Pt. Response: see above    \"Has an appointment with your primary care provider been scheduled?\"   No (schedule appointment)   Plans DM F/U with PCP Aug/ Sept.    \"When you see the provider, I would recommend that you bring your medications with you.\"    Medications    \"Tell me what changed about your medicines when you discharged?\"    Changes to chronic meds?    0-1    \"What questions do you have about your medications?\"    None     New diagnoses of heart failure, COPD, diabetes, or MI?    No              Post Discharge Medication Reconciliation Status: discharge medications reconciled, continue medications without change.    Was MTM referral placed (*Make sure to put transitions as reason for referral)?   No    Call Summary    \"What questions or concerns do you have about your recent visit and your follow-up care?\"     none    \"If you have questions or things don't continue to improve, we encourage you contact us through the main clinic number (give number).  Even if the clinic is not open, triage nurses are available 24/7 to help you.     We would like you to know that our clinic has extended hours (provide information).  We also have urgent care (provide details on closest location and hours/contact info)\"      \"Thank you for your time and take " "care!\"           "

## 2020-06-15 NOTE — TELEPHONE ENCOUNTER
ED/UC/IP follow up phone call: Bimalleolar Ankle Fracture - Left - 06/12/20    RN please call to follow up.    Number of ED visits in past 12 months = 0

## 2020-06-29 DIAGNOSIS — I10 BENIGN ESSENTIAL HYPERTENSION: Chronic | ICD-10-CM

## 2020-06-30 RX ORDER — HYDRALAZINE HYDROCHLORIDE 100 MG/1
TABLET, FILM COATED ORAL
Qty: 90 TABLET | Refills: 1 | Status: SHIPPED | OUTPATIENT
Start: 2020-06-30 | End: 2021-12-07

## 2020-06-30 RX ORDER — FUROSEMIDE 20 MG
TABLET ORAL
Qty: 90 TABLET | Refills: 1 | Status: SHIPPED | OUTPATIENT
Start: 2020-06-30 | End: 2021-12-07

## 2020-06-30 RX ORDER — LOSARTAN POTASSIUM 50 MG/1
TABLET ORAL
Qty: 135 TABLET | Refills: 1 | Status: SHIPPED | OUTPATIENT
Start: 2020-06-30 | End: 2021-12-07

## 2020-07-13 DIAGNOSIS — Z79.4 TYPE 2 DIABETES MELLITUS WITH HYPERGLYCEMIA, WITH LONG-TERM CURRENT USE OF INSULIN (H): ICD-10-CM

## 2020-07-13 DIAGNOSIS — E11.65 TYPE 2 DIABETES MELLITUS WITH HYPERGLYCEMIA, WITH LONG-TERM CURRENT USE OF INSULIN (H): ICD-10-CM

## 2020-07-13 RX ORDER — LIRAGLUTIDE 6 MG/ML
INJECTION SUBCUTANEOUS
Qty: 27 ML | Refills: 0 | Status: SHIPPED | OUTPATIENT
Start: 2020-07-13 | End: 2020-09-28

## 2020-08-05 DIAGNOSIS — Z79.4 TYPE 2 DIABETES MELLITUS WITH HYPERGLYCEMIA, WITH LONG-TERM CURRENT USE OF INSULIN (H): ICD-10-CM

## 2020-08-05 DIAGNOSIS — E11.65 TYPE 2 DIABETES MELLITUS WITH HYPERGLYCEMIA, WITH LONG-TERM CURRENT USE OF INSULIN (H): ICD-10-CM

## 2020-08-05 DIAGNOSIS — J45.30 MILD PERSISTENT ASTHMA, UNCOMPLICATED: Chronic | ICD-10-CM

## 2020-08-05 RX ORDER — ALBUTEROL SULFATE 90 UG/1
AEROSOL, METERED RESPIRATORY (INHALATION)
Qty: 18 G | Refills: 6 | Status: SHIPPED | OUTPATIENT
Start: 2020-08-05 | End: 2021-12-07

## 2020-08-05 RX ORDER — PEN NEEDLE, DIABETIC 32GX 5/32"
NEEDLE, DISPOSABLE MISCELLANEOUS
Qty: 100 EACH | Refills: 3 | Status: SHIPPED | OUTPATIENT
Start: 2020-08-05

## 2020-09-04 DIAGNOSIS — I10 BENIGN ESSENTIAL HYPERTENSION: Chronic | ICD-10-CM

## 2020-09-04 DIAGNOSIS — G25.81 RESTLESS LEGS SYNDROME: ICD-10-CM

## 2020-09-04 DIAGNOSIS — Z79.4 TYPE 2 DIABETES MELLITUS WITH HYPERGLYCEMIA, WITH LONG-TERM CURRENT USE OF INSULIN (H): Chronic | ICD-10-CM

## 2020-09-04 DIAGNOSIS — E11.65 TYPE 2 DIABETES MELLITUS WITH HYPERGLYCEMIA, WITH LONG-TERM CURRENT USE OF INSULIN (H): Chronic | ICD-10-CM

## 2020-09-04 RX ORDER — ROPINIROLE 0.5 MG/1
TABLET, FILM COATED ORAL
Qty: 30 TABLET | Refills: 0 | Status: SHIPPED | OUTPATIENT
Start: 2020-09-04 | End: 2020-10-14

## 2020-09-04 NOTE — TELEPHONE ENCOUNTER
BP Readings from Last 6 Encounters:   06/11/20 (!) 149/68   05/28/20 128/69   05/17/20 132/54   05/19/20 132/54   05/05/20 (!) 144/74   04/29/20 (!) 158/62     30 day refill  Due for DM/HTN/Lipids office visit. Please schedule. LUIS M Bean

## 2020-09-04 NOTE — TELEPHONE ENCOUNTER
"Routing refill request to provider for review/approval because:  Labs not current  Last recorded systolic BP not at goal     Requested Prescriptions   Pending Prescriptions Disp Refills     rOPINIRole (REQUIP) 0.5 MG tablet [Pharmacy Med Name: ropinirole 0.5 mg tablet] 60 tablet 1     Sig: Start 1/2 tablet (0.25 mg) at bedtime for 2 days, then 1 tablet by mouth (0.5 mg) at bedtime.       Antiparkinson's Agents Protocol Failed - 9/4/2020  8:00 AM        Failed - Blood pressure under 140/90 in past 12 months     BP Readings from Last 3 Encounters:   06/11/20 (!) 149/68   05/28/20 128/69   05/17/20 132/54                 Failed - CBC on record in past 12 months     Recent Labs   Lab Test 01/08/18  1110   WBC 10.4   RBC 3.84   HGB 12.5   HCT 37.8                    Passed - ALT on record in past 12 months         Recent Labs   Lab Test 03/19/20  0933   ALT 27             Passed - Serum Creatinine on file in past 12 months     Recent Labs   Lab Test 03/19/20  0933   CR 1.03       Ok to refill medication if creatinine is low          Passed - Medication is active on med list        Passed - Patient is age 18 or older        Passed - No active pregnancy on record        Passed - No positive pregnancy test in the past 12 months        Passed - Recent (6 mo) or future (30 days) visit within the authorizing provider's specialty     Patient had office visit in the last 6 months or has a visit in the next 30 days with authorizing provider or within the authorizing provider's specialty.  See \"Patient Info\" tab in inbasket, or \"Choose Columns\" in Meds & Orders section of the refill encounter.               Marilin SCHWARTZ, RN, BSN      "

## 2020-09-08 RX ORDER — VERAPAMIL HYDROCHLORIDE 240 MG/1
TABLET, FILM COATED, EXTENDED RELEASE ORAL
Qty: 30 TABLET | Refills: 0 | Status: SHIPPED | OUTPATIENT
Start: 2020-09-08 | End: 2020-10-27

## 2020-09-08 NOTE — TELEPHONE ENCOUNTER
"Routing refill requests to provider for review/approval because:  Last recorded BP not at goal  6/11/2020 [facility] home summary: 'PCP in In Aug/Sept for diabetes check'    Requested Prescriptions   Pending Prescriptions Disp Refills     metFORMIN (GLUCOPHAGE) 1000 MG tablet [Pharmacy Med Name: METFORMIN HCL 1000MG TABS] 225 tablet 1     Sig: TAKE ONE TABLET (1000MG) BY MOUTH AT BREAKFAST AND ONE AND ONE - HALF TABLETS  (1500MG) AT DINNER       Biguanide Agents Passed - 9/4/2020 10:12 AM        Passed - Patient is age 10 or older        Passed - Patient has documented A1c within the specified period of time.     If HgbA1C is 8 or greater, it needs to be on file within the past 3 months.  If less than 8, must be on file within the past 6 months.     Recent Labs   Lab Test 03/19/20  0933   A1C 7.7*             Passed - Patient's CR is NOT>1.4 OR Patient's EGFR is NOT<45 within past 12 mos.     Recent Labs   Lab Test 03/19/20  0933   GFRESTIMATED 54*   GFRESTBLACK 62       Recent Labs   Lab Test 03/19/20  0933   CR 1.03             Passed - Patient does NOT have a diagnosis of CHF.        Passed - Medication is active on med list        Passed - Patient is not pregnant        Passed - Patient has not had a positive pregnancy test within the past 12 mos.         Passed - Recent (6 mo) or future (30 days) visit within the authorizing provider's specialty     Patient had office visit in the last 6 months or has a visit in the next 30 days with authorizing provider or within the authorizing provider's specialty.  See \"Patient Info\" tab in inbasket, or \"Choose Columns\" in Meds & Orders section of the refill encounter.               verapamil ER (CALAN-SR) 240 MG CR tablet [Pharmacy Med Name: VERAPAMIL HCL ER 240MG TBCR] 90 tablet 0     Sig: TAKE ONE TABLET BY MOUTH ONCE DAILY ** NEED BLOOD PRESSURE RECHECK **       Calcium Channel Blockers Protocol  Failed - 9/4/2020 10:12 AM        Failed - Blood pressure under 140/90 in past 12 " "months     BP Readings from Last 3 Encounters:   06/11/20 (!) 149/68   05/28/20 128/69   05/17/20 132/54                 Passed - Normal ALT in past 12 months     Recent Labs   Lab Test 03/19/20  0933   ALT 27             Passed - Recent (12 mo) or future (30 days) visit within the authorizing provider's specialty     Patient has had an office visit with the authorizing provider or a provider within the authorizing providers department within the previous 12 mos or has a future within next 30 days. See \"Patient Info\" tab in inbasket, or \"Choose Columns\" in Meds & Orders section of the refill encounter.              Passed - Medication is active on med list        Passed - Patient is age 18 or older        Passed - No active pregnancy on record        Passed - Normal serum creatinine on file in past 12 months     Recent Labs   Lab Test 03/19/20  0933   CR 1.03       Ok to refill medication if creatinine is low          Passed - No positive pregnancy test in past 12 months           Marilin SCHWARTZ, RN, BSN        "

## 2020-09-28 DIAGNOSIS — E11.65 TYPE 2 DIABETES MELLITUS WITH HYPERGLYCEMIA, WITH LONG-TERM CURRENT USE OF INSULIN (H): ICD-10-CM

## 2020-09-28 DIAGNOSIS — Z79.4 TYPE 2 DIABETES MELLITUS WITH HYPERGLYCEMIA, WITH LONG-TERM CURRENT USE OF INSULIN (H): ICD-10-CM

## 2020-09-28 RX ORDER — ATORVASTATIN CALCIUM 20 MG/1
TABLET, FILM COATED ORAL
Qty: 90 TABLET | Refills: 0 | Status: SHIPPED | OUTPATIENT
Start: 2020-09-28 | End: 2020-12-02

## 2020-09-28 RX ORDER — LIRAGLUTIDE 6 MG/ML
INJECTION SUBCUTANEOUS
Qty: 3 ML | Refills: 0 | Status: SHIPPED | OUTPATIENT
Start: 2020-09-28 | End: 2020-10-27

## 2020-09-28 NOTE — LETTER
[facility]  [address] [facility] [address]  [phone]        September 29, 2020  Lucia Bobo  [address] [facility] [address]    Dear Lucia,    I care about your health and have reviewed your health plan. I have reviewed your medical conditions, medication list, and lab results and am making recommendations based on this review, to better manage your health.    You are in particular need of attention regarding:  -Diabetes    I am recommending that you:  -schedule a FOLLOWUP OFFICE APPOINTMENT with me. I will check labs the same day.     Please call us at [phone] to schedule.    Thank you for trusting [facility] and we appreciate the opportunity to serve you.  We look forward to supporting your healthcare needs in the future.      Healthy Regards,        Mary Hutchins NP/Marilin SCHWARTZ, RN, BSN

## 2020-09-28 NOTE — TELEPHONE ENCOUNTER
"Requested Prescriptions   Pending Prescriptions Disp Refills     atorvastatin (LIPITOR) 20 MG tablet [Pharmacy Med Name: ATORVASTATIN CALCIUM 20MG TABS] 90 tablet 2     Sig: TAKE ONE TABLET BY MOUTH EVERY NIGHT AT BEDTIME       Statins Protocol Passed - 9/28/2020 11:35 AM        Passed - LDL on file in past 12 months     Recent Labs   Lab Test 03/19/20  0933   LDL 6             Passed - No abnormal creatine kinase in past 12 months     No lab results found.             Passed - Recent (12 mo) or future (30 days) visit within the authorizing provider's specialty     Patient has had an office visit with the authorizing provider or a provider within the authorizing providers department within the previous 12 mos or has a future within next 30 days. See \"Patient Info\" tab in inbasket, or \"Choose Columns\" in Meds & Orders section of the refill encounter.              Passed - Medication is active on med list        Passed - Patient is age 18 or older        Passed - No active pregnancy on record        Passed - No positive pregnancy test in past 12 months           VICTOZA PEN 18 MG/3ML soln [Pharmacy Med Name: VICTOZA 18MG/3ML SOPN] 27 mL 0     Sig: INJECT 1.8MG UNDER THE SKIN ONCE DAILY       GLP-1 Agonists Protocol Failed - 9/28/2020 11:35 AM        Failed - HgbA1C in past 3 or 6 months     If HgbA1C is 8 or greater, it needs to be on file within the past 3 months.  If less than 8, must be on file within the past 6 months.     Recent Labs   Lab Test 03/19/20  0933   A1C 7.7*             Passed - Medication is active on med list        Passed - Patient is age 18 or older        Passed - No active pregnancy on record        Passed - Normal serum creatinine on file in past 12 months     Recent Labs   Lab Test 03/19/20  0933   CR 1.03       Ok to refill medication if creatinine is low          Passed - No positive pregnancy test in past 12 months        Passed - Recent (6 mo) or future (30 days) visit within the " "authorizing provider's specialty     Patient had office visit in the last 6 months or has a visit in the next 30 days with authorizing provider.  See \"Patient Info\" tab in inbasket, or \"Choose Columns\" in Meds & Orders section of the refill encounter.                 "

## 2020-09-28 NOTE — TELEPHONE ENCOUNTER
Please call Lucia she is overdue for labs and follow up appointment. Needs to schedule. Serenity ESQUIVEL

## 2020-10-14 DIAGNOSIS — G25.81 RESTLESS LEGS SYNDROME: ICD-10-CM

## 2020-10-14 RX ORDER — ROPINIROLE 0.5 MG/1
TABLET, FILM COATED ORAL
Qty: 30 TABLET | Refills: 0 | Status: SHIPPED | OUTPATIENT
Start: 2020-10-14 | End: 2020-10-27

## 2020-10-27 ENCOUNTER — OFFICE VISIT (OUTPATIENT)
Dept: FAMILY MEDICINE | Facility: CLINIC | Age: 75
End: 2020-10-27
Payer: COMMERCIAL

## 2020-10-27 VITALS
BODY MASS INDEX: 42.6 KG/M2 | WEIGHT: 256 LBS | DIASTOLIC BLOOD PRESSURE: 70 MMHG | TEMPERATURE: 97.9 F | OXYGEN SATURATION: 97 % | SYSTOLIC BLOOD PRESSURE: 160 MMHG | RESPIRATION RATE: 12 BRPM | HEART RATE: 100 BPM

## 2020-10-27 DIAGNOSIS — M15.0 PRIMARY OSTEOARTHRITIS INVOLVING MULTIPLE JOINTS: ICD-10-CM

## 2020-10-27 DIAGNOSIS — E11.65 TYPE 2 DIABETES MELLITUS WITH HYPERGLYCEMIA, WITH LONG-TERM CURRENT USE OF INSULIN (H): Primary | ICD-10-CM

## 2020-10-27 DIAGNOSIS — Z79.4 TYPE 2 DIABETES MELLITUS WITH HYPERGLYCEMIA, WITH LONG-TERM CURRENT USE OF INSULIN (H): Primary | ICD-10-CM

## 2020-10-27 DIAGNOSIS — E78.1 HYPERTRIGLYCERIDEMIA: ICD-10-CM

## 2020-10-27 DIAGNOSIS — I10 BENIGN ESSENTIAL HYPERTENSION: ICD-10-CM

## 2020-10-27 DIAGNOSIS — G25.81 RESTLESS LEGS SYNDROME: ICD-10-CM

## 2020-10-27 LAB
ANION GAP SERPL CALCULATED.3IONS-SCNC: 6 MMOL/L (ref 3–14)
BASOPHILS # BLD AUTO: 0.1 10E9/L (ref 0–0.2)
BASOPHILS NFR BLD AUTO: 0.5 %
BUN SERPL-MCNC: 18 MG/DL (ref 7–30)
CALCIUM SERPL-MCNC: 9.8 MG/DL (ref 8.5–10.1)
CHLORIDE SERPL-SCNC: 105 MMOL/L (ref 94–109)
CHOLEST SERPL-MCNC: 125 MG/DL
CO2 SERPL-SCNC: 27 MMOL/L (ref 20–32)
CREAT SERPL-MCNC: 0.85 MG/DL (ref 0.52–1.04)
DIFFERENTIAL METHOD BLD: ABNORMAL
EOSINOPHIL # BLD AUTO: 0.4 10E9/L (ref 0–0.7)
EOSINOPHIL NFR BLD AUTO: 2.9 %
ERYTHROCYTE [DISTWIDTH] IN BLOOD BY AUTOMATED COUNT: 12.8 % (ref 10–15)
GFR SERPL CREATININE-BSD FRML MDRD: 67 ML/MIN/{1.73_M2}
GLUCOSE SERPL-MCNC: 195 MG/DL (ref 70–99)
HBA1C MFR BLD: 6.7 % (ref 0–5.6)
HCT VFR BLD AUTO: 41.2 % (ref 35–47)
HDLC SERPL-MCNC: 78 MG/DL
HGB BLD-MCNC: 13.6 G/DL (ref 11.7–15.7)
LDLC SERPL CALC-MCNC: 10 MG/DL
LYMPHOCYTES # BLD AUTO: 4.8 10E9/L (ref 0.8–5.3)
LYMPHOCYTES NFR BLD AUTO: 39.8 %
MCH RBC QN AUTO: 32 PG (ref 26.5–33)
MCHC RBC AUTO-ENTMCNC: 33 G/DL (ref 31.5–36.5)
MCV RBC AUTO: 97 FL (ref 78–100)
MONOCYTES # BLD AUTO: 0.7 10E9/L (ref 0–1.3)
MONOCYTES NFR BLD AUTO: 5.4 %
NEUTROPHILS # BLD AUTO: 6.2 10E9/L (ref 1.6–8.3)
NEUTROPHILS NFR BLD AUTO: 51.4 %
NONHDLC SERPL-MCNC: 47 MG/DL
PLATELET # BLD AUTO: 506 10E9/L (ref 150–450)
POTASSIUM SERPL-SCNC: 4.4 MMOL/L (ref 3.4–5.3)
RBC # BLD AUTO: 4.25 10E12/L (ref 3.8–5.2)
SODIUM SERPL-SCNC: 138 MMOL/L (ref 133–144)
T4 FREE SERPL-MCNC: 1.18 NG/DL (ref 0.76–1.46)
TRIGL SERPL-MCNC: 183 MG/DL
TSH SERPL DL<=0.005 MIU/L-ACNC: 5.2 MU/L (ref 0.4–4)
WBC # BLD AUTO: 12.1 10E9/L (ref 4–11)

## 2020-10-27 PROCEDURE — 80048 BASIC METABOLIC PNL TOTAL CA: CPT | Performed by: NURSE PRACTITIONER

## 2020-10-27 PROCEDURE — 83036 HEMOGLOBIN GLYCOSYLATED A1C: CPT | Performed by: NURSE PRACTITIONER

## 2020-10-27 PROCEDURE — 85025 COMPLETE CBC W/AUTO DIFF WBC: CPT | Performed by: NURSE PRACTITIONER

## 2020-10-27 PROCEDURE — 84443 ASSAY THYROID STIM HORMONE: CPT | Performed by: NURSE PRACTITIONER

## 2020-10-27 PROCEDURE — 80061 LIPID PANEL: CPT | Performed by: NURSE PRACTITIONER

## 2020-10-27 PROCEDURE — 99214 OFFICE O/P EST MOD 30 MIN: CPT | Performed by: NURSE PRACTITIONER

## 2020-10-27 PROCEDURE — 84439 ASSAY OF FREE THYROXINE: CPT | Performed by: NURSE PRACTITIONER

## 2020-10-27 PROCEDURE — 36415 COLL VENOUS BLD VENIPUNCTURE: CPT | Performed by: NURSE PRACTITIONER

## 2020-10-27 RX ORDER — LIRAGLUTIDE 6 MG/ML
INJECTION SUBCUTANEOUS
Qty: 3 ML | Refills: 0 | Status: SHIPPED | OUTPATIENT
Start: 2020-10-27 | End: 2020-12-02

## 2020-10-27 RX ORDER — ROPINIROLE 0.5 MG/1
TABLET, FILM COATED ORAL
Qty: 30 TABLET | Refills: 0 | Status: SHIPPED | OUTPATIENT
Start: 2020-10-27 | End: 2020-12-02

## 2020-10-27 RX ORDER — VERAPAMIL HYDROCHLORIDE 240 MG/1
TABLET, FILM COATED, EXTENDED RELEASE ORAL
Qty: 90 TABLET | Refills: 0 | Status: SHIPPED | OUTPATIENT
Start: 2020-10-27 | End: 2020-12-02

## 2020-10-27 RX ORDER — DULOXETIN HYDROCHLORIDE 60 MG/1
CAPSULE, DELAYED RELEASE ORAL
Qty: 90 CAPSULE | Refills: 3 | Status: SHIPPED | OUTPATIENT
Start: 2020-10-27

## 2020-10-27 ASSESSMENT — PAIN SCALES - GENERAL: PAINLEVEL: NO PAIN (0)

## 2020-10-27 NOTE — NURSING NOTE
"Chief Complaint   Patient presents with     Diabetes     BP (!) 174/74   Pulse 100   Temp 97.9  F (36.6  C) (Tympanic)   Resp 12   Wt 116.1 kg (256 lb)   SpO2 97%   BMI 42.60 kg/m   Estimated body mass index is 42.6 kg/m  as calculated from the following:    Height as of 9/24/19: 1.651 m (5' 5\").    Weight as of this encounter: 116.1 kg (256 lb).  Patient presents to the clinic using [facility]      Health Maintenance that is potentially due pending provider review:    Health Maintenance Due   Topic Date Due     DEXA  1945     HEPATITIS B IMMUNIZATION (1 of 3 - Risk 3-dose series) 07/29/1965     MAMMO SCREENING  08/23/2014     PHQ-2  01/01/2020     MICROALBUMIN  03/27/2020     ASTHMA ACTION PLAN  03/27/2020     ASTHMA CONTROL TEST  09/19/2020     MEDICARE ANNUAL WELLNESS VISIT  09/24/2020     FALL RISK ASSESSMENT  09/24/2020     COLORECTAL CANCER SCREENING  09/12/2020        Possibly completing today per provider review.        "

## 2020-10-27 NOTE — PROGRESS NOTES
SUBJECTIVE   Lucia Bobo is a 75 year old female who presents with     Diabetes Follow-up      How often are you checking your blood sugar? Not at all    What concerns do you have today about your diabetes? None     Do you have any of these symptoms? (Select all that apply)  No numbness or tingling in feet.  No redness, sores or blisters on feet.  No complaints of excessive thirst.  No reports of blurry vision.  No significant changes to weight.      BP Readings from Last 2 Encounters:   10/27/20 (!) 160/70   06/11/20 (!) 149/68     Hemoglobin A1C (%)   Date Value   10/27/2020 6.7 (H)   03/19/2020 7.7 (H)     LDL Cholesterol Calculated (mg/dL)   Date Value   03/19/2020 6   03/27/2019 21       Bilateral knee pain   Had 2 cortisone injections- without relief  Had Synvisc injection- had relief 6 years ago- did well since       PCP   Keturah Chaney, [phone]    Health Maintenance        Health Maintenance Due   Topic Date Due     DEXA  1945     HEPATITIS B IMMUNIZATION (1 of 3 - Risk 3-dose series) 07/29/1965     MAMMO SCREENING  08/23/2014     PHQ-2  01/01/2020     MICROALBUMIN  03/27/2020     ASTHMA ACTION PLAN  03/27/2020     ASTHMA CONTROL TEST  09/19/2020     MEDICARE ANNUAL WELLNESS VISIT  09/24/2020     FALL RISK ASSESSMENT  09/24/2020     COLORECTAL CANCER SCREENING  09/12/2020       HPI        Patient Active Problem List   Diagnosis     Type 2 diabetes mellitus with hyperglycemia, with long-term current use of insulin (H) HgbA1c goal <7     Osteoarthritis of both knees     BMI 40.0-44.9, adult (H)     Hypertriglyceridemia, LDL goal <100     Benign essential hypertension, BP goal <140/90     Mild persistent asthma, uncomplicated     Colon polyps     Slow transit constipation     Muscle cramp     Mammogram declined     Post-menopause     Bilateral hip pain     Acute left-sided low back pain without sciatica     Varicose veins of left lower extremity with pain     Restless legs syndrome      Current Outpatient Medications   Medication     acetaminophen (TYLENOL) 325 MG tablet     ADVAIR DISKUS 250-50 MCG/DOSE inhaler     aspirin 81 MG tablet     atorvastatin (LIPITOR) 20 MG tablet     blood glucose (NO BRAND SPECIFIED) test strip     blood glucose monitoring (NO BRAND SPECIFIED) meter device kit     Calcium Carb-Cholecalciferol (CALCIUM 600+D) 600-800 MG-UNIT TABS     DULoxetine (CYMBALTA) 60 MG capsule     furosemide (LASIX) 20 MG tablet     glucosamine-chondroitin 500-400 MG CAPS per capsule     hydrALAZINE (APRESOLINE) 100 MG tablet     hydrALAZINE (APRESOLINE) 50 MG tablet     insulin pen needle (BD SMILEY U/F) 32G X 4 MM miscellaneous     liraglutide (VICTOZA PEN) 18 MG/3ML solution     losartan (COZAAR) 50 MG tablet     magnesium oxide (MAG-OX) 400 (241.3 Mg) MG tablet     metFORMIN (GLUCOPHAGE) 1000 MG tablet     multivitamin  with lutein (OCUVITE WITH LTEIN) CAPS per capsule     ONETOUCH ULTRA test strip     order for DME     order for DME     order for DME     PHARMACIST CHOICE LANCETS MISC     polyethylene glycol (MIRALAX) powder     rOPINIRole (REQUIP) 0.5 MG tablet     VENTOLIN  (90 Base) MCG/ACT inhaler     verapamil ER (CALAN-SR) 240 MG CR tablet     No current facility-administered medications for this visit.        Patient Active Problem List   Diagnosis     Type 2 diabetes mellitus with hyperglycemia, with long-term current use of insulin (H) HgbA1c goal <7     Osteoarthritis of both knees     BMI 40.0-44.9, adult (H)     Hypertriglyceridemia, LDL goal <100     Benign essential hypertension, BP goal <140/90     Mild persistent asthma, uncomplicated     Colon polyps     Slow transit constipation     Muscle cramp     Mammogram declined     Post-menopause     Bilateral hip pain     Acute left-sided low back pain without sciatica     Varicose veins of left lower extremity with pain     Restless legs syndrome     Past Surgical History:   Procedure Laterality Date     APPENDECTOMY        CHOLECYSTECTOMY, OPEN       COLONOSCOPY  2007     COLONOSCOPY N/A 12/22/2017    Procedure: COLONOSCOPY;;  Surgeon: Aurelio Alanis MD;  Location: [facility]     COLONOSCOPY N/A 1/17/2018    Procedure: COMBINED COLONOSCOPY, SINGLE OR MULTIPLE BIOPSY/POLYPECTOMY BY BIOPSY;  Colonoscopy with polypectomy by snare and cold biopsy;  Surgeon: Aurelio Alanis MD;  Location: [facility]     COLONOSCOPY N/A 9/12/2019    Procedure: COLONOSCOPY;  Surgeon: Danielito Reeves MD;  Location: [facility]     ESOPHAGOSCOPY, GASTROSCOPY, DUODENOSCOPY (EGD), COMBINED N/A 11/20/2015    Procedure: COMBINED ESOPHAGOSCOPY, GASTROSCOPY, DUODENOSCOPY (EGD);  Surgeon: Anamika Boyer MD;  Location: [facility]     ESOPHAGOSCOPY, GASTROSCOPY, DUODENOSCOPY (EGD), COMBINED N/A 12/22/2017    Procedure: COMBINED ESOPHAGOSCOPY, GASTROSCOPY, DUODENOSCOPY (EGD);  Gastroscopy & Colonoscopy  ;  Surgeon: Aurelio Alanis MD;  Location: [facility]       Social History     Tobacco Use     Smoking status: Never Smoker     Smokeless tobacco: Never Used   Substance Use Topics     Alcohol use: No     Family History   Problem Relation Age of Onset     Cancer Mother      Hypertension Father            Reviewed and updated:  Tobacco  Allergies  Meds  Med Hx  Surg Hx  Fam Hx  Soc Hx     ROS:  Constitutional, HEENT, cardiovascular, pulmonary, gi and gu systems are negative, except as otherwise noted.    PHYSICAL EXAM   BP (!) 160/70   Pulse 100   Temp 97.9  F (36.6  C) (Tympanic)   Resp 12   Wt 116.1 kg (256 lb)   SpO2 97%   BMI 42.60 kg/m    Body mass index is 42.6 kg/m .  GENERAL: healthy, alert, no distress and obese  NECK: no adenopathy, no asymmetry, masses, or scars and thyroid normal to palpation  RESP: lungs clear to auscultation - no rales, rhonchi or wheezes  CV: regular rate and rhythm, normal S1 S2, no S3 or S4, no murmur, click or rub, no peripheral edema and peripheral pulses strong  ABDOMEN: soft, nontender, no  hepatosplenomegaly, no masses and bowel sounds normal  MS: no gross musculoskeletal defects noted, no edema    Assessment & Plan     Type 2 diabetes mellitus with hyperglycemia, with long-term current use of insulin (H) HgbA1c goal <7  a1c stable   Continue current regimen   - Hemoglobin A1c  - Basic metabolic panel  (Ca, Cl, CO2, Creat, Gluc, K, Na, BUN)  - CBC with platelets and differential  - metFORMIN (GLUCOPHAGE) 1000 MG tablet  Dispense: 75 tablet; Refill: 0  - liraglutide (VICTOZA PEN) 18 MG/3ML solution  Dispense: 3 mL; Refill: 0    Benign essential hypertension, BP goal <140/90  High today   Just took medications prior to coming into clinic   Also reports stress due to situational issues at home   Will have BP rechecked in 2 weeks     - Basic metabolic panel  (Ca, Cl, CO2, Creat, Gluc, K, Na, BUN)  - TSH with free T4 reflex  - verapamil ER (CALAN-SR) 240 MG CR tablet  Dispense: 90 tablet; Refill: 0    Hypertriglyceridemia, LDL goal <100  - Lipid panel    Restless legs syndrome  Stable   Refilled   - rOPINIRole (REQUIP) 0.5 MG tablet  Dispense: 30 tablet; Refill: 0    Primary osteoarthritis involving multiple joints  On cymbalta   Knees are bothersome- encouraged to schedule with sport's med to discuss repeat synvisc injections   She plans on checking with Dr. Hubbard at [facility] as had been seeing him for ankle fx and location   She will call if unable to schedule and will refer to [place]   [facility]           Patient Instructions     Your A1C looks great   Lab Results   Component Value Date    A1C 6.7 10/27/2020    A1C 7.7 03/19/2020    A1C 6.9 07/05/2019    A1C 7.0 03/27/2019    A1C 6.6 06/28/2018       Blood pressure was elevated   Recheck within the next week or 2 with nurse only visit     Refilled medication           Return in about 2 weeks (around 11/10/2020) for BP Recheck.    Mary Hutchins NP  [facility]      Risks, benefits, side effects and  rationale for treatment plan fully discussed with the patient and understanding expressed.

## 2020-10-27 NOTE — PATIENT INSTRUCTIONS
Your A1C looks great   Lab Results   Component Value Date    A1C 6.7 10/27/2020    A1C 7.7 03/19/2020    A1C 6.9 07/05/2019    A1C 7.0 03/27/2019    A1C 6.6 06/28/2018       Blood pressure was elevated   Recheck within the next week or 2 with nurse only visit     Refilled medication

## 2020-11-30 DIAGNOSIS — E11.65 TYPE 2 DIABETES MELLITUS WITH HYPERGLYCEMIA, WITH LONG-TERM CURRENT USE OF INSULIN (H): ICD-10-CM

## 2020-11-30 DIAGNOSIS — I10 BENIGN ESSENTIAL HYPERTENSION: ICD-10-CM

## 2020-11-30 DIAGNOSIS — G25.81 RESTLESS LEGS SYNDROME: ICD-10-CM

## 2020-11-30 DIAGNOSIS — Z79.4 TYPE 2 DIABETES MELLITUS WITH HYPERGLYCEMIA, WITH LONG-TERM CURRENT USE OF INSULIN (H): ICD-10-CM

## 2020-12-01 NOTE — TELEPHONE ENCOUNTER
"Requested Prescriptions   Pending Prescriptions Disp Refills     liraglutide (VICTOZA PEN) 18 MG/3ML solution [Pharmacy Med Name: Victoza 3-Sher 0.6 mg/0.1 mL (18 mg/3 mL) subcutaneous pen injector] 9 mL 0     Sig: INJECT 1.8mg subcutaneously ONCE DAILY       GLP-1 Agonists Protocol Passed - 11/30/2020  2:41 PM        Passed - HgbA1C in past 3 or 6 months     If HgbA1C is 8 or greater, it needs to be on file within the past 3 months.  If less than 8, must be on file within the past 6 months.     Recent Labs   Lab Test 10/27/20  0807   A1C 6.7*             Passed - Medication is active on med list        Passed - Patient is age 18 or older        Passed - No active pregnancy on record        Passed - Normal serum creatinine on file in past 12 months     Recent Labs   Lab Test 10/27/20  0807   CR 0.85       Ok to refill medication if creatinine is low          Passed - No positive pregnancy test in past 12 months        Passed - Recent (6 mo) or future (30 days) visit within the authorizing provider's specialty     Patient had office visit in the last 6 months or has a visit in the next 30 days with authorizing provider.  See \"Patient Info\" tab in inbasket, or \"Choose Columns\" in Meds & Orders section of the refill encounter.               rOPINIRole (REQUIP) 0.5 MG tablet [Pharmacy Med Name: ropinirole 0.5 mg tablet] 90 tablet 0     Sig: TAKE 1 TABLET BY MOUTH EVERY NIGHT AT BEDTIME       Antiparkinson's Agents Protocol Failed - 11/30/2020  2:41 PM        Failed - Blood pressure under 140/90 in past 12 months     BP Readings from Last 3 Encounters:   10/27/20 (!) 160/70   06/11/20 (!) 149/68   05/28/20 128/69                 Passed - CBC on record in past 12 months     Recent Labs   Lab Test 10/27/20  0807   WBC 12.1*   RBC 4.25   HGB 13.6   HCT 41.2   *                 Passed - ALT on record in past 12 months         Recent Labs   Lab Test 03/19/20  0933   ALT 27             Passed - Serum Creatinine on file " "in past 12 months     Recent Labs   Lab Test 10/27/20  0807   CR 0.85       Ok to refill medication if creatinine is low          Passed - Medication is active on med list        Passed - Patient is age 18 or older        Passed - No active pregnancy on record        Passed - No positive pregnancy test in the past 12 months        Passed - Recent (6 mo) or future (30 days) visit within the authorizing provider's specialty     Patient had office visit in the last 6 months or has a visit in the next 30 days with authorizing provider or within the authorizing provider's specialty.  See \"Patient Info\" tab in inbasket, or \"Choose Columns\" in Meds & Orders section of the refill encounter.               verapamil ER (CALAN-SR) 240 MG CR tablet [Pharmacy Med Name: verapamil ER (SR) 240 mg tablet,extended release] 90 tablet 0     Sig: TAKE 1 TABLET BY MOUTH ONCE DAILY       Calcium Channel Blockers Protocol  Failed - 11/30/2020  2:41 PM        Failed - Blood pressure under 140/90 in past 12 months     BP Readings from Last 3 Encounters:   10/27/20 (!) 160/70   06/11/20 (!) 149/68   05/28/20 128/69                 Passed - Normal ALT in past 12 months     Recent Labs   Lab Test 03/19/20  0933   ALT 27             Passed - Recent (12 mo) or future (30 days) visit within the authorizing provider's specialty     Patient has had an office visit with the authorizing provider or a provider within the authorizing providers department within the previous 12 mos or has a future within next 30 days. See \"Patient Info\" tab in inRelievant Medsystemset, or \"Choose Columns\" in Meds & Orders section of the refill encounter.              Passed - Medication is active on med list        Passed - Patient is age 18 or older        Passed - No active pregnancy on record        Passed - Normal serum creatinine on file in past 12 months     Recent Labs   Lab Test 10/27/20  0807   CR 0.85       Ok to refill medication if creatinine is low          Passed - No " "positive pregnancy test in past 12 months           metFORMIN (GLUCOPHAGE) 1000 MG tablet [Pharmacy Med Name: metformin 1,000 mg tablet] 75 tablet 0     Sig: TAKE 1 TABLET BY MOUTH AT BREAKFAST AND TAKE 1 AND 1/2 TABLETS (1500mg) AT dinner       Biguanide Agents Passed - 11/30/2020  2:41 PM        Passed - Patient is age 10 or older        Passed - Patient has documented A1c within the specified period of time.     If HgbA1C is 8 or greater, it needs to be on file within the past 3 months.  If less than 8, must be on file within the past 6 months.     Recent Labs   Lab Test 10/27/20  0807   A1C 6.7*             Passed - Patient's CR is NOT>1.4 OR Patient's EGFR is NOT<45 within past 12 mos.     Recent Labs   Lab Test 10/27/20  0807   GFRESTIMATED 67   GFRESTBLACK 78       Recent Labs   Lab Test 10/27/20  0807   CR 0.85             Passed - Patient does NOT have a diagnosis of CHF.        Passed - Medication is active on med list        Passed - Patient is not pregnant        Passed - Patient has not had a positive pregnancy test within the past 12 mos.         Passed - Recent (6 mo) or future (30 days) visit within the authorizing provider's specialty     Patient had office visit in the last 6 months or has a visit in the next 30 days with authorizing provider or within the authorizing provider's specialty.  See \"Patient Info\" tab in inbasket, or \"Choose Columns\" in Meds & Orders section of the refill encounter.               atorvastatin (LIPITOR) 20 MG tablet [Pharmacy Med Name: atorvastatin 20 mg tablet] 90 tablet 0     Sig: TAKE 1 TABLET BY MOUTH EVERY NIGHT AT BEDTIME       Statins Protocol Passed - 11/30/2020  2:41 PM        Passed - LDL on file in past 12 months     Recent Labs   Lab Test 10/27/20  0807   LDL 10             Passed - No abnormal creatine kinase in past 12 months     No lab results found.             Passed - Recent (12 mo) or future (30 days) visit within the authorizing provider's specialty " "    Patient has had an office visit with the authorizing provider or a provider within the authorizing providers department within the previous 12 mos or has a future within next 30 days. See \"Patient Info\" tab in inbasket, or \"Choose Columns\" in Meds & Orders section of the refill encounter.              Passed - Medication is active on med list        Passed - Patient is age 18 or older        Passed - No active pregnancy on record        Passed - No positive pregnancy test in past 12 months             "

## 2020-12-02 RX ORDER — ROPINIROLE 0.5 MG/1
TABLET, FILM COATED ORAL
Qty: 90 TABLET | Refills: 0 | Status: SHIPPED | OUTPATIENT
Start: 2020-12-02

## 2020-12-02 RX ORDER — LIRAGLUTIDE 6 MG/ML
INJECTION SUBCUTANEOUS
Qty: 9 ML | Refills: 0 | Status: SHIPPED | OUTPATIENT
Start: 2020-12-02

## 2020-12-02 RX ORDER — VERAPAMIL HYDROCHLORIDE 240 MG/1
TABLET, FILM COATED, EXTENDED RELEASE ORAL
Qty: 90 TABLET | Refills: 0 | Status: SHIPPED | OUTPATIENT
Start: 2020-12-02

## 2020-12-02 RX ORDER — ATORVASTATIN CALCIUM 20 MG/1
TABLET, FILM COATED ORAL
Qty: 90 TABLET | Refills: 0 | Status: SHIPPED | OUTPATIENT
Start: 2020-12-02

## 2020-12-10 DIAGNOSIS — Z79.4 TYPE 2 DIABETES MELLITUS WITH HYPERGLYCEMIA, WITH LONG-TERM CURRENT USE OF INSULIN (H): ICD-10-CM

## 2020-12-10 DIAGNOSIS — J45.30 MILD PERSISTENT ASTHMA, UNCOMPLICATED: Chronic | ICD-10-CM

## 2020-12-10 DIAGNOSIS — E11.65 TYPE 2 DIABETES MELLITUS WITH HYPERGLYCEMIA, WITH LONG-TERM CURRENT USE OF INSULIN (H): ICD-10-CM

## 2020-12-10 RX ORDER — LIRAGLUTIDE 6 MG/ML
INJECTION SUBCUTANEOUS
Qty: 9 ML | Refills: 0 | OUTPATIENT
Start: 2020-12-10

## 2020-12-10 NOTE — TELEPHONE ENCOUNTER
Routing refill request to provider for review/approval because: Last ordered by A Shiv SCHWARTZ RN, BSN

## 2021-12-06 PROBLEM — E66.01 MORBID OBESITY (H): Status: ACTIVE | Noted: 2021-11-24

## 2021-12-06 PROBLEM — S82.842A ANKLE FRACTURE, BIMALLEOLAR, CLOSED, LEFT, INITIAL ENCOUNTER: Status: ACTIVE | Noted: 2020-04-24

## 2021-12-06 PROBLEM — S82.209A CLOSED FRACTURE OF SHAFT OF TIBIA: Status: ACTIVE | Noted: 2021-11-22

## 2021-12-06 PROBLEM — R53.81 DEBILITY: Status: ACTIVE | Noted: 2021-11-24

## 2021-12-06 NOTE — PROGRESS NOTES
"[facility] GERIATRIC SERVICES  Primary Care Provider & Clinic: Physician No Ref-Primary, No address on file  Chief Complaint   Patient presents with     Hospital F/U     [facility] 11/24/2021 - 12/6/2021     [place] Medical Record Number: [id number]  Place of Service Where Encounter Took Place: [facility] ([facility]) [[id number]]    Lucia Bobo is a 76 year old (1945), admitted to the above facility from  [facility]. Hospital stay 11/24/21 through 12/6/21.    HPI:    Lucia Bobo is a 76 year old female with PMH of Dm2 morbid obesity, essential hypertension, and asthma originally admitted on 11/22/2021 after a fall, found to have a minimally displaced left distal tibia and midshaft fibular fracture.  She had closed reduction with an external fixator placed on 11/23/2021, non-weightbearing on the left lower extremity. She transitioned to acute rehab on 11/24/2021. Readmitted to the hospital on 12/3/2021 in order to undergo planned external fixator removal with intramedullary nailing of left tibia fracture for definitive management. Surgery was without complication. Glipizide held post-op, otherwise multiple chronic conditions were stable. When medically stable was discharged to U for further rehab and medical management.     Seen today sitting up in wheelchair after breakfast. She is having some pain in her left leg, 7/10, but just took her oxycodone and typically gets good relief. Appetite is ok, not yet back to normal.  has been having bowel movements. Says she has a \"slow\" colon at baseline, but was given senna while in the hospital and it caused diarrhea so she would like to avoid this. Does have multiple incisions to LLE -staff asking about wound care to area that are still draining. She is looking forward to therapy. Says she will not be able to go home until she can weight bear on her left leg because she has multiple steps in her house. Is hoping she can get a recliner " in her room so she can elevate her leg better as she finds the wheelchair uncomfortable to sit in for long periods of time.     CODE STATUS/ADVANCE DIRECTIVES DISCUSSION: No CPR- Do NOT Intubate - DNR / DNI  Patient's living condition: lives with spouse  ALLERGIES:   Allergies   Allergen Reactions     Lisinopril Cough     Amoxicillin Hives, Itching and Rash     Lisinopril Cough     Penicillins Other (See Comments)     Has only had reaction to Amoxicillin so does not take any Penicillin      PAST MEDICAL HISTORY:   Past Medical History:   Diagnosis Date     Asthma      HTN      Type II or unspecified type diabetes mellitus without mention of complication, not stated as uncontrolled 4/2/04      PAST SURGICAL HISTORY:  has a past surgical history that includes colonoscopy (2007); cholecystectomy, open; appendectomy; Esophagoscopy, gastroscopy, duodenoscopy (EGD), combined (N/A, 11/20/2015); Esophagoscopy, gastroscopy, duodenoscopy (EGD), combined (N/A, 12/22/2017); Colonoscopy (N/A, 12/22/2017); Colonoscopy (N/A, 1/17/2018); and Colonoscopy (N/A, 9/12/2019).  FAMILY HISTORY: family history includes Cancer in her mother; Hypertension in her father.  SOCIAL HISTORY:  reports that she has never smoked. She has never used smokeless tobacco. She reports that she does not drink alcohol and does not use drugs.    Post Discharge Medication Reconciliation Status: discharge medications reconciled and changed, per note/orders  Current Outpatient Medications   Medication Sig     acetaminophen (TYLENOL) 500 MG tablet Take 1,000 mg by mouth 3 times daily      ADVAIR DISKUS 250-50 MCG/DOSE inhaler Inhale 1 puff into the lungs 2 times daily     albuterol (PROAIR HFA/PROVENTIL HFA/VENTOLIN HFA) 108 (90 Base) MCG/ACT inhaler Inhale 2 puffs into the lungs every 4 hours as needed for shortness of breath / dyspnea     atorvastatin (LIPITOR) 20 MG tablet TAKE 1 TABLET BY MOUTH EVERY NIGHT AT BEDTIME     blood glucose (NO BRAND SPECIFIED)  test strip Use to test blood sugar 1 times daily or as directed.     blood glucose monitoring (NO BRAND SPECIFIED) meter device kit Use to test blood sugar 2 times daily or as directed. Needs lancets and strips per insurance coverage.     calcium carbonate 600 mg-vitamin D 400 units (CALTRATE) 600-400 MG-UNIT per tablet Take 1 tablet by mouth daily     DULoxetine (CYMBALTA) 60 MG capsule Take 1 capsule daily     enoxaparin ANTICOAGULANT (LOVENOX) 40 MG/0.4ML syringe Inject 40 mg Subcutaneous 2 times daily     furosemide (LASIX) 40 MG tablet Take 40 mg by mouth daily     gabapentin (NEURONTIN) 300 MG capsule Take 300 mg by mouth 2 times daily     glipiZIDE (GLUCOTROL XL) 5 MG 24 hr tablet Take 5 mg by mouth daily     glucosamine-chondroitin 500-400 MG CAPS per capsule Take 2 capsules by mouth 2 times daily      hydrALAZINE (APRESOLINE) 100 MG tablet Take 100 mg by mouth daily Patient takes Hydralazine BID - 100 mg once daily AND 50 mg once daily     hydrALAZINE (APRESOLINE) 50 MG tablet Take 50 mg by mouth daily Patient takes Hydralazine BID - 100 mg once daily AND 50 mg once daily     insulin pen needle (BD SMILEY U/F) 32G X 4 MM miscellaneous USE ONE PEN NEEDLE ONCE DAILY OR AS DIRECTED     liraglutide (VICTOZA PEN) 18 MG/3ML solution INJECT 1.8mg subcutaneously ONCE DAILY     losartan (COZAAR) 100 MG tablet Take 100 mg by mouth daily     magnesium oxide (MAG-OX) 400 MG tablet Take 400 mg by mouth 2 times daily     metFORMIN (GLUCOPHAGE) 1000 MG tablet TAKE 1 TABLET BY MOUTH AT BREAKFAST AND TAKE 1 AND 1/2 TABLETS (1500mg) AT dinner     multivitamin (OCUVITE) TABS tablet Take 1 tablet by mouth daily      ONETOUCH ULTRA test strip USE TO TEST BLOOD SUGAR TWO TIMES A DAY OR AS DIRECTED     order for DME Equipment being ordered: 2 pairs of CARINA hose 10 mmHg Calf width 51 cm, Lower leg length 39 cm     order for DME Equipment being ordered: glucometer with test strips     order for DME Equipment being ordered: blood  "pressure monitor     oxyCODONE (ROXICODONE) 5 MG tablet Take 5 mg by mouth every 4 hours as needed for moderate to severe pain     PHARMACIST CHOICE LANCETS MISC Check blood sugars twice daily     polyethylene glycol 3350 POWD Take 17 g by mouth daily as needed (constipation)     rOPINIRole (REQUIP) 0.5 MG tablet TAKE 1 TABLET BY MOUTH EVERY NIGHT AT BEDTIME     verapamil ER (CALAN-SR) 240 MG CR tablet TAKE 1 TABLET BY MOUTH ONCE DAILY     No current facility-administered medications for this visit.     ROS:  4 point ROS including Respiratory, CV, GI and , other than that noted in the HPI,  is negative    Vitals:  /61   Pulse 104   Temp 97.8  F (36.6  C)   Resp 16   Ht 1.651 m (5' 5\")   Wt 116.7 kg (257 lb 4.8 oz)   SpO2 95%   BMI 42.82 kg/m    Exam:  GENERAL APPEARANCE:  Alert, in no distress, cooperative  RESP:  lungs clear to auscultation , no respiratory distress  CV:  regular rate and rhythm, no murmur, rub, or gallop, peripheral edema 2+ in LLE, 1  ABDOMEN:  bowel sounds normal  M/S:   NWB to LLE, decreased ROm to left knee and ankle  SKIN:  wound appearance: multiple incisions to LLE (see photos), multiple abrasions to right medial calf  NEURO:   CN 2-12 grossly intact  PSYCH:  oriented X 3, normal insight, judgement and memory, affect and mood normal    Lab/Diagnostic data:  Recent labs in [facility] reviewed by me today.     ASSESSMENT/PLAN:  Closed fracture of distal end of left tibia with routine healing, unspecified fracture morphology, subsequent encounter  Closed fracture of left tibia and fibula with routine healing, subsequent encounter  Aftercare following surgery for injury and trauma  Physical deconditioning  Secondary to fall while trying to get into car. Surgery without complication. Pain appears controlled. Multiple incisions to LLE. Assisted nursing staff with wound care today.  Discussed with patient, nursing staff. Given weight bearing status suspect progress with therapy will be " "slow.   - Wound care as below, ok leave STEPHEN if not draining; staff to clarify if/when sutures can be removed at facility  - Analgesia with oxycodone 5mg q 4h PRN, APAP 1000mg TID, ice PRN, elevate when at rest  - NWB to LLE  - follow-up with ortho on 1/10/22 as scheduled  - DVT ppx with Lovenox 40mg BID x 20 days - will check PLT while ion Lovenox  - PT/OT eval and treat. Discharge planning per their recommendation    Type 2 diabetes mellitus with hyperglycemia, with long-term current use of insulin (H) HgbA1c goal <7  A1C 6.7% on 11/17/21. Not enough data to trend in TCU.  Discussed with patient, nursing staff.   - Change BG checks to BID  - continue metformin 1000mg q AM 1500mg q PM, glipizide Er 5mg daily, and liraglutide 1.8mg at bedtime    Benign essential hypertension, BP goal <140/90  Limited data to trend BP. Does have chronic BLE edema, wearing home compression stocking.  Discussed with patient, nursing staff.   - atorvastatin 20mg daily  - furosemide 40mg daily  - hydralazine 150mg daily, losartan 100mg daily, verapamil ER 240mg daily,     Mild persistent asthma, uncomplicated  Appears controlled,  Discussed with patient, nursing staff.   - continue Advair BID, albuterol MDI PRN    ABLA (acute blood loss anemia)  Hgb 10.3, no s/s of bleeding, is on Lovenox  - CBC on 12/13 to monitor for stability     Slow transit constipation  Reviewed BM medications, encouraged patient to request medications or update staff if having symptoms of constipation. H/o of \"slow\" colon. Wants to avoid us of senna.    - miralax 17gm daily.     Restless legs syndrome (RLS)  - requip 0.5mg at bedtime    Neuropathy  Pain appears controlled, + CMS to BLE. Discussed with patient.   - gabapentin 300mg BID     Mood disorder (H)  Mood appears stable, reviewed medications with patient. Slept well last night.   - continue duloxetine 60mg daily, monitor and adjust     Morbid obesity (H)  BMI 42.8, Known issue that I take into account for " their medical decisions, no current exacerbations or new concerns. Likely will affect recovery/  - dietician to follow in TCU      Orders:  1. Wound care - if incisions to LLE are draining cleanse with wound cleanser, pat dry, cover with small piece of adaptic then cover with Telfa. Change daily and PRN, Apply ACE wrap to LLE  2. BG check BID  3. Clarify with ortho when/if sutures can be removed at facility  4. CBC on 12/13 DX: Anemia    Total time spent with patient visit at the [facility] nursing [facility] was 45 min including patient visit and review of past records. Greater than 50% of total time spent with counseling and coordinating care due to review of medications, discussion fo plan of care and patient education d/t left tib/fib fracture, wound care, acute post op pain, deconditioning, HTN, edema, DM2, constipation .     Electronically signed by: MERRY Weinberg CNP

## 2021-12-07 ENCOUNTER — TRANSITIONAL CARE UNIT VISIT (OUTPATIENT)
Dept: GERIATRICS | Facility: CLINIC | Age: 76
End: 2021-12-07
Payer: COMMERCIAL

## 2021-12-07 VITALS
BODY MASS INDEX: 42.87 KG/M2 | WEIGHT: 257.3 LBS | HEIGHT: 65 IN | TEMPERATURE: 97.8 F | HEART RATE: 104 BPM | SYSTOLIC BLOOD PRESSURE: 130 MMHG | OXYGEN SATURATION: 95 % | RESPIRATION RATE: 16 BRPM | DIASTOLIC BLOOD PRESSURE: 61 MMHG

## 2021-12-07 DIAGNOSIS — G25.81 RESTLESS LEGS SYNDROME (RLS): ICD-10-CM

## 2021-12-07 DIAGNOSIS — S82.302D CLOSED FRACTURE OF DISTAL END OF LEFT TIBIA WITH ROUTINE HEALING, UNSPECIFIED FRACTURE MORPHOLOGY, SUBSEQUENT ENCOUNTER: Primary | ICD-10-CM

## 2021-12-07 DIAGNOSIS — K59.01 SLOW TRANSIT CONSTIPATION: ICD-10-CM

## 2021-12-07 DIAGNOSIS — J45.30 MILD PERSISTENT ASTHMA, UNCOMPLICATED: ICD-10-CM

## 2021-12-07 DIAGNOSIS — S82.202D CLOSED FRACTURE OF LEFT TIBIA AND FIBULA WITH ROUTINE HEALING, SUBSEQUENT ENCOUNTER: ICD-10-CM

## 2021-12-07 DIAGNOSIS — Z48.89 AFTERCARE FOLLOWING SURGERY FOR INJURY AND TRAUMA: ICD-10-CM

## 2021-12-07 DIAGNOSIS — I10 BENIGN ESSENTIAL HYPERTENSION: ICD-10-CM

## 2021-12-07 DIAGNOSIS — E11.65 TYPE 2 DIABETES MELLITUS WITH HYPERGLYCEMIA, WITH LONG-TERM CURRENT USE OF INSULIN (H): ICD-10-CM

## 2021-12-07 DIAGNOSIS — G62.9 NEUROPATHY: ICD-10-CM

## 2021-12-07 DIAGNOSIS — S82.402D CLOSED FRACTURE OF LEFT TIBIA AND FIBULA WITH ROUTINE HEALING, SUBSEQUENT ENCOUNTER: ICD-10-CM

## 2021-12-07 DIAGNOSIS — D62 ABLA (ACUTE BLOOD LOSS ANEMIA): ICD-10-CM

## 2021-12-07 DIAGNOSIS — F39 MOOD DISORDER (H): ICD-10-CM

## 2021-12-07 DIAGNOSIS — R53.81 PHYSICAL DECONDITIONING: ICD-10-CM

## 2021-12-07 DIAGNOSIS — E66.01 MORBID OBESITY (H): ICD-10-CM

## 2021-12-07 DIAGNOSIS — Z79.4 TYPE 2 DIABETES MELLITUS WITH HYPERGLYCEMIA, WITH LONG-TERM CURRENT USE OF INSULIN (H): ICD-10-CM

## 2021-12-07 PROCEDURE — 99310 SBSQ NF CARE HIGH MDM 45: CPT | Performed by: NURSE PRACTITIONER

## 2021-12-07 RX ORDER — ALBUTEROL SULFATE 90 UG/1
2 AEROSOL, METERED RESPIRATORY (INHALATION) EVERY 4 HOURS PRN
COMMUNITY
Start: 2021-12-07

## 2021-12-07 RX ORDER — GABAPENTIN 300 MG/1
300 CAPSULE ORAL 2 TIMES DAILY
COMMUNITY
Start: 2021-12-07

## 2021-12-07 RX ORDER — POLYETHYLENE GLYCOL 3350
17 POWDER (GRAM) MISCELLANEOUS DAILY PRN
COMMUNITY
Start: 2021-12-07 | End: 2022-02-08

## 2021-12-07 RX ORDER — GLIPIZIDE 5 MG/1
5 TABLET, FILM COATED, EXTENDED RELEASE ORAL DAILY
COMMUNITY
Start: 2021-12-07

## 2021-12-07 RX ORDER — OXYCODONE HYDROCHLORIDE 5 MG/1
5 TABLET ORAL EVERY 4 HOURS PRN
COMMUNITY
Start: 2021-12-07 | End: 2022-01-18

## 2021-12-07 RX ORDER — LOSARTAN POTASSIUM 100 MG/1
100 TABLET ORAL DAILY
COMMUNITY
Start: 2021-12-07

## 2021-12-07 RX ORDER — MAGNESIUM OXIDE 400 MG/1
400 TABLET ORAL 2 TIMES DAILY
COMMUNITY
Start: 2021-12-07

## 2021-12-07 RX ORDER — FUROSEMIDE 40 MG
40 TABLET ORAL DAILY
COMMUNITY
Start: 2021-12-07

## 2021-12-07 RX ORDER — HYDRALAZINE HYDROCHLORIDE 100 MG/1
100 TABLET, FILM COATED ORAL DAILY
COMMUNITY
Start: 2021-12-07

## 2021-12-07 ASSESSMENT — MIFFLIN-ST. JEOR: SCORE: 1662.99

## 2021-12-07 NOTE — LETTER
"    12/7/2021        RE: Lucia Bobo  [address] [address] [address]        [facility]  Primary Care Provider & Clinic: Physician No Ref-Primary, No address on file  Chief Complaint   Patient presents with     Hospital F/U     [facility] 11/24/2021 - 12/6/2021     [place] Medical Record Number: [id number]  Place of Service Where Encounter Took Place: [facility] ([facility]) [[id number]]    Lucia Bobo is a 76 year old (1945), admitted to the above facility from  [facility]. Hospital stay 11/24/21 through 12/6/21.    HPI:    Lucia Bobo is a 76 year old female with PMH of Dm2 morbid obesity, essential hypertension, and asthma originally admitted on 11/22/2021 after a fall, found to have a minimally displaced left distal tibia and midshaft fibular fracture.  She had closed reduction with an external fixator placed on 11/23/2021, non-weightbearing on the left lower extremity. She transitioned to acute rehab on 11/24/2021. Readmitted to the hospital on 12/3/2021 in order to undergo planned external fixator removal with intramedullary nailing of left tibia fracture for definitive management. Surgery was without complication. Glipizide held post-op, otherwise multiple chronic conditions were stable. When medically stable was discharged to U for further rehab and medical management.     Seen today sitting up in wheelchair after breakfast. She is having some pain in her left leg, 7/10, but just took her oxycodone and typically gets good relief. Appetite is ok, not yet back to normal.  has been having bowel movements. Says she has a \"slow\" colon at baseline, but was given senna while in the hospital and it caused diarrhea so she would like to avoid this. Does have multiple incisions to LLE -staff asking about wound care to area that are still draining. She is looking forward to therapy. Says she will not be able to go home until she can weight bear " on her left leg because she has multiple steps in her house. Is hoping she can get a recliner in her room so she can elevate her leg better as she finds the wheelchair uncomfortable to sit in for long periods of time.     CODE STATUS/ADVANCE DIRECTIVES DISCUSSION: No CPR- Do NOT Intubate - DNR / DNI  Patient's living condition: lives with spouse  ALLERGIES:   Allergies   Allergen Reactions     Lisinopril Cough     Amoxicillin Hives, Itching and Rash     Lisinopril Cough     Penicillins Other (See Comments)     Has only had reaction to Amoxicillin so does not take any Penicillin      PAST MEDICAL HISTORY:   Past Medical History:   Diagnosis Date     Asthma      HTN      Type II or unspecified type diabetes mellitus without mention of complication, not stated as uncontrolled 4/2/04      PAST SURGICAL HISTORY:  has a past surgical history that includes colonoscopy (2007); cholecystectomy, open; appendectomy; Esophagoscopy, gastroscopy, duodenoscopy (EGD), combined (N/A, 11/20/2015); Esophagoscopy, gastroscopy, duodenoscopy (EGD), combined (N/A, 12/22/2017); Colonoscopy (N/A, 12/22/2017); Colonoscopy (N/A, 1/17/2018); and Colonoscopy (N/A, 9/12/2019).  FAMILY HISTORY: family history includes Cancer in her mother; Hypertension in her father.  SOCIAL HISTORY:  reports that she has never smoked. She has never used smokeless tobacco. She reports that she does not drink alcohol and does not use drugs.    Post Discharge Medication Reconciliation Status: discharge medications reconciled and changed, per note/orders  Current Outpatient Medications   Medication Sig     acetaminophen (TYLENOL) 500 MG tablet Take 1,000 mg by mouth 3 times daily      ADVAIR DISKUS 250-50 MCG/DOSE inhaler Inhale 1 puff into the lungs 2 times daily     albuterol (PROAIR HFA/PROVENTIL HFA/VENTOLIN HFA) 108 (90 Base) MCG/ACT inhaler Inhale 2 puffs into the lungs every 4 hours as needed for shortness of breath / dyspnea     atorvastatin (LIPITOR) 20 MG  tablet TAKE 1 TABLET BY MOUTH EVERY NIGHT AT BEDTIME     blood glucose (NO BRAND SPECIFIED) test strip Use to test blood sugar 1 times daily or as directed.     blood glucose monitoring (NO BRAND SPECIFIED) meter device kit Use to test blood sugar 2 times daily or as directed. Needs lancets and strips per insurance coverage.     calcium carbonate 600 mg-vitamin D 400 units (CALTRATE) 600-400 MG-UNIT per tablet Take 1 tablet by mouth daily     DULoxetine (CYMBALTA) 60 MG capsule Take 1 capsule daily     enoxaparin ANTICOAGULANT (LOVENOX) 40 MG/0.4ML syringe Inject 40 mg Subcutaneous 2 times daily     furosemide (LASIX) 40 MG tablet Take 40 mg by mouth daily     gabapentin (NEURONTIN) 300 MG capsule Take 300 mg by mouth 2 times daily     glipiZIDE (GLUCOTROL XL) 5 MG 24 hr tablet Take 5 mg by mouth daily     glucosamine-chondroitin 500-400 MG CAPS per capsule Take 2 capsules by mouth 2 times daily      hydrALAZINE (APRESOLINE) 100 MG tablet Take 100 mg by mouth daily Patient takes Hydralazine BID - 100 mg once daily AND 50 mg once daily     hydrALAZINE (APRESOLINE) 50 MG tablet Take 50 mg by mouth daily Patient takes Hydralazine BID - 100 mg once daily AND 50 mg once daily     insulin pen needle (BD SMILEY U/F) 32G X 4 MM miscellaneous USE ONE PEN NEEDLE ONCE DAILY OR AS DIRECTED     liraglutide (VICTOZA PEN) 18 MG/3ML solution INJECT 1.8mg subcutaneously ONCE DAILY     losartan (COZAAR) 100 MG tablet Take 100 mg by mouth daily     magnesium oxide (MAG-OX) 400 MG tablet Take 400 mg by mouth 2 times daily     metFORMIN (GLUCOPHAGE) 1000 MG tablet TAKE 1 TABLET BY MOUTH AT BREAKFAST AND TAKE 1 AND 1/2 TABLETS (1500mg) AT dinner     multivitamin (OCUVITE) TABS tablet Take 1 tablet by mouth daily      ONETOUCH ULTRA test strip USE TO TEST BLOOD SUGAR TWO TIMES A DAY OR AS DIRECTED     order for DME Equipment being ordered: 2 pairs of CARINA hose 10 mmHg Calf width 51 cm, Lower leg length 39 cm     order for DME Equipment being  "ordered: glucometer with test strips     order for [facility] Equipment being ordered: blood pressure monitor     oxyCODONE (ROXICODONE) 5 MG tablet Take 5 mg by mouth every 4 hours as needed for moderate to severe pain     PHARMACIST CHOICE LANCETS MISC Check blood sugars twice daily     polyethylene glycol 3350 POWD Take 17 g by mouth daily as needed (constipation)     rOPINIRole (REQUIP) 0.5 MG tablet TAKE 1 TABLET BY MOUTH EVERY NIGHT AT BEDTIME     verapamil ER (CALAN-SR) 240 MG CR tablet TAKE 1 TABLET BY MOUTH ONCE DAILY     No current facility-administered medications for this visit.     ROS:  4 point ROS including Respiratory, CV, GI and , other than that noted in the HPI,  is negative    Vitals:  /61   Pulse 104   Temp 97.8  F (36.6  C)   Resp 16   Ht 1.651 m (5' 5\")   Wt 116.7 kg (257 lb 4.8 oz)   SpO2 95%   BMI 42.82 kg/m    Exam:  GENERAL APPEARANCE:  Alert, in no distress, cooperative  RESP:  lungs clear to auscultation , no respiratory distress  CV:  regular rate and rhythm, no murmur, rub, or gallop, peripheral edema 2+ in LLE, 1  ABDOMEN:  bowel sounds normal  M/S:   NWB to LLE, decreased ROm to left knee and ankle  SKIN:  wound appearance: multiple incisions to LLE (see photos), multiple abrasions to right medial calf  NEURO:   CN 2-12 grossly intact  PSYCH:  oriented X 3, normal insight, judgement and memory, affect and mood normal    Lab/Diagnostic data:  Recent labs in [facility] reviewed by me today.     ASSESSMENT/PLAN:  Closed fracture of distal end of left tibia with routine healing, unspecified fracture morphology, subsequent encounter  Closed fracture of left tibia and fibula with routine healing, subsequent encounter  Aftercare following surgery for injury and trauma  Physical deconditioning  Secondary to fall while trying to get into car. Surgery without complication. Pain appears controlled. Multiple incisions to LLE. Assisted nursing staff with wound care today.  Discussed with " "patient, nursing staff. Given weight bearing status suspect progress with therapy will be slow.   - Wound care as below, ok leave STEPHEN if not draining; staff to clarify if/when sutures can be removed at facility  - Analgesia with oxycodone 5mg q 4h PRN, APAP 1000mg TID, ice PRN, elevate when at rest  - NWB to LLE  - follow-up with ortho on 1/10/22 as scheduled  - DVT ppx with Lovenox 40mg BID x 20 days - will check PLT while ion Lovenox  - PT/OT eval and treat. Discharge planning per their recommendation    Type 2 diabetes mellitus with hyperglycemia, with long-term current use of insulin (H) HgbA1c goal <7  A1C 6.7% on 11/17/21. Not enough data to trend in TCU.  Discussed with patient, nursing staff.   - Change BG checks to BID  - continue metformin 1000mg q AM 1500mg q PM, glipizide Er 5mg daily, and liraglutide 1.8mg at bedtime    Benign essential hypertension, BP goal <140/90  Limited data to trend BP. Does have chronic BLE edema, wearing home compression stocking.  Discussed with patient, nursing staff.   - atorvastatin 20mg daily  - furosemide 40mg daily  - hydralazine 150mg daily, losartan 100mg daily, verapamil ER 240mg daily,     Mild persistent asthma, uncomplicated  Appears controlled,  Discussed with patient, nursing staff.   - continue Advair BID, albuterol MDI PRN    ABLA (acute blood loss anemia)  Hgb 10.3, no s/s of bleeding, is on Lovenox  - CBC on 12/13 to monitor for stability     Slow transit constipation  Reviewed BM medications, encouraged patient to request medications or update staff if having symptoms of constipation. H/o of \"slow\" colon. Wants to avoid us of senna.    - miralax 17gm daily.     Restless legs syndrome (RLS)  - requip 0.5mg at bedtime    Neuropathy  Pain appears controlled, + CMS to BLE. Discussed with patient.   - gabapentin 300mg BID     Mood disorder (H)  Mood appears stable, reviewed medications with patient. Slept well last night.   - continue duloxetine 60mg daily, " monitor and adjust     Morbid obesity (H)  BMI 42.8, Known issue that I take into account for their medical decisions, no current exacerbations or new concerns. Likely will affect recovery/  - dietician to follow in TCU      Orders:  1. Wound care - if incisions to LLE are draining cleanse with wound cleanser, pat dry, cover with small piece of adaptic then cover with Telfa. Change daily and PRN, Apply ACE wrap to LLE  2. BG check BID  3. Clarify with ortho when/if sutures can be removed at facility  4. CBC on 12/13 DX: Anemia    Total time spent with patient visit at the [facility] nursing [facility] was 45 min including patient visit and review of past records. Greater than 50% of total time spent with counseling and coordinating care due to review of medications, discussion fo plan of care and patient education d/t left tib/fib fracture, wound care, acute post op pain, deconditioning, HTN, edema, DM2, constipation .     Electronically signed by: MERRY Weinberg CNP        Sincerely,        MERRY Weinberg CNP

## 2021-12-13 ENCOUNTER — LAB REQUISITION (OUTPATIENT)
Dept: LAB | Facility: CLINIC | Age: 76
End: 2021-12-13

## 2021-12-13 DIAGNOSIS — D64.9 ANEMIA, UNSPECIFIED: ICD-10-CM

## 2021-12-13 LAB
ERYTHROCYTE [DISTWIDTH] IN BLOOD BY AUTOMATED COUNT: 12.7 % (ref 10–15)
HCT VFR BLD AUTO: 37 % (ref 35–47)
HGB BLD-MCNC: 11.4 G/DL (ref 11.7–15.7)
MCH RBC QN AUTO: 31.8 PG (ref 26.5–33)
MCHC RBC AUTO-ENTMCNC: 30.8 G/DL (ref 31.5–36.5)
MCV RBC AUTO: 103 FL (ref 78–100)
PLATELET # BLD AUTO: 785 10E3/UL (ref 150–450)
RBC # BLD AUTO: 3.59 10E6/UL (ref 3.8–5.2)
WBC # BLD AUTO: 12.9 10E3/UL (ref 4–11)

## 2021-12-13 PROCEDURE — 85027 COMPLETE CBC AUTOMATED: CPT | Performed by: NURSE PRACTITIONER

## 2021-12-13 PROCEDURE — P9603 ONE-WAY ALLOW PRORATED MILES: HCPCS | Performed by: NURSE PRACTITIONER

## 2021-12-13 PROCEDURE — 36415 COLL VENOUS BLD VENIPUNCTURE: CPT | Performed by: NURSE PRACTITIONER

## 2021-12-14 ENCOUNTER — TRANSITIONAL CARE UNIT VISIT (OUTPATIENT)
Dept: GERIATRICS | Facility: CLINIC | Age: 76
End: 2021-12-14
Payer: COMMERCIAL

## 2021-12-14 VITALS
TEMPERATURE: 98.9 F | BODY MASS INDEX: 43.26 KG/M2 | HEART RATE: 82 BPM | SYSTOLIC BLOOD PRESSURE: 113 MMHG | WEIGHT: 253.4 LBS | OXYGEN SATURATION: 97 % | HEIGHT: 64 IN | DIASTOLIC BLOOD PRESSURE: 67 MMHG | RESPIRATION RATE: 18 BRPM

## 2021-12-14 DIAGNOSIS — Z48.89 AFTERCARE FOLLOWING SURGERY FOR INJURY AND TRAUMA: ICD-10-CM

## 2021-12-14 DIAGNOSIS — Z79.4 TYPE 2 DIABETES MELLITUS WITH HYPERGLYCEMIA, WITH LONG-TERM CURRENT USE OF INSULIN (H): ICD-10-CM

## 2021-12-14 DIAGNOSIS — I10 BENIGN ESSENTIAL HYPERTENSION: ICD-10-CM

## 2021-12-14 DIAGNOSIS — E11.65 TYPE 2 DIABETES MELLITUS WITH HYPERGLYCEMIA, WITH LONG-TERM CURRENT USE OF INSULIN (H): ICD-10-CM

## 2021-12-14 DIAGNOSIS — S82.202D CLOSED FRACTURE OF LEFT TIBIA AND FIBULA WITH ROUTINE HEALING, SUBSEQUENT ENCOUNTER: ICD-10-CM

## 2021-12-14 DIAGNOSIS — S82.402D CLOSED FRACTURE OF LEFT TIBIA AND FIBULA WITH ROUTINE HEALING, SUBSEQUENT ENCOUNTER: ICD-10-CM

## 2021-12-14 DIAGNOSIS — R53.81 PHYSICAL DECONDITIONING: ICD-10-CM

## 2021-12-14 DIAGNOSIS — D72.829 LEUKOCYTOSIS, UNSPECIFIED TYPE: ICD-10-CM

## 2021-12-14 DIAGNOSIS — K59.01 SLOW TRANSIT CONSTIPATION: ICD-10-CM

## 2021-12-14 DIAGNOSIS — S82.302D CLOSED FRACTURE OF DISTAL END OF LEFT TIBIA WITH ROUTINE HEALING, UNSPECIFIED FRACTURE MORPHOLOGY, SUBSEQUENT ENCOUNTER: Primary | ICD-10-CM

## 2021-12-14 PROCEDURE — 99309 SBSQ NF CARE MODERATE MDM 30: CPT | Performed by: NURSE PRACTITIONER

## 2021-12-14 ASSESSMENT — MIFFLIN-ST. JEOR: SCORE: 1629.41

## 2021-12-14 NOTE — LETTER
"    12/14/2021        RE: Lucia Bobo  [address] [facility] [address]        M [facility] Medical Record Number: [id number]  Chief Complaint   Patient presents with     RECHECK     HPI: Lucia Bobo is a 76 year old (1945), who is being seen today for an episodic care visit at: [facility] ([facility]) [[id number]]. Today's concern is: Recent multiple hospitalizations with minimally displaced left distal tibia and midshaft fibular fracture requiring multiple surgeries. Seen today for routine visit in [place]. Labs were done yesterday and showed mild leukocytosis with WBC of 12. She has multiple incisions to her LLE from previous external fixation, staff report some bloody drainage on some of the distal incisions but otherwise appear to be healing well. She reports leg as \"achy\" but feels pain is controlled. Appetite is good. Having bowel movements. No urinary burning or discomfort. No coughing or SOB. Is in good spirits during visit.     Allergies and PMH/PSH reviewed in [facility] today.    REVIEW OF SYSTEMS:  4 point ROS including Respiratory, CV, GI and , other than that noted in the HPI,  is negative    Objective:   /67   Pulse 82   Temp 98.9  F (37.2  C)   Resp 18   Ht 1.626 m (5' 4\")   Wt 114.9 kg (253 lb 6.4 oz)   SpO2 97%   BMI 43.50 kg/m    Exam:  GENERAL APPEARANCE:  Alert, in no distress, cooperative  RESP:  lungs clear to auscultation , no respiratory distress  CV:  regular rate and rhythm, no murmur, rub, or gallop, +2 pedal pulses  ABDOMEN:  bowel sounds normal  M/S:   decreased ROM, NWB to LLE  SKIN:  wound appearance: multiple surgical incisions to LLE, sutures intact, some old blood present to multple wounds near ankle, no active bleeding noted  NEURO:   Cn 2-12 grossly intact  PSYCH:  oriented X 3, normal insight, judgement and memory, affect and mood normal    Labs:   Recent labs in [facility] reviewed by me today.   (labs from chart marked for " "merge)      Assessment/Plan:  Closed fracture of distal end of left tibia with routine healing, unspecified fracture morphology, subsequent encounter  Closed fracture of left tibia and fibula with routine healing, subsequent encounter  Aftercare following surgery for injury and trauma  Physical deconditioning  Secondary to fall while trying to get into car. Surgery without complication. Pain appears controlled. Multiple incisions to LLE - healing well. Given weight bearing status suspect progress with therapy will be slow.   - Continue current wound care, ok leave STEPHEN if not draining; suture to remain in place until follow-up with ortho.   - Analgesia with oxycodone 5mg q 4h PRN, APAP 1000mg TID, ice PRN, elevate when at rest  - NWB to LLE  - follow-up with ortho on 1/10/22 as scheduled  - DVT ppx with Lovenox 40mg BID x 20 days - will check PLT while ion Lovenox  - PT/OT eval and treat. Discharge planning per their recommendation    Leukocytosis, unspecified type  No obvious s/s of infection. Wounds do not appear infected. WBC mildly elevated, PLT, and HGB also increased from previous labs done in [facility]. ? Mildly dehydrated.   - encourage fluid intake  - CBC with diff. BMP on 12/17    Type 2 diabetes mellitus with hyperglycemia, with long-term current use of insulin (H) HgbA1c goal <7  A1C 6.7% on 11/17/21. BG  in TCU, typically running 100-150.   - BG checks to BID  - continue metformin 1000mg q AM 1500mg q PM, glipizide Er 5mg daily, and liraglutide 1.8mg at bedtime    Benign essential hypertension, BP goal <140/90  -120, HR 70-80. Does have chronic BLE edema, wearing home compression stocking.    - atorvastatin 20mg daily  - furosemide 40mg daily  - hydralazine 150mg daily, losartan 100mg daily, verapamil ER 240mg daily,     Slow transit constipation  Report regular bowel movements. No recent loose stools. H/o of \"slow\" colon per patient.   - miralax 17gm daily prn, monitor and adjust "     Orders:  1. Push fluids  2. BMP, CBC on 12/17 DX Leukocytosis,     Electronically signed by: MERRY Weinberg CNP        Sincerely,        MERRY Weinberg CNP

## 2021-12-17 ENCOUNTER — TELEPHONE (OUTPATIENT)
Dept: GERIATRICS | Facility: CLINIC | Age: 76
End: 2021-12-17
Payer: COMMERCIAL

## 2021-12-17 ENCOUNTER — LAB REQUISITION (OUTPATIENT)
Dept: LAB | Facility: CLINIC | Age: 76
End: 2021-12-17

## 2021-12-17 DIAGNOSIS — I48.91 UNSPECIFIED ATRIAL FIBRILLATION (H): ICD-10-CM

## 2021-12-17 LAB
ANION GAP SERPL CALCULATED.3IONS-SCNC: 6 MMOL/L (ref 3–14)
BASOPHILS # BLD AUTO: 0.1 10E3/UL (ref 0–0.2)
BASOPHILS NFR BLD AUTO: 1 %
BUN SERPL-MCNC: 25 MG/DL (ref 7–30)
CALCIUM SERPL-MCNC: 9 MG/DL (ref 8.5–10.1)
CHLORIDE BLD-SCNC: 107 MMOL/L (ref 94–109)
CO2 SERPL-SCNC: 27 MMOL/L (ref 20–32)
CREAT SERPL-MCNC: 0.9 MG/DL (ref 0.52–1.04)
EOSINOPHIL # BLD AUTO: 0.3 10E3/UL (ref 0–0.7)
EOSINOPHIL NFR BLD AUTO: 4 %
ERYTHROCYTE [DISTWIDTH] IN BLOOD BY AUTOMATED COUNT: 13.2 % (ref 10–15)
GFR SERPL CREATININE-BSD FRML MDRD: 63 ML/MIN/1.73M2
GLUCOSE BLD-MCNC: 107 MG/DL (ref 70–99)
HCT VFR BLD AUTO: 31.9 % (ref 35–47)
HGB BLD-MCNC: 10.1 G/DL (ref 11.7–15.7)
IMM GRANULOCYTES # BLD: 0 10E3/UL
IMM GRANULOCYTES NFR BLD: 0 %
LYMPHOCYTES # BLD AUTO: 2.5 10E3/UL (ref 0.8–5.3)
LYMPHOCYTES NFR BLD AUTO: 29 %
MCH RBC QN AUTO: 32.5 PG (ref 26.5–33)
MCHC RBC AUTO-ENTMCNC: 31.7 G/DL (ref 31.5–36.5)
MCV RBC AUTO: 103 FL (ref 78–100)
MONOCYTES # BLD AUTO: 0.6 10E3/UL (ref 0–1.3)
MONOCYTES NFR BLD AUTO: 7 %
NEUTROPHILS # BLD AUTO: 5.1 10E3/UL (ref 1.6–8.3)
NEUTROPHILS NFR BLD AUTO: 59 %
NRBC # BLD AUTO: 0 10E3/UL
NRBC BLD AUTO-RTO: 0 /100
PLATELET # BLD AUTO: 503 10E3/UL (ref 150–450)
POTASSIUM BLD-SCNC: 4.6 MMOL/L (ref 3.4–5.3)
RBC # BLD AUTO: 3.11 10E6/UL (ref 3.8–5.2)
SODIUM SERPL-SCNC: 140 MMOL/L (ref 133–144)
WBC # BLD AUTO: 8.6 10E3/UL (ref 4–11)

## 2021-12-17 PROCEDURE — P9603 ONE-WAY ALLOW PRORATED MILES: HCPCS | Performed by: NURSE PRACTITIONER

## 2021-12-17 PROCEDURE — 80048 BASIC METABOLIC PNL TOTAL CA: CPT | Performed by: NURSE PRACTITIONER

## 2021-12-17 PROCEDURE — 85025 COMPLETE CBC W/AUTO DIFF WBC: CPT | Performed by: NURSE PRACTITIONER

## 2021-12-17 PROCEDURE — 36415 COLL VENOUS BLD VENIPUNCTURE: CPT | Performed by: NURSE PRACTITIONER

## 2021-12-17 NOTE — TELEPHONE ENCOUNTER
[facility] Nurse Triage Telephone Note    Provider: MERRY Reinoso CNP  Facility: [facility] Facility Type:  TCU    Caller: Hortencia   Call Back Number: [phone]    Allergies:    Allergies   Allergen Reactions     Lisinopril Cough     Amoxicillin Hives, Itching and Rash     Lisinopril Cough     Penicillins Other (See Comments)     Has only had reaction to Amoxicillin so does not take any Penicillin        Reason for call:     CBC- low hgb of 10.1 last 11.4, WBC 8.6   Not on iron, no s/s of bleeding, VS stable.     BMP- stable     Verbal Order/Direction given by Provider: NNO    Provider giving Order:  MERRY Reinoso CNP    Verbal Order given to: Hortencia Tirado RN

## 2021-12-20 NOTE — PROGRESS NOTES
"[facility] SERVICES  [place] Medical Record Number: [id number]  Chief Complaint   Patient presents with     RECHECK     HPI: Lucia Bobo is a 76 year old (1945), who is being seen today for an episodic care visit at: [facility] ([facility]) [[id number]]. Today's concern is: Recent multiple hospitalizations with minimally displaced left distal tibia and midshaft fibular fracture requiring multiple surgeries. Seen today to follow-up on pain, wound healing. She is seen in room mid morning, sitting up in wheelchair. All of the incisions have stopped draining, and are now able to be left open to air. Reports some tightness and pulling of sutures near knee, is wondering if she should continue with the ACE wrap to help with swelling. Report minimal pain/discomfort in the leg. Does use oxycodone sometimes at night  When leg is uncomfortable to help her sleep. Is having bowel movements. Appetite is good. Is disappointed that she cannot be home for [place], but is happy with her care in [facility]. Was aron to transfer to and from bed/wheelchair without walker today.     Allergies and PMH/PSH reviewed in [facility] today.    REVIEW OF SYSTEMS:  4 point ROS including Respiratory, CV, GI and , other than that noted in the HPI,  is negative    Objective:   /58   Pulse 80   Temp 98.3  F (36.8  C)   Resp 20   Ht 1.626 m (5' 4\")   Wt 116.1 kg (256 lb)   SpO2 96%   BMI 43.94 kg/m    Exam:  GENERAL APPEARANCE:  Alert, in no distress, morbidly obese, cooperative  RESP:  lungs clear to auscultation , no respiratory distress  CV:  regular rate and rhythm, no murmur, rub, or gallop, peripheral edema 2+ in LLE  ABDOMEN:  bowel sounds normal  M/S:   NWB to LLE, decreased ROM to left knee  SKIN:  multiple incisions to LLE, healing, CDI, sutures intact  NEURO:   CN 2-12 grossly intact  PSYCH:  oriented X 3, normal insight, judgement and memory, affect and mood normal    Labs:   Recent labs in [facility] reviewed by me today. "  and   Most Recent 3 CBC's:  Recent Labs   Lab Test 12/17/21  0800 12/13/21  0800 10/27/20  0807   WBC 8.6 12.9* 12.1*   HGB 10.1* 11.4* 13.6   * 103* 97   * 785* 506*     Most Recent 3 BMP's:  Recent Labs   Lab Test 12/17/21  0800 10/27/20  0807 03/19/20  0933    138 136   POTASSIUM 4.6 4.4 4.6   CHLORIDE 107 105 104   CO2 27 27 24   BUN 25 18 26   CR 0.90 0.85 1.03   ANIONGAP 6 6 8   RAQUEL 9.0 9.8 10.0   * 195* 195*       Assessment/Plan:  Closed fracture of distal end of left tibia with routine healing, unspecified fracture morphology, subsequent encounter  Closed fracture of left tibia and fibula with routine healing, subsequent encounter  Aftercare following surgery for injury and trauma  Physical deconditioning  Physical deconditioning  Secondary to fall while trying to get into car. Surgery without complication. Pain appears controlled. Multiple incisions to LLE - healing well.  No further drainage so may be open to air. Given weight bearing status suspect progress with therapy will be slow but is making progress with transfers, ADLs.   - Continue current wound care, ok leave STEPHEN if not draining; sutures to remain in place until follow-up with ortho.   - Analgesia with oxycodone 5mg q 4h PRN, APAP 1000mg TID, ice PRN, elevate when at rest  - NWB to LLE  - follow-up with ortho on 1/10/22 as scheduled  - DVT ppx with Lovenox 40mg BID x 20 days - will check PLT PRN while in Lovenox  - PT/OT eval and treat. Discharge planning per their recommendation    Type 2 diabetes mellitus with hyperglycemia, with long-term current use of insulin (H) HgbA1c goal <7  A1C 6.7% on 11/17/21. BG typically 100-170, did have one Bg of 65 at lunch a couple of days ago, but did not eat much breakfast that day.   - BG BID  - continue metformin 1000mg q AM 1500mg q PM, glipizide Er 5mg daily, and liraglutide 1.8mg at bedtime    Benign essential hypertension, BP goal <140/90  -130, HR 70-80.  Does have  "chronic BLE edema, wearing home compression stocking to RLE  - atorvastatin 20mg daily  - furosemide 40mg daily  - hydralazine 150mg daily, losartan 100mg daily, verapamil ER 240mg daily,   - ok to wear ACE loosely over LLE to help with edema - on in AM, off at HS    Slow transit constipation  Report regular bowel movements. No recent loose stools. H/o of \"slow\" colon per patient.   - miralax 17gm daily prn, monitor and adjust        ABLA (acute blood loss anemia)  Hgb stable (see labs) is on Lovenox. NO s/s of bleeding.   - Hgb PRN       Orders:  1. Apply ACE wrap to loosely for light compression to LLE. Apply q AM, off at bedtime.     Electronically signed by: MERRY Weinberg CNP  "

## 2021-12-21 ENCOUNTER — TRANSITIONAL CARE UNIT VISIT (OUTPATIENT)
Dept: GERIATRICS | Facility: CLINIC | Age: 76
End: 2021-12-21
Payer: COMMERCIAL

## 2021-12-21 VITALS
OXYGEN SATURATION: 96 % | RESPIRATION RATE: 20 BRPM | SYSTOLIC BLOOD PRESSURE: 132 MMHG | HEIGHT: 64 IN | TEMPERATURE: 98.3 F | DIASTOLIC BLOOD PRESSURE: 58 MMHG | WEIGHT: 256 LBS | HEART RATE: 80 BPM | BODY MASS INDEX: 43.71 KG/M2

## 2021-12-21 DIAGNOSIS — D62 ABLA (ACUTE BLOOD LOSS ANEMIA): ICD-10-CM

## 2021-12-21 DIAGNOSIS — K59.01 SLOW TRANSIT CONSTIPATION: ICD-10-CM

## 2021-12-21 DIAGNOSIS — Z79.4 TYPE 2 DIABETES MELLITUS WITH HYPERGLYCEMIA, WITH LONG-TERM CURRENT USE OF INSULIN (H): ICD-10-CM

## 2021-12-21 DIAGNOSIS — Z48.89 AFTERCARE FOLLOWING SURGERY FOR INJURY AND TRAUMA: ICD-10-CM

## 2021-12-21 DIAGNOSIS — S82.302D CLOSED FRACTURE OF DISTAL END OF LEFT TIBIA WITH ROUTINE HEALING, UNSPECIFIED FRACTURE MORPHOLOGY, SUBSEQUENT ENCOUNTER: Primary | ICD-10-CM

## 2021-12-21 DIAGNOSIS — I10 BENIGN ESSENTIAL HYPERTENSION: ICD-10-CM

## 2021-12-21 DIAGNOSIS — S82.202D CLOSED FRACTURE OF LEFT TIBIA AND FIBULA WITH ROUTINE HEALING, SUBSEQUENT ENCOUNTER: ICD-10-CM

## 2021-12-21 DIAGNOSIS — R53.81 PHYSICAL DECONDITIONING: ICD-10-CM

## 2021-12-21 DIAGNOSIS — S82.402D CLOSED FRACTURE OF LEFT TIBIA AND FIBULA WITH ROUTINE HEALING, SUBSEQUENT ENCOUNTER: ICD-10-CM

## 2021-12-21 DIAGNOSIS — E11.65 TYPE 2 DIABETES MELLITUS WITH HYPERGLYCEMIA, WITH LONG-TERM CURRENT USE OF INSULIN (H): ICD-10-CM

## 2021-12-21 PROCEDURE — 99309 SBSQ NF CARE MODERATE MDM 30: CPT | Performed by: NURSE PRACTITIONER

## 2021-12-21 ASSESSMENT — MIFFLIN-ST. JEOR: SCORE: 1641.21

## 2021-12-21 NOTE — LETTER
"    12/21/2021        RE: Lucia Bobo  [address] [facility] [address]        M [facility] Medical Record Number: [id number]  Chief Complaint   Patient presents with     RECHECK     HPI: Lucia Bobo is a 76 year old (1945), who is being seen today for an episodic care visit at: [facility] ([facility]) [[id number]]. Today's concern is: Recent multiple hospitalizations with minimally displaced left distal tibia and midshaft fibular fracture requiring multiple surgeries. Seen today to follow-up on pain, wound healing. She is seen in room mid morning, sitting up in wheelchair. All of the incisions have stopped draining, and are now able to be left open to air. Reports some tightness and pulling of sutures near knee, is wondering if she should continue with the ACE wrap to help with swelling. Report minimal pain/discomfort in the leg. Does use oxycodone sometimes at night  When leg is uncomfortable to help her sleep. Is having bowel movements. Appetite is good. Is disappointed that she cannot be home for [place], but is happy with her care in [facility]. Was aron to transfer to and from bed/wheelchair without walker today.     Allergies and PMH/PSH reviewed in [facility] today.    REVIEW OF SYSTEMS:  4 point ROS including Respiratory, CV, GI and , other than that noted in the HPI,  is negative    Objective:   /58   Pulse 80   Temp 98.3  F (36.8  C)   Resp 20   Ht 1.626 m (5' 4\")   Wt 116.1 kg (256 lb)   SpO2 96%   BMI 43.94 kg/m    Exam:  GENERAL APPEARANCE:  Alert, in no distress, morbidly obese, cooperative  RESP:  lungs clear to auscultation , no respiratory distress  CV:  regular rate and rhythm, no murmur, rub, or gallop, peripheral edema 2+ in LLE  ABDOMEN:  bowel sounds normal  M/S:   NWB to LLE, decreased ROM to left knee  SKIN:  multiple incisions to LLE, healing, CDI, sutures intact  NEURO:   CN 2-12 grossly intact  PSYCH:  oriented X 3, normal insight, " judgement and memory, affect and mood normal    Labs:   Recent labs in [facility] reviewed by me today.  and   Most Recent 3 CBC's:  Recent Labs   Lab Test 12/17/21  0800 12/13/21  0800 10/27/20  0807   WBC 8.6 12.9* 12.1*   HGB 10.1* 11.4* 13.6   * 103* 97   * 785* 506*     Most Recent 3 BMP's:  Recent Labs   Lab Test 12/17/21  0800 10/27/20  0807 03/19/20  0933    138 136   POTASSIUM 4.6 4.4 4.6   CHLORIDE 107 105 104   CO2 27 27 24   BUN 25 18 26   CR 0.90 0.85 1.03   ANIONGAP 6 6 8   RAQUEL 9.0 9.8 10.0   * 195* 195*       Assessment/Plan:  Closed fracture of distal end of left tibia with routine healing, unspecified fracture morphology, subsequent encounter  Closed fracture of left tibia and fibula with routine healing, subsequent encounter  Aftercare following surgery for injury and trauma  Physical deconditioning  Physical deconditioning  Secondary to fall while trying to get into car. Surgery without complication. Pain appears controlled. Multiple incisions to LLE - healing well.  No further drainage so may be open to air. Given weight bearing status suspect progress with therapy will be slow but is making progress with transfers, ADLs.   - Continue current wound care, ok leave STEPHEN if not draining; sutures to remain in place until follow-up with ortho.   - Analgesia with oxycodone 5mg q 4h PRN, APAP 1000mg TID, ice PRN, elevate when at rest  - NWB to LLE  - follow-up with ortho on 1/10/22 as scheduled  - DVT ppx with Lovenox 40mg BID x 20 days - will check PLT PRN while in Lovenox  - PT/OT eval and treat. Discharge planning per their recommendation    Type 2 diabetes mellitus with hyperglycemia, with long-term current use of insulin (H) HgbA1c goal <7  A1C 6.7% on 11/17/21. BG typically 100-170, did have one Bg of 65 at lunch a couple of days ago, but did not eat much breakfast that day.   - BG BID  - continue metformin 1000mg q AM 1500mg q PM, glipizide Er 5mg daily, and liraglutide 1.8mg  "at bedtime    Benign essential hypertension, BP goal <140/90  -130, HR 70-80.  Does have chronic BLE edema, wearing home compression stocking to RLE  - atorvastatin 20mg daily  - furosemide 40mg daily  - hydralazine 150mg daily, losartan 100mg daily, verapamil ER 240mg daily,   - ok to wear ACE loosely over LLE to help with edema - on in AM, off at HS    Slow transit constipation  Report regular bowel movements. No recent loose stools. H/o of \"slow\" colon per patient.   - miralax 17gm daily prn, monitor and adjust        ABLA (acute blood loss anemia)  Hgb stable (see labs) is on Lovenox. NO s/s of bleeding.   - Hgb PRN       Orders:  1. Apply ACE wrap to loosely for light compression to LLE. Apply q AM, off at bedtime.     Electronically signed by: MERRY Weinberg CNP        Sincerely,        MERRY Weinberg CNP    "

## 2021-12-27 NOTE — PROGRESS NOTES
"[facility]ERIATRIC SERVICES  [place] Medical Record Number: [id number]  Chief Complaint   Patient presents with     RECHECK     HPI: Lucia Bobo is a 76 year old (1945), who is being seen today for an episodic care visit at: [facility] ([facility]) [[id number]]. Today's concern is: Recent multiple hospitalizations with minimally displaced left distal tibia and midshaft fibular fracture requiring multiple surgeries. Seen today to follow-up on pain, wound healing. Seen at the end of her PT session. She is NWB to LLE, unable to walk but can transfer from chair to bed independently. Denies any pain with exercises. Is very motivated and looking forward to being able to walk again. She is feeling well. Appetite is good. Having bowel movements. NO chest pain or SOB. Incisions on her leg are no longer draining, she is happy with how they are healing.     Allergies and PMH/PSH reviewed in [facility] today.    REVIEW OF SYSTEMS:  4 point ROS including Respiratory, CV, GI and , other than that noted in the HPI,  is negative    Objective:   /68   Pulse 83   Temp 98.1  F (36.7  C)   Resp 18   Ht 1.626 m (5' 4\")   Wt 115.3 kg (254 lb 3.2 oz)   SpO2 97%   BMI 43.63 kg/m    Exam:  GENERAL APPEARANCE:  Alert, in no distress, cooperative  RESP:  lungs clear to auscultation , no respiratory distress  CV:  regular rate and rhythm, no murmur, rub, or gallop, peripheral edema 2+ in LLE  ABDOMEN:  bowel sounds normal  M/S:   NWB to LLE  SKIN:  multiple sutured incisions to LLE, intact, dry  NEURO:   CN 2-12 grossly intact  PSYCH:  oriented X 3, normal insight, judgement and memory, affect and mood normal    Labs:   Recent labs in [facility] reviewed by me today.     Assessment/Plan:  Closed fracture of distal end of left tibia with routine healing, unspecified fracture morphology, subsequent encounter  Closed fracture of left tibia and fibula with routine healing, subsequent encounter  Aftercare following surgery for injury " "and trauma  Physical deconditioning  Secondary to fall while trying to get into car. Surgery without complication. Pain appears controlled. Multiple incisions to LLE - healing well, open to air. Given weight bearing status suspect progress with therapy will be slow. Able to transfer independently. No pain with activity. Completed 20 days of Lovenox 40mg BID on 12/26.   - Continue current wound care-  leave STEPHEN if not draining; sutures to remain in place until follow-up with ortho.- Analgesia with oxycodone 5mg q 4h PRN, APAP 1000mg TID, ice PRN, elevate when at rest  - NWB to LLE  - follow-up with ortho on 1/10/22 as scheduled  - PT/OT eval and treat. Discharge planning per their recommendation    Type 2 diabetes mellitus with hyperglycemia, with long-term current use of insulin (H) HgbA1c goal <7  A1C 6.7% on 11/17/21. BG typically  over the past week. No further episodes of hypoglycemia.   - BG BID  - continue metformin 1000mg q AM 1500mg q PM, glipizide Er 5mg daily, and liraglutide 1.8mg at bedtime    Benign essential hypertension, BP goal <140/90  -130, HR 70-80.  Does have chronic BLE edema, wearing home compression stocking to RLE  - atorvastatin 20mg daily  - furosemide 40mg daily  - hydralazine 150mg daily, losartan 100mg daily, verapamil ER 240mg daily,   - ok to wear ACE loosely over LLE to help with edema - on in AM, off at HS    Slow transit constipation  Report regular bowel movements. No recent loose stools. H/o of \"slow\" colon per patient.   - miralax 17gm daily prn, monitor and adjust       Orders:  NNO    Electronically signed by: Luci Luis, EMRRY CNP  "

## 2021-12-28 ENCOUNTER — TRANSITIONAL CARE UNIT VISIT (OUTPATIENT)
Dept: GERIATRICS | Facility: CLINIC | Age: 76
End: 2021-12-28
Payer: COMMERCIAL

## 2021-12-28 VITALS
RESPIRATION RATE: 18 BRPM | HEART RATE: 83 BPM | DIASTOLIC BLOOD PRESSURE: 68 MMHG | SYSTOLIC BLOOD PRESSURE: 126 MMHG | BODY MASS INDEX: 43.4 KG/M2 | WEIGHT: 254.2 LBS | HEIGHT: 64 IN | TEMPERATURE: 98.1 F | OXYGEN SATURATION: 97 %

## 2021-12-28 DIAGNOSIS — E11.65 TYPE 2 DIABETES MELLITUS WITH HYPERGLYCEMIA, WITH LONG-TERM CURRENT USE OF INSULIN (H): ICD-10-CM

## 2021-12-28 DIAGNOSIS — S82.402D CLOSED FRACTURE OF LEFT TIBIA AND FIBULA WITH ROUTINE HEALING, SUBSEQUENT ENCOUNTER: ICD-10-CM

## 2021-12-28 DIAGNOSIS — R53.81 PHYSICAL DECONDITIONING: ICD-10-CM

## 2021-12-28 DIAGNOSIS — K59.01 SLOW TRANSIT CONSTIPATION: ICD-10-CM

## 2021-12-28 DIAGNOSIS — I10 BENIGN ESSENTIAL HYPERTENSION: ICD-10-CM

## 2021-12-28 DIAGNOSIS — S82.302D CLOSED FRACTURE OF DISTAL END OF LEFT TIBIA WITH ROUTINE HEALING, UNSPECIFIED FRACTURE MORPHOLOGY, SUBSEQUENT ENCOUNTER: Primary | ICD-10-CM

## 2021-12-28 DIAGNOSIS — Z79.4 TYPE 2 DIABETES MELLITUS WITH HYPERGLYCEMIA, WITH LONG-TERM CURRENT USE OF INSULIN (H): ICD-10-CM

## 2021-12-28 DIAGNOSIS — Z48.89 AFTERCARE FOLLOWING SURGERY FOR INJURY AND TRAUMA: ICD-10-CM

## 2021-12-28 DIAGNOSIS — S82.202D CLOSED FRACTURE OF LEFT TIBIA AND FIBULA WITH ROUTINE HEALING, SUBSEQUENT ENCOUNTER: ICD-10-CM

## 2021-12-28 PROCEDURE — 99309 SBSQ NF CARE MODERATE MDM 30: CPT | Performed by: NURSE PRACTITIONER

## 2021-12-28 ASSESSMENT — MIFFLIN-ST. JEOR: SCORE: 1633.04

## 2021-12-28 NOTE — LETTER
"    12/28/2021        RE: Lucia Bobo  [address] [address] [address]        M [facility] Medical Record Number: [id number]  Chief Complaint   Patient presents with     RECHECK     HPI: Lucia Bobo is a 76 year old (1945), who is being seen today for an episodic care visit at: [facility] ([facility]) [[id number]]. Today's concern is: Recent multiple hospitalizations with minimally displaced left distal tibia and midshaft fibular fracture requiring multiple surgeries. Seen today to follow-up on pain, wound healing. Seen at the end of her PT session. She is NWB to LLE, unable to walk but can transfer from chair to bed independently. Denies any pain with exercises. Is very motivated and looking forward to being able to walk again. She is feeling well. Appetite is good. Having bowel movements. NO chest pain or SOB. Incisions on her leg are no longer draining, she is happy with how they are healing.     Allergies and PMH/PSH reviewed in [facility] today.    REVIEW OF SYSTEMS:  4 point ROS including Respiratory, CV, GI and , other than that noted in the HPI,  is negative    Objective:   /68   Pulse 83   Temp 98.1  F (36.7  C)   Resp 18   Ht 1.626 m (5' 4\")   Wt 115.3 kg (254 lb 3.2 oz)   SpO2 97%   BMI 43.63 kg/m    Exam:  GENERAL APPEARANCE:  Alert, in no distress, cooperative  RESP:  lungs clear to auscultation , no respiratory distress  CV:  regular rate and rhythm, no murmur, rub, or gallop, peripheral edema 2+ in LLE  ABDOMEN:  bowel sounds normal  M/S:   NWB to LLE  SKIN:  multiple sutured incisions to LLE, intact, dry  NEURO:   CN 2-12 grossly intact  PSYCH:  oriented X 3, normal insight, judgement and memory, affect and mood normal    Labs:   Recent labs in [facility] reviewed by me today.     Assessment/Plan:  Closed fracture of distal end of left tibia with routine healing, unspecified fracture morphology, subsequent encounter  Closed fracture of left " "tibia and fibula with routine healing, subsequent encounter  Aftercare following surgery for injury and trauma  Physical deconditioning  Secondary to fall while trying to get into car. Surgery without complication. Pain appears controlled. Multiple incisions to LLE - healing well, open to air. Given weight bearing status suspect progress with therapy will be slow. Able to transfer independently. No pain with activity. Completed 20 days of Lovenox 40mg BID on 12/26.   - Continue current wound care-  leave STEPHEN if not draining; sutures to remain in place until follow-up with ortho.- Analgesia with oxycodone 5mg q 4h PRN, APAP 1000mg TID, ice PRN, elevate when at rest  - NWB to LLE  - follow-up with ortho on 1/10/22 as scheduled  - PT/OT eval and treat. Discharge planning per their recommendation    Type 2 diabetes mellitus with hyperglycemia, with long-term current use of insulin (H) HgbA1c goal <7  A1C 6.7% on 11/17/21. BG typically  over the past week. No further episodes of hypoglycemia.   - BG BID  - continue metformin 1000mg q AM 1500mg q PM, glipizide Er 5mg daily, and liraglutide 1.8mg at bedtime    Benign essential hypertension, BP goal <140/90  -130, HR 70-80.  Does have chronic BLE edema, wearing home compression stocking to RLE  - atorvastatin 20mg daily  - furosemide 40mg daily  - hydralazine 150mg daily, losartan 100mg daily, verapamil ER 240mg daily,   - ok to wear ACE loosely over LLE to help with edema - on in AM, off at HS    Slow transit constipation  Report regular bowel movements. No recent loose stools. H/o of \"slow\" colon per patient.   - miralax 17gm daily prn, monitor and adjust       Orders:  NNO    Electronically signed by: MERRY Weinberg CNP        Sincerely,        MERRY Weinberg CNP    "

## 2022-01-04 ENCOUNTER — TRANSITIONAL CARE UNIT VISIT (OUTPATIENT)
Dept: GERIATRICS | Facility: CLINIC | Age: 77
End: 2022-01-04
Payer: COMMERCIAL

## 2022-01-04 VITALS
HEART RATE: 73 BPM | SYSTOLIC BLOOD PRESSURE: 103 MMHG | OXYGEN SATURATION: 99 % | TEMPERATURE: 98.3 F | WEIGHT: 253 LBS | DIASTOLIC BLOOD PRESSURE: 49 MMHG | HEIGHT: 64 IN | BODY MASS INDEX: 43.19 KG/M2 | RESPIRATION RATE: 18 BRPM

## 2022-01-04 DIAGNOSIS — F39 MOOD DISORDER (H): ICD-10-CM

## 2022-01-04 DIAGNOSIS — S82.302D CLOSED FRACTURE OF DISTAL END OF LEFT TIBIA WITH ROUTINE HEALING, UNSPECIFIED FRACTURE MORPHOLOGY, SUBSEQUENT ENCOUNTER: Primary | ICD-10-CM

## 2022-01-04 DIAGNOSIS — S82.402D CLOSED FRACTURE OF LEFT TIBIA AND FIBULA WITH ROUTINE HEALING, SUBSEQUENT ENCOUNTER: ICD-10-CM

## 2022-01-04 DIAGNOSIS — I10 BENIGN ESSENTIAL HYPERTENSION: ICD-10-CM

## 2022-01-04 DIAGNOSIS — R53.81 PHYSICAL DECONDITIONING: ICD-10-CM

## 2022-01-04 DIAGNOSIS — S82.202D CLOSED FRACTURE OF LEFT TIBIA AND FIBULA WITH ROUTINE HEALING, SUBSEQUENT ENCOUNTER: ICD-10-CM

## 2022-01-04 DIAGNOSIS — E66.01 MORBID OBESITY (H): ICD-10-CM

## 2022-01-04 DIAGNOSIS — K59.01 SLOW TRANSIT CONSTIPATION: ICD-10-CM

## 2022-01-04 DIAGNOSIS — E11.65 TYPE 2 DIABETES MELLITUS WITH HYPERGLYCEMIA, WITH LONG-TERM CURRENT USE OF INSULIN (H): ICD-10-CM

## 2022-01-04 DIAGNOSIS — Z79.4 TYPE 2 DIABETES MELLITUS WITH HYPERGLYCEMIA, WITH LONG-TERM CURRENT USE OF INSULIN (H): ICD-10-CM

## 2022-01-04 DIAGNOSIS — Z48.89 AFTERCARE FOLLOWING SURGERY FOR INJURY AND TRAUMA: ICD-10-CM

## 2022-01-04 PROCEDURE — 99309 SBSQ NF CARE MODERATE MDM 30: CPT | Performed by: NURSE PRACTITIONER

## 2022-01-04 ASSESSMENT — MIFFLIN-ST. JEOR: SCORE: 1627.6

## 2022-01-04 NOTE — LETTER
"    1/4/2022        RE: Lucia Bobo  [address] [facility] [address]        [facility] Medical Record Number: [id number]  Chief Complaint   Patient presents with     RECHECK     HPI: Lucia Bobo is a 76 year old (1945), who is being seen today for an episodic care visit at: [facility] ([facility]) [[id number]]. Today's concern is: Recent multiple hospitalizations with minimally displaced left distal tibia and midshaft fibular fracture requiring multiple surgeries. Seen today for routine visit in TCU. She is working with therapy. Feels she is making progress, is hopeful her weight bearing will be advanced at ortho appointment next week. Appetite is good, having bowel movements. No chest pain or SOB.     Allergies and PMH/PSH reviewed in [facility] today.    REVIEW OF SYSTEMS:  4 point ROS including Respiratory, CV, GI and , other than that noted in the HPI,  is negative    Objective:   /49   Pulse 73   Temp 98.3  F (36.8  C)   Resp 18   Ht 1.626 m (5' 4\")   Wt 114.8 kg (253 lb)   SpO2 99%   BMI 43.43 kg/m    Exam:  GENERAL APPEARANCE:  Alert, in no distress, cooperative  RESP:  lungs clear to auscultation , no respiratory distress  CV:  regular rate and rhythm, no murmur, rub, or gallop, peripheral edema 2+ in BLE  ABDOMEN:  bowel sounds normal  M/S:   generalized weankess, decreased ROM to left ankle NWB to LLE  SKIN:  multple incisions to LLE healing, sutures in place, no drainage, dry peeling skin near left ankle  NEURO:   Cn 2-12 grossly intact  PSYCH:  oriented X 3, normal insight, judgement and memory, affect and mood normal    Labs:   Recent labs in [facility] reviewed by me today.     Assessment/Plan:  Closed fracture of distal end of left tibia with routine healing, unspecified fracture morphology, subsequent encounter  Closed fracture of left tibia and fibula with routine healing, subsequent encounter  Aftercare following surgery for injury and " "trauma  Physical deconditioning  Secondary to fall while trying to get into car. Surgery without complication. Pain controlled with minimal narcotics. Multiple incisions to LLE - healing well, open to air. Given weight bearing status suspect progress with therapy will be slow. Able to transfer independently. Completed 20 days of Lovenox 40mg BID on 12/26.   - Continue current wound care-  leave STEPHEN if not draining; sutures to remain in place until follow-up with ortho.  - Analgesia with oxycodone 5mg q 4h PRN, APAP 1000mg TID, ice PRN, elevate when at rest  - NWB to LLE  - follow-up with ortho on 1/10/22 as scheduled  - PT/OT eval and treat. Discharge planning per their recommendation    Type 2 diabetes mellitus with hyperglycemia, with long-term current use of insulin (H)  A1C 6.7% on 11/17/21; BG controlled, typically       Mood disorder (H)  - BG BID  - continue metformin 1000mg q AM 1500mg q PM, glipizide Er 5mg daily, and liraglutide 1.8mg at bedtime    Morbid obesity (H)  BMI 43. Likely will affect recovery.   - monitor weights, limit snacking    Benign essential hypertension, BP goal <140/90  -130, BR 70-80. Chronic BLE edema, wearing home compression stocking to RLE  - atorvastatin 20mg daily  - furosemide 40mg daily  - hydralazine 150mg daily, losartan 100mg daily, verapamil ER 240mg daily,   - ok to wear ACE loosely over LLE to help with edema - on in AM, off at HS    Slow transit constipation  Report regular bowel movements. No recent loose stools. H/o of \"slow\" colon per patient.   - miralax 17gm daily prn, monitor and adjust       Orders:  NNO    Electronically signed by: MERRY Weinberg CNP        Sincerely,        MERRY Weinberg CNP    "

## 2022-01-05 ENCOUNTER — TRANSITIONAL CARE UNIT VISIT (OUTPATIENT)
Dept: GERIATRICS | Facility: CLINIC | Age: 77
End: 2022-01-05
Payer: COMMERCIAL

## 2022-01-05 VITALS
WEIGHT: 253 LBS | OXYGEN SATURATION: 99 % | BODY MASS INDEX: 43.19 KG/M2 | TEMPERATURE: 98.7 F | DIASTOLIC BLOOD PRESSURE: 63 MMHG | HEART RATE: 90 BPM | RESPIRATION RATE: 16 BRPM | HEIGHT: 64 IN | SYSTOLIC BLOOD PRESSURE: 128 MMHG

## 2022-01-05 DIAGNOSIS — E11.65 TYPE 2 DIABETES MELLITUS WITH HYPERGLYCEMIA, WITH LONG-TERM CURRENT USE OF INSULIN (H): ICD-10-CM

## 2022-01-05 DIAGNOSIS — Z48.89 AFTERCARE FOLLOWING SURGERY FOR INJURY AND TRAUMA: ICD-10-CM

## 2022-01-05 DIAGNOSIS — S82.202D CLOSED FRACTURE OF LEFT TIBIA AND FIBULA WITH ROUTINE HEALING, SUBSEQUENT ENCOUNTER: ICD-10-CM

## 2022-01-05 DIAGNOSIS — E66.01 MORBID OBESITY (H): ICD-10-CM

## 2022-01-05 DIAGNOSIS — I10 BENIGN ESSENTIAL HYPERTENSION: ICD-10-CM

## 2022-01-05 DIAGNOSIS — Z79.4 TYPE 2 DIABETES MELLITUS WITH HYPERGLYCEMIA, WITH LONG-TERM CURRENT USE OF INSULIN (H): ICD-10-CM

## 2022-01-05 DIAGNOSIS — F39 MOOD DISORDER (H): ICD-10-CM

## 2022-01-05 DIAGNOSIS — S82.402D CLOSED FRACTURE OF LEFT TIBIA AND FIBULA WITH ROUTINE HEALING, SUBSEQUENT ENCOUNTER: ICD-10-CM

## 2022-01-05 DIAGNOSIS — S82.302D CLOSED FRACTURE OF DISTAL END OF LEFT TIBIA WITH ROUTINE HEALING, UNSPECIFIED FRACTURE MORPHOLOGY, SUBSEQUENT ENCOUNTER: Primary | ICD-10-CM

## 2022-01-05 PROCEDURE — 99305 1ST NF CARE MODERATE MDM 35: CPT | Performed by: FAMILY MEDICINE

## 2022-01-05 ASSESSMENT — MIFFLIN-ST. JEOR: SCORE: 1627.6

## 2022-01-05 NOTE — PROGRESS NOTES
[place] [facility]  PRIMARY CARE PROVIDER AND CLINIC:  MERRY Reynolds CNP, [facility] [address] [facility] [address]  Chief Complaint   Patient presents with     Hospital F/U     [place] Medical Record Number:  [id number]  Place of Service where encounter took place:  [facility] (TCU) [[id number]]    Lucia Bobo  is a 76 year old  (1945), admitted to the above facility from  [facility]. Hospital stay 12/3/21 through 12/6/21..  Admitted to this facility for  rehab, medical management and nursing care.    HPI:    HPI information obtained from: facility chart records, facility staff, patient report and [facility] chart review.   Brief Summary of Hospital Course:   -Hospital discharge summary and GNP's TCU notes reviewed  -Briefly patient with PMH pertinent for morbid obesity, had a fall on November 22 which resulted in minimally displaced left distal tibia and midshaft fibula fracture s/p closed reduction and external fixator placement, NWB on LLE, transitioned to rehab, and was readmitted  on 12/3  S/p external fixator removal and IM nailing of left tibia.      Today:  - Left tibia fx: denies pain.   - rehab: reports going well, still NWB. Going to see ortho in 5 days. Still have the stitches on, and was told that Ortho team wants to take it off by themselves.       CODE STATUS/ADVANCE DIRECTIVES DISCUSSION: DNR/Selective Treatment    Patient's living condition: lives with spouse  ALLERGIES: Lisinopril, Amoxicillin, Lisinopril, and Penicillins  PAST MEDICAL HISTORY:  has a past medical history of Asthma, HTN, and Type II or unspecified type diabetes mellitus without mention of complication, not stated as uncontrolled (4/2/04).  PAST SURGICAL HISTORY:   has a past surgical history that includes colonoscopy (2007); cholecystectomy, open; appendectomy; Esophagoscopy, gastroscopy, duodenoscopy (EGD), combined (N/A, 11/20/2015); Esophagoscopy, gastroscopy,  duodenoscopy (EGD), combined (N/A, 12/22/2017); Colonoscopy (N/A, 12/22/2017); Colonoscopy (N/A, 1/17/2018); and Colonoscopy (N/A, 9/12/2019).  FAMILY HISTORY: family history includes Cancer in her mother; Hypertension in her father.  SOCIAL HISTORY:   reports that she has never smoked. She has never used smokeless tobacco. She reports that she does not drink alcohol and does not use drugs.    Post Discharge Medication Reconciliation Status: discharge medications reconciled and changed, per note/orders  Current Outpatient Medications   Medication Sig Dispense Refill     acetaminophen (TYLENOL) 500 MG tablet Take 1,000 mg by mouth 3 times daily        ADVAIR DISKUS 250-50 MCG/DOSE inhaler Inhale 1 puff into the lungs 2 times daily 1 Inhaler 11     albuterol (PROAIR HFA/PROVENTIL HFA/VENTOLIN HFA) 108 (90 Base) MCG/ACT inhaler Inhale 2 puffs into the lungs every 4 hours as needed for shortness of breath / dyspnea       atorvastatin (LIPITOR) 20 MG tablet TAKE 1 TABLET BY MOUTH EVERY NIGHT AT BEDTIME 90 tablet 0     blood glucose (NO BRAND SPECIFIED) test strip Use to test blood sugar 1 times daily or as directed. 100 each 3     blood glucose monitoring (NO BRAND SPECIFIED) meter device kit Use to test blood sugar 2 times daily or as directed. Needs lancets and strips per insurance coverage. 1 kit 0     calcium carbonate 600 mg-vitamin D 400 units (CALTRATE) 600-400 MG-UNIT per tablet Take 1 tablet by mouth daily       DULoxetine (CYMBALTA) 60 MG capsule Take 1 capsule daily 90 capsule 3     furosemide (LASIX) 40 MG tablet Take 40 mg by mouth daily       gabapentin (NEURONTIN) 300 MG capsule Take 300 mg by mouth 2 times daily       glipiZIDE (GLUCOTROL XL) 5 MG 24 hr tablet Take 5 mg by mouth daily       glucosamine-chondroitin 500-400 MG CAPS per capsule Take 2 capsules by mouth 2 times daily        hydrALAZINE (APRESOLINE) 100 MG tablet Take 100 mg by mouth daily Patient takes Hydralazine BID - 100 mg once daily AND  "50 mg once daily       hydrALAZINE (APRESOLINE) 50 MG tablet Take 50 mg by mouth daily Patient takes Hydralazine BID - 100 mg once daily AND 50 mg once daily       insulin pen needle (BD SMILEY U/F) 32G X 4 MM miscellaneous USE ONE PEN NEEDLE ONCE DAILY OR AS DIRECTED 100 each 3     liraglutide (VICTOZA PEN) 18 MG/3ML solution INJECT 1.8mg subcutaneously ONCE DAILY 9 mL 0     losartan (COZAAR) 100 MG tablet Take 100 mg by mouth daily       magnesium oxide (MAG-OX) 400 MG tablet Take 400 mg by mouth 2 times daily       metFORMIN (GLUCOPHAGE) 1000 MG tablet TAKE 1 TABLET BY MOUTH AT BREAKFAST AND TAKE 1 AND 1/2 TABLETS (1500mg) AT dinner 75 tablet 0     multivitamin (OCUVITE) TABS tablet Take 1 tablet by mouth daily        ONETOUCH ULTRA test strip USE TO TEST BLOOD SUGAR TWO TIMES A DAY OR AS DIRECTED 100 each 1     order for DME Equipment being ordered: 2 pairs of CARINA hose 10 mmHg Calf width 51 cm, Lower leg length 39 cm 2 Units 0     order for DME Equipment being ordered: glucometer with test strips 1 Units 0     order for DME Equipment being ordered: blood pressure monitor 1 Device 0     oxyCODONE (ROXICODONE) 5 MG tablet Take 5 mg by mouth every 4 hours as needed for moderate to severe pain       PHARMACIST CHOICE LANCETS MISC Check blood sugars twice daily 100 each 3     polyethylene glycol 3350 POWD Take 17 g by mouth daily as needed (constipation)       rOPINIRole (REQUIP) 0.5 MG tablet TAKE 1 TABLET BY MOUTH EVERY NIGHT AT BEDTIME 90 tablet 0     verapamil ER (CALAN-SR) 240 MG CR tablet TAKE 1 TABLET BY MOUTH ONCE DAILY 90 tablet 0     ROS:  10 point ROS of systems including Constitutional, Eyes, Respiratory, Cardiovascular, Gastroenterology, Genitourinary, Integumentary, Musculoskeletal, Psychiatric were all negative except for pertinent positives noted in my HPI.    Vitals:  /63   Pulse 90   Temp 98.7  F (37.1  C)   Resp 16   Ht 1.626 m (5' 4\")   Wt 114.8 kg (253 lb)   SpO2 99%   BMI 43.43 kg/m  "   Exam:  GENERAL APPEARANCE:  in no distress, cooperative  ENT:  Mouth and posterior oropharynx normal, moist mucous membranes, oral mucosa moist, no lesion noted.   EYES:  EOMI, Pupil rounded and equal.  RESP:  lungs clear to auscultation   CV:  S1S2 audible, regular HR, no murmur appreciated.   ABDOMEN:  soft, NT/ND, BS audible. no mass appreciated on palpation.   M/S:   2+ pitting edema in feet  SKIN:  Healed multiple surgical sites over left leg: no drainage or erythema, dry, stitches in place.   NEURO:   No NFD appreciated on observation. Hand  5/5 b/l  PSYCH:  normal insight, judgement and memory, affect and mood normal, very pleasant    Lab/Diagnostic data: Reviewed in the chart and EHR.          ASSESSMENT/PLAN:  -------------------------------  Closed fracture of distal end of left tibia with routine healing, unspecified fracture morphology, subsequent encounter  Closed fracture of left tibia and fibula with routine healing, subsequent encounter  Aftercare following surgery for injury and trauma  Physical deconditioning  - - Started rehab program, making a progress, continue until desired goal is achieved.   - Analgesia optimal  - Followed by Orthopedic Team.   - edema at baseline.       Type 2 diabetes mellitus with hyperglycemia, with long-term current use of insulin (H)   A1C 6.7 10/27/2020    A1C 7.7 03/19/2020   - Goal is less than 8.5%  -  In this frail elderly adult with a limited life expectancy, the goal of  the management is to address the hyperglycemia sx if any,  rather than the  BGs numbers per se.     Mood disorder (H): stable, adjusting well. Strong family support, very positive attitude.       Morbid obesity (H): - Body mass index is 43.43 kg/m .  requires extensive nursing care.       Benign essential hypertension, BP goal <140/90: controlled.     Mild persistent asthma, uncomplicated: no concern. Tolerating therapy.         Electronically signed by:  Dawson Maravilla MD

## 2022-01-05 NOTE — LETTER
1/5/2022        RE: Lucia Bobo  [address]  [facility] [address]        [place] [facility]  PRIMARY CARE PROVIDER AND CLINIC:  MERRY Reynolds CNP, [address] [address]  Chief Complaint   Patient presents with     Hospital F/U     [place] Medical Record Number:  [id number]  Place of Service where encounter took place:  [facility] (TCU) [[id number]]    Lucia Bobo  is a 76 year old  (1945), admitted to the above facility from  [facility]. Hospital stay 12/3/21 through 12/6/21..  Admitted to this facility for  rehab, medical management and nursing care.    HPI:    HPI information obtained from: facility chart records, facility staff, patient report and [facility] chart review.   Brief Summary of Hospital Course:   -Hospital discharge summary and GNP's TCU notes reviewed  -Briefly patient with PMH pertinent for morbid obesity, had a fall on November 22 which resulted in minimally displaced left distal tibia and midshaft fibula fracture s/p closed reduction and external fixator placement, NWB on LLE, transitioned to rehab, and was readmitted  on 12/3  S/p external fixator removal and IM nailing of left tibia.      Today:  - Left tibia fx: denies pain.   - rehab: reports going well, still NWB. Going to see ortho in 5 days. Still have the stitches on, and was told that Ortho team wants to take it off by themselves.       CODE STATUS/ADVANCE DIRECTIVES DISCUSSION: DNR/Selective Treatment    Patient's living condition: lives with spouse  ALLERGIES: Lisinopril, Amoxicillin, Lisinopril, and Penicillins  PAST MEDICAL HISTORY:  has a past medical history of Asthma, HTN, and Type II or unspecified type diabetes mellitus without mention of complication, not stated as uncontrolled (4/2/04).  PAST SURGICAL HISTORY:   has a past surgical history that includes colonoscopy (2007); cholecystectomy, open; appendectomy; Esophagoscopy,  gastroscopy, duodenoscopy (EGD), combined (N/A, 11/20/2015); Esophagoscopy, gastroscopy, duodenoscopy (EGD), combined (N/A, 12/22/2017); Colonoscopy (N/A, 12/22/2017); Colonoscopy (N/A, 1/17/2018); and Colonoscopy (N/A, 9/12/2019).  FAMILY HISTORY: family history includes Cancer in her mother; Hypertension in her father.  SOCIAL HISTORY:   reports that she has never smoked. She has never used smokeless tobacco. She reports that she does not drink alcohol and does not use drugs.    Post Discharge Medication Reconciliation Status: discharge medications reconciled and changed, per note/orders  Current Outpatient Medications   Medication Sig Dispense Refill     acetaminophen (TYLENOL) 500 MG tablet Take 1,000 mg by mouth 3 times daily        ADVAIR DISKUS 250-50 MCG/DOSE inhaler Inhale 1 puff into the lungs 2 times daily 1 Inhaler 11     albuterol (PROAIR HFA/PROVENTIL HFA/VENTOLIN HFA) 108 (90 Base) MCG/ACT inhaler Inhale 2 puffs into the lungs every 4 hours as needed for shortness of breath / dyspnea       atorvastatin (LIPITOR) 20 MG tablet TAKE 1 TABLET BY MOUTH EVERY NIGHT AT BEDTIME 90 tablet 0     blood glucose (NO BRAND SPECIFIED) test strip Use to test blood sugar 1 times daily or as directed. 100 each 3     blood glucose monitoring (NO BRAND SPECIFIED) meter device kit Use to test blood sugar 2 times daily or as directed. Needs lancets and strips per insurance coverage. 1 kit 0     calcium carbonate 600 mg-vitamin D 400 units (CALTRATE) 600-400 MG-UNIT per tablet Take 1 tablet by mouth daily       DULoxetine (CYMBALTA) 60 MG capsule Take 1 capsule daily 90 capsule 3     furosemide (LASIX) 40 MG tablet Take 40 mg by mouth daily       gabapentin (NEURONTIN) 300 MG capsule Take 300 mg by mouth 2 times daily       glipiZIDE (GLUCOTROL XL) 5 MG 24 hr tablet Take 5 mg by mouth daily       glucosamine-chondroitin 500-400 MG CAPS per capsule Take 2 capsules by mouth 2 times daily        hydrALAZINE (APRESOLINE) 100 MG  tablet Take 100 mg by mouth daily Patient takes Hydralazine BID - 100 mg once daily AND 50 mg once daily       hydrALAZINE (APRESOLINE) 50 MG tablet Take 50 mg by mouth daily Patient takes Hydralazine BID - 100 mg once daily AND 50 mg once daily       insulin pen needle (BD SMILEY U/F) 32G X 4 MM miscellaneous USE ONE PEN NEEDLE ONCE DAILY OR AS DIRECTED 100 each 3     liraglutide (VICTOZA PEN) 18 MG/3ML solution INJECT 1.8mg subcutaneously ONCE DAILY 9 mL 0     losartan (COZAAR) 100 MG tablet Take 100 mg by mouth daily       magnesium oxide (MAG-OX) 400 MG tablet Take 400 mg by mouth 2 times daily       metFORMIN (GLUCOPHAGE) 1000 MG tablet TAKE 1 TABLET BY MOUTH AT BREAKFAST AND TAKE 1 AND 1/2 TABLETS (1500mg) AT dinner 75 tablet 0     multivitamin (OCUVITE) TABS tablet Take 1 tablet by mouth daily        ONETOUCH ULTRA test strip USE TO TEST BLOOD SUGAR TWO TIMES A DAY OR AS DIRECTED 100 each 1     order for DME Equipment being ordered: 2 pairs of CARINA hose 10 mmHg Calf width 51 cm, Lower leg length 39 cm 2 Units 0     order for DME Equipment being ordered: glucometer with test strips 1 Units 0     order for DME Equipment being ordered: blood pressure monitor 1 Device 0     oxyCODONE (ROXICODONE) 5 MG tablet Take 5 mg by mouth every 4 hours as needed for moderate to severe pain       PHARMACIST CHOICE LANCETS MIS Check blood sugars twice daily 100 each 3     polyethylene glycol 3350 POWD Take 17 g by mouth daily as needed (constipation)       rOPINIRole (REQUIP) 0.5 MG tablet TAKE 1 TABLET BY MOUTH EVERY NIGHT AT BEDTIME 90 tablet 0     verapamil ER (CALAN-SR) 240 MG CR tablet TAKE 1 TABLET BY MOUTH ONCE DAILY 90 tablet 0     ROS:  10 point ROS of systems including Constitutional, Eyes, Respiratory, Cardiovascular, Gastroenterology, Genitourinary, Integumentary, Musculoskeletal, Psychiatric were all negative except for pertinent positives noted in my HPI.    Vitals:  /63   Pulse 90   Temp 98.7  F (37.1  C)   " Resp 16   Ht 1.626 m (5' 4\")   Wt 114.8 kg (253 lb)   SpO2 99%   BMI 43.43 kg/m    Exam:  GENERAL APPEARANCE:  in no distress, cooperative  ENT:  Mouth and posterior oropharynx normal, moist mucous membranes, oral mucosa moist, no lesion noted.   EYES:  EOMI, Pupil rounded and equal.  RESP:  lungs clear to auscultation   CV:  S1S2 audible, regular HR, no murmur appreciated.   ABDOMEN:  soft, NT/ND, BS audible. no mass appreciated on palpation.   M/S:   2+ pitting edema in feet  SKIN:  Healed multiple surgical sites over left leg: no drainage or erythema, dry, stitches in place.   NEURO:   No NFD appreciated on observation. Hand  5/5 b/l  PSYCH:  normal insight, judgement and memory, affect and mood normal, very pleasant    Lab/Diagnostic data: Reviewed in the chart and EHR.          ASSESSMENT/PLAN:  -------------------------------  Closed fracture of distal end of left tibia with routine healing, unspecified fracture morphology, subsequent encounter  Closed fracture of left tibia and fibula with routine healing, subsequent encounter  Aftercare following surgery for injury and trauma  Physical deconditioning  - - Started rehab program, making a progress, continue until desired goal is achieved.   - Analgesia optimal  - Followed by Orthopedic Team.   - edema at baseline.       Type 2 diabetes mellitus with hyperglycemia, with long-term current use of insulin (H)   A1C 6.7 10/27/2020    A1C 7.7 03/19/2020   - Goal is less than 8.5%  -  In this frail elderly adult with a limited life expectancy, the goal of  the management is to address the hyperglycemia sx if any,  rather than the  BGs numbers per se.     Mood disorder (H): stable, adjusting well. Strong family support, very positive attitude.       Morbid obesity (H): - Body mass index is 43.43 kg/m .  requires extensive nursing care.       Benign essential hypertension, BP goal <140/90: controlled.     Mild persistent asthma, uncomplicated: no concern. " Tolerating therapy.         Electronically signed by:  Dawson Maravilla MD             Sincerely,        Dawson Maravilla MD

## 2022-01-10 NOTE — PROGRESS NOTES
"[facility] SERVICES  [place] Medical Record Number: [id number]  Chief Complaint   Patient presents with     RECHECK     HPI: Lucia Bobo is a 76 year old (1945), who is being seen today for an episodic care visit at: [facility] ([facility]) [[id number]]. Today's concern is: Recent multiple hospitalizations with minimally displaced left distal tibia and midshaft fibular fracture requiring multiple surgeries. She is currently in TCU for rehab, in NWB to LLE. She was seen by ortho yesterday, they recommended 2 more weeks of NWB to LLE, then advance as tolerated. She is disappointed she is not yet able to bear weight, but is happy that her sutures were removed. She denies any pain. Appetite is good, having bowel movements.     Allergies and PMH/PSH reviewed in [facility] today.    REVIEW OF SYSTEMS:  4 point ROS including Respiratory, CV, GI and , other than that noted in the HPI,  is negative    Objective:   /68   Pulse 103   Temp 98.7  F (37.1  C)   Resp 18   Ht 1.626 m (5' 4\")   Wt 116.5 kg (256 lb 12.8 oz)   SpO2 95%   BMI 44.08 kg/m    Exam:  GENERAL APPEARANCE:  Alert, in no distress, cooperative  RESP:  lungs clear to auscultation , no respiratory distress  CV:  regular rate and rhythm, no murmur, rub, or gallop, peripheral edema 1-2+ in BLE, compression socks in place  ABDOMEN:  bowel sounds normal  M/S:   NWB to LLE, no gross joint deformities  SKIN:  multiple healing wound to LLE  NEURO:   Cn 2-12 grossly intact  PSYCH:  oriented X 3, normal insight, judgement and memory, affect and mood normal    Labs:   Recent labs in [facility] reviewed by me today.     Assessment/Plan:  Closed fracture of distal end of left tibia with routine healing, unspecified fracture morphology, subsequent encounter  Closed fracture of left tibia and fibula with routine healing, subsequent encounter  Physical deconditioning  Aftercare following surgery for injury and trauma  Secondary to fall while trying to get " into car. Surgery without complication. Pain controlled with minimal narcotics. Multiple incisions to LLE - healing well, open to air. Given weight bearing status suspect progress with therapy will be slow. Able to transfer independently. Completed 20 days of Lovenox 40mg BID on 12/26. Had follow-up with ortho on 1/10, sutures removed.  - Analgesia with oxycodone 5mg q 4h PRN, APAP 1000mg TID, ice PRN, elevate when at rest  - NWB to LLE x 2 more weeks then advance as tolerated.   - follow-up with ortho on 2/10/22 as scheduled  - PT/OT eval and treat. Discharge planning per their recommendation     Benign essential hypertension, BP goal <140/90  -130, P 70-80.   - compression stockings to BLE  - atorvastatin 20mg daily  - furosemide 40mg daily  - hydralazine 150mg daily, losartan 100mg daily, verapamil ER 240mg daily,    Type 2 diabetes mellitus with hyperglycemia, with long-term current use of insulin (H)  A1C 6.7% on 11/17/21; BG controlled, typically 100-160  - BG BID  - continue metformin 1000mg q AM 1500mg q PM, glipizide Er 5mg daily, and liraglutide 1.8mg at bedtime      Orders:  1. Decrease Bg checks to BID    Electronically signed by: MERRY Weinberg CNP

## 2022-01-11 ENCOUNTER — TRANSITIONAL CARE UNIT VISIT (OUTPATIENT)
Dept: GERIATRICS | Facility: CLINIC | Age: 77
End: 2022-01-11
Payer: COMMERCIAL

## 2022-01-11 VITALS
HEART RATE: 103 BPM | HEIGHT: 64 IN | TEMPERATURE: 98.7 F | BODY MASS INDEX: 43.84 KG/M2 | SYSTOLIC BLOOD PRESSURE: 114 MMHG | WEIGHT: 256.8 LBS | OXYGEN SATURATION: 95 % | DIASTOLIC BLOOD PRESSURE: 68 MMHG | RESPIRATION RATE: 18 BRPM

## 2022-01-11 DIAGNOSIS — R53.81 PHYSICAL DECONDITIONING: ICD-10-CM

## 2022-01-11 DIAGNOSIS — I10 BENIGN ESSENTIAL HYPERTENSION: ICD-10-CM

## 2022-01-11 DIAGNOSIS — S82.302D CLOSED FRACTURE OF DISTAL END OF LEFT TIBIA WITH ROUTINE HEALING, UNSPECIFIED FRACTURE MORPHOLOGY, SUBSEQUENT ENCOUNTER: Primary | ICD-10-CM

## 2022-01-11 DIAGNOSIS — S82.202D CLOSED FRACTURE OF LEFT TIBIA AND FIBULA WITH ROUTINE HEALING, SUBSEQUENT ENCOUNTER: ICD-10-CM

## 2022-01-11 DIAGNOSIS — Z48.89 AFTERCARE FOLLOWING SURGERY FOR INJURY AND TRAUMA: ICD-10-CM

## 2022-01-11 DIAGNOSIS — S82.402D CLOSED FRACTURE OF LEFT TIBIA AND FIBULA WITH ROUTINE HEALING, SUBSEQUENT ENCOUNTER: ICD-10-CM

## 2022-01-11 DIAGNOSIS — Z79.4 TYPE 2 DIABETES MELLITUS WITH HYPERGLYCEMIA, WITH LONG-TERM CURRENT USE OF INSULIN (H): ICD-10-CM

## 2022-01-11 DIAGNOSIS — E11.65 TYPE 2 DIABETES MELLITUS WITH HYPERGLYCEMIA, WITH LONG-TERM CURRENT USE OF INSULIN (H): ICD-10-CM

## 2022-01-11 PROCEDURE — 99309 SBSQ NF CARE MODERATE MDM 30: CPT | Performed by: NURSE PRACTITIONER

## 2022-01-11 ASSESSMENT — MIFFLIN-ST. JEOR: SCORE: 1644.84

## 2022-01-11 NOTE — LETTER
"    1/11/2022        RE: Lucia Bobo  [address] [facility] [address]        M [facility] Medical Record Number: [id number]  Chief Complaint   Patient presents with     RECHECK     HPI: Lucia Bobo is a 76 year old (1945), who is being seen today for an episodic care visit at: [facility] ([facility]) [[id number]]. Today's concern is: Recent multiple hospitalizations with minimally displaced left distal tibia and midshaft fibular fracture requiring multiple surgeries. She is currently in TCU for rehab, in NWB to LLE. She was seen by ortho yesterday, they recommended 2 more weeks of NWB to LLE, then advance as tolerated. She is disappointed she is not yet able to bear weight, but is happy that her sutures were removed. She denies any pain. Appetite is good, having bowel movements.     Allergies and PMH/PSH reviewed in [facility] today.    REVIEW OF SYSTEMS:  4 point ROS including Respiratory, CV, GI and , other than that noted in the HPI,  is negative    Objective:   /68   Pulse 103   Temp 98.7  F (37.1  C)   Resp 18   Ht 1.626 m (5' 4\")   Wt 116.5 kg (256 lb 12.8 oz)   SpO2 95%   BMI 44.08 kg/m    Exam:  GENERAL APPEARANCE:  Alert, in no distress, cooperative  RESP:  lungs clear to auscultation , no respiratory distress  CV:  regular rate and rhythm, no murmur, rub, or gallop, peripheral edema 1-2+ in BLE, compression socks in place  ABDOMEN:  bowel sounds normal  M/S:   NWB to LLE, no gross joint deformities  SKIN:  multiple healing wound to LLE  NEURO:   Cn 2-12 grossly intact  PSYCH:  oriented X 3, normal insight, judgement and memory, affect and mood normal    Labs:   Recent labs in [facility] reviewed by me today.     Assessment/Plan:  Closed fracture of distal end of left tibia with routine healing, unspecified fracture morphology, subsequent encounter  Closed fracture of left tibia and fibula with routine healing, subsequent encounter  Physical " deconditioning  Aftercare following surgery for injury and trauma  Secondary to fall while trying to get into car. Surgery without complication. Pain controlled with minimal narcotics. Multiple incisions to LLE - healing well, open to air. Given weight bearing status suspect progress with therapy will be slow. Able to transfer independently. Completed 20 days of Lovenox 40mg BID on 12/26. Had follow-up with ortho on 1/10, sutures removed.  - Analgesia with oxycodone 5mg q 4h PRN, APAP 1000mg TID, ice PRN, elevate when at rest  - NWB to LLE x 2 more weeks then advance as tolerated.   - follow-up with ortho on 2/10/22 as scheduled  - PT/OT eval and treat. Discharge planning per their recommendation     Benign essential hypertension, BP goal <140/90  -130, P 70-80.   - compression stockings to BLE  - atorvastatin 20mg daily  - furosemide 40mg daily  - hydralazine 150mg daily, losartan 100mg daily, verapamil ER 240mg daily,    Type 2 diabetes mellitus with hyperglycemia, with long-term current use of insulin (H)  A1C 6.7% on 11/17/21; BG controlled, typically 100-160  - BG BID  - continue metformin 1000mg q AM 1500mg q PM, glipizide Er 5mg daily, and liraglutide 1.8mg at bedtime      Orders:  1. Decrease Bg checks to BID    Electronically signed by: MERRY Weinberg CNP        Sincerely,        MERRY Weinberg CNP

## 2022-01-17 NOTE — PROGRESS NOTES
"[facility] SERVICES  [place] Medical Record Number: [id number]  Chief Complaint   Patient presents with     RECHECK     HPI: Lucia Bobo is a 76 year old (1945), who is being seen today for an episodic care visit at: [facility] ([facility]) [[id number]]. Today's concern is: Recent multiple hospitalizations with minimally displaced left distal tibia and midshaft fibular fracture requiring multiple surgeries. She is currently in TCU for rehab, in NWB to LLE. She was seen by ortho 1/10/22, they recommended 2 more weeks of NWB to LLE, then advance as tolerated starting 1/24/22. She says she is doing well. Has mild ache some times in her leg, but not real pain in her left leg. She is concerned she will still be at facility in 1 month, discussed that I expect her to make progress fairly quickly once she is able to put weight on her leg next week - she is really looking forwards to going home. Appetite is good, denies any SOB. Sleeping ok at night. She has no concerns today.    Allergies and PMH/PSH reviewed in [facility] today.    REVIEW OF SYSTEMS:  4 point ROS including Respiratory, CV, GI and , other than that noted in the HPI,  is negative    Objective:   /60   Pulse 95   Temp 98.2  F (36.8  C)   Resp 20   Ht 1.626 m (5' 4\")   Wt 116.8 kg (257 lb 6.4 oz)   SpO2 97%   BMI 44.18 kg/m    Exam:  GENERAL APPEARANCE:  Alert, in no distress, cooperative  RESP:  lungs clear to auscultation , no respiratory distress  CV:  regular rate and rhythm, no murmur, rub, or gallop, peripheral edema 1-2+ in BLE  ABDOMEN:  bowel sounds normal  M/S:   NWB to LLE  SKIN:  wound appearance: healing incicions to LLE  NEURO:   Cn 2-12 grossly intact  PSYCH:  oriented X 3, affect and mood normal    Labs:   Recent labs in [facility] reviewed by me today.     Assessment/Plan:  Closed fracture of distal end of left tibia with routine healing, unspecified fracture morphology, subsequent encounter  Physical " deconditioning  Closed fracture of left tibia and fibula with routine healing, subsequent encounter  Aftercare following surgery for injury and trauma  Secondary to fall while trying to get into car. Surgery without complication. Pain controlled with minimal narcotics. Multiple incisions to LLE - healing well, open to air.Completed 20 days of Lovenox 40mg BID on 12/26. Had follow-up with ortho on 1/10, sutures removed. Agreeable to discontinued oxycodone. Making progress with therapy   - Analgesia APAP 1000mg TID, ice PRN, elevate when at rest; will discontinue oxycodone.   - NWB to LLE, then advance as tolerated on 1/24.   - follow-up with ortho on 2/10/22 as scheduled  - PT/OT eval and treat. Discharge planning per their recommendation    Benign essential hypertension, BP goal <140/90  -130s over past week, HR 70-90. Weights stable ~255lbs  - compression stockings to BLE  - atorvastatin 20mg daily  - furosemide 40mg daily  - hydralazine 150mg daily, losartan 100mg daily, verapamil ER 240mg daily    Type 2 diabetes mellitus with hyperglycemia, with long-term current use of insulin (H)  A1C 6.7% on 11/17/21; BG controlled, typically 110-160; >200 x2 in the past week  - BG BID  - continue metformin 1000mg q AM 1500mg q PM, glipizide Er 5mg daily, and liraglutide 1.8mg at bedtime    Slow transit constipation  Having regular bowel movements.   - miralax PRN      Orders:  1. discontinue oxycodone    Electronically signed by: MERRY Weinberg CNP

## 2022-01-18 ENCOUNTER — TRANSITIONAL CARE UNIT VISIT (OUTPATIENT)
Dept: GERIATRICS | Facility: CLINIC | Age: 77
End: 2022-01-18
Payer: COMMERCIAL

## 2022-01-18 VITALS
SYSTOLIC BLOOD PRESSURE: 118 MMHG | BODY MASS INDEX: 43.94 KG/M2 | OXYGEN SATURATION: 97 % | WEIGHT: 257.4 LBS | HEART RATE: 95 BPM | RESPIRATION RATE: 20 BRPM | TEMPERATURE: 98.2 F | HEIGHT: 64 IN | DIASTOLIC BLOOD PRESSURE: 60 MMHG

## 2022-01-18 DIAGNOSIS — I10 BENIGN ESSENTIAL HYPERTENSION: ICD-10-CM

## 2022-01-18 DIAGNOSIS — K59.01 SLOW TRANSIT CONSTIPATION: ICD-10-CM

## 2022-01-18 DIAGNOSIS — R53.81 PHYSICAL DECONDITIONING: ICD-10-CM

## 2022-01-18 DIAGNOSIS — S82.402D CLOSED FRACTURE OF LEFT TIBIA AND FIBULA WITH ROUTINE HEALING, SUBSEQUENT ENCOUNTER: ICD-10-CM

## 2022-01-18 DIAGNOSIS — Z48.89 AFTERCARE FOLLOWING SURGERY FOR INJURY AND TRAUMA: ICD-10-CM

## 2022-01-18 DIAGNOSIS — S82.302D CLOSED FRACTURE OF DISTAL END OF LEFT TIBIA WITH ROUTINE HEALING, UNSPECIFIED FRACTURE MORPHOLOGY, SUBSEQUENT ENCOUNTER: Primary | ICD-10-CM

## 2022-01-18 DIAGNOSIS — E11.65 TYPE 2 DIABETES MELLITUS WITH HYPERGLYCEMIA, WITH LONG-TERM CURRENT USE OF INSULIN (H): ICD-10-CM

## 2022-01-18 DIAGNOSIS — Z79.4 TYPE 2 DIABETES MELLITUS WITH HYPERGLYCEMIA, WITH LONG-TERM CURRENT USE OF INSULIN (H): ICD-10-CM

## 2022-01-18 DIAGNOSIS — S82.202D CLOSED FRACTURE OF LEFT TIBIA AND FIBULA WITH ROUTINE HEALING, SUBSEQUENT ENCOUNTER: ICD-10-CM

## 2022-01-18 PROCEDURE — 99309 SBSQ NF CARE MODERATE MDM 30: CPT | Performed by: NURSE PRACTITIONER

## 2022-01-18 ASSESSMENT — MIFFLIN-ST. JEOR: SCORE: 1647.56

## 2022-01-24 NOTE — PROGRESS NOTES
"[facility] GERIATRICS  [place] Medical Record Number: [id number]  Chief Complaint   Patient presents with     RECHECK     HPI: Lucia Bobo is a 76 year old (1945), who is being seen today for an episodic care visit at: [facility] ([facility]) [[id number]]. Today's concern is:  Recent multiple hospitalizations with minimally displaced left distal tibia and midshaft fibular fracture requiring multiple surgeries. She is currently in TCU for rehab, in NWB to LLE. She was seen by ortho 1/10/22, they recommended 2 more weeks of NWB to LLE, then advance as tolerated starting 1/24/22. She was able to stand and bear weight on her LLE yesterday. Says is felt very good to stand, was not able to do much but says it was good to stand. She denies any pain . She has no concerns today. Is looking forward to getting to move around more and go home.     Allergies and PMH/PSH reviewed in [facility] today.    REVIEW OF SYSTEMS:  4 point ROS including Respiratory, CV, GI and , other than that noted in the HPI,  is negative    Objective:   /76   Pulse 90   Temp 98.5  F (36.9  C)   Resp 18   Ht 1.626 m (5' 4\")   Wt 140.5 kg (309 lb 12.8 oz)   SpO2 98%   BMI 53.18 kg/m    Exam:  GENERAL APPEARANCE:  Alert, in no distress, morbidly obese, cooperative  RESP:  lungs clear to auscultation , no respiratory distress  CV:  regular rate and rhythm, no murmur, rub, or gallop, peripheral edema 1-2+ in BLE, compression stockings on   ABDOMEN:  bowel sounds normal  M/S:   decreased ROM to left ankle  SKIN:  wound appearance: healed incisions to LLE, dry skin  NEURO:   CN 2-12 grossly intact  PSYCH:  oriented X 3, normal insight, judgement and memory, affect and mood normal    Labs:   Recent labs in [facility] reviewed by me today.  and   Most Recent 3 CBC's:  Recent Labs   Lab Test 12/17/21  0800 12/13/21  0800 10/27/20  0807   WBC 8.6 12.9* 12.1*   HGB 10.1* 11.4* 13.6   * 103* 97   * 785* 506*     Most Recent 3 " BMP's:  Recent Labs   Lab Test 12/17/21  0800 10/27/20  0807 03/19/20  0933    138 136   POTASSIUM 4.6 4.4 4.6   CHLORIDE 107 105 104   CO2 27 27 24   BUN 25 18 26   CR 0.90 0.85 1.03   ANIONGAP 6 6 8   RAQUEL 9.0 9.8 10.0   * 195* 195*       Assessment/Plan:  Closed fracture of distal end of left tibia with routine healing, unspecified fracture morphology, subsequent encounter  Aftercare following surgery for injury and trauma  Closed fracture of left tibia and fibula with routine healing, subsequent encounter  Physical deconditioning  Secondary to fall while trying to get into car. Surgery without complication. Pain controlled with minimal narcotics. Multiple incisions to LLE - healing well, open to air.Completed 20 days of Lovenox 40mg BID on 12/26. Had follow-up with ortho on 1/10, sutures removed. Advanced to weight bearing as tolerated on 1/24.   - Analgesia APAP 1000mg TID, ice PRN, elevate when at rest;    -  advance as weight bearing as tolerated on 1/24.   - follow-up with ortho on 2/10/22 as scheduled  - PT/OT eval and treat. Discharge planning per their recommendation    Benign essential hypertension, BP goal <140/90  -130s over the past week. HR 70-90, weight stable ~255lbs  - compression stockings to BLE  - atorvastatin 20mg daily  - furosemide 40mg daily  - hydralazine 100mg q AM, 5mg q PM, losartan 100mg daily, verapamil ER 240mg daily    Type 2 diabetes mellitus with hyperglycemia, with long-term current use of insulin (H)  A1C 6.7% on 11/17/21. -180.   -  BG BID  - continue metformin 1000mg q AM 1500mg q PM, glipizide Er 5mg daily, and liraglutide 1.8mg at bedtime    Slow transit constipation  - miralax 17gm daily PRN      Orders:  NNO    Electronically signed by: MERRY Weinberg CNP

## 2022-01-25 ENCOUNTER — TRANSITIONAL CARE UNIT VISIT (OUTPATIENT)
Dept: GERIATRICS | Facility: CLINIC | Age: 77
End: 2022-01-25
Payer: COMMERCIAL

## 2022-01-25 VITALS
TEMPERATURE: 98.5 F | BODY MASS INDEX: 50.02 KG/M2 | RESPIRATION RATE: 18 BRPM | WEIGHT: 293 LBS | DIASTOLIC BLOOD PRESSURE: 76 MMHG | SYSTOLIC BLOOD PRESSURE: 132 MMHG | HEIGHT: 64 IN | OXYGEN SATURATION: 98 % | HEART RATE: 90 BPM

## 2022-01-25 DIAGNOSIS — E11.65 TYPE 2 DIABETES MELLITUS WITH HYPERGLYCEMIA, WITH LONG-TERM CURRENT USE OF INSULIN (H): ICD-10-CM

## 2022-01-25 DIAGNOSIS — K59.01 SLOW TRANSIT CONSTIPATION: ICD-10-CM

## 2022-01-25 DIAGNOSIS — I10 BENIGN ESSENTIAL HYPERTENSION: ICD-10-CM

## 2022-01-25 DIAGNOSIS — S82.402D CLOSED FRACTURE OF LEFT TIBIA AND FIBULA WITH ROUTINE HEALING, SUBSEQUENT ENCOUNTER: ICD-10-CM

## 2022-01-25 DIAGNOSIS — Z79.4 TYPE 2 DIABETES MELLITUS WITH HYPERGLYCEMIA, WITH LONG-TERM CURRENT USE OF INSULIN (H): ICD-10-CM

## 2022-01-25 DIAGNOSIS — R53.81 PHYSICAL DECONDITIONING: ICD-10-CM

## 2022-01-25 DIAGNOSIS — S82.302D CLOSED FRACTURE OF DISTAL END OF LEFT TIBIA WITH ROUTINE HEALING, UNSPECIFIED FRACTURE MORPHOLOGY, SUBSEQUENT ENCOUNTER: Primary | ICD-10-CM

## 2022-01-25 DIAGNOSIS — S82.202D CLOSED FRACTURE OF LEFT TIBIA AND FIBULA WITH ROUTINE HEALING, SUBSEQUENT ENCOUNTER: ICD-10-CM

## 2022-01-25 DIAGNOSIS — Z48.89 AFTERCARE FOLLOWING SURGERY FOR INJURY AND TRAUMA: ICD-10-CM

## 2022-01-25 PROCEDURE — 99309 SBSQ NF CARE MODERATE MDM 30: CPT | Performed by: NURSE PRACTITIONER

## 2022-01-25 ASSESSMENT — MIFFLIN-ST. JEOR: SCORE: 1885.24

## 2022-01-25 NOTE — LETTER
"    1/25/2022        RE: Lucia Bobo  [address] [address] [id number]        [facility]  [place] Medical Record Number: [id number]  Chief Complaint   Patient presents with     RECHECK     HPI: Lucia Bobo is a 76 year old (1945), who is being seen today for an episodic care visit at: [facility] ([facility]) [[id number]]. Today's concern is:  Recent multiple hospitalizations with minimally displaced left distal tibia and midshaft fibular fracture requiring multiple surgeries. She is currently in TCU for rehab, in NWB to LLE. She was seen by ortho 1/10/22, they recommended 2 more weeks of NWB to LLE, then advance as tolerated starting 1/24/22. She was able to stand and bear weight on her LLE yesterday. Says is felt very good to stand, was not able to do much but says it was good to stand. She denies any pain . She has no concerns today. Is looking forward to getting to move around more and go home.     Allergies and PMH/PSH reviewed in [facility] today.    REVIEW OF SYSTEMS:  4 point ROS including Respiratory, CV, GI and , other than that noted in the HPI,  is negative    Objective:   /76   Pulse 90   Temp 98.5  F (36.9  C)   Resp 18   Ht 1.626 m (5' 4\")   Wt 140.5 kg (309 lb 12.8 oz)   SpO2 98%   BMI 53.18 kg/m    Exam:  GENERAL APPEARANCE:  Alert, in no distress, morbidly obese, cooperative  RESP:  lungs clear to auscultation , no respiratory distress  CV:  regular rate and rhythm, no murmur, rub, or gallop, peripheral edema 1-2+ in BLE, compression stockings on   ABDOMEN:  bowel sounds normal  M/S:   decreased ROM to left ankle  SKIN:  wound appearance: healed incisions to LLE, dry skin  NEURO:   CN 2-12 grossly intact  PSYCH:  oriented X 3, normal insight, judgement and memory, affect and mood normal    Labs:   Recent labs in [facility] reviewed by me today.  and   Most Recent 3 CBC's:  Recent Labs   Lab Test 12/17/21  0800 12/13/21  0800 10/27/20  0807   WBC 8.6 " 12.9* 12.1*   HGB 10.1* 11.4* 13.6   * 103* 97   * 785* 506*     Most Recent 3 BMP's:  Recent Labs   Lab Test 12/17/21  0800 10/27/20  0807 03/19/20  0933    138 136   POTASSIUM 4.6 4.4 4.6   CHLORIDE 107 105 104   CO2 27 27 24   BUN 25 18 26   CR 0.90 0.85 1.03   ANIONGAP 6 6 8   RAQUEL 9.0 9.8 10.0   * 195* 195*       Assessment/Plan:  Closed fracture of distal end of left tibia with routine healing, unspecified fracture morphology, subsequent encounter  Aftercare following surgery for injury and trauma  Closed fracture of left tibia and fibula with routine healing, subsequent encounter  Physical deconditioning  Secondary to fall while trying to get into car. Surgery without complication. Pain controlled with minimal narcotics. Multiple incisions to LLE - healing well, open to air.Completed 20 days of Lovenox 40mg BID on 12/26. Had follow-up with ortho on 1/10, sutures removed. Advanced to weight bearing as tolerated on 1/24.   - Analgesia APAP 1000mg TID, ice PRN, elevate when at rest;    -  advance as weight bearing as tolerated on 1/24.   - follow-up with ortho on 2/10/22 as scheduled  - PT/OT eval and treat. Discharge planning per their recommendation    Benign essential hypertension, BP goal <140/90  -130s over the past week. HR 70-90, weight stable ~255lbs  - compression stockings to BLE  - atorvastatin 20mg daily  - furosemide 40mg daily  - hydralazine 100mg q AM, 5mg q PM, losartan 100mg daily, verapamil ER 240mg daily    Type 2 diabetes mellitus with hyperglycemia, with long-term current use of insulin (H)  A1C 6.7% on 11/17/21. -180.   -  BG BID  - continue metformin 1000mg q AM 1500mg q PM, glipizide Er 5mg daily, and liraglutide 1.8mg at bedtime    Slow transit constipation  - miralax 17gm daily PRN      Orders:  NNO    Electronically signed by: MERRY Weinberg CNP        Sincerely,        MERRY Weinberg CNP

## 2022-02-01 ENCOUNTER — TRANSITIONAL CARE UNIT VISIT (OUTPATIENT)
Dept: GERIATRICS | Facility: CLINIC | Age: 77
End: 2022-02-01
Payer: COMMERCIAL

## 2022-02-01 VITALS
BODY MASS INDEX: 43.6 KG/M2 | WEIGHT: 255.4 LBS | DIASTOLIC BLOOD PRESSURE: 57 MMHG | TEMPERATURE: 98.7 F | HEIGHT: 64 IN | HEART RATE: 72 BPM | SYSTOLIC BLOOD PRESSURE: 117 MMHG | RESPIRATION RATE: 16 BRPM | OXYGEN SATURATION: 95 %

## 2022-02-01 DIAGNOSIS — S82.302D CLOSED FRACTURE OF DISTAL END OF LEFT TIBIA WITH ROUTINE HEALING, UNSPECIFIED FRACTURE MORPHOLOGY, SUBSEQUENT ENCOUNTER: Primary | ICD-10-CM

## 2022-02-01 DIAGNOSIS — Z79.4 TYPE 2 DIABETES MELLITUS WITH HYPERGLYCEMIA, WITH LONG-TERM CURRENT USE OF INSULIN (H): ICD-10-CM

## 2022-02-01 DIAGNOSIS — Z48.89 AFTERCARE FOLLOWING SURGERY FOR INJURY AND TRAUMA: ICD-10-CM

## 2022-02-01 DIAGNOSIS — S82.402D CLOSED FRACTURE OF LEFT TIBIA AND FIBULA WITH ROUTINE HEALING, SUBSEQUENT ENCOUNTER: ICD-10-CM

## 2022-02-01 DIAGNOSIS — R53.81 PHYSICAL DECONDITIONING: ICD-10-CM

## 2022-02-01 DIAGNOSIS — I10 BENIGN ESSENTIAL HYPERTENSION: ICD-10-CM

## 2022-02-01 DIAGNOSIS — S82.202D CLOSED FRACTURE OF LEFT TIBIA AND FIBULA WITH ROUTINE HEALING, SUBSEQUENT ENCOUNTER: ICD-10-CM

## 2022-02-01 DIAGNOSIS — E11.65 TYPE 2 DIABETES MELLITUS WITH HYPERGLYCEMIA, WITH LONG-TERM CURRENT USE OF INSULIN (H): ICD-10-CM

## 2022-02-01 PROCEDURE — 99309 SBSQ NF CARE MODERATE MDM 30: CPT | Performed by: NURSE PRACTITIONER

## 2022-02-01 ASSESSMENT — MIFFLIN-ST. JEOR: SCORE: 1638.49

## 2022-02-01 NOTE — LETTER
"    2/1/2022        RE: Lucia Bobo  [address] [facility] [address] [facility] [address] [facility] [address]        [facility] GERIATRICS  [place] Medical Record Number: [id number]  Chief Complaint   Patient presents with     RECHECK     HPI: Lucia Bobo is a 76 year old (1945), who is being seen today for an episodic care visit at: [facility] ([facility] [[id number]]. Today's concern is: Recent multiple hospitalizations with minimally displaced left distal tibia and midshaft fibular fracture requiring multiple surgeries. She is currently in TCU for rehab, in NWB to LLE. She was seen by ortho 1/10/22, they recommended 2 more weeks of NWB to LLE, then advance as tolerated starting 1/24/22. She was able to walk 16ft yesterday. Able to toilet herself, transfer in and out of bed. Denies any pain, would like to change APAP to PRN. She is feeling well. Appetite is good. Feels the swelling has really gone down in her legs. She has not concerns today.     Allergies and PMH/PSH reviewed in [facility] today.    REVIEW OF SYSTEMS:  4 point ROS including Respiratory, CV, GI and , other than that noted in the HPI,  is negative    Objective:   /57   Pulse 72   Temp 98.7  F (37.1  C)   Resp 16   Ht 1.626 m (5' 4\")   Wt 115.8 kg (255 lb 6.4 oz)   SpO2 95%   BMI 43.84 kg/m    Exam:  GENERAL APPEARANCE:  Alert, in no distress, morbidly obese, cooperative  RESP:  lungs clear to auscultation , no respiratory distress  CV:  regular rate and rhythm, no murmur, rub, or gallop, peripheral edema 1+ in BLE  ABDOMEN:  bowel sounds normal  M/S:   generalized weakness, no gross joint deformities  SKIN:  healing incisions to LLE  NEURO:   Cn 2-12 grossly intact  PSYCH:  oriented X 3, normal insight, judgement and memory    Labs:   Recent labs in [facility] reviewed by me today.     Assessment/Plan:  Closed fracture of distal end of left tibia with routine healing, unspecified fracture morphology, subsequent encounter  Aftercare following surgery " for injury and trauma  Closed fracture of left tibia and fibula with routine healing, subsequent encounter  Physical deconditioning  Secondary to fall while trying to get into car. Surgery without complication.  Multiple incisions to LLE - healing well, open to air.Completed 20 days of Lovenox 40mg BID on 12/26. Had follow-up with ortho on 1/10, sutures removed. Advanced to weight bearing as tolerated on 1/24. Able to self transfer, walk ~16 feet. No pain.   - Change APAP to 1000mg q6h PRN, ice PRN, elevate when at rest;    -  WBAT  - follow-up with ortho on 2/10/22 as scheduled  - PT/OT eval and treat. Discharge planning per their recommendation    Benign essential hypertension, BP goal <140/90  -130,s HR 70-80. Weight stable ~255lbs. Controlled.  - compression stockings to BLE  - atorvastatin 20mg daily  - furosemide 40mg daily  - hydralazine 100mg q AM, 5mg q PM, losartan 100mg daily, verapamil ER 240mg daily  - continue to monitor and adjust     Type 2 diabetes mellitus with hyperglycemia, with long-term current use of insulin (H)  A1C 6.7% on 11/17/21. BG typically 110-200, elevated at times, but she admits to eating snacks foods.   -  BG BID  - continue metformin 1000mg q AM 1500mg q PM, glipizide Er 5mg daily, and liraglutide 1.8mg at bedtime         Orders:  1. Change Tylenol to 1000mg Po q6h PRN for pain    Electronically signed by: MERRY Weinberg CNP        Sincerely,        MERRY Weinberg CNP

## 2022-02-01 NOTE — PROGRESS NOTES
"[facility] GERIATRICS  [place] Medical Record Number: [id number]  Chief Complaint   Patient presents with     RECHECK     HPI: Lucia Bobo is a 76 year old (1945), who is being seen today for an episodic care visit at: [facility] ([facility] [[id number]]. Today's concern is: Recent multiple hospitalizations with minimally displaced left distal tibia and midshaft fibular fracture requiring multiple surgeries. She is currently in TCU for rehab, in NWB to LLE. She was seen by ortho 1/10/22, they recommended 2 more weeks of NWB to LLE, then advance as tolerated starting 1/24/22. She was able to walk 16ft yesterday. Able to toilet herself, transfer in and out of bed. Denies any pain, would like to change APAP to PRN. She is feeling well. Appetite is good. Feels the swelling has really gone down in her legs. She has not concerns today.     Allergies and PMH/PSH reviewed in [facility] today.    REVIEW OF SYSTEMS:  4 point ROS including Respiratory, CV, GI and , other than that noted in the HPI,  is negative    Objective:   /57   Pulse 72   Temp 98.7  F (37.1  C)   Resp 16   Ht 1.626 m (5' 4\")   Wt 115.8 kg (255 lb 6.4 oz)   SpO2 95%   BMI 43.84 kg/m    Exam:  GENERAL APPEARANCE:  Alert, in no distress, morbidly obese, cooperative  RESP:  lungs clear to auscultation , no respiratory distress  CV:  regular rate and rhythm, no murmur, rub, or gallop, peripheral edema 1+ in BLE  ABDOMEN:  bowel sounds normal  M/S:   generalized weakness, no gross joint deformities  SKIN:  healing incisions to LLE  NEURO:   Cn 2-12 grossly intact  PSYCH:  oriented X 3, normal insight, judgement and memory    Labs:   Recent labs in [facility] reviewed by me today.     Assessment/Plan:  Closed fracture of distal end of left tibia with routine healing, unspecified fracture morphology, subsequent encounter  Aftercare following surgery for injury and trauma  Closed fracture of left tibia and fibula with routine healing, " subsequent encounter  Physical deconditioning  Secondary to fall while trying to get into car. Surgery without complication.  Multiple incisions to LLE - healing well, open to air.Completed 20 days of Lovenox 40mg BID on 12/26. Had follow-up with ortho on 1/10, sutures removed. Advanced to weight bearing as tolerated on 1/24. Able to self transfer, walk ~16 feet. No pain.   - Change APAP to 1000mg q6h PRN, ice PRN, elevate when at rest;    -  WBAT  - follow-up with ortho on 2/10/22 as scheduled  - PT/OT eval and treat. Discharge planning per their recommendation    Benign essential hypertension, BP goal <140/90  -130,s HR 70-80. Weight stable ~255lbs. Controlled.  - compression stockings to BLE  - atorvastatin 20mg daily  - furosemide 40mg daily  - hydralazine 100mg q AM, 5mg q PM, losartan 100mg daily, verapamil ER 240mg daily  - continue to monitor and adjust     Type 2 diabetes mellitus with hyperglycemia, with long-term current use of insulin (H)  A1C 6.7% on 11/17/21. BG typically 110-200, elevated at times, but she admits to eating snacks foods.   -  BG BID  - continue metformin 1000mg q AM 1500mg q PM, glipizide Er 5mg daily, and liraglutide 1.8mg at bedtime         Orders:  1. Change Tylenol to 1000mg Po q6h PRN for pain    Electronically signed by: MERRY Weinberg CNP

## 2022-02-06 ENCOUNTER — NURSING HOME VISIT (OUTPATIENT)
Dept: GERIATRICS | Facility: CLINIC | Age: 77
End: 2022-02-06
Payer: COMMERCIAL

## 2022-02-06 DIAGNOSIS — E11.9 TYPE 2 DIABETES MELLITUS WITHOUT COMPLICATION, WITH LONG-TERM CURRENT USE OF INSULIN (H): Primary | ICD-10-CM

## 2022-02-06 DIAGNOSIS — Z79.4 TYPE 2 DIABETES MELLITUS WITHOUT COMPLICATION, WITH LONG-TERM CURRENT USE OF INSULIN (H): Primary | ICD-10-CM

## 2022-02-06 PROCEDURE — 99207 PR NO CHARGE LOS: CPT | Performed by: NURSE PRACTITIONER

## 2022-02-07 NOTE — PROGRESS NOTES
"[facility] GERIATRICS  [place] Medical Record Number: [id number]  Chief Complaint   Patient presents with     RECHECK     HPI: Lucia Bobo is a 76 year old (1945), who is being seen today for an episodic care visit at: [facility] ([facility]) [[id number]]. Today's concern is: Recent multiple hospitalizations with minimally displaced left distal tibia and midshaft fibular fracture requiring multiple surgeries. She is currently in TCU for rehab, in NWB to LLE. She was seen by ortho 1/10/22, they recommended 2 more weeks of NWB to LLE, then advance as tolerated starting 1/24/22.    She has been making progress with therapy. She has been able to walk up to 70 feet with therapy. She has no pain in her legs. She is hoping to go home earlier next week. She has home visit this afternoon which she is looking forward too. Nursing report no concerns    Allergies and PMH/PSH reviewed in [facility] today.    REVIEW OF SYSTEMS:  4 point ROS including Respiratory, CV, GI and , other than that noted in the HPI,  is negative    Objective:   /63   Pulse 80   Temp 98.9  F (37.2  C)   Resp 18   Ht 1.626 m (5' 4\")   Wt 115.8 kg (255 lb 6.4 oz)   SpO2 98%   BMI 43.84 kg/m    Exam:  GENERAL APPEARANCE:  Alert, in no distress, cooperative  RESP:  lungs clear to auscultation , no respiratory distress  CV:  regular rate and rhythm, no murmur, rub, or gallop, peripheral edema 1+ in BLE  ABDOMEN:  bowel sounds normal  M/S:   no gross joint deformties, ambulates with walker  SKIN:  wound appearance: healed incisions to LLE  NEURO:   Cn 2-12 grossly intact  PSYCH:  oriented X 3, affect and mood normal    Labs:   Recent labs in [facility] reviewed by me today.     Assessment/Plan:  Closed fracture of distal end of left tibia with routine healing, unspecified fracture morphology, subsequent encounter  Aftercare following surgery for injury and trauma  Closed fracture of left tibia and fibula with routine healing, subsequent " encounter  Physical deconditioning  Secondary to fall while trying to get into car. Surgery without complication.  Multiple incisions to LLE - healing well, open to air.Completed 20 days of Lovenox 40mg BID on 12/26. Had follow-up with ortho on 1/10, sutures removed. Advanced to weight bearing as tolerated on 1/24. Making progress with therapy, likely ready for discharge next week  - Change APAP to 1000mg q6h PRN, ice PRN, elevate when at rest;    -  WBAT  - follow-up with ortho on 2/10/22 as scheduled  - PT/OT eval and treat. Discharge planning per their recommendation    Type 2 diabetes mellitus without complication, with long-term current use of insulin (H)  A1C 6.7% on 11/17/21. BG typically  over the next week  -  BG BID  - continue metformin 1000mg q AM 1500mg q PM, glipizide Er 5mg daily, and liraglutide 1.8mg at bedtime    Benign essential hypertension, BP goal <140/90  Controlled; -130, HR 70-80  - compression stockings to BLE  - atorvastatin 20mg daily  - furosemide 40mg daily  - hydralazine 100mg q AM, 5mg q PM, losartan 100mg daily, verapamil ER 240mg daily  - continue to monitor and adjust     Slow transit constipation  Having bowel movements  - discontinue miralax as not used       Orders:  1. discontinue miralax    Electronically signed by: MERRY Weinberg CNP

## 2022-02-07 NOTE — PROGRESS NOTES
RN called to report they ran out of Victoza, they will reorder from pharmacy, but will not show up until 3ish in the AM.  They normally give in the at bedtime.  Reports she is doing well, glucoses lately in the 150's.     Plan:   -Hold Victoza tonight.   -Reorder from Pharmacy.   -Check Glucose 0200 and 0730, call On-call if >250.   -Resume prior Victoza tomorrow night.    --Of note: This was a Phone call from RN to On-call, PT was not seen by this provider...

## 2022-02-08 ENCOUNTER — TRANSITIONAL CARE UNIT VISIT (OUTPATIENT)
Dept: GERIATRICS | Facility: CLINIC | Age: 77
End: 2022-02-08
Payer: COMMERCIAL

## 2022-02-08 VITALS
BODY MASS INDEX: 43.6 KG/M2 | SYSTOLIC BLOOD PRESSURE: 115 MMHG | HEIGHT: 64 IN | TEMPERATURE: 98.9 F | WEIGHT: 255.4 LBS | OXYGEN SATURATION: 98 % | DIASTOLIC BLOOD PRESSURE: 63 MMHG | RESPIRATION RATE: 18 BRPM | HEART RATE: 80 BPM

## 2022-02-08 DIAGNOSIS — K59.01 SLOW TRANSIT CONSTIPATION: ICD-10-CM

## 2022-02-08 DIAGNOSIS — Z79.4 TYPE 2 DIABETES MELLITUS WITHOUT COMPLICATION, WITH LONG-TERM CURRENT USE OF INSULIN (H): ICD-10-CM

## 2022-02-08 DIAGNOSIS — Z48.89 AFTERCARE FOLLOWING SURGERY FOR INJURY AND TRAUMA: ICD-10-CM

## 2022-02-08 DIAGNOSIS — S82.202D CLOSED FRACTURE OF LEFT TIBIA AND FIBULA WITH ROUTINE HEALING, SUBSEQUENT ENCOUNTER: ICD-10-CM

## 2022-02-08 DIAGNOSIS — I10 BENIGN ESSENTIAL HYPERTENSION: ICD-10-CM

## 2022-02-08 DIAGNOSIS — R53.81 PHYSICAL DECONDITIONING: ICD-10-CM

## 2022-02-08 DIAGNOSIS — S82.302D CLOSED FRACTURE OF DISTAL END OF LEFT TIBIA WITH ROUTINE HEALING, UNSPECIFIED FRACTURE MORPHOLOGY, SUBSEQUENT ENCOUNTER: Primary | ICD-10-CM

## 2022-02-08 DIAGNOSIS — E11.9 TYPE 2 DIABETES MELLITUS WITHOUT COMPLICATION, WITH LONG-TERM CURRENT USE OF INSULIN (H): ICD-10-CM

## 2022-02-08 DIAGNOSIS — S82.402D CLOSED FRACTURE OF LEFT TIBIA AND FIBULA WITH ROUTINE HEALING, SUBSEQUENT ENCOUNTER: ICD-10-CM

## 2022-02-08 PROCEDURE — 99309 SBSQ NF CARE MODERATE MDM 30: CPT | Performed by: NURSE PRACTITIONER

## 2022-02-08 ASSESSMENT — MIFFLIN-ST. JEOR: SCORE: 1638.49

## 2022-02-08 NOTE — LETTER
"    2/8/2022        RE: Lucia Bobo  [address] [address] [address]        [facility] GERIATRICS  [place] Medical Record Number: [id number]  Chief Complaint   Patient presents with     RECHECK     HPI: Lucia Bobo is a 76 year old (1945), who is being seen today for an episodic care visit at: [facility] ([facility]) [[id number]]. Today's concern is: Recent multiple hospitalizations with minimally displaced left distal tibia and midshaft fibular fracture requiring multiple surgeries. She is currently in TCU for rehab, in NWB to LLE. She was seen by ortho 1/10/22, they recommended 2 more weeks of NWB to LLE, then advance as tolerated starting 1/24/22.    She has been making progress with therapy. She has been able to walk up to 70 feet with therapy. She has no pain in her legs. She is hoping to go home earlier next week. She has home visit this afternoon which she is looking forward too. Nursing report no concerns    Allergies and PMH/PSH reviewed in [facility] today.    REVIEW OF SYSTEMS:  4 point ROS including Respiratory, CV, GI and , other than that noted in the HPI,  is negative    Objective:   /63   Pulse 80   Temp 98.9  F (37.2  C)   Resp 18   Ht 1.626 m (5' 4\")   Wt 115.8 kg (255 lb 6.4 oz)   SpO2 98%   BMI 43.84 kg/m    Exam:  GENERAL APPEARANCE:  Alert, in no distress, cooperative  RESP:  lungs clear to auscultation , no respiratory distress  CV:  regular rate and rhythm, no murmur, rub, or gallop, peripheral edema 1+ in BLE  ABDOMEN:  bowel sounds normal  M/S:   no gross joint deformties, ambulates with walker  SKIN:  wound appearance: healed incisions to LLE  NEURO:   Cn 2-12 grossly intact  PSYCH:  oriented X 3, affect and mood normal    Labs:   Recent labs in [facility] reviewed by me today.     Assessment/Plan:  Closed fracture of distal end of left tibia with routine healing, unspecified fracture morphology, subsequent encounter  Aftercare following surgery for " injury and trauma  Closed fracture of left tibia and fibula with routine healing, subsequent encounter  Physical deconditioning  Secondary to fall while trying to get into car. Surgery without complication.  Multiple incisions to LLE - healing well, open to air.Completed 20 days of Lovenox 40mg BID on 12/26. Had follow-up with ortho on 1/10, sutures removed. Advanced to weight bearing as tolerated on 1/24. Making progress with therapy, likely ready for discharge next week  - Change APAP to 1000mg q6h PRN, ice PRN, elevate when at rest;    -  WBAT  - follow-up with ortho on 2/10/22 as scheduled  - PT/OT eval and treat. Discharge planning per their recommendation    Type 2 diabetes mellitus without complication, with long-term current use of insulin (H)  A1C 6.7% on 11/17/21. BG typically  over the next week  -  BG BID  - continue metformin 1000mg q AM 1500mg q PM, glipizide Er 5mg daily, and liraglutide 1.8mg at bedtime    Benign essential hypertension, BP goal <140/90  Controlled; -130, HR 70-80  - compression stockings to BLE  - atorvastatin 20mg daily  - furosemide 40mg daily  - hydralazine 100mg q AM, 5mg q PM, losartan 100mg daily, verapamil ER 240mg daily  - continue to monitor and adjust     Slow transit constipation  Having bowel movements  - discontinue miralax as not used       Orders:  1. discontinue miralax    Electronically signed by: MERRY Weinberg CNP        Sincerely,        MERRY Weinberg CNP

## 2022-02-10 NOTE — PROGRESS NOTES
[facility] GERIATRICS DISCHARGE SUMMARY  Patient Name: Lucia Bobo  YOB: 1945  [place] Medical Record Number: [id number]  Place of Service Where Encounter Took Place: [facility] ([facility]) [[id number]]    PRIMARY CARE PROVIDER AND CLINIC RESPONSIBLE AFTER TRANSFER: MERRY Reynolds CNP, [facility] [address] / [place] [address]; Phone: [phone]; Fax: [phone]; Non-G Provider     Transferring providers: MERRY Reinoso CNP; Dawson Maravilla MD  Recent Hospitalization/ED: [facility] stay 11/24/21 to 12/6/21.  Date of SNF Admission: 12/26/2021  Date of SNF (anticipated) Discharge: 2/17/22  Discharged to: previous independent home  Cognitive Scores: BIMS: 15/15  Physical Function: Ambulating 200 ft with independent, 4WW  DME: Walker    CODE STATUS/ADVANCE DIRECTIVES DISCUSSION: No CPR- Do NOT Intubate - DNR/Selective Treatment  ALLERGIES: Lisinopril, Amoxicillin, Lisinopril, and Penicillins    NURSING FACILITY COURSE   Medication Changes/Rationale:     Oxycodone, miralax discontinued and not used    APAP changed to PRN    Summary of nursing facility stay:   Lucia Bobo is a 76 year old female with PMH of Dm2 morbid obesity, essential hypertension, and asthma originally admitted on 11/22/2021 after a fall, found to have a minimally displaced left distal tibia and midshaft fibular fracture.  She had closed reduction with an external fixator placed on11/23/2021, non-weightbearing on the left lower extremity. She transitioned to acute rehab on 11/24/2021. Readmitted to the hospital on 12/3/2021 in order to undergo planned external fixator removal with intramedullary nailing of left tibia fracture for definitive management. Surgery was without complication. Glipizide held post-op, otherwise multiple chronic conditions were stable. When medically stable was discharged to TCU for further rehab and medical management    Closed fracture of  distal end of left tibia with routine healing, unspecified fracture morphology, subsequent encounter  Closed fracture of left tibia and fibula with routine healing, subsequent encounter  Aftercare following surgery for injury and trauma  Physical deconditioning  Secondary to fall while trying to get into car. Surgery without complication.  Multiple incisions to LLE - healing well, open to air.Completed 20 days of Lovenox 40mg BID  X 20 days on 12/26. Had follow-up with ortho on 1/10, sutures removed. Advanced to weight bearing as tolerated on 1/24. Had second follow-up with ortho on 2/10. Had home visit on 2/8, went well. Oxycodone discontinued as not used.   - APAP PRN for analgesia  - WBAT  - Home PT, OT, RN, and HHA  - follow-up with ortho as scheduled ~3/16    Benign essential hypertension, BP goal <140/90  Controlled; -130s, HR 70-80  - compression stockings to BLE  - atorvastatin 20mg daily  - furosemide 40mg daily  - hydralazine 100mg q AM, 5mg q PM, losartan 100mg daily, verapamil ER 240mg daily    Type 2 diabetes mellitus without complication, with long-term current use of insulin (H)  A1C 6.7% on 11/17/21. BG typically  over the next week. Nursing staff teaching how to use home glucometer. Dietician to see patient to provide diet education prior to discharge   -  BG BID  - continue metformin 1000mg q AM 1500mg q PM, glipizide Er 5mg daily, and liraglutide 1.8mg at bedtime    Slow transit constipation  Stable off medication    Morbid obesity (H)  BMI 44. Weight stable in TCU. Dietician meeting with patient to discuss diet, weight loss. Patient is motivated to make diet changes and loose weight.     ABLA (acute blood loss anemia)  Hgb stable in TCU. No concerns for bleeding. See labs.     Mild persistent asthma, uncomplicated  Stable, no concerns in TCU  - continue Advair BID, albuterol MDI PRN    Restless legs syndrome (RLS)  - requip 0.5mg at bedtime    Neuropathy  Controlled  - gabapentin  300mg BID, duloxetine 60mg daily,       Discharge Medications:  Current Outpatient Medications   Medication Sig Dispense Refill     acetaminophen (TYLENOL) 500 MG tablet Take 1,000 mg by mouth every 6 hours as needed       ADVAIR DISKUS 250-50 MCG/DOSE inhaler Inhale 1 puff into the lungs 2 times daily 1 Inhaler 11     albuterol (PROAIR HFA/PROVENTIL HFA/VENTOLIN HFA) 108 (90 Base) MCG/ACT inhaler Inhale 2 puffs into the lungs every 4 hours as needed for shortness of breath / dyspnea       atorvastatin (LIPITOR) 20 MG tablet TAKE 1 TABLET BY MOUTH EVERY NIGHT AT BEDTIME 90 tablet 0     blood glucose (NO BRAND SPECIFIED) test strip Use to test blood sugar 1 times daily or as directed. 100 each 3     blood glucose monitoring (NO BRAND SPECIFIED) meter device kit Use to test blood sugar 2 times daily or as directed. Needs lancets and strips per insurance coverage. 1 kit 0     calcium carbonate 600 mg-vitamin D 400 units (CALTRATE) 600-400 MG-UNIT per tablet Take 1 tablet by mouth daily       DULoxetine (CYMBALTA) 60 MG capsule Take 1 capsule daily 90 capsule 3     furosemide (LASIX) 40 MG tablet Take 40 mg by mouth daily       gabapentin (NEURONTIN) 300 MG capsule Take 300 mg by mouth 2 times daily       glipiZIDE (GLUCOTROL XL) 5 MG 24 hr tablet Take 5 mg by mouth daily       glucosamine-chondroitin 500-400 MG CAPS per capsule Take 2 capsules by mouth 2 times daily        hydrALAZINE (APRESOLINE) 100 MG tablet Take 100 mg by mouth daily Patient takes Hydralazine BID - 100 mg once daily AND 50 mg once daily       hydrALAZINE (APRESOLINE) 50 MG tablet Take 50 mg by mouth daily Patient takes Hydralazine BID - 100 mg once daily AND 50 mg once daily       insulin pen needle (BD SMILEY U/F) 32G X 4 MM miscellaneous USE ONE PEN NEEDLE ONCE DAILY OR AS DIRECTED 100 each 3     liraglutide (VICTOZA PEN) 18 MG/3ML solution INJECT 1.8mg subcutaneously ONCE DAILY 9 mL 0     losartan (COZAAR) 100 MG tablet Take 100 mg by mouth daily   "     magnesium oxide (MAG-OX) 400 MG tablet Take 400 mg by mouth 2 times daily       metFORMIN (GLUCOPHAGE) 1000 MG tablet TAKE 1 TABLET BY MOUTH AT BREAKFAST AND TAKE 1 AND 1/2 TABLETS (1500mg) AT dinner 75 tablet 0     multivitamin (OCUVITE) TABS tablet Take 1 tablet by mouth daily        ONETOUCH ULTRA test strip USE TO TEST BLOOD SUGAR TWO TIMES A DAY OR AS DIRECTED 100 each 1     order for DME Equipment being ordered: 2 pairs of CARINA hose 10 mmHg Calf width 51 cm, Lower leg length 39 cm 2 Units 0     order for DME Equipment being ordered: glucometer with test strips 1 Units 0     order for DME Equipment being ordered: blood pressure monitor 1 Device 0     PHARMACIST CHOICE LANCETS MISC Check blood sugars twice daily 100 each 3     rOPINIRole (REQUIP) 0.5 MG tablet TAKE 1 TABLET BY MOUTH EVERY NIGHT AT BEDTIME 90 tablet 0     verapamil ER (CALAN-SR) 240 MG CR tablet TAKE 1 TABLET BY MOUTH ONCE DAILY 90 tablet 0     Controlled medications:   not applicable/none     Past Medical History:   Past Medical History:   Diagnosis Date     Asthma      HTN      Type II or unspecified type diabetes mellitus without mention of complication, not stated as uncontrolled 4/2/04     Physical Exam:   Vitals: /72   Pulse 86   Temp 98.6  F (37  C)   Resp 18   Ht 1.626 m (5' 4\")   Wt 116.6 kg (257 lb)   SpO2 97%   BMI 44.11 kg/m    BMI: Body mass index is 44.11 kg/m .  GENERAL APPEARANCE:  Alert, in no distress, morbidly obese, cooperative  RESP:  lungs clear to auscultation , no respiratory distress  CV:  regular rate and rhythm, no murmur, rub, or gallop, peripheral edema 1+ in BLE, compression stockings in pkace  ABDOMEN:  bowel sounds normal  M/S:   generalized weakness, decreased ROM to left ankle  SKIN:  multiple healed surgical sites to LLE  NEURO:   CN 2-12 grossly intact  PSYCH:  oriented X 3, normal insight, judgement and memory, affect and mood normal     SNF labs: Recent labs in [facility] reviewed by me today.  and "   Most Recent 3 CBC's:  Recent Labs   Lab Test 12/17/21  0800 12/13/21  0800 10/27/20  0807   WBC 8.6 12.9* 12.1*   HGB 10.1* 11.4* 13.6   * 103* 97   * 785* 506*     Most Recent 3 BMP's:  Recent Labs   Lab Test 12/17/21  0800 10/27/20  0807 03/19/20  0933    138 136   POTASSIUM 4.6 4.4 4.6   CHLORIDE 107 105 104   CO2 27 27 24   BUN 25 18 26   CR 0.90 0.85 1.03   ANIONGAP 6 6 8   RAQUEL 9.0 9.8 10.0   * 195* 195*       DISCHARGE PLAN:    Follow up labs: No labs orders/due    Medical Follow Up:     Follow up with primary care provider in 1-2 weeks    Follow up with specialist ortho ~3/16   [facility] scheduled appointments: None.    Discharge Services: Home Care: Physical Therapy, Occupational Therapy, Registered Nurse, Home Health Aide.    Discharge Instructions Verbalized to Patient at Discharge:     Weight bearing restrictions:  Weight bearing as tolerated.     Monitor blood glucose monitoring 2 times a day. Keep a record and bring it to your next primary provider visit.     Continue to follow your diet:  Carbohydrate Controlled Diet.     Driving is not recommended until cleared by ortho to resume.     TOTAL DISCHARGE TIME: Greater than 30 minutes  Electronically signed by:  MERRY Weinberg CNP     Documentation of Face to Face and Certification for Home Health Services    I certify that services are/were furnished while this patient was under the care of a physician and that a physician or an allowed non-physician practitioner (NPP), had a face-to-face encounter that meets the physician face-to-face encounter requirements. The encounter was in whole, or in part, related to the primary reason for home health. The patient is confined to his/her home and needs intermittent skilled nursing, physical therapy, speech-language pathology, or the continued need for occupational therapy. A plan of care has been established by a physician and is periodically reviewed by a  physician.  Date of Face-to-Face Encounter: 2/15/2022.    I certify that, based on my findings, the following services are medically necessary home health services: Nursing, Occupational Therapy, Physical Therapy and home health aide.    My clinical findings support the need for the above skilled services because: Requires assistance of another person or specialized equipment to access medical services because patient: Is unable to walk greater than 200 feet without rest. and Range of motion limitations prevents ability to exit home safely...    Patient to re-establish plan of care with their PCP within 7-10 days after leaving the facility to reestablish care.  Medicare certified PECOS provider: MERRY Weinberg CNP  Date: February 15, 2022      Dr.Yasser Taiwo MD, signing F2F and only signing for initial order. Please send all follow up questions and concerns or needed follow up signatures to the PCP, who Kimmy has on file as:  Mary Hutchins.       DISPLAY PLAN FREE TEXT

## 2022-02-15 ENCOUNTER — DISCHARGE SUMMARY NURSING HOME (OUTPATIENT)
Dept: GERIATRICS | Facility: CLINIC | Age: 77
End: 2022-02-15
Payer: COMMERCIAL

## 2022-02-15 VITALS
OXYGEN SATURATION: 97 % | HEIGHT: 64 IN | RESPIRATION RATE: 18 BRPM | SYSTOLIC BLOOD PRESSURE: 133 MMHG | TEMPERATURE: 98.6 F | HEART RATE: 86 BPM | DIASTOLIC BLOOD PRESSURE: 72 MMHG | BODY MASS INDEX: 43.87 KG/M2 | WEIGHT: 257 LBS

## 2022-02-15 DIAGNOSIS — D62 ABLA (ACUTE BLOOD LOSS ANEMIA): ICD-10-CM

## 2022-02-15 DIAGNOSIS — E66.01 MORBID OBESITY (H): ICD-10-CM

## 2022-02-15 DIAGNOSIS — S82.402D CLOSED FRACTURE OF LEFT TIBIA AND FIBULA WITH ROUTINE HEALING, SUBSEQUENT ENCOUNTER: ICD-10-CM

## 2022-02-15 DIAGNOSIS — R53.81 PHYSICAL DECONDITIONING: ICD-10-CM

## 2022-02-15 DIAGNOSIS — G62.9 NEUROPATHY: ICD-10-CM

## 2022-02-15 DIAGNOSIS — S82.302D CLOSED FRACTURE OF DISTAL END OF LEFT TIBIA WITH ROUTINE HEALING, UNSPECIFIED FRACTURE MORPHOLOGY, SUBSEQUENT ENCOUNTER: Primary | ICD-10-CM

## 2022-02-15 DIAGNOSIS — K59.01 SLOW TRANSIT CONSTIPATION: ICD-10-CM

## 2022-02-15 DIAGNOSIS — Z48.89 AFTERCARE FOLLOWING SURGERY FOR INJURY AND TRAUMA: ICD-10-CM

## 2022-02-15 DIAGNOSIS — S82.202D CLOSED FRACTURE OF LEFT TIBIA AND FIBULA WITH ROUTINE HEALING, SUBSEQUENT ENCOUNTER: ICD-10-CM

## 2022-02-15 DIAGNOSIS — G25.81 RESTLESS LEGS SYNDROME (RLS): ICD-10-CM

## 2022-02-15 DIAGNOSIS — I10 BENIGN ESSENTIAL HYPERTENSION: ICD-10-CM

## 2022-02-15 DIAGNOSIS — E11.9 TYPE 2 DIABETES MELLITUS WITHOUT COMPLICATION, WITH LONG-TERM CURRENT USE OF INSULIN (H): ICD-10-CM

## 2022-02-15 DIAGNOSIS — Z79.4 TYPE 2 DIABETES MELLITUS WITHOUT COMPLICATION, WITH LONG-TERM CURRENT USE OF INSULIN (H): ICD-10-CM

## 2022-02-15 DIAGNOSIS — J45.30 MILD PERSISTENT ASTHMA, UNCOMPLICATED: ICD-10-CM

## 2022-02-15 PROCEDURE — 99316 NF DSCHRG MGMT 30 MIN+: CPT | Performed by: NURSE PRACTITIONER

## 2022-02-15 ASSESSMENT — MIFFLIN-ST. JEOR: SCORE: 1645.74

## 2022-02-15 NOTE — LETTER
2/15/2022        RE: Lucia Bobo  [id number] [facility] [id number]        [facility] GERIATRICS DISCHARGE SUMMARY  Patient Name: Lucia Bobo  YOB: 1945  [place] Medical Record Number: [id number]  Place of Service Where Encounter Took Place: [facility] ([facility]) [[id number]]    PRIMARY CARE PROVIDER AND CLINIC RESPONSIBLE AFTER TRANSFER: MERRY Reynolds CNP, GILDARDO [facility] [address] [address]; Phone: [phone]; Fax: [phone]; Non-[facility] Provider     Transferring providers: MERRY Reinoso CNP; Dawson Maravilla MD  Recent Hospitalization/ED: [facility] stay 11/24/21 to 12/6/21.  Date of SNF Admission: 12/26/2021  Date of SNF (anticipated) Discharge: 2/17/22  Discharged to: previous independent home  Cognitive Scores: BIMS: 15/15  Physical Function: Ambulating 200 ft with independent, 4WW  DME: Walker    CODE STATUS/ADVANCE DIRECTIVES DISCUSSION: No CPR- Do NOT Intubate - DNR/Selective Treatment  ALLERGIES: Lisinopril, Amoxicillin, Lisinopril, and Penicillins    NURSING FACILITY COURSE   Medication Changes/Rationale:     Oxycodone, miralax discontinued and not used    APAP changed to PRN    Summary of nursing facility stay:   Lucia Bobo is a 76 year old female with PMH of Dm2 morbid obesity, essential hypertension, and asthma originally admitted on 11/22/2021 after a fall, found to have a minimally displaced left distal tibia and midshaft fibular fracture.  She had closed reduction with an external fixator placed on11/23/2021, non-weightbearing on the left lower extremity. She transitioned to acute rehab on 11/24/2021. Readmitted to the hospital on 12/3/2021 in order to undergo planned external fixator removal with intramedullary nailing of left tibia fracture for definitive management. Surgery was without complication. Glipizide held post-op, otherwise multiple chronic conditions were stable. When medically  stable was discharged to TCU for further rehab and medical management    Closed fracture of distal end of left tibia with routine healing, unspecified fracture morphology, subsequent encounter  Closed fracture of left tibia and fibula with routine healing, subsequent encounter  Aftercare following surgery for injury and trauma  Physical deconditioning  Secondary to fall while trying to get into car. Surgery without complication.  Multiple incisions to LLE - healing well, open to air.Completed 20 days of Lovenox 40mg BID  X 20 days on 12/26. Had follow-up with ortho on 1/10, sutures removed. Advanced to weight bearing as tolerated on 1/24. Had second follow-up with ortho on 2/10. Had home visit on 2/8, went well. Oxycodone discontinued as not used.   - APAP PRN for analgesia  - WBAT  - Home PT, OT, RN, and HHA  - follow-up with ortho as scheduled ~3/16    Benign essential hypertension, BP goal <140/90  Controlled; -130s, HR 70-80  - compression stockings to BLE  - atorvastatin 20mg daily  - furosemide 40mg daily  - hydralazine 100mg q AM, 5mg q PM, losartan 100mg daily, verapamil ER 240mg daily    Type 2 diabetes mellitus without complication, with long-term current use of insulin (H)  A1C 6.7% on 11/17/21. BG typically  over the next week. Nursing staff teaching how to use home glucometer. Dietician to see patient to provide diet education prior to discharge   -  BG BID  - continue metformin 1000mg q AM 1500mg q PM, glipizide Er 5mg daily, and liraglutide 1.8mg at bedtime    Slow transit constipation  Stable off medication    Morbid obesity (H)  BMI 44. Weight stable in TCU. Dietician meeting with patient to discuss diet, weight loss. Patient is motivated to make diet changes and loose weight.     ABLA (acute blood loss anemia)  Hgb stable in TCU. No concerns for bleeding. See labs.     Mild persistent asthma, uncomplicated  Stable, no concerns in TCU  - continue Advair BID, albuterol MDI PRN    Restless  legs syndrome (RLS)  - requip 0.5mg at bedtime    Neuropathy  Controlled  - gabapentin 300mg BID, duloxetine 60mg daily,       Discharge Medications:  Current Outpatient Medications   Medication Sig Dispense Refill     acetaminophen (TYLENOL) 500 MG tablet Take 1,000 mg by mouth every 6 hours as needed       ADVAIR DISKUS 250-50 MCG/DOSE inhaler Inhale 1 puff into the lungs 2 times daily 1 Inhaler 11     albuterol (PROAIR HFA/PROVENTIL HFA/VENTOLIN HFA) 108 (90 Base) MCG/ACT inhaler Inhale 2 puffs into the lungs every 4 hours as needed for shortness of breath / dyspnea       atorvastatin (LIPITOR) 20 MG tablet TAKE 1 TABLET BY MOUTH EVERY NIGHT AT BEDTIME 90 tablet 0     blood glucose (NO BRAND SPECIFIED) test strip Use to test blood sugar 1 times daily or as directed. 100 each 3     blood glucose monitoring (NO BRAND SPECIFIED) meter device kit Use to test blood sugar 2 times daily or as directed. Needs lancets and strips per insurance coverage. 1 kit 0     calcium carbonate 600 mg-vitamin D 400 units (CALTRATE) 600-400 MG-UNIT per tablet Take 1 tablet by mouth daily       DULoxetine (CYMBALTA) 60 MG capsule Take 1 capsule daily 90 capsule 3     furosemide (LASIX) 40 MG tablet Take 40 mg by mouth daily       gabapentin (NEURONTIN) 300 MG capsule Take 300 mg by mouth 2 times daily       glipiZIDE (GLUCOTROL XL) 5 MG 24 hr tablet Take 5 mg by mouth daily       glucosamine-chondroitin 500-400 MG CAPS per capsule Take 2 capsules by mouth 2 times daily        hydrALAZINE (APRESOLINE) 100 MG tablet Take 100 mg by mouth daily Patient takes Hydralazine BID - 100 mg once daily AND 50 mg once daily       hydrALAZINE (APRESOLINE) 50 MG tablet Take 50 mg by mouth daily Patient takes Hydralazine BID - 100 mg once daily AND 50 mg once daily       insulin pen needle (BD SMILEY U/F) 32G X 4 MM miscellaneous USE ONE PEN NEEDLE ONCE DAILY OR AS DIRECTED 100 each 3     liraglutide (VICTOZA PEN) 18 MG/3ML solution INJECT 1.8mg  "subcutaneously ONCE DAILY 9 mL 0     losartan (COZAAR) 100 MG tablet Take 100 mg by mouth daily       magnesium oxide (MAG-OX) 400 MG tablet Take 400 mg by mouth 2 times daily       metFORMIN (GLUCOPHAGE) 1000 MG tablet TAKE 1 TABLET BY MOUTH AT BREAKFAST AND TAKE 1 AND 1/2 TABLETS (1500mg) AT dinner 75 tablet 0     multivitamin (OCUVITE) TABS tablet Take 1 tablet by mouth daily        ONETOUCH ULTRA test strip USE TO TEST BLOOD SUGAR TWO TIMES A DAY OR AS DIRECTED 100 each 1     order for DME Equipment being ordered: 2 pairs of CARINA hose 10 mmHg Calf width 51 cm, Lower leg length 39 cm 2 Units 0     order for DME Equipment being ordered: glucometer with test strips 1 Units 0     order for DME Equipment being ordered: blood pressure monitor 1 Device 0     PHARMACIST CHOICE LANCETS MISC Check blood sugars twice daily 100 each 3     rOPINIRole (REQUIP) 0.5 MG tablet TAKE 1 TABLET BY MOUTH EVERY NIGHT AT BEDTIME 90 tablet 0     verapamil ER (CALAN-SR) 240 MG CR tablet TAKE 1 TABLET BY MOUTH ONCE DAILY 90 tablet 0     Controlled medications:   not applicable/none     Past Medical History:   Past Medical History:   Diagnosis Date     Asthma      HTN      Type II or unspecified type diabetes mellitus without mention of complication, not stated as uncontrolled 4/2/04     Physical Exam:   Vitals: /72   Pulse 86   Temp 98.6  F (37  C)   Resp 18   Ht 1.626 m (5' 4\")   Wt 116.6 kg (257 lb)   SpO2 97%   BMI 44.11 kg/m    BMI: Body mass index is 44.11 kg/m .  GENERAL APPEARANCE:  Alert, in no distress, morbidly obese, cooperative  RESP:  lungs clear to auscultation , no respiratory distress  CV:  regular rate and rhythm, no murmur, rub, or gallop, peripheral edema 1+ in BLE, compression stockings in pkace  ABDOMEN:  bowel sounds normal  M/S:   generalized weakness, decreased ROM to left ankle  SKIN:  multiple healed surgical sites to LLE  NEURO:   CN 2-12 grossly intact  PSYCH:  oriented X 3, normal insight, " judgement and memory, affect and mood normal     SNF labs: Recent labs in EPIC reviewed by me today.  and   Most Recent 3 CBC's:  Recent Labs   Lab Test 12/17/21  0800 12/13/21  0800 10/27/20  0807   WBC 8.6 12.9* 12.1*   HGB 10.1* 11.4* 13.6   * 103* 97   * 785* 506*     Most Recent 3 BMP's:  Recent Labs   Lab Test 12/17/21  0800 10/27/20  0807 03/19/20  0933    138 136   POTASSIUM 4.6 4.4 4.6   CHLORIDE 107 105 104   CO2 27 27 24   BUN 25 18 26   CR 0.90 0.85 1.03   ANIONGAP 6 6 8   RAQUEL 9.0 9.8 10.0   * 195* 195*       DISCHARGE PLAN:    Follow up labs: No labs orders/due    Medical Follow Up:     Follow up with primary care provider in 1-2 weeks    Follow up with specialist ortho ~3/16   [facility] scheduled appointments: None.    Discharge Services: Home Care: Physical Therapy, Occupational Therapy, Registered Nurse, Home Health Aide.    Discharge Instructions Verbalized to Patient at Discharge:     Weight bearing restrictions:  Weight bearing as tolerated.     Monitor blood glucose monitoring 2 times a day. Keep a record and bring it to your next primary provider visit.     Continue to follow your diet:  Carbohydrate Controlled Diet.     Driving is not recommended until cleared by ortho to resume.     TOTAL DISCHARGE TIME: Greater than 30 minutes  Electronically signed by:  MERRY Weinberg CNP     Documentation of Face to Face and Certification for Home Health Services    I certify that services are/were furnished while this patient was under the care of a physician and that a physician or an allowed non-physician practitioner (NPP), had a face-to-face encounter that meets the physician face-to-face encounter requirements. The encounter was in whole, or in part, related to the primary reason for home health. The patient is confined to his/her home and needs intermittent skilled nursing, physical therapy, speech-language pathology, or the continued need for occupational  therapy. A plan of care has been established by a physician and is periodically reviewed by a physician.  Date of Face-to-Face Encounter: 2/15/2022.    I certify that, based on my findings, the following services are medically necessary home health services: Nursing, Occupational Therapy, Physical Therapy and home health aide.    My clinical findings support the need for the above skilled services because: Requires assistance of another person or specialized equipment to access medical services because patient: Is unable to walk greater than 200 feet without rest. and Range of motion limitations prevents ability to exit home safely...    Patient to re-establish plan of care with their PCP within 7-10 days after leaving the facility to reestablish care.  Medicare certified PECOS provider: MERRY Weinberg CNP  Date: February 15, 2022      Dr.Yasser Taiwo MD, signing F2F and only signing for initial order. Please send all follow up questions and concerns or needed follow up signatures to the PCP, who [place] has on file as:  Mary Hutchins.            Sincerely,        MERRY Weinberg CNP

## 2022-05-31 NOTE — PROGRESS NOTES
"[facility] GERIATRIC SERVICES  [place] Medical Record Number: [id number]  Chief Complaint   Patient presents with     RECHECK     HPI: Lucia Bobo is a 76 year old (1945), who is being seen today for an episodic care visit at: [facility] ([facility]) [[id number]]. Today's concern is: Recent multiple hospitalizations with minimally displaced left distal tibia and midshaft fibular fracture requiring multiple surgeries. Seen today for routine visit in TCU. She is working with therapy. Feels she is making progress, is hopeful her weight bearing will be advanced at ortho appointment next week. Appetite is good, having bowel movements. No chest pain or SOB.     Allergies and PMH/PSH reviewed in [facility] today.    REVIEW OF SYSTEMS:  4 point ROS including Respiratory, CV, GI and , other than that noted in the HPI,  is negative    Objective:   /49   Pulse 73   Temp 98.3  F (36.8  C)   Resp 18   Ht 1.626 m (5' 4\")   Wt 114.8 kg (253 lb)   SpO2 99%   BMI 43.43 kg/m    Exam:  GENERAL APPEARANCE:  Alert, in no distress, cooperative  RESP:  lungs clear to auscultation , no respiratory distress  CV:  regular rate and rhythm, no murmur, rub, or gallop, peripheral edema 2+ in BLE  ABDOMEN:  bowel sounds normal  M/S:   generalized weankess, decreased ROM to left ankle NWB to LLE  SKIN:  multple incisions to LLE healing, sutures in place, no drainage, dry peeling skin near left ankle  NEURO:   Cn 2-12 grossly intact  PSYCH:  oriented X 3, normal insight, judgement and memory, affect and mood normal    Labs:   Recent labs in [facility] reviewed by me today.     Assessment/Plan:  Closed fracture of distal end of left tibia with routine healing, unspecified fracture morphology, subsequent encounter  Closed fracture of left tibia and fibula with routine healing, subsequent encounter  Aftercare following surgery for injury and trauma  Physical deconditioning  Secondary to fall while trying to get into car. Surgery without " PLAN:   Home Care Advice provided     Patient/Caller agrees to follow recommendations.      "complication. Pain controlled with minimal narcotics. Multiple incisions to LLE - healing well, open to air. Given weight bearing status suspect progress with therapy will be slow. Able to transfer independently. Completed 20 days of Lovenox 40mg BID on 12/26.   - Continue current wound care-  leave STEPHEN if not draining; sutures to remain in place until follow-up with ortho.  - Analgesia with oxycodone 5mg q 4h PRN, APAP 1000mg TID, ice PRN, elevate when at rest  - NWB to LLE  - follow-up with ortho on 1/10/22 as scheduled  - PT/OT eval and treat. Discharge planning per their recommendation    Type 2 diabetes mellitus with hyperglycemia, with long-term current use of insulin (H)  A1C 6.7% on 11/17/21; BG controlled, typically       Mood disorder (H)  - BG BID  - continue metformin 1000mg q AM 1500mg q PM, glipizide Er 5mg daily, and liraglutide 1.8mg at bedtime    Morbid obesity (H)  BMI 43. Likely will affect recovery.   - monitor weights, limit snacking    Benign essential hypertension, BP goal <140/90  -130, BR 70-80. Chronic BLE edema, wearing home compression stocking to RLE  - atorvastatin 20mg daily  - furosemide 40mg daily  - hydralazine 150mg daily, losartan 100mg daily, verapamil ER 240mg daily,   - ok to wear ACE loosely over LLE to help with edema - on in AM, off at HS    Slow transit constipation  Report regular bowel movements. No recent loose stools. H/o of \"slow\" colon per patient.   - miralax 17gm daily prn, monitor and adjust       Orders:  NNO    Electronically signed by: Luci Luis, MERRY CNP  "

## (undated) RX ORDER — LIDOCAINE HYDROCHLORIDE 10 MG/ML
INJECTION, SOLUTION INFILTRATION; PERINEURAL
Status: DISPENSED
Start: 2018-01-17

## (undated) RX ORDER — GLYCOPYRROLATE 0.2 MG/ML
INJECTION, SOLUTION INTRAMUSCULAR; INTRAVENOUS
Status: DISPENSED
Start: 2019-09-12

## (undated) RX ORDER — GLYCOPYRROLATE 0.2 MG/ML
INJECTION, SOLUTION INTRAMUSCULAR; INTRAVENOUS
Status: DISPENSED
Start: 2018-01-17

## (undated) RX ORDER — ONDANSETRON 2 MG/ML
INJECTION INTRAMUSCULAR; INTRAVENOUS
Status: DISPENSED
Start: 2019-09-12